# Patient Record
Sex: FEMALE | Race: WHITE | NOT HISPANIC OR LATINO | Employment: OTHER | URBAN - METROPOLITAN AREA
[De-identification: names, ages, dates, MRNs, and addresses within clinical notes are randomized per-mention and may not be internally consistent; named-entity substitution may affect disease eponyms.]

---

## 2017-02-15 ENCOUNTER — OFFICE VISIT (OUTPATIENT)
Dept: PEDIATRICS | Facility: CLINIC | Age: 72
End: 2017-02-15
Payer: MEDICARE

## 2017-02-15 VITALS
HEIGHT: 65 IN | HEART RATE: 52 BPM | BODY MASS INDEX: 40.15 KG/M2 | TEMPERATURE: 97.7 F | SYSTOLIC BLOOD PRESSURE: 120 MMHG | DIASTOLIC BLOOD PRESSURE: 80 MMHG | WEIGHT: 241 LBS

## 2017-02-15 DIAGNOSIS — I48.19 PERSISTENT ATRIAL FIBRILLATION (H): ICD-10-CM

## 2017-02-15 DIAGNOSIS — I10 BENIGN HYPERTENSION: ICD-10-CM

## 2017-02-15 DIAGNOSIS — E11.8 TYPE 2 DIABETES MELLITUS WITH COMPLICATION, WITHOUT LONG-TERM CURRENT USE OF INSULIN (H): ICD-10-CM

## 2017-02-15 DIAGNOSIS — E78.5 HYPERLIPIDEMIA LDL GOAL <100: Primary | ICD-10-CM

## 2017-02-15 LAB — HBA1C MFR BLD: 7.5 % (ref 4.3–6)

## 2017-02-15 PROCEDURE — 80061 LIPID PANEL: CPT | Performed by: INTERNAL MEDICINE

## 2017-02-15 PROCEDURE — 99207 C FOOT EXAM  NO CHARGE: CPT | Performed by: INTERNAL MEDICINE

## 2017-02-15 PROCEDURE — 99214 OFFICE O/P EST MOD 30 MIN: CPT | Performed by: INTERNAL MEDICINE

## 2017-02-15 PROCEDURE — 36415 COLL VENOUS BLD VENIPUNCTURE: CPT | Performed by: INTERNAL MEDICINE

## 2017-02-15 PROCEDURE — 80053 COMPREHEN METABOLIC PANEL: CPT | Performed by: INTERNAL MEDICINE

## 2017-02-15 PROCEDURE — 83036 HEMOGLOBIN GLYCOSYLATED A1C: CPT | Performed by: INTERNAL MEDICINE

## 2017-02-15 RX ORDER — METFORMIN HCL 500 MG
500 TABLET, EXTENDED RELEASE 24 HR ORAL 2 TIMES DAILY WITH MEALS
Qty: 180 TABLET | Refills: 0 | Status: SHIPPED | OUTPATIENT
Start: 2017-02-15 | End: 2018-01-10

## 2017-02-15 RX ORDER — PRAVASTATIN SODIUM 40 MG
40 TABLET ORAL DAILY
Qty: 90 TABLET | Refills: 1 | Status: SHIPPED | OUTPATIENT
Start: 2017-02-15 | End: 2017-06-27

## 2017-02-15 NOTE — PROGRESS NOTES
SUBJECTIVE:                                                      SUBJECTIVE:                                                    Hima Griffiths is a 71 year old female who presents to clinic today for the following health issues:        Diabetes Follow-up      Patient is checking blood sugars: 3x's times a week 120 in am    Diabetic concerns: None     Symptoms of hypoglycemia (low blood sugar): none     Paresthesias (numbness or burning in feet) or sores: No      Date of last diabetic eye exam: 1/16     Hyperlipidemia Follow-Up      Rate your low fat/cholesterol diet?: fair    Taking statin?  Yes, no muscle aches from statin    Other lipid medications/supplements?:  none     Hypertension Follow-up      Outpatient blood pressures are being checked at [facility].  Results are 120/80.    Low Salt Diet: low salt         Amount of exercise or physical activity: 1-2day/week for an average of less than 15 minutes    Problems taking medications regularly: No    Medication side effects: yes, diarrhea from metformin, only taking one pill  Diet: regular (no restrictions)    Problem list and histories reviewed & adjusted, as indicated.  Additional history: as documented    Patient Active Problem List   Diagnosis     Advanced directives, counseling/discussion     Persistent atrial fibrillation (H)     Benign paroxysmal positional vertigo     H/O ischemic left MCA stroke     Family history of atrial fibrillation     Type 2 diabetes mellitus with complication, without long-term current use of insulin (H)     Benign hypertension     Facial droop     BPPV (benign paroxysmal positional vertigo)     Colitis     Past Surgical History   Procedure Laterality Date     As loop recorder implant  11/9/15     Cataract iol, rt/lt  8/13/14       Social History   Substance Use Topics     Smoking status: Former Smoker     Start date: 10/3/1963     Quit date: 10/3/2006     Smokeless tobacco: Not on file     Alcohol use Yes     Family  "History   Problem Relation Age of Onset     Hypertension Mother      Arrhythmia Mother      Hypertension Father      Coronary Artery Disease Father            ROS:  Constitutional, HEENT, cardiovascular, pulmonary, GI, , musculoskeletal, neuro, skin, endocrine and psych systems are negative, except as otherwise noted.    OBJECTIVE:                                                    /80 (Cuff Size: Adult Large)  Pulse 52  Temp 97.7  F (36.5  C) (Oral)  Ht 5' 4.57\" (1.64 m)  Wt 241 lb (109.3 kg)  BMI 40.64 kg/m2  Body mass index is 40.64 kg/(m^2).  GENERAL: healthy, alert and no distress  NECK: no adenopathy, no asymmetry, masses, or scars and thyroid normal to palpation  RESP: lungs clear to auscultation - no rales, rhonchi or wheezes  CV: regular rate and rhythm, normal S1 S2, no S3 or S4, no murmur, click or rub, no peripheral edema and peripheral pulses strong  ABDOMEN: soft, nontender, no hepatosplenomegaly, no masses and bowel sounds normal  MS: no gross musculoskeletal defects noted, no edema  Diabetic foot exam: normal DP and PT pulses, no trophic changes or ulcerative lesions, normal sensory exam and normal monofilament exam    Diagnostic Test Results:  Results for orders placed or performed in visit on 02/15/17   Hemoglobin A1c   Result Value Ref Range    Hemoglobin A1C 7.5 (H) 4.3 - 6.0 %   Lipid panel reflex to direct LDL   Result Value Ref Range    Cholesterol 107 <200 mg/dL    Triglycerides 100 <150 mg/dL    HDL Cholesterol 51 >49 mg/dL    LDL Cholesterol Calculated 36 <100 mg/dL    Non HDL Cholesterol 56 <130 mg/dL   Comprehensive metabolic panel   Result Value Ref Range    Sodium 139 133 - 144 mmol/L    Potassium 4.8 3.4 - 5.3 mmol/L    Chloride 104 94 - 109 mmol/L    Carbon Dioxide 22 20 - 32 mmol/L    Anion Gap 13 3 - 14 mmol/L    Glucose 215 (H) 70 - 99 mg/dL    Urea Nitrogen 18 7 - 30 mg/dL    Creatinine 0.94 0.52 - 1.04 mg/dL    GFR Estimate 58 (L) >60 mL/min/1.7m2    GFR Estimate If Black " 70 >60 mL/min/1.7m2    Calcium 9.5 8.5 - 10.1 mg/dL    Bilirubin Total 0.6 0.2 - 1.3 mg/dL    Albumin 3.7 3.4 - 5.0 g/dL    Protein Total 8.2 6.8 - 8.8 g/dL    Alkaline Phosphatase 90 40 - 150 U/L    ALT 25 0 - 50 U/L    AST 17 0 - 45 U/L        ASSESSMENT/PLAN:                                                      1. Hyperlipidemia LDL goal <100  Will switch to pravastatin due to also being on diltiazem.   - pravastatin (PRAVACHOL) 40 MG tablet; Take 1 tablet (40 mg) by mouth daily  Dispense: 90 tablet; Refill: 1    2. Type 2 diabetes mellitus with complication, without long-term current use of insulin (H)  Will increase metformin. She will let me know if she has GI side effects  - FOOT EXAM  - Hemoglobin A1c  - Lipid panel reflex to direct LDL  - Comprehensive metabolic panel  - metFORMIN (GLUCOPHAGE-XR) 500 MG 24 hr tablet; Take 1 tablet (500 mg) by mouth 2 times daily (with meals)  Dispense: 180 tablet; Refill: 0    3. Persistent atrial fibrillation (H)  Stable with good rate control.    4. Benign hypertension  Repeat blood pressure at nurse visit.       Patient Instructions   Try increasing the metformin XR to twice daily with meals.    Stop lovastatin and start pravastatin 40 mg daily.  We should repeat fasting labs in 3 months. See me a few days after.    Come in for a nurse visit blood pressure/pulse check in 2 weeks. Check your pulse and call if >100 at rest.      Georgiana Belle MD  [facility]

## 2017-02-15 NOTE — NURSING NOTE
"Chief Complaint   Patient presents with     Recheck Medication       Initial There were no vitals taken for this visit. Estimated body mass index is 38.33 kg/(m^2) as calculated from the following:    Height as of 11/14/16: 5' 6\" (1.676 m).    Weight as of 12/12/16: 237 lb 8 oz (107.7 kg).  Medication Reconciliation: complete   Patience Henry LPN    "

## 2017-02-15 NOTE — MR AVS SNAPSHOT
"              After Visit Summary   2/15/2017    Hima Griffiths    MRN: [id number]           Patient Information     Date Of Birth          1945        Visit Information        Provider Department      2/15/2017 9:30 AM Georgiana Joy MD [facility]        Today's Diagnoses     Hyperlipidemia LDL goal <100    -  1    Type 2 diabetes mellitus with complication, without long-term current use of insulin (H)        Persistent atrial fibrillation (H)        Benign hypertension          Care Instructions    Try increasing the metformin XR to twice daily with meals.    Stop lovastatin and start pravastatin 40 mg daily.  We should repeat fasting labs in 3 months. See me a few days after.    Come in for a nurse visit blood pressure/pulse check in 2 weeks. Check your pulse and call if >100 at rest.        Follow-ups after your visit        Who to contact     If you have questions or need follow up information about today's clinic visit or your schedule please contact [facility] directly at [phone].  Normal or non-critical lab and imaging results will be communicated to you by [employer], letter or phone within 4 business days after the clinic has received the results. If you do not hear from us within 7 days, please contact the clinic through [email or web address] or phone. If you have a critical or abnormal lab result, we will notify you by phone as soon as possible.  Submit refill requests through [employer] or call your pharmacy and they will forward the refill request to us. Please allow 3 business days for your refill to be completed.          Additional Information About Your Visit        [employer] Information     [employer] lets you send messages to your doctor, view your test results, renew your prescriptions, schedule appointments and more. To sign up, go to [email or web address] . Click on \"Log in\" on the left side of the screen, which will take you to the Welcome page. Then click on \"Sign up Now\" " "on the right side of the page.     You will be asked to enter the access code listed below, as well as some personal information. Please follow the directions to create your username and password.     Your access code is: [email or web address]  Expires: 2017 10:36 AM     Your access code will  in 90 days. If you need help or a new code, please call your [place] clinic or [phone].        Care EveryWhere ID     This is your Care EveryWhere ID. This could be used by other organizations to access your [place] medical records  [id number]        Your Vitals Were     Pulse Temperature Height BMI (Body Mass Index)          52 97.7  F (36.5  C) (Oral) 5' 4.57\" (1.64 m) 40.64 kg/m2         Blood Pressure from Last 3 Encounters:   02/15/17 120/80   16 132/84   16 108/66    Weight from Last 3 Encounters:   02/15/17 241 lb (109.3 kg)   16 237 lb 8 oz (107.7 kg)   16 234 lb 8 oz (106.4 kg)              We Performed the Following     Comprehensive metabolic panel     FOOT EXAM     Hemoglobin A1c     Lipid panel reflex to direct LDL          Today's Medication Changes          These changes are accurate as of: 2/15/17 10:36 AM.  If you have any questions, ask your nurse or doctor.               Start taking these medicines.        Dose/Directions    pravastatin 40 MG tablet   Commonly known as:  PRAVACHOL   Used for:  Hyperlipidemia LDL goal <100   Started by:  Georgiana Joy MD        Dose:  40 mg   Take 1 tablet (40 mg) by mouth daily   Quantity:  90 tablet   Refills:  1         These medicines have changed or have updated prescriptions.        Dose/Directions    metFORMIN 500 MG 24 hr tablet   Commonly known as:  GLUCOPHAGE-XR   This may have changed:    - how much to take  - when to take this  - additional instructions   Used for:  Type 2 diabetes mellitus with complication, without long-term current use of insulin (H)        Take 1 tablet at dinner week 1, then week 2 take 2 tablets at " dinner, then week 3 take 3 tablets at dinner   Quantity:  90 tablet   Refills:  0         Stop taking these medicines if you haven't already. Please contact your care team if you have questions.     lovastatin 40 MG tablet   Commonly known as:  MEVACOR   Stopped by:  Georgiana Joy MD                Where to get your medicines      These medications were sent to [facility] - [place], [place] - [address]  [address], [facility] [address]     Phone:  [phone]     pravastatin 40 MG tablet                Primary Care Provider Office Phone # Fax #    Georgiana Joy [phone]       [facility] [address] [place] [address]        Thank you!     Thank you for choosing [facility]  for your care. Our goal is always to provide you with excellent care. Hearing back from our patients is one way we can continue to improve our services. Please take a few minutes to complete the written survey that you may receive in the mail after your visit with us. Thank you!             Your Updated Medication List - Protect others around you: Learn how to safely use, store and throw away your medicines at www.disposemymeds.org.          This list is accurate as of: 2/15/17 10:36 AM.  Always use your most recent med list.                   Brand Name Dispense Instructions for use    diltiazem 30 MG tablet    CARDIZEM    180 tablet    Take 1 tablet (30 mg) by mouth 2 times daily       glipiZIDE 10 MG 24 hr tablet    GLUCOTROL XL    180 tablet    Take 2 tablets (20 mg) by mouth daily       JANUVIA 100 MG tablet   Generic drug:  sitagliptin     90 tablet    Take 1 tablet (100 mg) by mouth daily       metFORMIN 500 MG 24 hr tablet    GLUCOPHAGE-XR    90 tablet    Take 1 tablet at dinner week 1, then week 2 take 2 tablets at dinner, then week 3 take 3 tablets at dinner       metoprolol 100 MG tablet    LOPRESSOR    180  tablet    Take 1 tablet (100 mg) by mouth 2 times daily       pravastatin 40 MG tablet    PRAVACHOL    90 tablet    Take 1 tablet (40 mg) by mouth daily       XARELTO 20 MG Tabs tablet   Generic drug:  rivaroxaban ANTICOAGULANT     90 tablet    Take 1 tablet (20 mg) by mouth daily (with dinner)

## 2017-02-15 NOTE — LETTER
[facility]  [address] [address]                  [phone]   February 20, 2017    Hima Griffiths  [address]   [facility] [address]      Dear Hima,    Here is a summary of your recent test results:    It was so good to see you at your visit.    Your kidney, electrolyte, and liver tests are essentially normal. Your cholesterol looks good.    Your A1C looks better.     We can meet after your repeat lab tests in 3 months. Please let me know if you have questions or concerns.    Your test results are enclosed.      Please contact me if you have any questions.           Thank you very much for choosing [facility]    Best regards,    Georgiana Joy MD        Results for orders placed or performed in visit on 02/15/17   Hemoglobin A1c   Result Value Ref Range    Hemoglobin A1C 7.5 (H) 4.3 - 6.0 %   Lipid panel reflex to direct LDL   Result Value Ref Range    Cholesterol 107 <200 mg/dL    Triglycerides 100 <150 mg/dL    HDL Cholesterol 51 >49 mg/dL    LDL Cholesterol Calculated 36 <100 mg/dL    Non HDL Cholesterol 56 <130 mg/dL   Comprehensive metabolic panel   Result Value Ref Range    Sodium 139 133 - 144 mmol/L    Potassium 4.8 3.4 - 5.3 mmol/L    Chloride 104 94 - 109 mmol/L    Carbon Dioxide 22 20 - 32 mmol/L    Anion Gap 13 3 - 14 mmol/L    Glucose 215 (H) 70 - 99 mg/dL    Urea Nitrogen 18 7 - 30 mg/dL    Creatinine 0.94 0.52 - 1.04 mg/dL    GFR Estimate 58 (L) >60 mL/min/1.7m2    GFR Estimate If Black 70 >60 mL/min/1.7m2    Calcium 9.5 8.5 - 10.1 mg/dL    Bilirubin Total 0.6 0.2 - 1.3 mg/dL    Albumin 3.7 3.4 - 5.0 g/dL    Protein Total 8.2 6.8 - 8.8 g/dL    Alkaline Phosphatase 90 40 - 150 U/L    ALT 25 0 - 50 U/L    AST 17 0 - 45 U/L

## 2017-02-15 NOTE — PATIENT INSTRUCTIONS
Try increasing the metformin XR to twice daily with meals.    Stop lovastatin and start pravastatin 40 mg daily.  We should repeat fasting labs in 3 months. See me a few days after.    Come in for a nurse visit blood pressure/pulse check in 2 weeks. Check your pulse and call if >100 at rest.

## 2017-02-16 LAB
ALBUMIN SERPL-MCNC: 3.7 G/DL (ref 3.4–5)
ALP SERPL-CCNC: 90 U/L (ref 40–150)
ALT SERPL W P-5'-P-CCNC: 25 U/L (ref 0–50)
ANION GAP SERPL CALCULATED.3IONS-SCNC: 13 MMOL/L (ref 3–14)
AST SERPL W P-5'-P-CCNC: 17 U/L (ref 0–45)
BILIRUB SERPL-MCNC: 0.6 MG/DL (ref 0.2–1.3)
BUN SERPL-MCNC: 18 MG/DL (ref 7–30)
CALCIUM SERPL-MCNC: 9.5 MG/DL (ref 8.5–10.1)
CHLORIDE SERPL-SCNC: 104 MMOL/L (ref 94–109)
CHOLEST SERPL-MCNC: 107 MG/DL
CO2 SERPL-SCNC: 22 MMOL/L (ref 20–32)
CREAT SERPL-MCNC: 0.94 MG/DL (ref 0.52–1.04)
GFR SERPL CREATININE-BSD FRML MDRD: 58 ML/MIN/1.7M2
GLUCOSE SERPL-MCNC: 215 MG/DL (ref 70–99)
HDLC SERPL-MCNC: 51 MG/DL
LDLC SERPL CALC-MCNC: 36 MG/DL
NONHDLC SERPL-MCNC: 56 MG/DL
POTASSIUM SERPL-SCNC: 4.8 MMOL/L (ref 3.4–5.3)
PROT SERPL-MCNC: 8.2 G/DL (ref 6.8–8.8)
SODIUM SERPL-SCNC: 139 MMOL/L (ref 133–144)
TRIGL SERPL-MCNC: 100 MG/DL

## 2017-02-22 ENCOUNTER — TELEPHONE (OUTPATIENT)
Dept: PEDIATRICS | Facility: CLINIC | Age: 72
End: 2017-02-22

## 2017-02-22 NOTE — TELEPHONE ENCOUNTER
"Pts daughter sent the message below in her own chart in a [employer] msg.    Do you want pt to have OV to do these things?  Please advise.        \"Also, my mom (Hima Griffiths) needs a form filled out for purposes of getting plates for her car in [place] for handicapped parking and also, she forgot to ask but wanted a letter dictated to her residence ([facility]) to indicate that due to her diabetes she wants to be able to purchase just 10 meals versus 20 because she feels many of the meals are too high in sodium, sometimes sugar and carbs. How should we go about getting either of those completed.\"  "

## 2017-02-22 NOTE — LETTER
[facility]  [address] [place] [address]  [phone]      March 9, 2017    RE:  Hima Lanierдмитрийdat                                                                                                                                                       [address]   [facility] [address]            To whom it may concern:    Hima BANDA Hasmukhbob is under my professional care. Because she has diabetes, she prefers and would do better with just 10 meals purchased at a time.        Sincerely,        Georgiana Joy MD

## 2017-02-23 NOTE — TELEPHONE ENCOUNTER
Dr. Joy can address this next week.    Lilo Gallo MD  Internal Medicine/Pediatrics  [facility]

## 2017-03-09 NOTE — TELEPHONE ENCOUNTER
Called and LM to return call to let us know if they want the form mailed or put downstairs for pick-up.  Patience Henry LPN

## 2017-04-06 DIAGNOSIS — I10 BENIGN HYPERTENSION: ICD-10-CM

## 2017-04-06 DIAGNOSIS — Z86.73 H/O ISCHEMIC LEFT MCA STROKE: ICD-10-CM

## 2017-04-06 DIAGNOSIS — I48.19 PERSISTENT ATRIAL FIBRILLATION (H): ICD-10-CM

## 2017-04-06 DIAGNOSIS — E11.8 TYPE 2 DIABETES MELLITUS WITH COMPLICATION, WITHOUT LONG-TERM CURRENT USE OF INSULIN (H): Primary | ICD-10-CM

## 2017-04-07 RX ORDER — RIVAROXABAN 20 MG/1
20 TABLET, FILM COATED ORAL
Qty: 90 TABLET | Refills: 0 | Status: SHIPPED | OUTPATIENT
Start: 2017-04-07 | End: 2017-06-27

## 2017-04-07 NOTE — TELEPHONE ENCOUNTER
Routing refill request to provider for review/approval because:  Due for CBC.     Dr. Joy - would you like the CBC updated?

## 2017-04-07 NOTE — TELEPHONE ENCOUNTER
Future labs ordered. She is due next month and can have done then. Then see me a week after.  Georgiana Joy M.D.

## 2017-05-10 DIAGNOSIS — E78.5 HYPERLIPIDEMIA LDL GOAL <100: ICD-10-CM

## 2017-05-10 NOTE — TELEPHONE ENCOUNTER
Glipizide 10mg tab         Last Written Prescription Date: 12/12/16  Last Fill Quantity: 180, # refills: 0  Last Office Visit with [facility], [facility] or [facility] prescribing provider:  02/15/17        BP Readings from Last 3 Encounters:   02/15/17 120/80   12/12/16 132/84   11/14/16 108/66     Lab Results   Component Value Date    MICROL 33 10/03/2016     Lab Results   Component Value Date    UMALCR 18.07 10/03/2016     Creatinine   Date Value Ref Range Status   02/15/2017 0.94 0.52 - 1.04 mg/dL Final   ]  GFR Estimate   Date Value Ref Range Status   02/15/2017 58 (L) >60 mL/min/1.7m2 Final     Comment:     Non  GFR Calc   10/03/2016 58 (L) >60 mL/min/1.7m2 Final     Comment:     Non  GFR Calc     GFR Estimate If Black   Date Value Ref Range Status   02/15/2017 70 >60 mL/min/1.7m2 Final     Comment:      GFR Calc   10/03/2016 71 >60 mL/min/1.7m2 Final     Comment:      GFR Calc     Lab Results   Component Value Date    CHOL 107 02/15/2017     Lab Results   Component Value Date    HDL 51 02/15/2017     Lab Results   Component Value Date    LDL 36 02/15/2017     Lab Results   Component Value Date    TRIG 100 02/15/2017     No results found for: CHOLHDLRATIO  Lab Results   Component Value Date    AST 17 02/15/2017     Lab Results   Component Value Date    ALT 25 02/15/2017     Lab Results   Component Value Date    A1C 7.5 02/15/2017    A1C 8.5 10/03/2016     Potassium   Date Value Ref Range Status   02/15/2017 4.8 3.4 - 5.3 mmol/L Final

## 2017-05-11 ENCOUNTER — TELEPHONE (OUTPATIENT)
Dept: PEDIATRICS | Facility: CLINIC | Age: 72
End: 2017-05-11

## 2017-05-11 RX ORDER — GLIPIZIDE 10 MG/1
20 TABLET, FILM COATED, EXTENDED RELEASE ORAL DAILY
Qty: 60 TABLET | Refills: 0 | Status: SHIPPED | OUTPATIENT
Start: 2017-05-11 | End: 2017-06-14

## 2017-05-11 NOTE — TELEPHONE ENCOUNTER
OK to hold the xarelto for A fib for 24 hours before surgery.    Harley Gutierrez MD  Internal Medicine and Pediatrics

## 2017-05-11 NOTE — TELEPHONE ENCOUNTER
Patient is completely out of medication and hasn't taken for a week.    Abena Disla,   [facility]

## 2017-05-11 NOTE — TELEPHONE ENCOUNTER
LM on VM to call back and asked to schedule a lab only appt.    Medication is being filled for 1 time refill only due to:  Future labs ordered for follow up labs..     Prescription approved per [facility] Refill Protocol.    Aida Ramirez, RN, BSN, PHN

## 2017-05-11 NOTE — TELEPHONE ENCOUNTER
Call from Dr. Liu.  Patient is scheduled to have 11 teeth pulled on 5/15, Monday.  She is on Xarelto and they are asking if there is possibility to hold this for a day, or if being on Xarelto would be more important and they would just have to deal with possibility of oozing more.  He can be reached at [phone].    Routing to covering provider-please let me know if this should be addressed by PCP.    Lea Oliva RN  Message handled by Nurse Triage.

## 2017-06-01 DIAGNOSIS — I10 BENIGN HYPERTENSION: ICD-10-CM

## 2017-06-01 DIAGNOSIS — I48.19 PERSISTENT ATRIAL FIBRILLATION (H): ICD-10-CM

## 2017-06-01 DIAGNOSIS — Z86.73 H/O ISCHEMIC LEFT MCA STROKE: ICD-10-CM

## 2017-06-01 DIAGNOSIS — E11.8 TYPE 2 DIABETES MELLITUS WITH COMPLICATION, WITHOUT LONG-TERM CURRENT USE OF INSULIN (H): ICD-10-CM

## 2017-06-01 LAB
ALBUMIN SERPL-MCNC: 3.5 G/DL (ref 3.4–5)
ALP SERPL-CCNC: 91 U/L (ref 40–150)
ALT SERPL W P-5'-P-CCNC: 30 U/L (ref 0–50)
ANION GAP SERPL CALCULATED.3IONS-SCNC: 8 MMOL/L (ref 3–14)
AST SERPL W P-5'-P-CCNC: 23 U/L (ref 0–45)
BILIRUB SERPL-MCNC: 0.6 MG/DL (ref 0.2–1.3)
BUN SERPL-MCNC: 18 MG/DL (ref 7–30)
CALCIUM SERPL-MCNC: 8.9 MG/DL (ref 8.5–10.1)
CHLORIDE SERPL-SCNC: 107 MMOL/L (ref 94–109)
CHOLEST SERPL-MCNC: 136 MG/DL
CO2 SERPL-SCNC: 24 MMOL/L (ref 20–32)
CREAT SERPL-MCNC: 1.03 MG/DL (ref 0.52–1.04)
ERYTHROCYTE [DISTWIDTH] IN BLOOD BY AUTOMATED COUNT: 11.5 % (ref 10–15)
GFR SERPL CREATININE-BSD FRML MDRD: 52 ML/MIN/1.7M2
GLUCOSE SERPL-MCNC: 185 MG/DL (ref 70–99)
HBA1C MFR BLD: 7.7 % (ref 4.3–6)
HCT VFR BLD AUTO: 42.8 % (ref 35–47)
HDLC SERPL-MCNC: 45 MG/DL
HGB BLD-MCNC: 14.6 G/DL (ref 11.7–15.7)
LDLC SERPL CALC-MCNC: 58 MG/DL
MCH RBC QN AUTO: 34.4 PG (ref 26.5–33)
MCHC RBC AUTO-ENTMCNC: 34.1 G/DL (ref 31.5–36.5)
MCV RBC AUTO: 101 FL (ref 78–100)
NONHDLC SERPL-MCNC: 91 MG/DL
PLATELET # BLD AUTO: 177 10E9/L (ref 150–450)
POTASSIUM SERPL-SCNC: 4.5 MMOL/L (ref 3.4–5.3)
PROT SERPL-MCNC: 7.9 G/DL (ref 6.8–8.8)
RBC # BLD AUTO: 4.24 10E12/L (ref 3.8–5.2)
SODIUM SERPL-SCNC: 139 MMOL/L (ref 133–144)
TRIGL SERPL-MCNC: 163 MG/DL
WBC # BLD AUTO: 9.1 10E9/L (ref 4–11)

## 2017-06-01 PROCEDURE — 80061 LIPID PANEL: CPT | Performed by: INTERNAL MEDICINE

## 2017-06-01 PROCEDURE — 83036 HEMOGLOBIN GLYCOSYLATED A1C: CPT | Performed by: INTERNAL MEDICINE

## 2017-06-01 PROCEDURE — 36415 COLL VENOUS BLD VENIPUNCTURE: CPT | Performed by: INTERNAL MEDICINE

## 2017-06-01 PROCEDURE — 80053 COMPREHEN METABOLIC PANEL: CPT | Performed by: INTERNAL MEDICINE

## 2017-06-01 PROCEDURE — 85027 COMPLETE CBC AUTOMATED: CPT | Performed by: INTERNAL MEDICINE

## 2017-06-12 DIAGNOSIS — E11.8 TYPE 2 DIABETES MELLITUS WITH COMPLICATION, WITHOUT LONG-TERM CURRENT USE OF INSULIN (H): Primary | ICD-10-CM

## 2017-06-13 NOTE — TELEPHONE ENCOUNTER
JANUVIA 100 MG tablet         Last Written Prescription Date: 12/12/2016  Last Fill Quantity: 90, # refills: 1  Last Office Visit with [facility], P or [facility] prescribing provider:  2/15/2017        BP Readings from Last 3 Encounters:   02/15/17 120/80   12/12/16 132/84   11/14/16 108/66     Lab Results   Component Value Date    MICROL 33 10/03/2016     Lab Results   Component Value Date    UMALCR 18.07 10/03/2016     Creatinine   Date Value Ref Range Status   06/01/2017 1.03 0.52 - 1.04 mg/dL Final   ]  GFR Estimate   Date Value Ref Range Status   06/01/2017 52 (L) >60 mL/min/1.7m2 Final     Comment:     Non  GFR Calc   02/15/2017 58 (L) >60 mL/min/1.7m2 Final     Comment:     Non  GFR Calc   10/03/2016 58 (L) >60 mL/min/1.7m2 Final     Comment:     Non  GFR Calc     GFR Estimate If Black   Date Value Ref Range Status   06/01/2017 63 >60 mL/min/1.7m2 Final     Comment:      GFR Calc   02/15/2017 70 >60 mL/min/1.7m2 Final     Comment:      GFR Calc   10/03/2016 71 >60 mL/min/1.7m2 Final     Comment:      GFR Calc     Lab Results   Component Value Date    CHOL 136 06/01/2017     Lab Results   Component Value Date    HDL 45 06/01/2017     Lab Results   Component Value Date    LDL 58 06/01/2017     Lab Results   Component Value Date    TRIG 163 06/01/2017     No results found for: CHOLHDLRATIO  Lab Results   Component Value Date    AST 23 06/01/2017     Lab Results   Component Value Date    ALT 30 06/01/2017     Lab Results   Component Value Date    A1C 7.7 06/01/2017    A1C 7.5 02/15/2017    A1C 8.5 10/03/2016     Potassium   Date Value Ref Range Status   06/01/2017 4.5 3.4 - 5.3 mmol/L Final

## 2017-06-13 NOTE — TELEPHONE ENCOUNTER
glipiZIDE (GLUCOTROL XL) 10 MG 24 hr tablet         Last Written Prescription Date: 5/11/2017  Last Fill Quantity: 60, # refills: 0  Last Office Visit with [facility], [facility] or [facility] prescribing provider:  2/15/2017        BP Readings from Last 3 Encounters:   02/15/17 120/80   12/12/16 132/84   11/14/16 108/66     Lab Results   Component Value Date    MICROL 33 10/03/2016     Lab Results   Component Value Date    UMALCR 18.07 10/03/2016     Creatinine   Date Value Ref Range Status   06/01/2017 1.03 0.52 - 1.04 mg/dL Final   ]  GFR Estimate   Date Value Ref Range Status   06/01/2017 52 (L) >60 mL/min/1.7m2 Final     Comment:     Non  GFR Calc   02/15/2017 58 (L) >60 mL/min/1.7m2 Final     Comment:     Non  GFR Calc   10/03/2016 58 (L) >60 mL/min/1.7m2 Final     Comment:     Non  GFR Calc     GFR Estimate If Black   Date Value Ref Range Status   06/01/2017 63 >60 mL/min/1.7m2 Final     Comment:      GFR Calc   02/15/2017 70 >60 mL/min/1.7m2 Final     Comment:      GFR Calc   10/03/2016 71 >60 mL/min/1.7m2 Final     Comment:      GFR Calc     Lab Results   Component Value Date    CHOL 136 06/01/2017     Lab Results   Component Value Date    HDL 45 06/01/2017     Lab Results   Component Value Date    LDL 58 06/01/2017     Lab Results   Component Value Date    TRIG 163 06/01/2017     No results found for: CHOLHDLRATIO  Lab Results   Component Value Date    AST 23 06/01/2017     Lab Results   Component Value Date    ALT 30 06/01/2017     Lab Results   Component Value Date    A1C 7.7 06/01/2017    A1C 7.5 02/15/2017    A1C 8.5 10/03/2016     Potassium   Date Value Ref Range Status   06/01/2017 4.5 3.4 - 5.3 mmol/L Final

## 2017-06-14 RX ORDER — GLIPIZIDE 10 MG/1
20 TABLET, FILM COATED, EXTENDED RELEASE ORAL DAILY
Qty: 60 TABLET | Refills: 0 | Status: SHIPPED | OUTPATIENT
Start: 2017-06-14 | End: 2017-06-27

## 2017-06-14 RX ORDER — SITAGLIPTIN 100 MG/1
100 TABLET, FILM COATED ORAL DAILY
Qty: 90 TABLET | Refills: 1 | OUTPATIENT
Start: 2017-06-14

## 2017-06-14 RX ORDER — SITAGLIPTIN 100 MG/1
100 TABLET, FILM COATED ORAL DAILY
Qty: 90 TABLET | Refills: 1 | Status: SHIPPED | OUTPATIENT
Start: 2017-06-14 | End: 2017-12-07

## 2017-06-14 RX ORDER — GLIPIZIDE 10 MG/1
20 TABLET, FILM COATED, EXTENDED RELEASE ORAL DAILY
Qty: 60 TABLET | Refills: 0 | OUTPATIENT
Start: 2017-06-14

## 2017-06-27 ENCOUNTER — OFFICE VISIT (OUTPATIENT)
Dept: PEDIATRICS | Facility: CLINIC | Age: 72
End: 2017-06-27
Payer: MEDICARE

## 2017-06-27 VITALS
SYSTOLIC BLOOD PRESSURE: 108 MMHG | TEMPERATURE: 97.4 F | DIASTOLIC BLOOD PRESSURE: 66 MMHG | BODY MASS INDEX: 39.92 KG/M2 | HEIGHT: 65 IN | WEIGHT: 239.6 LBS

## 2017-06-27 DIAGNOSIS — E78.5 HYPERLIPIDEMIA LDL GOAL <100: ICD-10-CM

## 2017-06-27 DIAGNOSIS — I48.19 PERSISTENT ATRIAL FIBRILLATION (H): ICD-10-CM

## 2017-06-27 DIAGNOSIS — Z86.73 H/O ISCHEMIC LEFT MCA STROKE: ICD-10-CM

## 2017-06-27 DIAGNOSIS — E11.8 TYPE 2 DIABETES MELLITUS WITH COMPLICATION, WITHOUT LONG-TERM CURRENT USE OF INSULIN (H): ICD-10-CM

## 2017-06-27 DIAGNOSIS — I10 BENIGN HYPERTENSION: ICD-10-CM

## 2017-06-27 PROCEDURE — 99214 OFFICE O/P EST MOD 30 MIN: CPT | Performed by: INTERNAL MEDICINE

## 2017-06-27 RX ORDER — PRAVASTATIN SODIUM 40 MG
40 TABLET ORAL DAILY
Qty: 90 TABLET | Refills: 1 | Status: SHIPPED | OUTPATIENT
Start: 2017-06-27 | End: 2018-01-10

## 2017-06-27 RX ORDER — METFORMIN HCL 500 MG
500 TABLET, EXTENDED RELEASE 24 HR ORAL 2 TIMES DAILY WITH MEALS
Qty: 180 TABLET | Refills: 1 | Status: CANCELLED | OUTPATIENT
Start: 2017-06-27

## 2017-06-27 RX ORDER — GLIPIZIDE 10 MG/1
20 TABLET, FILM COATED, EXTENDED RELEASE ORAL DAILY
Qty: 60 TABLET | Refills: 1 | Status: SHIPPED | OUTPATIENT
Start: 2017-06-27 | End: 2017-09-13

## 2017-06-27 RX ORDER — METOPROLOL TARTRATE 100 MG
100 TABLET ORAL 2 TIMES DAILY
Qty: 180 TABLET | Refills: 2 | Status: SHIPPED | OUTPATIENT
Start: 2017-06-27 | End: 2018-01-10

## 2017-06-27 RX ORDER — RIVAROXABAN 20 MG/1
20 TABLET, FILM COATED ORAL
Qty: 90 TABLET | Refills: 1 | Status: SHIPPED | OUTPATIENT
Start: 2017-06-27 | End: 2017-12-21

## 2017-06-27 NOTE — PROGRESS NOTES
SUBJECTIVE:                                                    Hima Griffiths is a 72 year old female who presents to clinic today for the following health issues:    Medication Followup     Taking Medication as prescribed: NO, metformin gives her diarrhea    Side Effects:  None    Medication Helping Symptoms:  yes     Diabetes Follow-up      Patient is checking blood sugars: not at all    Diabetic concerns: None     Symptoms of hypoglycemia (low blood sugar): none     Paresthesias (numbness or burning in feet) or sores: No     Date of last diabetic eye exam:     Hyperlipidemia Follow-Up      Rate your low fat/cholesterol diet?: good    Taking statin?  Yes, no muscle aches from statin    Other lipid medications/supplements?:  none    Hypertension Follow-up      Outpatient blood pressures are not being checked.    Low Salt Diet: no added salt    Cerebrovascular Follow-up      Patient history: ischemic cerebrovascular incident    Residual symptoms: None    Worsened or new symptoms since last visit: No    Daily aspirin use: No    Hypertension controlled: Yes      Problem list and histories reviewed & adjusted, as indicated.  Additional history: as documented    Patient Active Problem List   Diagnosis     Advanced directives, counseling/discussion     Persistent atrial fibrillation (H)     Benign paroxysmal positional vertigo     H/O ischemic left MCA stroke     Family history of atrial fibrillation     Type 2 diabetes mellitus with complication, without long-term current use of insulin (H)     Benign hypertension     Facial droop     Colitis     Past Surgical History:   Procedure Laterality Date     AS LOOP RECORDER IMPLANT  11/9/15     CATARACT IOL, RT/LT  8/13/14       Social History   Substance Use Topics     Smoking status: Former Smoker     Start date: 10/3/1963     Quit date: 10/3/2006     Smokeless tobacco: Not on file     Alcohol use Yes     Family History   Problem Relation Age of Onset     Hypertension  "Mother      Arrhythmia Mother      Hypertension Father      Coronary Artery Disease Father          Current Outpatient Prescriptions   Medication Sig Dispense Refill     XARELTO 20 MG TABS tablet Take 1 tablet (20 mg) by mouth daily (with dinner) 90 tablet 1     glipiZIDE (GLUCOTROL XL) 10 MG 24 hr tablet Take 2 tablets (20 mg) by mouth daily 60 tablet 1     pravastatin (PRAVACHOL) 40 MG tablet Take 1 tablet (40 mg) by mouth daily 90 tablet 1     metoprolol (LOPRESSOR) 100 MG tablet Take 1 tablet (100 mg) by mouth 2 times daily 180 tablet 2     ACE/ARB NOT PRESCRIBED, INTENTIONAL, Please choose reason not prescribed, below       JANUVIA 100 MG tablet Take 1 tablet (100 mg) by mouth daily 90 tablet 1     diltiazem (CARDIZEM) 30 MG tablet Take 1 tablet (30 mg) by mouth 2 times daily 180 tablet 3     metFORMIN (GLUCOPHAGE-XR) 500 MG 24 hr tablet Take 1 tablet (500 mg) by mouth 2 times daily (with meals) (Patient not taking: Reported on 6/27/2017) 180 tablet 0     BP Readings from Last 3 Encounters:   06/27/17 108/66   02/15/17 120/80   12/12/16 132/84    Wt Readings from Last 3 Encounters:   06/27/17 239 lb 9.6 oz (108.7 kg)   02/15/17 241 lb (109.3 kg)   12/12/16 237 lb 8 oz (107.7 kg)                    Reviewed and updated as needed this visit by clinical staff  Tobacco  Allergies  Meds  Med Hx  Surg Hx  Fam Hx  Soc Hx      Reviewed and updated as needed this visit by Provider         ROS:  Constitutional, HEENT, cardiovascular, pulmonary, GI, , musculoskeletal, neuro, skin, endocrine and psych systems are negative, except as otherwise noted.    OBJECTIVE:     /66 (Cuff Size: Adult Large)  Temp 97.4  F (36.3  C) (Oral)  Ht 5' 4.56\" (1.64 m)  Wt 239 lb 9.6 oz (108.7 kg)  BMI 40.42 kg/m2  Body mass index is 40.42 kg/(m^2).  GENERAL: healthy, alert and no distress  NECK: no adenopathy, no asymmetry, masses, or scars and thyroid normal to palpation  RESP: lungs clear to auscultation - no rales, rhonchi " or wheezes  CV: irreg irreg, normal S1 S2, no S3 or S4, no murmur, click or rub  ABDOMEN: soft, nontender, no hepatosplenomegaly, no masses and bowel sounds normal  MS: no gross musculoskeletal defects noted, no edema    Diagnostic Test Results:  Results for orders placed or performed in visit on 06/01/17   **A1C FUTURE anytime   Result Value Ref Range    Hemoglobin A1C 7.7 (H) 4.3 - 6.0 %   **Comprehensive metabolic panel FUTURE anytime   Result Value Ref Range    Sodium 139 133 - 144 mmol/L    Potassium 4.5 3.4 - 5.3 mmol/L    Chloride 107 94 - 109 mmol/L    Carbon Dioxide 24 20 - 32 mmol/L    Anion Gap 8 3 - 14 mmol/L    Glucose 185 (H) 70 - 99 mg/dL    Urea Nitrogen 18 7 - 30 mg/dL    Creatinine 1.03 0.52 - 1.04 mg/dL    GFR Estimate 52 (L) >60 mL/min/1.7m2    GFR Estimate If Black 63 >60 mL/min/1.7m2    Calcium 8.9 8.5 - 10.1 mg/dL    Bilirubin Total 0.6 0.2 - 1.3 mg/dL    Albumin 3.5 3.4 - 5.0 g/dL    Protein Total 7.9 6.8 - 8.8 g/dL    Alkaline Phosphatase 91 40 - 150 U/L    ALT 30 0 - 50 U/L    AST 23 0 - 45 U/L   **CBC with platelets FUTURE anytime   Result Value Ref Range    WBC 9.1 4.0 - 11.0 10e9/L    RBC Count 4.24 3.8 - 5.2 10e12/L    Hemoglobin 14.6 11.7 - 15.7 g/dL    Hematocrit 42.8 35.0 - 47.0 %     (H) 78 - 100 fl    MCH 34.4 (H) 26.5 - 33.0 pg    MCHC 34.1 31.5 - 36.5 g/dL    RDW 11.5 10.0 - 15.0 %    Platelet Count 177 150 - 450 10e9/L   **Lipid panel reflex to direct LDL FUTURE anytime   Result Value Ref Range    Cholesterol 136 <200 mg/dL    Triglycerides 163 (H) <150 mg/dL    HDL Cholesterol 45 (L) >49 mg/dL    LDL Cholesterol Calculated 58 <100 mg/dL    Non HDL Cholesterol 91 <130 mg/dL       ASSESSMENT/PLAN:     1. Persistent atrial fibrillation (H)  Stable. Good rate control. Doing well on xarelto  - XARELTO 20 MG TABS tablet; Take 1 tablet (20 mg) by mouth daily (with dinner)  Dispense: 90 tablet; Refill: 1  - metoprolol (LOPRESSOR) 100 MG tablet; Take 1 tablet (100 mg) by mouth 2  times daily  Dispense: 180 tablet; Refill: 2    2. Type 2 diabetes mellitus with complication, without long-term current use of insulin (H)  Doing reasonably well. Due for diabetes education. Discussed importance of walking.  - glipiZIDE (GLUCOTROL XL) 10 MG 24 hr tablet; Take 2 tablets (20 mg) by mouth daily  Dispense: 60 tablet; Refill: 1  - ACE/ARB NOT PRESCRIBED, INTENTIONAL,; Please choose reason not prescribed, below  - DIABETES EDUCATOR REFERRAL    3. Benign hypertension  stable  - XARELTO 20 MG TABS tablet; Take 1 tablet (20 mg) by mouth daily (with dinner)  Dispense: 90 tablet; Refill: 1    4. Hyperlipidemia LDL goal <100  Tolerating statin  - pravastatin (PRAVACHOL) 40 MG tablet; Take 1 tablet (40 mg) by mouth daily  Dispense: 90 tablet; Refill: 1    5. H/O ischemic left MCA stroke    - XARELTO 20 MG TABS tablet; Take 1 tablet (20 mg) by mouth daily (with dinner)  Dispense: 90 tablet; Refill: 1    Patient Instructions   Let's keep your medications the same.    Try to walk more every day.  If you feel unsteady with your walker, we can have you see a physical therapist.    Set up an appointment with diabetes education.    Try to skip dessert after dinner, especially if it doesn't look delicious.    I spent 25 minutes with this patient face-to-face, of which 50% or greater was spent in counseling and coordination of care with regards to diabetes, diet, walking, blood pressure and afib. Please see plan above.    Georgiana Belle MD  [facility]

## 2017-06-27 NOTE — NURSING NOTE
"Chief Complaint   Patient presents with     RECHECK     follow up on labs and meds       Initial /66 (Cuff Size: Adult Large)  Temp 97.4  F (36.3  C) (Oral)  Ht 5' 4.56\" (1.64 m)  Wt 239 lb 9.6 oz (108.7 kg)  BMI 40.42 kg/m2 Estimated body mass index is 40.42 kg/(m^2) as calculated from the following:    Height as of this encounter: 5' 4.56\" (1.64 m).    Weight as of this encounter: 239 lb 9.6 oz (108.7 kg).  Medication Reconciliation: complete   Patience Henry LPN    "

## 2017-06-27 NOTE — PATIENT INSTRUCTIONS
Let's keep your medications the same.    Try to walk more every day.  If you feel unsteady with your walker, we can have you see a physical therapist.    Set up an appointment with diabetes education.    Try to skip dessert after dinner, especially if it doesn't look delicious.

## 2017-06-27 NOTE — MR AVS SNAPSHOT
After Visit Summary   6/27/2017    Hima Griffiths    MRN: [id number]           Patient Information     Date Of Birth          1945        Visit Information        Provider Department      6/27/2017 9:50 AM Georgiana Joy MD [facility] [place]        Today's Diagnoses     H/O ischemic left MCA stroke        Persistent atrial fibrillation (H)        Type 2 diabetes mellitus with complication, without long-term current use of insulin (H)        Benign hypertension        Hyperlipidemia LDL goal <100        PAF (paroxysmal atrial fibrillation) (H)          Care Instructions    Let's keep your medications the same.    Try to walk more every day.  If you feel unsteady with your walker, we can have you see a physical therapist.    Set up an appointment with diabetes education.    Try to skip dessert after dinner, especially if it doesn't look delicious.          Follow-ups after your visit        Additional Services     DIABETES EDUCATOR REFERRAL       DIABETES SELF MANAGEMENT TRAINING (DSMT)      Your provider has referred you to Diabetes Education: [facility]: Diabetes Education - All [facility] [place] [phone]   [email or web address]    Type of training and number of hours: Previous Diagnosis: Follow-up DSMT - 2 hours.    Medicare covers: 10 hours of initial DSMT in 12 month period from the time of first visit, plus 2 hours of follow-up DSMT annually, and additional hours as requested for insulin training.    Diabetes Type: Type 2 - Diet Control             Diabetes Co-Morbidities: none               A1C Goal:  <8.0       A1C is: Lab Results       Component                Value               Date                       A1C                      7.7                 06/01/2017              If an urgent visit is needed or A1C is above 12, Care Team to call the Diabetes Education Team at [phone] or send an In Basket message to the Diabetes  Education Pool (P DIAB ED-PATIENT CARE).    Diabetes Education Topics: Comprehensive Knowledge Assessment and Instruction    Special Educational Needs Requiring Individual DSMT: None       MEDICAL NUTRITION THERAPY (MNT) for Diabetes    Medical Nutrition Therapy with a Registered Dietitian can be provided in coordination with Diabetes Self-Management Training to assist in achieving optimal diabetes management.    MNT Type and Hours: Do not initiate MNT at this time.                       Medicare will cover: 3 hours initial MNT in 12 month period after first visit, plus 2 hours of follow-up MNT annually    Please be aware that coverage of these services is subject to the terms and limitations of your health insurance plan.  Call member services at your health plan to determine Diabetes Self-Management Training benefits and ask which blood glucose monitor brands are covered by your plan.      Please bring the following with you to your appointment:    (1)  List of current medications   (2)  List of Blood Glucose Monitor brands that are covered by your insurance plan  (3)  Blood Glucose Monitor and log book  (4)   Food records for the 3 days prior to your visit    The Certified Diabetes Educator may make diabetes medication adjustments per the CDE Protocol and Collaborative Practice Agreement.                  Who to contact     If you have questions or need follow up information about today's clinic visit or your schedule please contact [facility] directly at [phone].  Normal or non-critical lab and imaging results will be communicated to you by MyChart, letter or phone within 4 business days after the clinic has received the results. If you do not hear from us within 7 days, please contact the clinic through [facility] or phone. If you have a critical or abnormal lab result, we will notify you by phone as soon as possible.  Submit refill requests through [employer] or call your pharmacy and they will  "forward the refill request to us. Please allow 3 business days for your refill to be completed.          Additional Information About Your Visit        [employer] of [employer] Information     [employer] lets you send messages to your doctor, view your test results, renew your prescriptions, schedule appointments and more. To sign up, go to [email or web address] . Click on \"Log in\" on the left side of the screen, which will take you to the Welcome page. Then click on \"Sign up Now\" on the right side of the page.     You will be asked to enter the access code listed below, as well as some personal information. Please follow the directions to create your username and password.     Your access code is: [email or web address]  Expires: 2017 10:57 AM     Your access code will  in 90 days. If you need help or a new code, please call your [place] clinic or [phone].        Care EveryWhere ID     This is your Care EveryWhere ID. This could be used by other organizations to access your [place] medical records  [id number]        Your Vitals Were     Temperature Height BMI (Body Mass Index)             97.4  F (36.3  C) (Oral) 5' 4.56\" (1.64 m) 40.42 kg/m2          Blood Pressure from Last 3 Encounters:   17 108/66   02/15/17 120/80   16 132/84    Weight from Last 3 Encounters:   17 239 lb 9.6 oz (108.7 kg)   02/15/17 241 lb (109.3 kg)   16 237 lb 8 oz (107.7 kg)              We Performed the Following     DIABETES EDUCATOR REFERRAL          Today's Medication Changes          These changes are accurate as of: 17 10:57 AM.  If you have any questions, ask your nurse or doctor.               Start taking these medicines.        Dose/Directions    ACE/ARB NOT PRESCRIBED (INTENTIONAL)   Used for:  Type 2 diabetes mellitus with complication, without long-term current use of insulin (H)   Started by:  Georgiana Joy MD        Please choose reason not prescribed, below   Refills:  0            Where to get " your medicines      These medications were sent to [facility] - [place], [place] - [address]  [address], [facility] [address]     Phone:  [phone]     glipiZIDE 10 MG 24 hr tablet    metoprolol 100 MG tablet    pravastatin 40 MG tablet    XARELTO 20 MG Tabs tablet         Some of these will need a paper prescription and others can be bought over the counter.  Ask your nurse if you have questions.     You don't need a prescription for these medications     ACE/ARB NOT PRESCRIBED (INTENTIONAL)                Primary Care Provider Office Phone # Fax #    Georgiana Joy [phone]       [facility] [address] [place] [address]        Equal Access to Services     TIFFANIE GUERRIER : Hadii aad ku hadasho Soomaali, waaxda luqadaha, qaybta kaalmada adeegyada, gigi adames . So [facility] [phone].    ATENCIÓN: Si habla español, tiene a ramesh disposición servicios gratuitos de asistencia lingüística. Llame al [phone].    We comply with applicable federal civil rights laws and [place] laws. We do not discriminate on the basis of race, color, national origin, age, disability sex, sexual orientation or gender identity.            Thank you!     Thank you for choosing [facility]  for your care. Our goal is always to provide you with excellent care. Hearing back from our patients is one way we can continue to improve our services. Please take a few minutes to complete the written survey that you may receive in the mail after your visit with us. Thank you!             Your Updated Medication List - Protect others around you: Learn how to safely use, store and throw away your medicines at www.disposemymeds.org.          This list is accurate as of: 6/27/17 10:57 AM.  Always use your most recent med list.                   Brand Name Dispense Instructions for use Diagnosis     ACE/ARB NOT PRESCRIBED (INTENTIONAL)      Please choose reason not prescribed, below    Type 2 diabetes mellitus with complication, without long-term current use of insulin (H)       diltiazem 30 MG tablet    CARDIZEM    180 tablet    Take 1 tablet (30 mg) by mouth 2 times daily    Persistent atrial fibrillation (H)       glipiZIDE 10 MG 24 hr tablet    GLUCOTROL XL    60 tablet    Take 2 tablets (20 mg) by mouth daily    Type 2 diabetes mellitus with complication, without long-term current use of insulin (H)       JANUVIA 100 MG tablet   Generic drug:  sitagliptin     90 tablet    Take 1 tablet (100 mg) by mouth daily    Type 2 diabetes mellitus with complication, without long-term current use of insulin (H)       metFORMIN 500 MG 24 hr tablet    GLUCOPHAGE-XR    180 tablet    Take 1 tablet (500 mg) by mouth 2 times daily (with meals)    Type 2 diabetes mellitus with complication, without long-term current use of insulin (H)       metoprolol 100 MG tablet    LOPRESSOR    180 tablet    Take 1 tablet (100 mg) by mouth 2 times daily    PAF (paroxysmal atrial fibrillation) (H)       pravastatin 40 MG tablet    PRAVACHOL    90 tablet    Take 1 tablet (40 mg) by mouth daily    Hyperlipidemia LDL goal <100       XARELTO 20 MG Tabs tablet   Generic drug:  rivaroxaban ANTICOAGULANT     90 tablet    Take 1 tablet (20 mg) by mouth daily (with dinner)    H/O ischemic left MCA stroke, Persistent atrial fibrillation (H), Type 2 diabetes mellitus with complication, without long-term current use of insulin (H), Benign hypertension

## 2017-07-20 ENCOUNTER — TRANSFERRED RECORDS (OUTPATIENT)
Dept: HEALTH INFORMATION MANAGEMENT | Facility: CLINIC | Age: 72
End: 2017-07-20

## 2017-09-13 DIAGNOSIS — E11.8 TYPE 2 DIABETES MELLITUS WITH COMPLICATION, WITHOUT LONG-TERM CURRENT USE OF INSULIN (H): ICD-10-CM

## 2017-09-13 DIAGNOSIS — I48.19 PERSISTENT ATRIAL FIBRILLATION (H): ICD-10-CM

## 2017-09-14 NOTE — TELEPHONE ENCOUNTER
glipiZIDE (GLUCOTROL XL) 10 MG 24 hr tablet         Last Written Prescription Date: 6/27/2017  Last Fill Quantity: 60, # refills: 1  Last Office Visit with [facility], [facility] or [facility] prescribing provider:  6/27/2017        BP Readings from Last 3 Encounters:   06/27/17 108/66   02/15/17 120/80   12/12/16 132/84     Lab Results   Component Value Date    MICROL 33 10/03/2016     Lab Results   Component Value Date    UMALCR 18.07 10/03/2016     Creatinine   Date Value Ref Range Status   06/01/2017 1.03 0.52 - 1.04 mg/dL Final   ]  GFR Estimate   Date Value Ref Range Status   06/01/2017 52 (L) >60 mL/min/1.7m2 Final     Comment:     Non  GFR Calc   02/15/2017 58 (L) >60 mL/min/1.7m2 Final     Comment:     Non  GFR Calc   10/03/2016 58 (L) >60 mL/min/1.7m2 Final     Comment:     Non  GFR Calc     GFR Estimate If Black   Date Value Ref Range Status   06/01/2017 63 >60 mL/min/1.7m2 Final     Comment:      GFR Calc   02/15/2017 70 >60 mL/min/1.7m2 Final     Comment:      GFR Calc   10/03/2016 71 >60 mL/min/1.7m2 Final     Comment:      GFR Calc     Lab Results   Component Value Date    CHOL 136 06/01/2017     Lab Results   Component Value Date    HDL 45 06/01/2017     Lab Results   Component Value Date    LDL 58 06/01/2017     Lab Results   Component Value Date    TRIG 163 06/01/2017     No results found for: CHOLHDLRATIO  Lab Results   Component Value Date    AST 23 06/01/2017     Lab Results   Component Value Date    ALT 30 06/01/2017     Lab Results   Component Value Date    A1C 7.7 06/01/2017    A1C 7.5 02/15/2017    A1C 8.5 10/03/2016     Potassium   Date Value Ref Range Status   06/01/2017 4.5 3.4 - 5.3 mmol/L Final

## 2017-09-15 RX ORDER — GLIPIZIDE 10 MG/1
20 TABLET, FILM COATED, EXTENDED RELEASE ORAL DAILY
Qty: 180 TABLET | Refills: 0 | Status: SHIPPED | OUTPATIENT
Start: 2017-09-15 | End: 2017-12-21

## 2017-09-15 NOTE — TELEPHONE ENCOUNTER
Routing refill request to provider for review/approval because:  Labs out of range:  gfr low, will forward to Dr. Gutierrez as Dr. Joy is out of the office

## 2017-12-07 DIAGNOSIS — E11.8 TYPE 2 DIABETES MELLITUS WITH COMPLICATION, WITHOUT LONG-TERM CURRENT USE OF INSULIN (H): ICD-10-CM

## 2017-12-11 RX ORDER — SITAGLIPTIN 100 MG/1
100 TABLET, FILM COATED ORAL DAILY
Qty: 30 TABLET | Refills: 0 | Status: SHIPPED | OUTPATIENT
Start: 2017-12-11 | End: 2018-01-10

## 2017-12-11 NOTE — TELEPHONE ENCOUNTER
Requested Prescriptions   Pending Prescriptions Disp Refills     LISSETUVIA 100 MG tablet 90 tablet 1     Sig: Take 1 tablet (100 mg) by mouth daily    DPP4 Inhibitors Protocol Failed    12/7/2017  4:39 PM       Failed - Microalbumin on file in past 12 months    Recent Labs   Lab Test  10/03/16   1303   MICROL  33   UMALCR  18.07            Failed - HgbA1C in past 3 or 6 months    Recent Labs   Lab Test  06/01/17   0816   A1C  7.7*            Passed - Blood pressure less than 140/90 in past 6 months    BP Readings from Last 3 Encounters:   06/27/17 108/66   02/15/17 120/80   12/12/16 132/84                Passed - LDL on file in past 12 months    Recent Labs   Lab Test  06/01/17   0816   LDL  58            Passed - Patient is age 18 or older       Passed - No active pregnancy on record       Passed - Normal serum creatinine in past 12 months    Recent Labs   Lab Test  06/01/17   0816   CR  1.03            Passed - No positive pregnancy test in past 12 months       Passed - Recent visit with authorizing provider's specialty in past 6 months    IV to PO - Antibiotics     None                    Routing refill request to provider for review/approval because:  Labs not current:  Due for an microalbumin and A1C and also an appt    Will forward to the station, pt due for d/m f/u.  Please call to help her schedule.  Thanks!

## 2017-12-21 DIAGNOSIS — I48.19 PERSISTENT ATRIAL FIBRILLATION (H): ICD-10-CM

## 2017-12-21 DIAGNOSIS — Z86.73 H/O ISCHEMIC LEFT MCA STROKE: ICD-10-CM

## 2017-12-21 DIAGNOSIS — E11.8 TYPE 2 DIABETES MELLITUS WITH COMPLICATION, WITHOUT LONG-TERM CURRENT USE OF INSULIN (H): ICD-10-CM

## 2017-12-21 DIAGNOSIS — I10 BENIGN HYPERTENSION: ICD-10-CM

## 2017-12-22 NOTE — TELEPHONE ENCOUNTER
Requested Prescriptions   Pending Prescriptions Disp Refills     XARELTO 20 MG TABS tablet  Last Written Prescription Date:  6/27/2017  Last Fill Quantity: 90 tablet,  # refills: 1   Last Office Visit with [facility], [facility] or [facility] prescribing provider:  6/27/2017   Future Office Visit:    Next 5 appointments (look out 90 days)     Ryan 10, 2018  8:30 AM CST   Office Visit with Georgiana Joy MD   [facility]    [address] [place] [address]  [address] [place] [id number] [phone]                90 tablet 1     Sig: Take 1 tablet (20 mg) by mouth daily (with dinner)    Platelet Inhibitors Passed    12/21/2017  8:13 PM       Passed - Normal ALT on file in past 12 months    Recent Labs   Lab Test  06/01/17   0816   ALT  30          Passed - Normal HGB on file in past 12 months    Recent Labs   Lab Test  06/01/17   0816   HGB  14.6          Passed - Normal AST on file in past 12 months    Recent Labs   Lab Test  06/01/17   0816   AST  23          Passed - Normal Platelets on file in past 12 months    Recent Labs   Lab Test  06/01/17   0816   PLT  177          Passed - Recent or future visit with authorizing provider's specialty    Patient had office visit in the last year or has a visit in the next 30 days with authorizing provider.  See chart review.        Passed - Patient is age 18 or older       Passed - No active pregnancy on record       Passed - Normal serum creatinine on file in past 12 months    Recent Labs   Lab Test  06/01/17   0816   CR  1.03          Passed - No positive pregnancy test in past 12 months              glipiZIDE (GLUCOTROL XL) 10 MG 24 hr tablet  Last Written Prescription Date:  9/15/2017  Last Fill Quantity: 180 tablet,  # refills: 0   Last Office Visit with [facility] or  [facility] prescribing provider:  6/27/2017   Future Office Visit:    Next 5 appointments (look out 90 days)     Ryan 10, 2018  8:30 AM CST   Office Visit with Georgiana Joy MD    [facility]    [address]  [address]  [place] [address] [phone]                180 tablet 0     Sig: Take 2 tablets (20 mg) by mouth daily    Sulfonylurea Agents Failed    12/21/2017  8:13 PM       Failed - Patient has had a Microalbumin in the past 12 mos.    Recent Labs   Lab Test  10/03/16   1303   MICROL  33   UMALCR  18.07          Failed - Patient has documented A1c within the specified period of time.    Recent Labs   Lab Test  06/01/17   0816   A1C  7.7*          Passed - Patient's BP is less than 140/90    BP Readings from Last 3 Encounters:   06/27/17 108/66   02/15/17 120/80   12/12/16 132/84          Passed - Patient has documented LDL within the past 12 mos.    Recent Labs   Lab Test  06/01/17   0816   LDL  58          Passed - Patient is age 18 or older       Passed - No active pregnancy on record       Passed - Patient has a recent creatinine (normal) within the past 12 mos.    Recent Labs   Lab Test  06/01/17   0816   CR  1.03          Passed - Patient has not had a positive pregnancy test within the past 12 mos.       Passed - Patient has had an appointment with authorizing provider within the past 6 mos. or  within next 30 days    Patient had office visit in the last 6 months or has a visit in the next 30 days with authorizing provider.  See chart review.

## 2017-12-27 RX ORDER — GLIPIZIDE 10 MG/1
20 TABLET, FILM COATED, EXTENDED RELEASE ORAL DAILY
Qty: 180 TABLET | Refills: 1 | Status: SHIPPED | OUTPATIENT
Start: 2017-12-27 | End: 2018-01-10

## 2018-01-10 ENCOUNTER — OFFICE VISIT (OUTPATIENT)
Dept: PEDIATRICS | Facility: CLINIC | Age: 73
End: 2018-01-10
Payer: MEDICARE

## 2018-01-10 VITALS
WEIGHT: 252.8 LBS | SYSTOLIC BLOOD PRESSURE: 100 MMHG | HEIGHT: 65 IN | TEMPERATURE: 97.7 F | DIASTOLIC BLOOD PRESSURE: 68 MMHG | HEART RATE: 52 BPM | BODY MASS INDEX: 42.12 KG/M2

## 2018-01-10 DIAGNOSIS — Z23 NEED FOR PROPHYLACTIC VACCINATION AND INOCULATION AGAINST INFLUENZA: ICD-10-CM

## 2018-01-10 DIAGNOSIS — I10 BENIGN HYPERTENSION: ICD-10-CM

## 2018-01-10 DIAGNOSIS — Z86.73 H/O ISCHEMIC LEFT MCA STROKE: ICD-10-CM

## 2018-01-10 DIAGNOSIS — Z78.0 ASYMPTOMATIC POSTMENOPAUSAL STATUS: ICD-10-CM

## 2018-01-10 DIAGNOSIS — R26.81 GAIT INSTABILITY: ICD-10-CM

## 2018-01-10 DIAGNOSIS — E11.8 TYPE 2 DIABETES MELLITUS WITH COMPLICATION, WITHOUT LONG-TERM CURRENT USE OF INSULIN (H): Primary | ICD-10-CM

## 2018-01-10 DIAGNOSIS — Z23 NEED FOR PROPHYLACTIC VACCINATION WITH TETANUS-DIPHTHERIA (TD): ICD-10-CM

## 2018-01-10 DIAGNOSIS — I48.19 PERSISTENT ATRIAL FIBRILLATION (H): ICD-10-CM

## 2018-01-10 DIAGNOSIS — Z23 NEED FOR PROPHYLACTIC VACCINATION AGAINST STREPTOCOCCUS PNEUMONIAE (PNEUMOCOCCUS): ICD-10-CM

## 2018-01-10 DIAGNOSIS — E78.5 HYPERLIPIDEMIA LDL GOAL <100: ICD-10-CM

## 2018-01-10 LAB — HBA1C MFR BLD: 7.8 % (ref 4.3–6)

## 2018-01-10 PROCEDURE — G0008 ADMIN INFLUENZA VIRUS VAC: HCPCS | Performed by: INTERNAL MEDICINE

## 2018-01-10 PROCEDURE — 80053 COMPREHEN METABOLIC PANEL: CPT | Performed by: INTERNAL MEDICINE

## 2018-01-10 PROCEDURE — 80061 LIPID PANEL: CPT | Performed by: INTERNAL MEDICINE

## 2018-01-10 PROCEDURE — 99214 OFFICE O/P EST MOD 30 MIN: CPT | Mod: 25 | Performed by: INTERNAL MEDICINE

## 2018-01-10 PROCEDURE — 90662 IIV NO PRSV INCREASED AG IM: CPT | Performed by: INTERNAL MEDICINE

## 2018-01-10 PROCEDURE — 83036 HEMOGLOBIN GLYCOSYLATED A1C: CPT | Performed by: INTERNAL MEDICINE

## 2018-01-10 PROCEDURE — 36415 COLL VENOUS BLD VENIPUNCTURE: CPT | Performed by: INTERNAL MEDICINE

## 2018-01-10 PROCEDURE — 99207 C FOOT EXAM  NO CHARGE: CPT | Performed by: INTERNAL MEDICINE

## 2018-01-10 RX ORDER — DILTIAZEM HYDROCHLORIDE 30 MG/1
30 TABLET, FILM COATED ORAL 2 TIMES DAILY
Qty: 180 TABLET | Refills: 3 | Status: SHIPPED | OUTPATIENT
Start: 2018-01-10 | End: 2019-01-02

## 2018-01-10 RX ORDER — PRAVASTATIN SODIUM 40 MG
40 TABLET ORAL DAILY
Qty: 90 TABLET | Refills: 3 | Status: SHIPPED | OUTPATIENT
Start: 2018-01-10 | End: 2019-01-02

## 2018-01-10 RX ORDER — METOPROLOL TARTRATE 100 MG
100 TABLET ORAL 2 TIMES DAILY
Qty: 180 TABLET | Refills: 3 | Status: SHIPPED | OUTPATIENT
Start: 2018-01-10 | End: 2019-01-02

## 2018-01-10 RX ORDER — SITAGLIPTIN 100 MG/1
100 TABLET, FILM COATED ORAL DAILY
Qty: 90 TABLET | Refills: 3 | Status: SHIPPED | OUTPATIENT
Start: 2018-01-10 | End: 2019-01-02

## 2018-01-10 RX ORDER — GLIPIZIDE 10 MG/1
20 TABLET, FILM COATED, EXTENDED RELEASE ORAL DAILY
Qty: 180 TABLET | Refills: 3 | Status: SHIPPED | OUTPATIENT
Start: 2018-01-10 | End: 2019-01-02

## 2018-01-10 NOTE — MR AVS SNAPSHOT
After Visit Summary   1/10/2018    Hima Griffiths    MRN: [id number]           Patient Information     Date Of Birth          1945        Visit Information        Provider Department      1/10/2018 8:30 AM Georgiana Joy MD [facility] [place]        Today's Diagnoses     Need for prophylactic vaccination and inoculation against influenza    -  1    Asymptomatic postmenopausal status        Need for prophylactic vaccination against Streptococcus pneumoniae (pneumococcus)        Need for prophylactic vaccination with tetanus-diphtheria (TD)        Persistent atrial fibrillation (H)        Hyperlipidemia LDL goal <100        Type 2 diabetes mellitus with complication, without long-term current use of insulin (H)        H/O ischemic left MCA stroke        Benign hypertension        Gait instability          Care Instructions    Let's keep your medications the same.    I've ordered a homecare visit to look at safety and balance.    Let's meet in 3 months. We can do a nonfasting A1C before you come upstairs for the visit.          Follow-ups after your visit        Additional Services     HOME CARE NURSING REFERRAL       **Order classes of: [facility], [facility] and [facility] will route to the Home Care and Hospice Referral Pool.  Home Care or Hospice will then contact the patient to schedule their appointment.**    If you do not hear from Home Care and Hospice, or you would like to call to schedule, please call the referring place of service that your provider has listed below.  ______________________________________________________________________    Your provider has referred you to: [facility]: [facility] [phone]   [email or web address]    Extended Emergency Contact Information  Primary Emergency Contact: Maida Whitney   [facility]  Home Phone: [phone]  Relation: Relative  Secondary Emergency Contact: Dillon  Santos   [facility]  Home Phone: [phone]  Relation: Son    Patient Anticipated Discharge Date: at home   RN, PT, HHA to begin 24 - 48 hours after discharge.  PLEASE EVALUATE AND TREAT (Evaluation timeline is 24 - 48 hrs. Please call if there is need for a variance to this timeline).    REASON FOR REFERRAL: Fall Risk Assessment: Assessment to be scheduled and completed routine, Member indicated is worried about falls, can stumble over walker, Member indicated use of walker and cane and Member indicates difficulty with chair did not indicate      ADDITIONAL SERVICES NEEDED: none    OTHER PERTINENT INFORMATION: Patient was last seen by provider on January 10, 2018   for diabetes, s/p TIA.    Current Outpatient Prescriptions:  rivaroxaban ANTICOAGULANT (XARELTO) 20 MG TABS tablet, Take 1 tablet (20 mg) by mouth daily (with dinner), Disp: 90 tablet, Rfl: 1  glipiZIDE (GLUCOTROL XL) 10 MG 24 hr tablet, Take 2 tablets (20 mg) by mouth daily, Disp: 180 tablet, Rfl: 1  JANUVIA 100 MG tablet, Take 1 tablet (100 mg) by mouth daily, Disp: 30 tablet, Rfl: 0  pravastatin (PRAVACHOL) 40 MG tablet, Take 1 tablet (40 mg) by mouth daily, Disp: 90 tablet, Rfl: 1  metoprolol (LOPRESSOR) 100 MG tablet, Take 1 tablet (100 mg) by mouth 2 times daily, Disp: 180 tablet, Rfl: 2  diltiazem (CARDIZEM) 30 MG tablet, Take 1 tablet (30 mg) by mouth 2 times daily, Disp: 180 tablet, Rfl: 3  ACE/ARB NOT PRESCRIBED, INTENTIONAL,, Please choose reason not prescribed, below, Disp: , Rfl:       Patient Active Problem List:     Advanced directives, counseling/discussion     Persistent atrial fibrillation (H)     Benign paroxysmal positional vertigo     H/O ischemic left MCA stroke     Family history of atrial fibrillation     Type 2 diabetes mellitus with complication, without long-term current use of insulin (H)     Benign hypertension     Facial droop     Colitis      Documentation of Face to Face and Certification for Home Health Services    I  certify that patient, Hima Griffiths is under my care and that I, or a Nurse Practitioner or Physician's Assistant working with me, had a face-to-face encounter that meets the physician face-to-face encounter requirements with this patient on: 1/10/2018.    This encounter with the patient was in whole, or in part, for the following medical condition, which is the primary reason for Home Health Care: gait instability.    I certify that, based on my findings, the following services are medically necessary Home Health Services: Physical Therapy    My clinical findings support the need for the above services because: Physical Therapy Services are needed to assess and treat the following functional impairments: falls risk assessment, gait instability.    Further, I certify that my clinical findings support that this patient is homebound (i.e. absences from home require considerable and taxing effort and are for medical reasons or [employer] services or infrequently or of short duration when for other reasons) because: Requires assistance of another person or specialized equipment to access medical services because patient: Is unable to exit home safely on own due to: need for assistance of another.    Based on the above findings, I certify that this patient is confined to the home and needs intermittent skilled nursing care, physical therapy and/or speech therapy.  The patient is under my care, and I have initiated the establishment of the plan of care.  This patient will be followed by a physician who will periodically review the plan of care.    Physician/Provider to provide follow up care: Georgiana Joy certified Physician at time of discharge: Georgiana Joy M.D.      Please be aware that coverage of these services is subject to the terms and limitations of your health insurance plan.  Call member services at your health plan with any benefit or coverage questions.                 "  Future tests that were ordered for you today     Open Future Orders        Priority Expected Expires Ordered    DEXA HIP/PELVIS/SPINE - Future Routine  1/10/2019 1/10/2018            Who to contact     If you have questions or need follow up information about today's clinic visit or your schedule please contact [facility] directly at [phone].  Normal or non-critical lab and imaging results will be communicated to you by MyChart, letter or phone within 4 business days after the clinic has received the results. If you do not hear from us within 7 days, please contact the clinic through [employer] or phone. If you have a critical or abnormal lab result, we will notify you by phone as soon as possible.  Submit refill requests through [employer] or call your pharmacy and they will forward the refill request to us. Please allow 3 business days for your refill to be completed.          Additional Information About Your Visit        [employer] Information     [employer] lets you send messages to your doctor, view your test results, renew your prescriptions, schedule appointments and more. To sign up, go to [email or web address] . Click on \"Log in\" on the left side of the screen, which will take you to the Welcome page. Then click on \"Sign up Now\" on the right side of the page.     You will be asked to enter the access code listed below, as well as some personal information. Please follow the directions to create your username and password.     Your access code is: [email or web address]  Expires: 4/10/2018  9:41 AM     Your access code will  in 90 days. If you need help or a new code, please call your [place] clinic or [phone].        Care EveryWhere ID     This is your Care EveryWhere ID. This could be used by other organizations to access your [place] medical records  [id number]        Your Vitals Were     Pulse Temperature Height BMI (Body Mass Index)          52 97.7  F (36.5  C) (Oral) 5' 4.56\" (1.64 m) " 42.64 kg/m2         Blood Pressure from Last 3 Encounters:   01/10/18 100/68   06/27/17 108/66   02/15/17 120/80    Weight from Last 3 Encounters:   01/10/18 252 lb 12.8 oz (114.7 kg)   06/27/17 239 lb 9.6 oz (108.7 kg)   02/15/17 241 lb (109.3 kg)              We Performed the Following     Albumin Random Urine Quantitative with Creat Ratio     Comprehensive metabolic panel     FLU VACCINE, INCREASED ANTIGEN, PRESV FREE, AGE 65+ [99182]     FOOT EXAM     HEMOGLOBIN A1C     HOME CARE NURSING REFERRAL     Lipid panel reflex to direct LDL Fasting     Pneumococcal vaccine 13 valent PCV13 IM (Prevnar) [33046]     Vaccine Administration, Initial [80853]          Where to get your medicines      These medications were sent to "[facility] - [facility] [address]  [address]  [address], [facility] [address]     Phone:  [phone]     diltiazem 30 MG tablet    glipiZIDE 10 MG 24 hr tablet    JANUVIA 100 MG tablet    metoprolol 100 MG tablet    pravastatin 40 MG tablet    rivaroxaban ANTICOAGULANT 20 MG Tabs tablet          Primary Care Provider Office Phone # Fax #    Georgiana Joy [phone] [address] [place] [address]        Equal Access to Services     [facility] AH: Hadii aad ku hadasho Soomaali, waaxda luqadaha, qaybta kaalmada adeegyada, gigi gar. So [facility] [phone].    ATENCIÓN: Si habla español, tiene a ramesh disposición servicios gratuitos de asistencia lingüística. Jose Raul al [phone].    We comply with applicable federal civil rights laws and [place] laws. We do not discriminate on the basis of race, color, national origin, age, disability, sex, sexual orientation, or gender identity.            Thank you!     Thank you for choosing [facility]  for your care. Our goal is always to provide you with excellent care. Hearing back from our patients is one way  we can continue to improve our services. Please take a few minutes to complete the written survey that you may receive in the mail after your visit with us. Thank you!             Your Updated Medication List - Protect others around you: Learn how to safely use, store and throw away your medicines at www.disposemymeds.org.          This list is accurate as of: 1/10/18  9:41 AM.  Always use your most recent med list.                   Brand Name Dispense Instructions for use Diagnosis    ACE/ARB/ARNI NOT PRESCRIBED (INTENTIONAL)      Please choose reason not prescribed, below    Type 2 diabetes mellitus with complication, without long-term current use of insulin (H)       diltiazem 30 MG tablet    CARDIZEM    180 tablet    Take 1 tablet (30 mg) by mouth 2 times daily    Persistent atrial fibrillation (H)       glipiZIDE 10 MG 24 hr tablet    GLUCOTROL XL    180 tablet    Take 2 tablets (20 mg) by mouth daily    Type 2 diabetes mellitus with complication, without long-term current use of insulin (H)       JANUVIA 100 MG tablet   Generic drug:  sitagliptin     90 tablet    Take 1 tablet (100 mg) by mouth daily    Type 2 diabetes mellitus with complication, without long-term current use of insulin (H)       metoprolol 100 MG tablet    LOPRESSOR    180 tablet    Take 1 tablet (100 mg) by mouth 2 times daily    Persistent atrial fibrillation (H)       pravastatin 40 MG tablet    PRAVACHOL    90 tablet    Take 1 tablet (40 mg) by mouth daily    Hyperlipidemia LDL goal <100       rivaroxaban ANTICOAGULANT 20 MG Tabs tablet    XARELTO    90 tablet    Take 1 tablet (20 mg) by mouth daily (with dinner)    H/O ischemic left MCA stroke, Persistent atrial fibrillation (H), Type 2 diabetes mellitus with complication, without long-term current use of insulin (H), Benign hypertension

## 2018-01-10 NOTE — PROGRESS NOTES
SUBJECTIVE:   Hima Griffiths is a 72 year old female who presents to clinic today for the following health issues:      Diabetes Follow-up      Patient is checking blood sugars: once a week and this am 113    Diabetic concerns: None     Symptoms of hypoglycemia (low blood sugar): none     Paresthesias (numbness or burning in feet) or sores: No     Date of last diabetic eye exam: 10/17    Hyperlipidemia Follow-Up      Rate your low fat/cholesterol diet?: fair    Taking statin?  Yes, no muscle aches from statin    Other lipid medications/supplements?:  none    Hypertension Follow-up      Outpatient blood pressures are not being checked.    Low Salt Diet: low salt    No dizziness or lightheadedness.     BP Readings from Last 2 Encounters:   06/27/17 108/66   02/15/17 120/80     Hemoglobin A1C (%)   Date Value   06/01/2017 7.7 (H)   02/15/2017 7.5 (H)     LDL Cholesterol Calculated (mg/dL)   Date Value   06/01/2017 58   02/15/2017 36         Amount of exercise or physical activity: walk the hallways 1 x per day    Problems taking medications regularly: No    Medication side effects: none    Diet: portion control    No falls at home, but does occasionally stumble. Uses a cane or walker most of the time. Walking hallways once a day, two more times to go to meals, etc.       Problem list and histories reviewed & adjusted, as indicated.  Additional history: as documented    Patient Active Problem List   Diagnosis     Advanced directives, counseling/discussion     Persistent atrial fibrillation (H)     Benign paroxysmal positional vertigo     H/O ischemic left MCA stroke     Family history of atrial fibrillation     Type 2 diabetes mellitus with complication, without long-term current use of insulin (H)     Benign hypertension     Facial droop     Colitis     Past Surgical History:   Procedure Laterality Date     AS LOOP RECORDER IMPLANT  11/9/15     CATARACT IOL, RT/LT  8/13/14       Social History   Substance Use  Topics     Smoking status: Former Smoker     Start date: 10/3/1963     Quit date: 10/3/2006     Smokeless tobacco: Never Used     Alcohol use Yes     Family History   Problem Relation Age of Onset     Hypertension Mother      Arrhythmia Mother      Hypertension Father      Coronary Artery Disease Father          Current Outpatient Prescriptions   Medication Sig Dispense Refill     diltiazem (CARDIZEM) 30 MG tablet Take 1 tablet (30 mg) by mouth 2 times daily 180 tablet 3     metoprolol (LOPRESSOR) 100 MG tablet Take 1 tablet (100 mg) by mouth 2 times daily 180 tablet 3     pravastatin (PRAVACHOL) 40 MG tablet Take 1 tablet (40 mg) by mouth daily 90 tablet 3     JANUVIA 100 MG tablet Take 1 tablet (100 mg) by mouth daily 90 tablet 3     glipiZIDE (GLUCOTROL XL) 10 MG 24 hr tablet Take 2 tablets (20 mg) by mouth daily 180 tablet 3     rivaroxaban ANTICOAGULANT (XARELTO) 20 MG TABS tablet Take 1 tablet (20 mg) by mouth daily (with dinner) 90 tablet 3     [DISCONTINUED] rivaroxaban ANTICOAGULANT (XARELTO) 20 MG TABS tablet Take 1 tablet (20 mg) by mouth daily (with dinner) 90 tablet 1     [DISCONTINUED] glipiZIDE (GLUCOTROL XL) 10 MG 24 hr tablet Take 2 tablets (20 mg) by mouth daily 180 tablet 1     [DISCONTINUED] JANUVIA 100 MG tablet Take 1 tablet (100 mg) by mouth daily 30 tablet 0     ACE/ARB NOT PRESCRIBED, INTENTIONAL, Please choose reason not prescribed, below       [DISCONTINUED] pravastatin (PRAVACHOL) 40 MG tablet Take 1 tablet (40 mg) by mouth daily 90 tablet 1     [DISCONTINUED] metoprolol (LOPRESSOR) 100 MG tablet Take 1 tablet (100 mg) by mouth 2 times daily 180 tablet 2     [DISCONTINUED] diltiazem (CARDIZEM) 30 MG tablet Take 1 tablet (30 mg) by mouth 2 times daily 180 tablet 3     Allergies   Allergen Reactions     Sudafed [Pseudoephedrine]      Legs and arms get weak     Recent Labs   Lab Test  01/10/18   0946  06/01/17   0816  02/15/17   1039  10/03/16   1303   A1C  7.8*  7.7*  7.5*  8.5*   LDL   --   " 58  36  46   HDL   --   45*  51  39*   TRIG   --   163*  100  135   ALT   --   30  25  20   CR   --   1.03  0.94  0.94   GFRESTIMATED   --   52*  58*  58*   GFRESTBLACK   --   63  70  71   POTASSIUM   --   4.5  4.8  4.5   TSH   --    --    --   0.74      BP Readings from Last 3 Encounters:   01/10/18 100/68   06/27/17 108/66   02/15/17 120/80    Wt Readings from Last 3 Encounters:   01/10/18 252 lb 12.8 oz (114.7 kg)   06/27/17 239 lb 9.6 oz (108.7 kg)   02/15/17 241 lb (109.3 kg)                          Reviewed and updated as needed this visit by clinical staff     Reviewed and updated as needed this visit by Provider         ROS:  Constitutional, HEENT, cardiovascular, pulmonary, GI, , musculoskeletal, neuro, skin, endocrine and psych systems are negative, except as otherwise noted.      OBJECTIVE:   /68 (Cuff Size: Adult Large)  Pulse 52  Temp 97.7  F (36.5  C) (Oral)  Ht 5' 4.56\" (1.64 m)  Wt 252 lb 12.8 oz (114.7 kg)  BMI 42.64 kg/m2  Body mass index is 42.64 kg/(m^2).  GENERAL: healthy, alert and no distress  NECK: no adenopathy, no asymmetry, masses, or scars and thyroid normal to palpation  RESP: lungs clear to auscultation - no rales, rhonchi or wheezes  CV: irreg irreg rhythm, normal S1 S2, no S3 or S4, no murmur, click or rub, no peripheral edema and peripheral pulses strong  ABDOMEN: soft, nontender, no hepatosplenomegaly, no masses and bowel sounds normal  MS: no gross musculoskeletal defects noted, no edema  Diabetic foot exam: normal DP and PT pulses, no trophic changes or ulcerative lesions, normal sensory exam and normal monofilament exam    Diagnostic Test Results:  Results for orders placed or performed in visit on 01/10/18 (from the past 24 hour(s))   HEMOGLOBIN A1C   Result Value Ref Range    Hemoglobin A1C 7.8 (H) 4.3 - 6.0 %       ASSESSMENT/PLAN:     1. Type 2 diabetes mellitus with complication, without long-term current use of insulin (H)  Stable. A1C today similar. " Discussed goal of 8 vs 7 for A1C. Given age and comorbidities, I do worry about hypoglycemia and think a goal of <8 is reasonable.   - HEMOGLOBIN A1C  - FOOT EXAM  - JANUVIA 100 MG tablet; Take 1 tablet (100 mg) by mouth daily  Dispense: 90 tablet; Refill: 3  - glipiZIDE (GLUCOTROL XL) 10 MG 24 hr tablet; Take 2 tablets (20 mg) by mouth daily  Dispense: 180 tablet; Refill: 3  - rivaroxaban ANTICOAGULANT (XARELTO) 20 MG TABS tablet; Take 1 tablet (20 mg) by mouth daily (with dinner)  Dispense: 90 tablet; Refill: 3  - Lipid panel reflex to direct LDL Fasting  - Comprehensive metabolic panel    2. Persistent atrial fibrillation (H)  Good rate control. Asymptomatic.  - diltiazem (CARDIZEM) 30 MG tablet; Take 1 tablet (30 mg) by mouth 2 times daily  Dispense: 180 tablet; Refill: 3  - metoprolol (LOPRESSOR) 100 MG tablet; Take 1 tablet (100 mg) by mouth 2 times daily  Dispense: 180 tablet; Refill: 3  - rivaroxaban ANTICOAGULANT (XARELTO) 20 MG TABS tablet; Take 1 tablet (20 mg) by mouth daily (with dinner)  Dispense: 90 tablet; Refill: 3    3. Gait instability  Home care eval to see if she needs different equipment.  - HOME CARE NURSING REFERRAL    4. Hyperlipidemia LDL goal <100    - pravastatin (PRAVACHOL) 40 MG tablet; Take 1 tablet (40 mg) by mouth daily  Dispense: 90 tablet; Refill: 3    5. H/O ischemic left MCA stroke    - rivaroxaban ANTICOAGULANT (XARELTO) 20 MG TABS tablet; Take 1 tablet (20 mg) by mouth daily (with dinner)  Dispense: 90 tablet; Refill: 3    6. Benign hypertension    - rivaroxaban ANTICOAGULANT (XARELTO) 20 MG TABS tablet; Take 1 tablet (20 mg) by mouth daily (with dinner)  Dispense: 90 tablet; Refill: 3    7. Need for prophylactic vaccination and inoculation against influenza    - FLU VACCINE, INCREASED ANTIGEN, PRESV FREE, AGE 65+ [92324]  - Vaccine Administration, Initial [05999]    8. Asymptomatic postmenopausal status    - DEXA HIP/PELVIS/SPINE - Future; Future    9. Need for prophylactic  vaccination against Streptococcus pneumoniae (pneumococcus)    - Pneumococcal vaccine 13 valent PCV13 IM (Prevnar) [39907]    10. Need for prophylactic vaccination with tetanus-diphtheria (TD)        Patient Instructions   Let's keep your medications the same.    I've ordered a homecare visit to look at safety and balance.    Let's meet in 3 months. We can do a nonfasting A1C before you come upstairs for the visit.      Georgiana Belle MD  [facility]  Injectable Influenza Immunization Documentation    1.  Is the person to be vaccinated sick today?   No    2. Does the person to be vaccinated have an allergy to a component   of the vaccine?   No  Egg Allergy Algorithm Link    3. Has the person to be vaccinated ever had a serious reaction   to influenza vaccine in the past?   No    4. Has the person to be vaccinated ever had Guillain-Barré syndrome?   No    Form completed by Patience Henry LPN

## 2018-01-10 NOTE — LETTER
[facility]  [address] [place] [id number]                  [phone]   January 26, 2018    Hima Griffiths  [address]   [facility] [address]      Hima,     It was so nice to see you and your daughter at your recent appointment.     Your blood tests look about the same.     I recommend you continue your medications as you are.     I look forward to seeing both of you at your next visit in April.     Best Regards,   Georgiana Joy M.D.         Results for orders placed or performed in visit on 01/10/18   HEMOGLOBIN A1C   Result Value Ref Range    Hemoglobin A1C 7.8 (H) 4.3 - 6.0 %   Lipid panel reflex to direct LDL Fasting   Result Value Ref Range    Cholesterol 141 <200 mg/dL    Triglycerides 166 (H) <150 mg/dL    HDL Cholesterol 53 >49 mg/dL    LDL Cholesterol Calculated 55 <100 mg/dL    Non HDL Cholesterol 88 <130 mg/dL   Comprehensive metabolic panel   Result Value Ref Range    Sodium 137 133 - 144 mmol/L    Potassium 4.7 3.4 - 5.3 mmol/L    Chloride 104 94 - 109 mmol/L    Carbon Dioxide 20 20 - 32 mmol/L    Anion Gap 13 3 - 14 mmol/L    Glucose 210 (H) 70 - 99 mg/dL    Urea Nitrogen 17 7 - 30 mg/dL    Creatinine 1.00 0.52 - 1.04 mg/dL    GFR Estimate 54 (L) >60 mL/min/1.7m2    GFR Estimate If Black 66 >60 mL/min/1.7m2    Calcium 9.0 8.5 - 10.1 mg/dL    Bilirubin Total 0.6 0.2 - 1.3 mg/dL    Albumin 3.7 3.4 - 5.0 g/dL    Protein Total 7.8 6.8 - 8.8 g/dL    Alkaline Phosphatase 80 40 - 150 U/L    ALT 26 0 - 50 U/L    AST 24 0 - 45 U/L

## 2018-01-10 NOTE — PATIENT INSTRUCTIONS
Let's keep your medications the same.    I've ordered a homecare visit to look at safety and balance.    Let's meet in 3 months. We can do a nonfasting A1C before you come upstairs for the visit.

## 2018-01-12 LAB
ALBUMIN SERPL-MCNC: 3.7 G/DL (ref 3.4–5)
ALP SERPL-CCNC: 80 U/L (ref 40–150)
ALT SERPL W P-5'-P-CCNC: 26 U/L (ref 0–50)
ANION GAP SERPL CALCULATED.3IONS-SCNC: 13 MMOL/L (ref 3–14)
AST SERPL W P-5'-P-CCNC: 24 U/L (ref 0–45)
BILIRUB SERPL-MCNC: 0.6 MG/DL (ref 0.2–1.3)
BUN SERPL-MCNC: 17 MG/DL (ref 7–30)
CALCIUM SERPL-MCNC: 9 MG/DL (ref 8.5–10.1)
CHLORIDE SERPL-SCNC: 104 MMOL/L (ref 94–109)
CHOLEST SERPL-MCNC: 141 MG/DL
CO2 SERPL-SCNC: 20 MMOL/L (ref 20–32)
CREAT SERPL-MCNC: 1 MG/DL (ref 0.52–1.04)
GFR SERPL CREATININE-BSD FRML MDRD: 54 ML/MIN/1.7M2
GLUCOSE SERPL-MCNC: 210 MG/DL (ref 70–99)
HDLC SERPL-MCNC: 53 MG/DL
LDLC SERPL CALC-MCNC: 55 MG/DL
NONHDLC SERPL-MCNC: 88 MG/DL
POTASSIUM SERPL-SCNC: 4.7 MMOL/L (ref 3.4–5.3)
PROT SERPL-MCNC: 7.8 G/DL (ref 6.8–8.8)
SODIUM SERPL-SCNC: 137 MMOL/L (ref 133–144)
TRIGL SERPL-MCNC: 166 MG/DL

## 2018-01-15 PROBLEM — E78.5 HYPERLIPIDEMIA LDL GOAL <100: Status: ACTIVE | Noted: 2018-01-15

## 2018-01-16 ENCOUNTER — DOCUMENTATION ONLY (OUTPATIENT)
Dept: CARE COORDINATION | Facility: CLINIC | Age: 73
End: 2018-01-16

## 2018-01-16 NOTE — PROGRESS NOTES
[facility] now requests orders and shares plan of care/discharge summaries for some patients through [employer].  Please REPLY TO THIS MESSAGE in order to give authorization for orders when needed.  This is considered a verbal order, you will still receive a faxed copy of orders for signature.  Thank you for your assistance in improving collaboration for our patients.    PT saw Ms. Griffiths for a home care start of care visit on 1/12/18.  Ms. Griffiths stated she is doing well, is not needing any home care or PT, and is not homebound, driving frequently even in the winter.  Pt was therefore not admitted to home care.

## 2018-07-19 ENCOUNTER — OFFICE VISIT (OUTPATIENT)
Dept: URGENT CARE | Facility: URGENT CARE | Age: 73
End: 2018-07-19
Payer: MEDICARE

## 2018-07-19 VITALS
TEMPERATURE: 97.1 F | SYSTOLIC BLOOD PRESSURE: 116 MMHG | DIASTOLIC BLOOD PRESSURE: 90 MMHG | HEART RATE: 79 BPM | OXYGEN SATURATION: 98 %

## 2018-07-19 DIAGNOSIS — R42 DIZZINESS: Primary | ICD-10-CM

## 2018-07-19 LAB
ALBUMIN SERPL-MCNC: 3.6 G/DL (ref 3.4–5)
ALP SERPL-CCNC: 73 U/L (ref 40–150)
ALT SERPL W P-5'-P-CCNC: 23 U/L (ref 0–50)
ANION GAP SERPL CALCULATED.3IONS-SCNC: 9 MMOL/L (ref 3–14)
AST SERPL W P-5'-P-CCNC: 24 U/L (ref 0–45)
BASOPHILS # BLD AUTO: 0 10E9/L (ref 0–0.2)
BASOPHILS NFR BLD AUTO: 0.2 %
BILIRUB SERPL-MCNC: 0.8 MG/DL (ref 0.2–1.3)
BUN SERPL-MCNC: 11 MG/DL (ref 7–30)
CALCIUM SERPL-MCNC: 9.2 MG/DL (ref 8.5–10.1)
CHLORIDE SERPL-SCNC: 101 MMOL/L (ref 94–109)
CO2 SERPL-SCNC: 29 MMOL/L (ref 20–32)
CREAT SERPL-MCNC: 1 MG/DL (ref 0.52–1.04)
DIFFERENTIAL METHOD BLD: ABNORMAL
EOSINOPHIL # BLD AUTO: 0.1 10E9/L (ref 0–0.7)
EOSINOPHIL NFR BLD AUTO: 0.5 %
ERYTHROCYTE [DISTWIDTH] IN BLOOD BY AUTOMATED COUNT: 11.7 % (ref 10–15)
GFR SERPL CREATININE-BSD FRML MDRD: 54 ML/MIN/1.7M2
GLUCOSE SERPL-MCNC: 266 MG/DL (ref 70–99)
HCT VFR BLD AUTO: 41.8 % (ref 35–47)
HGB BLD-MCNC: 14.7 G/DL (ref 11.7–15.7)
LYMPHOCYTES # BLD AUTO: 1.1 10E9/L (ref 0.8–5.3)
LYMPHOCYTES NFR BLD AUTO: 12.3 %
MCH RBC QN AUTO: 34.8 PG (ref 26.5–33)
MCHC RBC AUTO-ENTMCNC: 35.2 G/DL (ref 31.5–36.5)
MCV RBC AUTO: 99 FL (ref 78–100)
MONOCYTES # BLD AUTO: 0.5 10E9/L (ref 0–1.3)
MONOCYTES NFR BLD AUTO: 5.2 %
NEUTROPHILS # BLD AUTO: 7.6 10E9/L (ref 1.6–8.3)
NEUTROPHILS NFR BLD AUTO: 81.8 %
PLATELET # BLD AUTO: 155 10E9/L (ref 150–450)
POTASSIUM SERPL-SCNC: 4.7 MMOL/L (ref 3.4–5.3)
PROT SERPL-MCNC: 7.6 G/DL (ref 6.8–8.8)
RBC # BLD AUTO: 4.23 10E12/L (ref 3.8–5.2)
SODIUM SERPL-SCNC: 139 MMOL/L (ref 133–144)
WBC # BLD AUTO: 9.3 10E9/L (ref 4–11)

## 2018-07-19 PROCEDURE — 93000 ELECTROCARDIOGRAM COMPLETE: CPT | Performed by: FAMILY MEDICINE

## 2018-07-19 PROCEDURE — 80053 COMPREHEN METABOLIC PANEL: CPT | Performed by: FAMILY MEDICINE

## 2018-07-19 PROCEDURE — 99214 OFFICE O/P EST MOD 30 MIN: CPT | Performed by: FAMILY MEDICINE

## 2018-07-19 PROCEDURE — 85025 COMPLETE CBC W/AUTO DIFF WBC: CPT | Performed by: FAMILY MEDICINE

## 2018-07-19 PROCEDURE — 36415 COLL VENOUS BLD VENIPUNCTURE: CPT | Performed by: FAMILY MEDICINE

## 2018-07-19 RX ORDER — MECLIZINE HYDROCHLORIDE 25 MG/1
25 TABLET ORAL EVERY 6 HOURS PRN
Qty: 30 TABLET | Refills: 0 | Status: SHIPPED | OUTPATIENT
Start: 2018-07-19 | End: 2019-01-29

## 2018-07-19 NOTE — PATIENT INSTRUCTIONS
Okay to take meclizine to help with dizziness.  Follow up with ENT specialist for further evaluation.      Inner Ear Problems: Causes of Dizziness (Vertigo)       Benign positional vertigo (BPV)  This is the most common cause of vertigo. BPV is also called benign positional paroxysmal vertigo (BPPV). It happens when crystals in the ear canals shift into the wrong place. Vertigo usually occurs when you move your head in a certain way. This can happen when turning in bed, bending, or looking up. Because BPV comes on quickly, you should think about if you are safe to drive or do other tasks that need your full attention.  BPV:    Causes vertigo that last for seconds. Vertigo can occur several times a day, depending on body position.    Doesn t cause hearing loss    Often goes away on its own. But it but may go away sooner with treatment.  Infection or inflammation  Sometimes the semicircular canals swell and send incorrect balance signals. This problem may be caused by a viral infection. Depending on the cause, your hearing can be affected (labyrinthitis). Or your hearing can remain normal (neuronitis).  Infection or inflammation:    Causes vertigo that lasts for hours or days. The first episode is usually the worst.    Can cause hearing loss    Often goes away on its own. But it may go away sooner with treatment.  You may need vestibular rehabilitation if you have balance problems that don't go away.  Meniere s disease  This condition is uncommon. It happens when there is too much fluid in the ear canals. This causes increased pressure and swelling. It affects balance and hearing signals.  Meniere s disease may:    Cause vertigo that last for hours    Cause hearing problems that come and go. The problems are usually in one ear and get worse over time.    Cause buzzing or ringing in the ears (tinnitus)    Cause a feeling of fullness or pressure in the ear    Cause any of these symptoms: vertigo, hearing loss, tinnitus,  or ear fullness to last a lifetime  Date Last Reviewed: 11/1/2016 2000-2017 [employer]. [address] [place] [address], [place] [address]. All rights reserved. This information is not intended as a substitute for professional medical care. Always follow your healthcare professional's instructions.

## 2018-07-19 NOTE — PROGRESS NOTES
"SUBJECTIVE:   Hima Griffiths is a 73 year old female presenting with a chief complaint of dizziness, nausea and vomiting.  Felt \"off balance\".  Threw up once.  Denies room spinning.  Onset of symptoms was early this morning.  Course of illness is same.    Severity moderate  Current and Associated symptoms: dizzy, nausea  Treatment measures tried include Fluids and Rest.  Predisposing factors include DM, HTN, a.fib.    Patient has similar problems before but this was several years ago.  Patient states that had struggle with this, medication did not work well for her, had to go to physical therapy.    Denies any recent URI symptoms, no fever.  Denies any chest pain, palpitations.    Past Medical History:   Diagnosis Date     Benign hypertension      Benign paroxysmal positional vertigo      Diabetes mellitus (H)      Family history of atrial fibrillation     Mother     Persistent atrial fibrillation (H) 11/2015     Stroke (H) 10/2015     Current Outpatient Prescriptions   Medication Sig Dispense Refill     ACE/ARB NOT PRESCRIBED, INTENTIONAL, Please choose reason not prescribed, below       diltiazem (CARDIZEM) 30 MG tablet Take 1 tablet (30 mg) by mouth 2 times daily 180 tablet 3     glipiZIDE (GLUCOTROL XL) 10 MG 24 hr tablet Take 2 tablets (20 mg) by mouth daily 180 tablet 3     JANUVIA 100 MG tablet Take 1 tablet (100 mg) by mouth daily 90 tablet 3     metoprolol (LOPRESSOR) 100 MG tablet Take 1 tablet (100 mg) by mouth 2 times daily 180 tablet 3     pravastatin (PRAVACHOL) 40 MG tablet Take 1 tablet (40 mg) by mouth daily 90 tablet 3     rivaroxaban ANTICOAGULANT (XARELTO) 20 MG TABS tablet Take 1 tablet (20 mg) by mouth daily (with dinner) 90 tablet 3     Social History   Substance Use Topics     Smoking status: Former Smoker     Start date: 10/3/1963     Quit date: 10/3/2006     Smokeless tobacco: Never Used     Alcohol use Yes       ROS:  Review of systems negative except as stated above.    OBJECTIVE:  BP " 116/90 (BP Location: Right arm, Patient Position: Chair, Cuff Size: Adult Large)  Pulse 79  Temp 97.1  F (36.2  C) (Tympanic)  SpO2 98%  GENERAL APPEARANCE: healthy, alert and no distress  EYES: EOMI,  PERRL, conjunctiva clear  HENT: ear canals and TM's normal.  Nose and mouth without ulcers, erythema or lesions  NECK: supple, nontender, no lymphadenopathy  RESP: lungs clear to auscultation - no rales, rhonchi or wheezes  CV: irregular rate, rhythm, no murmur noted  Extremities: no peripheral edema or tenderness, peripheral pulses normal  SKIN: no suspicious lesions or rashes  PSYCH: mentation appears normal and affect normal/bright    EKG - a.fib, rate 89, no ST changes c/w ischemia    Results for orders placed or performed in visit on 07/19/18   CBC with platelets and differential   Result Value Ref Range    WBC 9.3 4.0 - 11.0 10e9/L    RBC Count 4.23 3.8 - 5.2 10e12/L    Hemoglobin 14.7 11.7 - 15.7 g/dL    Hematocrit 41.8 35.0 - 47.0 %    MCV 99 78 - 100 fl    MCH 34.8 (H) 26.5 - 33.0 pg    MCHC 35.2 31.5 - 36.5 g/dL    RDW 11.7 10.0 - 15.0 %    Platelet Count 155 150 - 450 10e9/L    Diff Method Automated Method     % Neutrophils 81.8 %    % Lymphocytes 12.3 %    % Monocytes 5.2 %    % Eosinophils 0.5 %    % Basophils 0.2 %    Absolute Neutrophil 7.6 1.6 - 8.3 10e9/L    Absolute Lymphocytes 1.1 0.8 - 5.3 10e9/L    Absolute Monocytes 0.5 0.0 - 1.3 10e9/L    Absolute Eosinophils 0.1 0.0 - 0.7 10e9/L    Absolute Basophils 0.0 0.0 - 0.2 10e9/L   Comprehensive metabolic panel   Result Value Ref Range    Sodium 139 133 - 144 mmol/L    Potassium 4.7 3.4 - 5.3 mmol/L    Chloride 101 94 - 109 mmol/L    Carbon Dioxide 29 20 - 32 mmol/L    Anion Gap 9 3 - 14 mmol/L    Glucose 266 (H) 70 - 99 mg/dL    Urea Nitrogen 11 7 - 30 mg/dL    Creatinine 1.00 0.52 - 1.04 mg/dL    GFR Estimate 54 (L) >60 mL/min/1.7m2    GFR Estimate If Black 65 >60 mL/min/1.7m2    Calcium 9.2 8.5 - 10.1 mg/dL    Bilirubin Total 0.8 0.2 - 1.3 mg/dL     Albumin 3.6 3.4 - 5.0 g/dL    Protein Total 7.6 6.8 - 8.8 g/dL    Alkaline Phosphatase 73 40 - 150 U/L    ALT 23 0 - 50 U/L    AST 24 0 - 45 U/L         ASSESSMENT/PLAN:  (R42) Dizziness  (primary encounter diagnosis)  Comment: inner ear  Plan: EKG 12-lead complete w/read - Clinics, CBC with        platelets and differential, Comprehensive         metabolic panel, meclizine (ANTIVERT) 25 MG         tablet, OTOLARYNGOLOGY REFERRAL            Reassurance given, reviewed etiology for dizziness, discussed labs and within normal limits.  EKG with a.fib which is c/w diagnosis.  Trial of meclizine given for probable inner ear causing dizziness.  Referral to ENT for further evaluation and treatment.    Follow up with ENT for further work up    Gabriel Kaiser MD  July 19, 2018 11:11 AM

## 2018-07-19 NOTE — MR AVS SNAPSHOT
After Visit Summary   7/19/2018    Hima Griffiths    MRN: [id number]           Patient Information     Date Of Birth          1945        Visit Information        Provider Department      7/19/2018 9:25 AM Gabriel Kaiser MD [facility]        Today's Diagnoses     Dizziness    -  1      Care Instructions    Okay to take meclizine to help with dizziness.  Follow up with ENT specialist for further evaluation.      Inner Ear Problems: Causes of Dizziness (Vertigo)       Benign positional vertigo (BPV)  This is the most common cause of vertigo. BPV is also called benign positional paroxysmal vertigo (BPPV). It happens when crystals in the ear canals shift into the wrong place. Vertigo usually occurs when you move your head in a certain way. This can happen when turning in bed, bending, or looking up. Because BPV comes on quickly, you should think about if you are safe to drive or do other tasks that need your full attention.  BPV:    Causes vertigo that last for seconds. Vertigo can occur several times a day, depending on body position.    Doesn t cause hearing loss    Often goes away on its own. But it but may go away sooner with treatment.  Infection or inflammation  Sometimes the semicircular canals swell and send incorrect balance signals. This problem may be caused by a viral infection. Depending on the cause, your hearing can be affected (labyrinthitis). Or your hearing can remain normal (neuronitis).  Infection or inflammation:    Causes vertigo that lasts for hours or days. The first episode is usually the worst.    Can cause hearing loss    Often goes away on its own. But it may go away sooner with treatment.  You may need vestibular rehabilitation if you have balance problems that don't go away.  Meniere s disease  This condition is uncommon. It happens when there is too much fluid in the ear canals. This causes increased pressure and swelling. It affects balance and hearing  signals.  Meniere s disease may:    Cause vertigo that last for hours    Cause hearing problems that come and go. The problems are usually in one ear and get worse over time.    Cause buzzing or ringing in the ears (tinnitus)    Cause a feeling of fullness or pressure in the ear    Cause any of these symptoms: vertigo, hearing loss, tinnitus, or ear fullness to last a lifetime  Date Last Reviewed: 11/1/2016 2000-2017 [employer]. [address] [place] [address]. All rights reserved. This information is not intended as a substitute for professional medical care. Always follow your healthcare professional's instructions.                Follow-ups after your visit        Additional Services     OTOLARYNGOLOGY REFERRAL       Your provider has referred you to: [facility]: [facility] [phone]   [email or web address]  [facility]: [facility] [place] [phone]   [email or web address]    Please be aware that coverage of these services is subject to the terms and limitations of your health insurance plan.  Call member services at your health plan with any benefit or coverage questions.      Please bring the following with you to your appointment:    (1) Any X-Rays, CTs or MRIs which have been performed.  Contact the facility where they were done to arrange for  prior to your scheduled appointment.   (2) List of current medications  (3) This referral request   (4) Any documents/labs given to you for this referral                  Who to contact     If you have questions or need follow up information about today's clinic visit or your schedule please contact [facility] directly at [phone].  Normal or non-critical lab and imaging results will be communicated to you by MyChart, letter or phone within 4 business days after the clinic has received the results. If you do not  "hear from us within 7 days, please contact the clinic through [employer] or phone. If you have a critical or abnormal lab result, we will notify you by phone as soon as possible.  Submit refill requests through [employer] or call your pharmacy and they will forward the refill request to us. Please allow 3 business days for your refill to be completed.          Additional Information About Your Visit        [employer] Information     [employer] lets you send messages to your doctor, view your test results, renew your prescriptions, schedule appointments and more. To sign up, go to [email or web address] . Click on \"Log in\" on the left side of the screen, which will take you to the Welcome page. Then click on \"Sign up Now\" on the right side of the page.     You will be asked to enter the access code listed below, as well as some personal information. Please follow the directions to create your username and password.     Your access code is: [email or web address]  Expires: 10/17/2018 11:08 AM     Your access code will  in 90 days. If you need help or a new code, please call your [place] clinic or [phone].        Care EveryWhere ID     This is your Care EveryWhere ID. This could be used by other organizations to access your [facility] medical records  [id number]        Your Vitals Were     Pulse Temperature Pulse Oximetry             79 97.1  F (36.2  C) (Tympanic) 98%          Blood Pressure from Last 3 Encounters:   18 116/90   01/10/18 100/68   17 108/66    Weight from Last 3 Encounters:   01/10/18 252 lb 12.8 oz (114.7 kg)   17 239 lb 9.6 oz (108.7 kg)   02/15/17 241 lb (109.3 kg)              We Performed the Following     CBC with platelets and differential     Comprehensive metabolic panel     EKG 12-lead complete w/read - Clinics     OTOLARYNGOLOGY REFERRAL          Today's Medication Changes          These changes are accurate as of 18 11:08 AM.  If you have any questions, ask your nurse or " doctor.               Start taking these medicines.        Dose/Directions    meclizine 25 MG tablet   Commonly known as:  ANTIVERT   Used for:  Dizziness   Started by:  Gabriel Kaiser MD        Dose:  25 mg   Take 1 tablet (25 mg) by mouth every 6 hours as needed for dizziness   Quantity:  30 tablet   Refills:  0            Where to get your medicines      These medications were sent to [id number] - [place], [place] - [address]  [address], [facility] [address]     Phone:  [phone]     meclizine 25 MG tablet                Primary Care Provider Office Phone # Fax #    Georgiana Joy [phone] [address] [place] [address]        Equal Access to Services     [facility]: Hadii wojciech ponce hadasho Soomaali, waaxda luqadaha, qaybta kaalmada adeegyada, gigi adames . So [facility] [phone].    ATENCIÓN: Si habla español, tiene a ramesh disposición servicios gratuitos de asistencia lingüística. Llame al [phone].    We comply with applicable federal civil rights laws and [place] laws. We do not discriminate on the basis of race, color, national origin, age, disability, sex, sexual orientation, or gender identity.            Thank you!     Thank you for [facility]  [facility] your care. Our goal is always to provide you with excellent care. Hearing back from our patients is one way we can continue to improve our services. Please take a few minutes to complete the written survey that you may receive in the mail after your visit with us. Thank you!             Your Updated Medication List - Protect others around you: Learn how to safely use, store and throw away your medicines at www.disposemymeds.org.          This list is accurate as of 7/19/18 11:08 AM.  Always use your most recent med list.                   Brand Name Dispense Instructions for use Diagnosis     ACE/ARB/ARNI NOT PRESCRIBED (INTENTIONAL)      Please choose reason not prescribed, below    Type 2 diabetes mellitus with complication, without long-term current use of insulin (H)       diltiazem 30 MG tablet    CARDIZEM    180 tablet    Take 1 tablet (30 mg) by mouth 2 times daily    Persistent atrial fibrillation (H)       glipiZIDE 10 MG 24 hr tablet    GLUCOTROL XL    180 tablet    Take 2 tablets (20 mg) by mouth daily    Type 2 diabetes mellitus with complication, without long-term current use of insulin (H)       JANUVIA 100 MG tablet   Generic drug:  sitagliptin     90 tablet    Take 1 tablet (100 mg) by mouth daily    Type 2 diabetes mellitus with complication, without long-term current use of insulin (H)       meclizine 25 MG tablet    ANTIVERT    30 tablet    Take 1 tablet (25 mg) by mouth every 6 hours as needed for dizziness    Dizziness       metoprolol tartrate 100 MG tablet    LOPRESSOR    180 tablet    Take 1 tablet (100 mg) by mouth 2 times daily    Persistent atrial fibrillation (H)       pravastatin 40 MG tablet    PRAVACHOL    90 tablet    Take 1 tablet (40 mg) by mouth daily    Hyperlipidemia LDL goal <100       rivaroxaban ANTICOAGULANT 20 MG Tabs tablet    XARELTO    90 tablet    Take 1 tablet (20 mg) by mouth daily (with dinner)    H/O ischemic left MCA stroke, Persistent atrial fibrillation (H), Type 2 diabetes mellitus with complication, without long-term current use of insulin (H), Benign hypertension

## 2018-08-09 DIAGNOSIS — Z86.73 H/O ISCHEMIC LEFT MCA STROKE: ICD-10-CM

## 2018-08-09 DIAGNOSIS — E11.8 TYPE 2 DIABETES MELLITUS WITH COMPLICATION, WITHOUT LONG-TERM CURRENT USE OF INSULIN (H): ICD-10-CM

## 2018-08-09 DIAGNOSIS — I48.19 PERSISTENT ATRIAL FIBRILLATION (H): ICD-10-CM

## 2018-08-09 DIAGNOSIS — I10 BENIGN HYPERTENSION: ICD-10-CM

## 2018-08-09 NOTE — TELEPHONE ENCOUNTER
"Requested Prescriptions   Pending Prescriptions Disp Refills     rivaroxaban ANTICOAGULANT (XARELTO) 20 MG TABS tablet  Last Written Prescription Date:  01/10/2018  Last Fill Quantity: 90 tablet,  # refills: 3   Last office visit: 1/10/2018 with prescribing provider:      Georgiana Joy MD        Future Office Visit:     90 tablet 3     Sig: Take 1 tablet (20 mg) by mouth daily (with dinner)    Platelet Inhibitors Passed    8/9/2018  9:43 AM       Passed - Normal ALT on file in past 12 months    Recent Labs   Lab Test  07/19/18   1017   ALT  23            Passed - Normal HGB on file in past 12 months    Recent Labs   Lab Test  07/19/18   1017   HGB  14.7              Passed - Normal AST on file in past 12 months    Recent Labs   Lab Test  07/19/18   1017   AST  24            Passed - Normal Platelets on file in past 12 months    Recent Labs   Lab Test  07/19/18   1017   PLT  155              Passed - Recent (12 mo) or future (30 days) visit within the authorizing provider's specialty    Patient had office visit in the last 12 months or has a visit in the next 30 days with authorizing provider or within the authorizing provider's specialty.  See \"Patient Info\" tab in inbasket, or \"Choose Columns\" in Meds & Orders section of the refill encounter.           Passed - Patient is age 18 or older       Passed - No active pregnancy on record       Passed - Normal serum creatinine on file in past 12 months    Recent Labs   Lab Test  07/19/18   1017   CR  1.00            Passed - No positive pregnancy test in past 12 months          "

## 2019-01-02 ENCOUNTER — OFFICE VISIT (OUTPATIENT)
Dept: PEDIATRICS | Facility: CLINIC | Age: 74
End: 2019-01-02
Payer: COMMERCIAL

## 2019-01-02 VITALS
HEIGHT: 64 IN | HEART RATE: 70 BPM | OXYGEN SATURATION: 96 % | SYSTOLIC BLOOD PRESSURE: 134 MMHG | WEIGHT: 260.4 LBS | TEMPERATURE: 97.5 F | DIASTOLIC BLOOD PRESSURE: 84 MMHG | BODY MASS INDEX: 44.46 KG/M2

## 2019-01-02 DIAGNOSIS — M25.561 CHRONIC PAIN OF RIGHT KNEE: ICD-10-CM

## 2019-01-02 DIAGNOSIS — E78.5 HYPERLIPIDEMIA LDL GOAL <100: ICD-10-CM

## 2019-01-02 DIAGNOSIS — G89.29 CHRONIC PAIN OF RIGHT KNEE: ICD-10-CM

## 2019-01-02 DIAGNOSIS — Z86.73 H/O ISCHEMIC LEFT MCA STROKE: ICD-10-CM

## 2019-01-02 DIAGNOSIS — R26.81 GAIT INSTABILITY: ICD-10-CM

## 2019-01-02 DIAGNOSIS — R19.7 DIARRHEA, UNSPECIFIED TYPE: ICD-10-CM

## 2019-01-02 DIAGNOSIS — E66.01 MORBID OBESITY (H): ICD-10-CM

## 2019-01-02 DIAGNOSIS — I10 BENIGN HYPERTENSION: ICD-10-CM

## 2019-01-02 DIAGNOSIS — E11.8 TYPE 2 DIABETES MELLITUS WITH COMPLICATION, WITHOUT LONG-TERM CURRENT USE OF INSULIN (H): Primary | ICD-10-CM

## 2019-01-02 DIAGNOSIS — I48.19 PERSISTENT ATRIAL FIBRILLATION (H): ICD-10-CM

## 2019-01-02 LAB — HBA1C MFR BLD: 8.9 % (ref 0–5.6)

## 2019-01-02 PROCEDURE — 82043 UR ALBUMIN QUANTITATIVE: CPT | Performed by: INTERNAL MEDICINE

## 2019-01-02 PROCEDURE — 80061 LIPID PANEL: CPT | Performed by: INTERNAL MEDICINE

## 2019-01-02 PROCEDURE — 36415 COLL VENOUS BLD VENIPUNCTURE: CPT | Performed by: INTERNAL MEDICINE

## 2019-01-02 PROCEDURE — 83036 HEMOGLOBIN GLYCOSYLATED A1C: CPT | Performed by: INTERNAL MEDICINE

## 2019-01-02 PROCEDURE — 80053 COMPREHEN METABOLIC PANEL: CPT | Performed by: INTERNAL MEDICINE

## 2019-01-02 PROCEDURE — 99215 OFFICE O/P EST HI 40 MIN: CPT | Performed by: INTERNAL MEDICINE

## 2019-01-02 RX ORDER — GLIPIZIDE 10 MG/1
20 TABLET, FILM COATED, EXTENDED RELEASE ORAL DAILY
Qty: 180 TABLET | Refills: 3 | Status: SHIPPED | OUTPATIENT
Start: 2019-01-02 | End: 2020-01-10

## 2019-01-02 RX ORDER — SITAGLIPTIN 100 MG/1
100 TABLET, FILM COATED ORAL DAILY
Qty: 90 TABLET | Refills: 3 | Status: SHIPPED | OUTPATIENT
Start: 2019-01-02 | End: 2019-12-20

## 2019-01-02 RX ORDER — PRAVASTATIN SODIUM 40 MG
40 TABLET ORAL DAILY
Qty: 90 TABLET | Refills: 3 | Status: SHIPPED | OUTPATIENT
Start: 2019-01-02 | End: 2019-12-18

## 2019-01-02 RX ORDER — METOPROLOL TARTRATE 100 MG
100 TABLET ORAL 2 TIMES DAILY
Qty: 180 TABLET | Refills: 3 | Status: SHIPPED | OUTPATIENT
Start: 2019-01-02 | End: 2019-12-18

## 2019-01-02 RX ORDER — DILTIAZEM HYDROCHLORIDE 30 MG/1
30 TABLET, FILM COATED ORAL 2 TIMES DAILY
Qty: 180 TABLET | Refills: 3 | Status: SHIPPED | OUTPATIENT
Start: 2019-01-02 | End: 2019-12-18

## 2019-01-02 ASSESSMENT — MIFFLIN-ST. JEOR: SCORE: 1657.68

## 2019-01-02 NOTE — LETTER
[facility]  [address] [facility] [address]                  [phone]   January 21, 2019    Hima Griffiths  [address]   [facility] [address]      Hima,     Your diabetes numbers are up, and there is some extra protein in your urine.     We should meet to discuss these results. Please set up appointment at your convenience.     Best Regards,   Georgiana Joy M.D.         Results for orders placed or performed in visit on 01/02/19   Lipid panel reflex to direct LDL Fasting   Result Value Ref Range    Cholesterol 159 <200 mg/dL    Triglycerides 202 (H) <150 mg/dL    HDL Cholesterol 45 (L) >49 mg/dL    LDL Cholesterol Calculated 74 <100 mg/dL    Non HDL Cholesterol 114 <130 mg/dL   Comprehensive metabolic panel   Result Value Ref Range    Sodium 135 133 - 144 mmol/L    Potassium 4.4 3.4 - 5.3 mmol/L    Chloride 103 94 - 109 mmol/L    Carbon Dioxide 23 20 - 32 mmol/L    Anion Gap 9 3 - 14 mmol/L    Glucose 213 (H) 70 - 99 mg/dL    Urea Nitrogen 15 7 - 30 mg/dL    Creatinine 1.00 0.52 - 1.04 mg/dL    GFR Estimate 55 (L) >60 mL/min/[1.73_m2]    GFR Estimate If Black 63 >60 mL/min/[1.73_m2]    Calcium 9.3 8.5 - 10.1 mg/dL    Bilirubin Total 0.7 0.2 - 1.3 mg/dL    Albumin 3.6 3.4 - 5.0 g/dL    Protein Total 8.2 6.8 - 8.8 g/dL    Alkaline Phosphatase 78 40 - 150 U/L    ALT 28 0 - 50 U/L    AST 30 0 - 45 U/L   Hemoglobin A1c   Result Value Ref Range    Hemoglobin A1C 8.9 (H) 0 - 5.6 %   Albumin Random Urine Quantitative with Creat Ratio   Result Value Ref Range    Creatinine Urine 242 mg/dL    Albumin Urine mg/L 233 mg/L    Albumin Urine mg/g Cr 96.28 (H) 0 - 25 mg/g Cr

## 2019-01-02 NOTE — PROGRESS NOTES
SUBJECTIVE:   Hima Griffiths is a 73 year old female who presents to clinic today with her daughter for the following health issues:      Diabetes Follow-up      Patient is checking blood sugars: 1 times a week in am 104    Diabetic concerns: None     Symptoms of hypoglycemia (low blood sugar): none     Paresthesias (numbness or burning in feet) or sores: No     Date of last diabetic eye exam: 10/17 will do soon    Diabetes Management Resources    Hyperlipidemia Follow-Up      Rate your low fat/cholesterol diet?: not monitoring fat    Taking statin?  Yes, no muscle aches from statin    Other lipid medications/supplements?:  none    Hypertension Follow-up      Outpatient blood pressures are not being checked    Low Salt Diet: no added salt    BP Readings from Last 2 Encounters:   07/19/18 116/90   01/10/18 100/68     Hemoglobin A1C (%)   Date Value   01/10/2018 7.8 (H)   06/01/2017 7.7 (H)     LDL Cholesterol Calculated (mg/dL)   Date Value   01/10/2018 55   06/01/2017 58       Amount of exercise or physical activity: None    Problems taking medications regularly: No    Medication side effects: none    Diet: regular (no restrictions)    No falls. Her kids are telling her they think she should start using the walker more. She feels more stable with this. Uses can or walker when going somewhere inside her building. Doesn't use anything in her apartment. Her daughter thinks she gets less tired when using a walker than when she uses a cane.    Also reports she is having knee pain, especially on R. Had a meniscus tear on that knee in past and was surgically repaired. Knee hurts sometimes, not all the time, especially if she is walking more, hurts to get in and out of the car.     Wonders if it is OK to take miralax every day. She is not taking anything regularly. Has about 1 day a week where she has more frequent stools. Then ends up staying home because of the frequent stools.  This has been going on for 6 months.  "No blood or black in stools.     Problem list and histories reviewed & adjusted, as indicated.  Additional history: as documented    Patient Active Problem List   Diagnosis     Advanced directives, counseling/discussion     Persistent atrial fibrillation (H)     Benign paroxysmal positional vertigo     H/O ischemic left MCA stroke     Family history of atrial fibrillation     Type 2 diabetes mellitus with complication, without long-term current use of insulin (H)     Benign hypertension     Facial droop     Colitis     Hyperlipidemia LDL goal <100     Morbid obesity (H)     Past Surgical History:   Procedure Laterality Date     AS LOOP RECORDER IMPLANT  11/9/15     CATARACT IOL, RT/LT  14       Social History     Tobacco Use     Smoking status: Former Smoker     Start date: 10/3/1963     Last attempt to quit: 10/3/2006     Years since quittin.2     Smokeless tobacco: Never Used   Substance Use Topics     Alcohol use: Yes     Family History   Problem Relation Age of Onset     Hypertension Mother      Arrhythmia Mother      Hypertension Father      Coronary Artery Disease Father            Reviewed and updated as needed this visit by clinical staff       Reviewed and updated as needed this visit by Provider         ROS:  Constitutional, HEENT, cardiovascular, pulmonary, gi and gu systems are negative, except as otherwise noted.    OBJECTIVE:     /84 (Cuff Size: Adult Large)   Pulse 70   Temp 97.5  F (36.4  C) (Oral)   Ht 1.62 m (5' 3.78\")   Wt 118.1 kg (260 lb 6.4 oz)   SpO2 96%   BMI 45.01 kg/m    Body mass index is 45.01 kg/m .  GENERAL: healthy, alert and no distress  NECK: no adenopathy, no asymmetry, masses, or scars and thyroid normal to palpation  RESP: lungs clear to auscultation - no rales, rhonchi or wheezes  CV: irregularly irregular rhythm, normal S1 S2, no S3 or S4, no murmur, click or rub, peripheral pulses strong and no peripheral edema  MS: no gross musculoskeletal defects noted, " no edema  Diabetic foot exam: normal DP and PT pulses, no trophic changes or ulcerative lesions and normal monofilament exam    Diagnostic Test Results:  Results for orders placed or performed in visit on 01/02/19 (from the past 24 hour(s))   Hemoglobin A1c   Result Value Ref Range    Hemoglobin A1C 8.9 (H) 0 - 5.6 %       ASSESSMENT/PLAN:     1. Type 2 diabetes mellitus with complication, without long-term current use of insulin (H)  A1C returned after visit completed. Will need follow up to discuss regimen change. She is concerned about cost of januvia. Discussed importance of weight management, which she is not working on.  - glipiZIDE (GLUCOTROL XL) 10 MG 24 hr tablet; Take 2 tablets (20 mg) by mouth daily  Dispense: 180 tablet; Refill: 3  - JANUVIA 100 MG tablet; Take 1 tablet (100 mg) by mouth daily  Dispense: 90 tablet; Refill: 3  - Comprehensive metabolic panel  - Hemoglobin A1c  - Albumin Random Urine Quantitative with Creat Ratio    2. Chronic pain of right knee  Suspect DJD. Defer xrays to orthopedics. Until then, can try topical nsaids.   - ORTHO  REFERRAL  - diclofenac (VOLTAREN) 1 % topical gel; Place 4 g onto the skin 4 times daily  Dispense: 800 g; Refill: 1    3. Morbid obesity (H)  Discussed importance of increasing her activity and cutting down on sweets.    4. Diarrhea  Discussed options for evaluation and treatment. She has no history concerning for infection, no recent abx. Symptoms mild and intermittent. Consider other colitis. I have recommended colonoscopy for evaluation. She is not interested in this time. Did have normal colonoscopy by her report 10 years ago. For now has agreed to do trial on metamucil, but if not better, she has agreed to consider colonoscopy.    4. H/O ischemic left MCA stroke  On xarelto, doing well  - rivaroxaban ANTICOAGULANT (XARELTO) 20 MG TABS tablet; Take 1 tablet (20 mg) by mouth daily (with dinner)  Dispense: 90 tablet; Refill: 3    5. Benign  hypertension  Good control  - rivaroxaban ANTICOAGULANT (XARELTO) 20 MG TABS tablet; Take 1 tablet (20 mg) by mouth daily (with dinner)  Dispense: 90 tablet; Refill: 3    6. Hyperlipidemia LDL goal <100  Tolerating statin  - pravastatin (PRAVACHOL) 40 MG tablet; Take 1 tablet (40 mg) by mouth daily  Dispense: 90 tablet; Refill: 3  - Lipid panel reflex to direct LDL Fasting    7. Persistent atrial fibrillation (H)  Good rate control.  - diltiazem (CARDIZEM) 30 MG tablet; Take 1 tablet (30 mg) by mouth 2 times daily  Dispense: 180 tablet; Refill: 3  - metoprolol tartrate (LOPRESSOR) 100 MG tablet; Take 1 tablet (100 mg) by mouth 2 times daily  Dispense: 180 tablet; Refill: 3  - rivaroxaban ANTICOAGULANT (XARELTO) 20 MG TABS tablet; Take 1 tablet (20 mg) by mouth daily (with dinner)  Dispense: 90 tablet; Refill: 3    8. Gait instability  Declines OT/PT evaluation, but feels more comfortable using walker with seat on it. Also feels she doesn't get as tired when she uses this. If any falls or worsening, she will let me know.     Patient Instructions   Consider adding glucosamine sulfate for the knee pain.    OK to add tylenol 2 tab twice daily.  You can also try diclofenac gel topically to knee 4 times daily.    Add metamucil every day. If you still have days with more stools after 3 months, we should do another colonoscopy.     Shingles vaccine.    I spent 45 minutes with this patient and her daughter face-to-face, of which 50% or greater was spent in counseling and coordination of care with regards to diabetes, weight, knee pain, diarrhea, a fib, anticoagulation, gait instability. Please see plan above.      Georgiana Joy MD  [facility]

## 2019-01-02 NOTE — PATIENT INSTRUCTIONS
Consider adding glucosamine sulfate for the knee pain.    OK to add tylenol 2 tab twice daily.  You can also try diclofenac gel topically to knee 4 times daily.    Add metamucil every day. If you still have days with more stools after 3 months, we should do another colonoscopy.     Shingles vaccine.

## 2019-01-03 LAB
ALBUMIN SERPL-MCNC: 3.6 G/DL (ref 3.4–5)
ALP SERPL-CCNC: 78 U/L (ref 40–150)
ALT SERPL W P-5'-P-CCNC: 28 U/L (ref 0–50)
ANION GAP SERPL CALCULATED.3IONS-SCNC: 9 MMOL/L (ref 3–14)
AST SERPL W P-5'-P-CCNC: 30 U/L (ref 0–45)
BILIRUB SERPL-MCNC: 0.7 MG/DL (ref 0.2–1.3)
BUN SERPL-MCNC: 15 MG/DL (ref 7–30)
CALCIUM SERPL-MCNC: 9.3 MG/DL (ref 8.5–10.1)
CHLORIDE SERPL-SCNC: 103 MMOL/L (ref 94–109)
CHOLEST SERPL-MCNC: 159 MG/DL
CO2 SERPL-SCNC: 23 MMOL/L (ref 20–32)
CREAT SERPL-MCNC: 1 MG/DL (ref 0.52–1.04)
CREAT UR-MCNC: 242 MG/DL
GFR SERPL CREATININE-BSD FRML MDRD: 55 ML/MIN/{1.73_M2}
GLUCOSE SERPL-MCNC: 213 MG/DL (ref 70–99)
HDLC SERPL-MCNC: 45 MG/DL
LDLC SERPL CALC-MCNC: 74 MG/DL
MICROALBUMIN UR-MCNC: 233 MG/L
MICROALBUMIN/CREAT UR: 96.28 MG/G CR (ref 0–25)
NONHDLC SERPL-MCNC: 114 MG/DL
POTASSIUM SERPL-SCNC: 4.4 MMOL/L (ref 3.4–5.3)
PROT SERPL-MCNC: 8.2 G/DL (ref 6.8–8.8)
SODIUM SERPL-SCNC: 135 MMOL/L (ref 133–144)
TRIGL SERPL-MCNC: 202 MG/DL

## 2019-01-09 ENCOUNTER — TRANSFERRED RECORDS (OUTPATIENT)
Dept: HEALTH INFORMATION MANAGEMENT | Facility: CLINIC | Age: 74
End: 2019-01-09

## 2019-01-18 ENCOUNTER — TRANSFERRED RECORDS (OUTPATIENT)
Dept: HEALTH INFORMATION MANAGEMENT | Facility: CLINIC | Age: 74
End: 2019-01-18

## 2019-01-29 ENCOUNTER — OFFICE VISIT (OUTPATIENT)
Dept: PEDIATRICS | Facility: CLINIC | Age: 74
End: 2019-01-29
Payer: COMMERCIAL

## 2019-01-29 VITALS
WEIGHT: 256.1 LBS | TEMPERATURE: 97.5 F | DIASTOLIC BLOOD PRESSURE: 88 MMHG | HEIGHT: 64 IN | HEART RATE: 95 BPM | BODY MASS INDEX: 43.72 KG/M2 | SYSTOLIC BLOOD PRESSURE: 134 MMHG | OXYGEN SATURATION: 97 %

## 2019-01-29 DIAGNOSIS — E11.8 TYPE 2 DIABETES MELLITUS WITH COMPLICATION, WITHOUT LONG-TERM CURRENT USE OF INSULIN (H): Primary | ICD-10-CM

## 2019-01-29 PROCEDURE — 99214 OFFICE O/P EST MOD 30 MIN: CPT | Performed by: INTERNAL MEDICINE

## 2019-01-29 ASSESSMENT — MIFFLIN-ST. JEOR: SCORE: 1638.17

## 2019-01-29 NOTE — PATIENT INSTRUCTIONS
trulicity 0.75 mg once weekly. 2 ml is a 1 month supply. This would be an option if A1C not improving.    For now, let's keep your medication the same.  Work on healthy diet and walking 3 times a day.

## 2019-01-29 NOTE — PROGRESS NOTES
SUBJECTIVE:   Hima Griffiths is a 73 year old female who presents to clinic today for the following health issues:      elevated A1C, not doing blood sugars, one touch meter not working.    Didn't do well with diet over holidays. Has been trying to walk more, and is using her walker when she goes places outside her apartment.    Here with daughter today. Reports she is concerned about the cost of Januvia, and isn't sure it is helping much. Has been on glipizide for many many years and has done well with this. Does not think she is having low blood sugars. No episodes of shakiness, dizziness.    Going to [place] in a few days!    Problem list and histories reviewed & adjusted, as indicated.  Additional history: as documented    Patient Active Problem List   Diagnosis     Advanced directives, counseling/discussion     Persistent atrial fibrillation (H)     Benign paroxysmal positional vertigo     H/O ischemic left MCA stroke     Family history of atrial fibrillation     Type 2 diabetes mellitus with complication, without long-term current use of insulin (H)     Benign hypertension     Facial droop     Colitis     Hyperlipidemia LDL goal <100     Morbid obesity (H)     Past Surgical History:   Procedure Laterality Date     AS LOOP RECORDER IMPLANT  11/9/15     CATARACT IOL, RT/LT  14       Social History     Tobacco Use     Smoking status: Former Smoker     Start date: 10/3/1963     Last attempt to quit: 10/3/2006     Years since quittin.3     Smokeless tobacco: Never Used   Substance Use Topics     Alcohol use: Yes     Family History   Problem Relation Age of Onset     Hypertension Mother      Arrhythmia Mother      Hypertension Father      Coronary Artery Disease Father          Current Outpatient Medications   Medication Sig Dispense Refill     diltiazem (CARDIZEM) 30 MG tablet Take 1 tablet (30 mg) by mouth 2 times daily 180 tablet 3     glipiZIDE (GLUCOTROL XL) 10 MG 24 hr tablet Take 2 tablets (20  "mg) by mouth daily 180 tablet 3     JANUVIA 100 MG tablet Take 1 tablet (100 mg) by mouth daily 90 tablet 3     metoprolol tartrate (LOPRESSOR) 100 MG tablet Take 1 tablet (100 mg) by mouth 2 times daily 180 tablet 3     order for DME Equipment being ordered: glucose meter  Per insurance coverage. 1 Device 0     order for DME Equipment being ordered: test strips.  Patient tests twice per day.  Per insurance coverage 200 Device 11     order for DME Equipment being ordered: lancets. Patient tests twice per day.  Per insurance coverage. 200 Device 11     pravastatin (PRAVACHOL) 40 MG tablet Take 1 tablet (40 mg) by mouth daily 90 tablet 3     rivaroxaban ANTICOAGULANT (XARELTO) 20 MG TABS tablet Take 1 tablet (20 mg) by mouth daily (with dinner) 90 tablet 3     ACE/ARB NOT PRESCRIBED, INTENTIONAL, Please choose reason not prescribed, below       diclofenac (VOLTAREN) 1 % topical gel Place 4 g onto the skin 4 times daily (Patient not taking: Reported on 1/29/2019) 800 g 1       Reviewed and updated as needed this visit by clinical staff  Tobacco  Allergies  Meds  Med Hx  Surg Hx  Fam Hx  Soc Hx      Reviewed and updated as needed this visit by Provider         ROS:  Constitutional, HEENT, cardiovascular, pulmonary, gi and gu systems are negative, except as otherwise noted.    OBJECTIVE:     /88 (Cuff Size: Adult Large)   Pulse 95   Temp 97.5  F (36.4  C) (Oral)   Ht 1.62 m (5' 3.78\")   Wt 116.2 kg (256 lb 1.6 oz)   SpO2 97%   BMI 44.26 kg/m    Body mass index is 44.26 kg/m .  GENERAL: healthy, alert and no distress    Diagnostic Test Results:  Results for orders placed or performed in visit on 01/02/19   Lipid panel reflex to direct LDL Fasting   Result Value Ref Range    Cholesterol 159 <200 mg/dL    Triglycerides 202 (H) <150 mg/dL    HDL Cholesterol 45 (L) >49 mg/dL    LDL Cholesterol Calculated 74 <100 mg/dL    Non HDL Cholesterol 114 <130 mg/dL   Comprehensive metabolic panel   Result Value Ref " Range    Sodium 135 133 - 144 mmol/L    Potassium 4.4 3.4 - 5.3 mmol/L    Chloride 103 94 - 109 mmol/L    Carbon Dioxide 23 20 - 32 mmol/L    Anion Gap 9 3 - 14 mmol/L    Glucose 213 (H) 70 - 99 mg/dL    Urea Nitrogen 15 7 - 30 mg/dL    Creatinine 1.00 0.52 - 1.04 mg/dL    GFR Estimate 55 (L) >60 mL/min/[1.73_m2]    GFR Estimate If Black 63 >60 mL/min/[1.73_m2]    Calcium 9.3 8.5 - 10.1 mg/dL    Bilirubin Total 0.7 0.2 - 1.3 mg/dL    Albumin 3.6 3.4 - 5.0 g/dL    Protein Total 8.2 6.8 - 8.8 g/dL    Alkaline Phosphatase 78 40 - 150 U/L    ALT 28 0 - 50 U/L    AST 30 0 - 45 U/L   Hemoglobin A1c   Result Value Ref Range    Hemoglobin A1C 8.9 (H) 0 - 5.6 %   Albumin Random Urine Quantitative with Creat Ratio   Result Value Ref Range    Creatinine Urine 242 mg/dL    Albumin Urine mg/L 233 mg/L    Albumin Urine mg/g Cr 96.28 (H) 0 - 25 mg/g Cr       ASSESSMENT/PLAN:     1. Type 2 diabetes mellitus with complication, without long-term current use of insulin (H)  Discussed options. Most important is for her to get a meter that works. She will start checking her sugars twice daily.   Discussed my concern over her high dose sulfonylurea and risk of hypoglycemia leading to dizziness or falls. Discussed option to continue current regimen knowing risk, and work on increasing exercise. Discussed switching sulfonylurea to lantus, discussed switch sulfonylurea to glp-1. She is not interested in making medication change, since she just got 90 days of januvia, but will come see me in 3 months and we can address again. Information on trulicity given to daughter.   - order for DME; Equipment being ordered: glucose meter  Per insurance coverage.  Dispense: 1 Device; Refill: 0  - order for DME; Equipment being ordered: test strips.  Patient tests twice per day.  Per insurance coverage  Dispense: 200 Device; Refill: 11  - order for DME; Equipment being ordered: lancets. Patient tests twice per day.  Per insurance coverage.  Dispense: 200  Device; Refill: 11    See Patient Instructions    I spent 30 minutes with this patient face-to-face, of which 50% or greater was spent in counseling and coordination of care with regards to diabetes treatment options. Please see plan above.      Georgiana Joy MD  [facility]

## 2019-02-13 ENCOUNTER — OFFICE VISIT (OUTPATIENT)
Dept: PEDIATRICS | Facility: CLINIC | Age: 74
End: 2019-02-13
Payer: COMMERCIAL

## 2019-02-13 VITALS
DIASTOLIC BLOOD PRESSURE: 82 MMHG | WEIGHT: 256 LBS | HEIGHT: 64 IN | BODY MASS INDEX: 43.71 KG/M2 | TEMPERATURE: 100 F | OXYGEN SATURATION: 96 % | SYSTOLIC BLOOD PRESSURE: 116 MMHG | HEART RATE: 78 BPM

## 2019-02-13 DIAGNOSIS — R06.2 WHEEZING: ICD-10-CM

## 2019-02-13 DIAGNOSIS — J06.9 VIRAL URI WITH COUGH: Primary | ICD-10-CM

## 2019-02-13 PROCEDURE — 99213 OFFICE O/P EST LOW 20 MIN: CPT | Mod: GE | Performed by: INTERNAL MEDICINE

## 2019-02-13 RX ORDER — FLUTICASONE PROPIONATE 50 MCG
2 SPRAY, SUSPENSION (ML) NASAL DAILY
Qty: 1 BOTTLE | Refills: 0 | Status: SHIPPED | OUTPATIENT
Start: 2019-02-13 | End: 2019-12-18

## 2019-02-13 RX ORDER — ALBUTEROL SULFATE 90 UG/1
2 AEROSOL, METERED RESPIRATORY (INHALATION) EVERY 4 HOURS PRN
Qty: 1 INHALER | Refills: 1 | Status: SHIPPED | OUTPATIENT
Start: 2019-02-13 | End: 2020-07-14

## 2019-02-13 ASSESSMENT — MIFFLIN-ST. JEOR: SCORE: 1637.72

## 2019-02-13 NOTE — PROGRESS NOTES
SUBJECTIVE:   Hima Griffiths is a 73 year old female who presents to clinic today for the following health issues:    RESPIRATORY SYMPTOMS      Duration: a week     Description  Cough started last Wednesday in [place] - started dry, now weakly productive, wheezing, cough & symptoms worse at night fatigue/malaise, hoarse voice and nausea with an episode of vomiting which was clear secretions. No diarrhea. No fevers.     Severity: moderate    Accompanying signs and symptoms: no appetite     History (predisposing factors):  15 year smoking history, quit 12 years ago. Has required an inhaler previous during a cold    Precipitating or alleviating factors: None    Therapies tried and outcome:  Nyquil and Dayquil, Robitussin     Problem list and histories reviewed & adjusted, as indicated.  Additional history: as documented    Patient Active Problem List   Diagnosis     Advanced directives, counseling/discussion     Persistent atrial fibrillation (H)     Benign paroxysmal positional vertigo     H/O ischemic left MCA stroke     Family history of atrial fibrillation     Type 2 diabetes mellitus with complication, without long-term current use of insulin (H)     Benign hypertension     Facial droop     Colitis     Hyperlipidemia LDL goal <100     Morbid obesity (H)     Past Surgical History:   Procedure Laterality Date     AS LOOP RECORDER IMPLANT  11/9/15     CATARACT IOL, RT/LT  14       Social History     Tobacco Use     Smoking status: Former Smoker     Start date: 10/3/1963     Last attempt to quit: 10/3/2006     Years since quittin.3     Smokeless tobacco: Never Used   Substance Use Topics     Alcohol use: Yes     Family History   Problem Relation Age of Onset     Hypertension Mother      Arrhythmia Mother      Hypertension Father      Coronary Artery Disease Father          Reviewed and updated as needed this visit by clinical staff       Reviewed and updated as needed this visit by Provider      "    ROS:  Constitutional, HEENT, cardiovascular, pulmonary, gi and gu systems are negative, except as otherwise noted.    OBJECTIVE:     /82   Pulse 78   Temp 100  F (37.8  C) (Tympanic)   Ht 1.62 m (5' 3.78\")   Wt 116.1 kg (256 lb)   SpO2 96%   BMI 44.25 kg/m    Body mass index is 44.25 kg/m .  GENERAL: Tired appearing, breathing comfortably, in NAD  EYES: Eyes grossly normal to inspection, PERRL and conjunctivae and sclerae normal  HENT: ear canals and TM's normal, bilateral erythematous turbinates & with thin secretions and mouth without ulcers or lesions  NECK: no adenopathy, no asymmetry, masses  RESP: Good aeration bilaterally with scattered wheezing. No crackles or dullness  CV: regular rate and rhythm, normal S1 S2, no murmurs, , no peripheral edema and peripheral pulses strong  ABDOMEN: soft, nontender, non-distended  MS: no gross musculoskeletal defects noted, no edema  SKIN: no suspicious lesions or rashes    ASSESSMENT/PLAN:   Hima is a 73 year old former smoker who presents with a week of progressive upper respiratory infection symptoms. She does not endorse fevers & lung exam without signs of pneumonia. Discussed typical etiology of viral respiratory infections & time course. Offered supportive cares & symptom management. Patient with wheezing on exam & history of viral induced wheezing. Will likely benefit from B2 agonist. Discussed warning signs for return evaluation.     1. Viral URI with cough  - Supportive cares: Rest, hydration time  - fluticasone (FLONASE) 50 MCG/ACT nasal spray; Spray 2 sprays into both nostrils daily  Dispense: 1 Bottle; Refill: 0  - Can consider mucinex to help thin secretions    2. Wheezing  - albuterol (PROAIR HFA/PROVENTIL HFA/VENTOLIN HFA) 108 (90 Base) MCG/ACT inhaler; Inhale 2 puffs into the lungs every 4 hours as needed for shortness of breath / dyspnea or wheezing  Dispense: 1 Inhaler; Refill: 1    Follow up as needed if symptoms worsen/do not " improve    Juan Leonardo MD  [facility]    I have discussed the patient with the resident and agree with the jointly developed plan as documented above    Zabrina Maldonado MD  Internal Medicine - Pediatrics

## 2019-02-13 NOTE — PATIENT INSTRUCTIONS
Cough/Cold  - Flonase, two puffs each nostril twice daily for 2 weeks, then daily as needed  - Consider Mucinex (guiafenesin) every 12 hours to help thin mucus  - Albuterol 2 puffs every 4 hours for the next 3-4 days, then as needed    - Watch for fevers, decreased intake, decreased urination, or significant chest pain/shortness of breath

## 2019-02-19 ENCOUNTER — TELEPHONE (OUTPATIENT)
Dept: PEDIATRICS | Facility: CLINIC | Age: 74
End: 2019-02-19

## 2019-02-19 NOTE — TELEPHONE ENCOUNTER
Reason for call:  Other   Patient called regarding (reason for call): call back  Additional comments: Call daughter (Debbi) back regarding symptoms of her mother. She has had a virus for the last 10 days and her eyes are very red. Her sugar is 345 also. Her daughter is concerned and wants to know if her mother should be seen.    :  Other phone number:  [phone]  Best Time:  ASAP    Can we leave a detailed message on this number?  YES

## 2019-02-19 NOTE — TELEPHONE ENCOUNTER
PCP-please review-would you like to see patient tomorrow ( ok to fit on, or should patient see antoher provider?  Daughter will call me with an update tomorrow morning/or I will call her.    Call to patient and spoke with patient and daughter who is with her. No fever, cough is getting better.  Patient was seen on 2/13 for URI.  URI is resolving, she is still tired.  The sugar was checked NON-Fasting.    No other symptoms, no fever, urinating as usual, no foul smell to the urine, denies confusion, acting herself.  The only symptoms is fatigue.  Taking all medications as per medication list, on Januvia and glipizide for DM 2, still using Flonase and albuterol inhaler for URI.  Has not been eating much as she was sick.    Daughter will monitor-will be seen if worsening, or with new symptoms. Will check sugar fasting, then tid 2 hours after meals.   Lea Oliva RN  Message handled by Nurse Triage.

## 2019-02-20 ENCOUNTER — APPOINTMENT (OUTPATIENT)
Dept: CT IMAGING | Facility: CLINIC | Age: 74
End: 2019-02-20
Attending: EMERGENCY MEDICINE
Payer: COMMERCIAL

## 2019-02-20 ENCOUNTER — APPOINTMENT (OUTPATIENT)
Dept: GENERAL RADIOLOGY | Facility: CLINIC | Age: 74
End: 2019-02-20
Attending: EMERGENCY MEDICINE
Payer: COMMERCIAL

## 2019-02-20 ENCOUNTER — HOSPITAL ENCOUNTER (EMERGENCY)
Facility: CLINIC | Age: 74
Discharge: HOME | End: 2019-02-20
Attending: EMERGENCY MEDICINE | Admitting: EMERGENCY MEDICINE
Payer: COMMERCIAL

## 2019-02-20 VITALS
DIASTOLIC BLOOD PRESSURE: 77 MMHG | SYSTOLIC BLOOD PRESSURE: 115 MMHG | BODY MASS INDEX: 43.32 KG/M2 | HEART RATE: 113 BPM | OXYGEN SATURATION: 95 % | HEIGHT: 65 IN | WEIGHT: 260 LBS | TEMPERATURE: 97.6 F | RESPIRATION RATE: 18 BRPM
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
## 2019-02-20 DIAGNOSIS — S02.2XXA CLOSED FRACTURE OF NASAL BONE, INITIAL ENCOUNTER: ICD-10-CM

## 2019-02-20 PROCEDURE — 71046 X-RAY EXAM CHEST 2 VIEWS: CPT

## 2019-02-20 PROCEDURE — 70450 CT HEAD/BRAIN W/O DYE: CPT

## 2019-02-20 PROCEDURE — 21310 ZZHC CLOSED TX NASAL BONE FRACTURE W/O MANIPULATION: CPT

## 2019-02-20 PROCEDURE — 90471 IMMUNIZATION ADMIN: CPT

## 2019-02-20 PROCEDURE — 90715 TDAP VACCINE 7 YRS/> IM: CPT | Performed by: EMERGENCY MEDICINE

## 2019-02-20 PROCEDURE — 25000128 H RX IP 250 OP 636: Performed by: EMERGENCY MEDICINE

## 2019-02-20 PROCEDURE — 99285 EMERGENCY DEPT VISIT HI MDM: CPT | Mod: 25

## 2019-02-20 PROCEDURE — 70486 CT MAXILLOFACIAL W/O DYE: CPT

## 2019-02-20 RX ADMIN — CLOSTRIDIUM TETANI TOXOID ANTIGEN (FORMALDEHYDE INACTIVATED), CORYNEBACTERIUM DIPHTHERIAE TOXOID ANTIGEN (FORMALDEHYDE INACTIVATED), BORDETELLA PERTUSSIS TOXOID ANTIGEN (GLUTARALDEHYDE INACTIVATED), BORDETELLA PERTUSSIS FILAMENTOUS HEMAGGLUTININ ANTIGEN (FORMALDEHYDE INACTIVATED), BORDETELLA PERTUSSIS PERTACTIN ANTIGEN, AND BORDETELLA PERTUSSIS FIMBRIAE 2/3 ANTIGEN 0.5 ML: 5; 2; 2.5; 5; 3; 5 INJECTION, SUSPENSION INTRAMUSCULAR at 22:04

## 2019-02-20 ASSESSMENT — MIFFLIN-ST. JEOR: SCORE: 1675.23

## 2019-02-20 NOTE — ED AVS SNAPSHOT
[facility] Emergency Department  [address]  [facility] [address]  Phone:  [phone]  Fax:  [phone]                                    Hima Griffiths   MRN: [id number]    Department:  [facility] Emergency Department   Date of Visit:  2/20/2019           After Visit Summary Signature Page    I have received my discharge instructions, and my questions have been answered. I have discussed any challenges I see with this plan with the nurse or doctor.    ..........................................................................................................................................  Patient/Patient Representative Signature      ..........................................................................................................................................  Patient Representative Print Name and Relationship to Patient    ..................................................               ................................................  Date                                   Time    ..........................................................................................................................................  Reviewed by Signature/Title    ...................................................              ..............................................  Date                                               Time          22EPIC Rev 08/18

## 2019-02-20 NOTE — TELEPHONE ENCOUNTER
Spoke with Debbi, pt's daughter, with pt present.  Pt is improving.  Debbi states that she is feeling better.  Her blood sugars today were 222, 270, and 250, much improved from yesterday.  Eyes are less pink and improving.  Cough is also improving.  Denies fever.  Pt continuing to take her medications as scheduled and appetite increasing.  Denies signs or symptoms of dehydration.  Debbi and pt will continue to monitor blood sugars closely and call with any concerns.     informed.    Isis Davila RN

## 2019-02-21 NOTE — DISCHARGE INSTRUCTIONS
Please follow with your PCP in 2-3 days.  Return to the ED if notice increasing weakness, headache, confusion, not acting like your self, vomiting more than 2 times, or other acute changes.    Discharge Instructions  Head Injury    You have been seen today for a head injury. Your evaluation included a history and physical examination. You may have had a CT (CAT) scan performed, though most head injuries do not require a scan. Based on this evaluation, your provider today does not feel that your head injury is serious.    Generally, every Emergency Department visit should have a follow-up clinic visit with either a primary or a specialty clinic/provider. Please follow-up as instructed by your emergency provider today.  Return to the Emergency Department if:  You are confused or you are not acting right.  Your headache gets worse or you start to have a really bad headache even with your recommended treatment plan.  You vomit (throw up) more than once.  You have a seizure.  You have trouble walking.  You have weakness or paralysis (cannot move) in an arm or a leg.  You have blood or fluid coming from your ears or nose.  You have new symptoms or anything that worries you.    Sleeping:  It is okay for you to sleep, but someone should wake you up if instructed by your provider, and someone should check on you at your usual time to wake up.     Activity:  Do not drive for at least 24 hours.  Do not drive if you have dizzy spells or trouble concentrating, or remembering things.  Do not return to any contact sports until cleared by your regular provider.     MORE INFORMATION:    Concussion:  A concussion is a minor head injury that may cause temporary problems with the way the brain works. Although concussions are important, they are generally not an emergency or a reason that a person needs to be hospitalized. Some concussion symptoms include confusion, amnesia (forgetful), nausea (sick to your stomach) and vomiting  (throwing up), dizziness, fatigue, memory or concentration problems, irritability and sleep problems. For most people, concussions are mild and temporary but some will have more severe and persistent symptoms that require on-going care and treatment.  CT Scans: Your evaluation today may have included a CT scan (CAT scan) to look for things like bleeding or a skull fracture (broken bone).  CT scans involve radiation and too many CT scans can cause serious health problems like cancer, especially in children.  Because of this, your provider may not have ordered a CT scan today if they think you are at low risk for a serious or life threatening problem.    If you were given a prescription for medicine here today, be sure to read all of the information (including the package insert) that comes with your prescription.  This will include important information about the medicine, its side effects, and any warnings that you need to know about.  The pharmacist who fills the prescription can provide more information and answer questions you may have about the medicine.  If you have questions or concerns that the pharmacist cannot address, please call or return to the Emergency Department.     Remember that you can always come back to the Emergency Department if you are not able to see your regular provider in the amount of time listed above, if you get any new symptoms, or if there is anything that worries you.

## 2019-02-21 NOTE — ED PROVIDER NOTES
"  History     Chief Complaint:  Fall    HPI   Hima Griffiths is a 73 year old female with a history of stroke, atrial fibrillation, and diabetes who presents with her two daughters to the Emergency Department today for evaluation of fall. Patient reports she dropped something on the floor in her room after dinner. She leaned over too far when she bent down to pick it up, lost her balance, and fell forward onto her face. Patient did not lose consciousness. Patient has had a cold for the last two weeks which has improved the last two days. No chest pain or shortness of breath. No palpitations or chest pain. No numbness or tingling. No abdominal pain. No nausea or vomiting. No lightheadedness or dizziness. No fever. No headache or vision changes. No neck pain. Patient does not know when her last tetanus shot was.    Allergies:  Alkylamines: weakness  Sudafed: arms and legs weak    Medications:    Albuterol inhaler  Cardizem  Glipizide  Januvia  Lopressor  Pravachol  Xarelto      Past Medical History:    Obesity  Hyperlipidemia   Facial droop  Colitis  Vertigo  Diabetes  Hypertension  Atrial fibrillation  Ischemic left MCA stroke    Past Surgical History:    As loop recorder implant  Bilateral cataract     Family History:    Hypertension  Arrhythmia  CAD    Social History:  Smoking status: Former smoker quit date 10/3/2006  Alcohol use: Yes  Marital Status:   [5]     Review of Systems   All other systems reviewed and are negative.    Physical Exam     Patient Vitals for the past 24 hrs:   BP Temp Temp src Pulse Heart Rate Resp SpO2 Height Weight   02/20/19 2200 115/77 -- -- -- 98 -- -- -- --   02/20/19 2100 (!) 123/93 -- -- 113 -- -- 95 % -- --   02/20/19 2059 123/85 -- -- -- -- -- -- -- --   02/20/19 2054 -- 97.6  F (36.4  C) Oral -- 89 18 98 % 1.651 m (5' 5\") 117.9 kg (260 lb)       Physical Exam  General: Resting on the bed.  Head: No obvious trauma to head except ecchymosis along the bilateral sides of the " nasal bridge   Ears, Nose, Throat:  External ears normal.  Nose normal.  No pharyngeal erythema, swelling or exudate.  Midline uvula.  no septal hematomas  Eyes:  Conjunctivae clear.  Pupils are equal, round, and reactive.   Neck: Normal range of motion.  Neck supple.  non tender c spine.    CV: Regular rate and rhythm.  No murmurs.      Respiratory: Effort normal and breath sounds normal.  Subtle wheezing without crackles.   Gastrointestinal: Soft.  No distension. There is no tenderness.  There is no rigidity, no rebound and no guarding.   Musculoskeletal: Normal range of motion.  Non tender extremities to palpations.    Neuro: Alert. Moving all extremities appropriately.  Normal speech.  GCS 15.  CN II-XII grossly intact.  Gross muscle strength intact of the proximal and distal bilateral upper and lower extremities.  Sensation intact to light touch in all 4 extremities.    Skin: Skin is warm and dry.  No rash noted.  Abrasion to the nasal bridge.      Emergency Department Course     Imaging:  Radiographic findings were communicated with the patient and family who voiced understanding of the findings.  XR Chest 2 Views  IMPRESSION: Mild linear atelectasis or fibrosis in the left midlung.  The right lung appears clear. No pleural effusion. Aortic tortuosity  is noted. As read by radiology.  Head CT w/o contrast  IMPRESSION:   1. No intracranial hemorrhage or skull fractures are identified.  2. There is diffuse parenchymal volume loss.  White matter changes are  present in the cerebral hemispheres that are consistent with small  vessel ischemic disease in this age patient. As read by radiology.  CT Facial Bones without Contrast  IMPRESSION:  1. Nondisplaced fracture of the tip of the nasal bones.  2. Mucosal thickening in the maxillary and ethmoid sinuses. Air-fluid  level in the left maxillary sinus. As read by radiology.    Interventions:  2204: Tdap 0.5 mL IM    Emergency Department Course:  Past medical records,  nursing notes, and vitals reviewed.  2106: I performed an exam of the patient and obtained history, as documented above.     The patient was sent for a chest x-ray, head CT, and facial bones CT while in the emergency department, findings above.    2211: I rechecked the patient. Explained findings to the patient and her daughters.    Findings and plan explained to the Patient and daughter. Patient discharged home with instructions regarding supportive care, medications, and reasons to return. The importance of close follow-up was reviewed.     Impression & Plan      Medical Decision Making:  Hima Griffiths is a 73-year-old female on Xarelto for A. Fib who presents after a fall.  Vital signs largely unremarkable.  Broad differential was pursued including but not limited to intracranial hemorrhage, concussion, contusion, facial fracture, septal hematoma, cervical fracture dislocation, pneumonia, pneumothorax, effusion, reactive lung disease, influenza, etc.  Overall patient is well-appearing and nontoxic.  Patient presents after a mechanical fall.  She reports that she lost her balance after she was seated and fell onto her face.  Head CT shows no evidence of acute intracranial hemorrhage, fracture.  There is no evidence of acute neurologic finding on examination.  CT face shows a nondisplaced fracture of the tip of the nasal bone.  There is otherwise no acute facial fractures noted.  Patient has no septal hematoma on examination.  No suggestion of open fracture at this time.  Able to clinically clear patient C-spine, no tenderness to midline palpation, no indication for CT imaging of the neck.  After discussion with patient she has had a cold recently and had some windedness earlier but is currently chest pain, shortness of breath, overall symptom-free.  Offered work up including labs, CXR, etc.  Family and patient would like a CXR.  Chest x-ray shows fibrosis versus atelectasis in the left midlung but no evidence of  pneumonia, effusions or pneumothorax.  She has some slight wheezing on exam but otherwise nothing notable.  She denies any chest pain or shortness of breath.  No palpitations.  This was a mechanical fall per her after she lost her balance.  She recalls the entire event.  Do not feel that further workup is required for this fall.  Head to toe exam there reveals is no other injuries.  Patient was ambulated without difficulty.  She was discharged home.  Advise close follow-up with primary care doctor.  Discussed possibility of delayed bleeding with patient and family and they are made aware.  She will be checked on regularly.  Strict return precautions were discussed and she was discharged in stable improved condition.    Diagnosis:    ICD-10-CM   1. Fall, initial encounter W19.XXXA   2. Closed fracture of nasal bone, initial encounter S02.2XXA     Disposition:  discharged to home    Melissa Katie  2/20/2019   [facility] EMERGENCY DEPARTMENT  Scribe Disclosure:  I, Melissa Katie, am serving as a scribe at 9:06 PM on 2/20/2019 to document services personally performed by Zehra Blas MD based on my observations and the provider's statements to me.        Zehra Blas MD  02/21/19 1200

## 2019-02-21 NOTE — ED NOTES
A/O. VSS. Pt verbalized understanding of d/c instructions. Pt ambulated around room at baseline. MD Blas updated

## 2019-02-21 NOTE — ED TRIAGE NOTES
Pt has had cold symptoms 2 weeks, pt seeing PCP.  Pt felt better today and went to dinner at facility.  After dinner pt up at her apartment.  Pt sitting in her chair when she dropped blood sugar testing supplies.  Pt leaned forward to pick it up and fell out of her chair and landed on her carpet face first. Denies LOC, recalls all event.  ABC in tact. A/OX4, .

## 2019-02-21 NOTE — ED NOTES
Bed: ED24  Expected date: 2/20/19  Expected time: 8:36 PM  Means of arrival: Ambulance  Comments:  Dana Wright

## 2019-03-11 ENCOUNTER — OFFICE VISIT (OUTPATIENT)
Dept: PEDIATRICS | Facility: CLINIC | Age: 74
End: 2019-03-11
Payer: COMMERCIAL

## 2019-03-11 VITALS
SYSTOLIC BLOOD PRESSURE: 122 MMHG | DIASTOLIC BLOOD PRESSURE: 84 MMHG | TEMPERATURE: 97.5 F | WEIGHT: 244.6 LBS | OXYGEN SATURATION: 99 % | HEART RATE: 84 BPM | HEIGHT: 65 IN | BODY MASS INDEX: 40.75 KG/M2

## 2019-03-11 DIAGNOSIS — B37.2 YEAST DERMATITIS: ICD-10-CM

## 2019-03-11 DIAGNOSIS — S02.2XXD CLOSED FRACTURE OF NASAL BONE WITH ROUTINE HEALING, SUBSEQUENT ENCOUNTER: Primary | ICD-10-CM

## 2019-03-11 PROCEDURE — 99214 OFFICE O/P EST MOD 30 MIN: CPT | Performed by: INTERNAL MEDICINE

## 2019-03-11 RX ORDER — CLOTRIMAZOLE 1 %
CREAM (GRAM) TOPICAL 2 TIMES DAILY
Qty: 113 G | Refills: 1 | Status: SHIPPED | OUTPATIENT
Start: 2019-03-11 | End: 2021-02-09

## 2019-03-11 ASSESSMENT — MIFFLIN-ST. JEOR: SCORE: 1600.38

## 2019-03-11 NOTE — PATIENT INSTRUCTIONS
If you can  trulicity, bring it to your appointment with diabetes education. They will show you how to use this injection once weekly.    OK to keep your other medications the same. If you start having blood sugars <100, stop januvia.  If blood sugars < 80, call clinic and let us know, take a hard candy or other.    Use clotrimazole cream twice daily on rash under breasts.

## 2019-03-11 NOTE — PROGRESS NOTES
SUBJECTIVE:   Hima Griffiths is a 74 year old female who presents to clinic today for the following health issues:      Diabetes Follow-up    Patient is checking blood sugars: twice daily.    Blood sugar testing frequency justification: Patient modifying lifestyle changes (diet, exercise) with blood sugars  Results are as follows:         am - 180              postprandial after lunch- 280    Diabetic concerns: blood sugar frequently over 200     Symptoms of hypoglycemia (low blood sugar): none     Paresthesias (numbness or burning in feet) or sores: No     Date of last diabetic eye exam: 1/18/19    BP Readings from Last 2 Encounters:   02/20/19 115/77   02/13/19 116/82     Hemoglobin A1C (%)   Date Value   01/02/2019 8.9 (H)   01/10/2018 7.8 (H)     LDL Cholesterol Calculated (mg/dL)   Date Value   01/02/2019 74   01/10/2018 55       Diabetes Management Resources    Amount of exercise or physical activity: None    Problems taking medications regularly: No    Medication side effects: none    Diet: diabetic      ED/UC Followup:    Facility:  [facility]  Date of visit: 2/20/19  Reason for visit: Patient reports she dropped something on the floor in her room after dinner. She leaned over too far when she bent down to pick it up, lost her balance, and fell forward onto her face. Patient did not lose consciousness.fracture nose  Current Status: unable to breathe through nose           Problem list and histories reviewed & adjusted, as indicated.  Additional history: as documented    Patient Active Problem List   Diagnosis     Advanced directives, counseling/discussion     Persistent atrial fibrillation (H)     Benign paroxysmal positional vertigo     H/O ischemic left MCA stroke     Family history of atrial fibrillation     Type 2 diabetes mellitus with complication, without long-term current use of insulin (H)     Benign hypertension     Facial droop     Colitis     Hyperlipidemia LDL goal <100     Morbid obesity (H)      "Past Surgical History:   Procedure Laterality Date     AS LOOP RECORDER IMPLANT  11/9/15     CATARACT IOL, RT/LT  14       Social History     Tobacco Use     Smoking status: Former Smoker     Start date: 10/3/1963     Last attempt to quit: 10/3/2006     Years since quittin.5     Smokeless tobacco: Never Used   Substance Use Topics     Alcohol use: Yes     Family History   Problem Relation Age of Onset     Hypertension Mother      Arrhythmia Mother      Hypertension Father      Coronary Artery Disease Father            Reviewed and updated as needed this visit by clinical staff       Reviewed and updated as needed this visit by Provider         ROS:   ROS: 10 point ROS neg other than the symptoms noted above in the HPI.      OBJECTIVE:     /84 (Cuff Size: Adult Regular)   Pulse 84   Temp 97.5  F (36.4  C) (Tympanic)   Ht 1.651 m (5' 5\")   Wt 110.9 kg (244 lb 9.6 oz)   SpO2 99%   BMI 40.70 kg/m    Body mass index is 40.7 kg/m .  GENERAL: healthy, alert and no distress  EYES: Eyes grossly normal to inspection, PERRL and conjunctivae and sclerae normal  HENT: ear canals and TM's normal, nose and mouth without ulcers or lesions. Mild ecchymoses bilateral upper cheeks.  NECK: no adenopathy, no asymmetry, masses, or scars and thyroid normal to palpation  RESP: lungs clear to auscultation - no rales, rhonchi or wheezes  CV: regular rate and rhythm, normal S1 S2, no S3 or S4, no murmur, click or rub  SKIN: erythematous patches under both breasts consistent with yeast.     Diagnostic Test Results:  Results for orders placed or performed during the hospital encounter of 19   Head CT w/o contrast    Narrative    CT SCAN OF THE HEAD WITHOUT CONTRAST   2019 9:44 PM     HISTORY: Head trauma, minor, pt on anticoagulation    TECHNIQUE:  Axial images of the head and coronal reformations without  IV contrast material. Radiation dose for this scan was reduced using  automated exposure control, " adjustment of the mA and/or kV according  to patient size, or iterative reconstruction technique.    COMPARISON: None.    FINDINGS:  There is diffuse parenchymal volume loss.  White matter  changes are present in the cerebral hemispheres that are consistent  with small vessel ischemic disease in this age patient. There is no  evidence of intracranial hemorrhage, mass, acute infarct or anomaly.  The visualized portions of the sinuses and mastoids appear normal. No  bleed or fractures are identified.      Impression    IMPRESSION:   1. No intracranial hemorrhage or skull fractures are identified.  2. There is diffuse parenchymal volume loss.  White matter changes are  present in the cerebral hemispheres that are consistent with small  vessel ischemic disease in this age patient.      LETY KURTZ MD   XR Chest 2 Views    Narrative    CHEST TWO VIEWS  2/20/2019 9:49 PM     HISTORY: Cough      Impression    IMPRESSION: Mild linear atelectasis or fibrosis in the left midlung.  The right lung appears clear. No pleural effusion. Aortic tortuosity  is noted.    JORDAN DOUGLAS MD   CT Facial Bones without Contrast    Narrative    CT SCAN OF THE PARANASAL SINUSES AND FACE  2/20/2019 9:44 PM     HISTORY: Nasal fx, suspected    TECHNIQUE: Radiation dose for this scan was reduced using automated  exposure control, adjustment of the mA and/or kV according to patient  size, or iterative reconstruction technique.  Noncontrast axial scans  and coronal and sagittal reformations.    COMPARISON: None.    FINDINGS: The mandible is normal. The zygomatic arches are negative.  The bony walls of the frontal sinuses, ethmoid sinuses, maxillary  sinuses are intact. There is mucosal thickening in ethmoid sinuses in  the maxillary sinuses. Bony walls of the orbits are intact. There is a  nondisplaced fracture through the tip of the nasal bones.      Impression    IMPRESSION:  1. Nondisplaced fracture of the tip of the nasal bones.  2. Mucosal  thickening in the maxillary and ethmoid sinuses. Air-fluid  level in the left maxillary sinus.    LETY KURTZ MD       ASSESSMENT/PLAN:     1. Closed fracture of nasal bone with routine healing, subsequent encounter  Symptoms continue to improve. Given facial fracture, I have recommended she follow up with ENT.  - OTOLARYNGOLOGY REFERRAL    2. Uncontrolled type 2 diabetes mellitus with complication, without long-term current use of insulin (H)  Discussed options for control. Just filled her januvia, which bothers her how expensive this is. Discussed option to try GLP1, which she would like to do. Discussed that this may be more expensive than the januvia. Will see how it goes for her at pharmacy.  - dulaglutide (TRULICITY) 0.75 MG/0.5ML pen; Inject 0.75 mg Subcutaneous every 7 days  Dispense: 6 mL; Refill: 3  - ASPIRIN NOT PRESCRIBED (INTENTIONAL); Please choose reason not prescribed, below    3. Yeast dermatitis  Discussed elevated sugars may be contributing.  - clotrimazole (LOTRIMIN) 1 % external cream; Apply topically 2 times daily  Dispense: 113 g; Refill: 1    Patient Instructions   If you can  trulicity, bring it to your appointment with diabetes education. They will show you how to use this injection once weekly.    OK to keep your other medications the same. If you start having blood sugars <100, stop januvia.  If blood sugars < 80, call clinic and let us know, take a hard candy or other.    Use clotrimazole cream twice daily on rash under breasts.      Georgiana Joy MD  [facility]

## 2019-03-22 ENCOUNTER — ALLIED HEALTH/NURSE VISIT (OUTPATIENT)
Dept: EDUCATION SERVICES | Facility: CLINIC | Age: 74
End: 2019-03-22
Payer: COMMERCIAL

## 2019-03-22 DIAGNOSIS — E11.8 TYPE 2 DIABETES MELLITUS WITH COMPLICATION, WITHOUT LONG-TERM CURRENT USE OF INSULIN (H): Primary | ICD-10-CM

## 2019-03-22 PROCEDURE — G0108 DIAB MANAGE TRN  PER INDIV: HCPCS | Performed by: DIETITIAN, REGISTERED

## 2019-03-22 NOTE — LETTER
"    3/22/2019         RE: Hima Griffiths  [address]  [facility] [address]        Dear Colleague,    Thank you for referring your patient, Hima Griffiths, to the [facility]. Please see a copy of my visit note below.    Diabetes Self-Management Education & Support    Diabetes Education Self Management & Training    SUBJECTIVE/OBJECTIVE:  Diabetes education in the past 24mo: (P) No  Diabetes type: (P) Type 2  Disease course: (P) Getting harder to manage  Cultural Influences/Ethnic Background:  American    Diabetes Symptoms & Complications  Blurred vision: (P) No  Fatigue: (P) No  Foot ulcerations: (P) No  Polydipsia: (P) No  Polyphagia: (P) No  Polyuria: (P) No  Visual change: (P) No  Weakness: (P) No  Weight loss: (P) Yes  Slow healing wounds: (P) No  Weight trend: (P) Fluctuating minimally  Autonomic neuropathy: (P) No  CVA: (P) No  Heart disease: (P) No  Nephropathy: (P) No  Peripheral Vascular Disease: (P) No  Retinopathy: (P) No  Sexual dysfunction: (P) No    Patient Problem List and Family Medical History reviewed for relevant medical history, current medical status, and diabetes risk factors.    Vitals:  There were no vitals taken for this visit.  Estimated body mass index is 40.7 kg/m  as calculated from the following:    Height as of 3/11/19: 1.651 m (5' 5\").    Weight as of 3/11/19: 110.9 kg (244 lb 9.6 oz).   Last 3 BP:   BP Readings from Last 3 Encounters:   03/11/19 122/84   02/20/19 115/77   02/13/19 116/82       History   Smoking Status     Former Smoker     Start date: 10/3/1963     Quit date: 10/3/2006   Smokeless Tobacco     Never Used       Labs:  Lab Results   Component Value Date    A1C 8.9 01/02/2019     Lab Results   Component Value Date     01/02/2019     Lab Results   Component Value Date    LDL 74 01/02/2019     HDL Cholesterol   Date Value Ref Range Status   01/02/2019 45 (L) >49 mg/dL Final   ]  GFR Estimate   Date Value Ref " Range Status   01/02/2019 55 (L) >60 mL/min/[1.73_m2] Final     Comment:     Non  GFR Calc  Starting 12/18/2018, serum creatinine based estimated GFR (eGFR) will be   calculated using the Chronic Kidney Disease Epidemiology Collaboration   (CKD-EPI) equation.       GFR Estimate If Black   Date Value Ref Range Status   01/02/2019 63 >60 mL/min/[1.73_m2] Final     Comment:      GFR Calc  Starting 12/18/2018, serum creatinine based estimated GFR (eGFR) will be   calculated using the Chronic Kidney Disease Epidemiology Collaboration   (CKD-EPI) equation.       Lab Results   Component Value Date    CR 1.00 01/02/2019     No results found for: MICROALBUMIN    Healthy Eating  Cultural/Sikhism diet restrictions?: (P) No  Meal planning: (P) Avoiding sweets, Smaller portions  Meals include: (P) Breakfast, Dinner, Snacks  Beverages: (P) Water, Coffee, Diet soda    Being Active  Barrier to exercise: (P) Physical limitation    Monitoring  Blood Glucose Meter: (P) Unknown  Home Glucose (Sugar) Monitoring: (P) 1-2 times per day  Blood glucose trend: (P) Decreasing steadily  Low Glucose Range (mg/dL): (P) 110-130  High Glucose Range (mg/dL): (P) >200  Overall Range (mg/dL): (P) 140-180    No meter or BG log brought to appt. Pt reports blood sugars are coming down- was 129 mg/dl this morning, 140 mg/dl yesterday am.    Taking Medications  Diabetes Medication(s)     Dipeptidyl Peptidase-4 (DPP-4) Inhibitors       JANUVIA 100 MG tablet    Take 1 tablet (100 mg) by mouth daily    Sulfonylureas       glipiZIDE (GLUCOTROL XL) 10 MG 24 hr tablet    Take 2 tablets (20 mg) by mouth daily    Incretin Mimetic Agents (GLP-1 Receptor Agonists)       dulaglutide (TRULICITY) 0.75 MG/0.5ML pen    Inject 0.75 mg Subcutaneous every 7 days - not taking yet, instruction provided today        Current Treatments: (P) Oral Agent (dual therapy)    Problem Solving  Hypoglycemia Frequency: (P) Never  Hypoglycemia Treatment:  (P) Juice, Candy  Patient carries a carbohydrate source: (P) No  Medical alert: (P) Yes  Severe weather/disaster plan for diabetes management?: (P) No  DKA prevention plan?: (P) No  Sick day plan for diabetes management?: (P) No    Reducing Risks  CAD Risks: (P) Family history, Hypertension, Obesity  Has dilated eye exam at least once a year?: (P) Yes  Sees dentist every 6 months?: (P) No  Sees podiatrist (foot doctor)?: (P) No    Healthy Coping  Informal Support system:: (P) Family  Difficulty affording diabetes management supplies?: (P) No  Patient Activation Measure Survey Score:  No flowsheet data found.    ASSESSMENT:  Needs instruction on Trulicity use- however with recent BG improvement- pt and daughter question whether it will be safe to add another medication. Cost is also a concern, and noted Januvia and Trulicity both incretin based treatments.     Patient's most recent   Lab Results   Component Value Date    A1C 8.9 01/02/2019    is not meeting goal of <8.0    INTERVENTION:   Diabetes knowledge and skills assessment:     Patient is knowledgeable in diabetes management concepts related to: Healthy Eating, Monitoring and Healthy Coping    Patient needs further education on the following diabetes management concepts: Taking Medication and Problem Solving    Based on learning assessment above, most appropriate setting for further diabetes education would be: Individual setting.    Education provided today on:  AADE Self-Care Behaviors:  Taking Medication: action of prescribed medication, drawing up, administering and storing injectable diabetes medications, proper site selection and rotation for injections, side effects of prescribed medications and when to take medications  Problem Solving: high blood glucose - causes, signs/symptoms, treatment and prevention, low blood glucose - causes, signs/symptoms, treatment and prevention and when to call health care provider    GLP-1 administration technique taught  today. Patient verbalized understanding and was able to perform an accurate return demonstration of administration technique. Side effects were discussed, if patient has any abdominal pain, with or without nausea and/or vomiting, stop medication, call provider, clinic or go to the emergency room.    Opportunities for ongoing education and support in diabetes-self management were discussed.    Pt verbalized understanding of concepts discussed and recommendations provided today.       Education Materials Provided:  No new materials provided today    PLAN:  See Patient Instructions for co-developed, patient-stated behavior change goals.  Routing to Dr. Joy for review- pt would like phone call regarding start of Trulicity, would like direction on whether she can use up her Januvia before starting Trulicity since blood sugars improving significantly, and both incretin based medications.   AVS printed and provided to patient today. See Follow-Up section for recommended follow-up.    Valarie Mckeon RD, CDE  Diabetes     Time Spent: 30 minutes  Encounter Type: Individual    Any diabetes medication dose changes were made via the CDE Protocol and Collaborative Practice Agreement with the patient's primary care provider. A copy of this encounter was shared with the provider.

## 2019-03-22 NOTE — PROGRESS NOTES
"Diabetes Self-Management Education & Support    Diabetes Education Self Management & Training    SUBJECTIVE/OBJECTIVE:  Diabetes education in the past 24mo: (P) No  Diabetes type: (P) Type 2  Disease course: (P) Getting harder to manage  Cultural Influences/Ethnic Background:  American    Diabetes Symptoms & Complications  Blurred vision: (P) No  Fatigue: (P) No  Foot ulcerations: (P) No  Polydipsia: (P) No  Polyphagia: (P) No  Polyuria: (P) No  Visual change: (P) No  Weakness: (P) No  Weight loss: (P) Yes  Slow healing wounds: (P) No  Weight trend: (P) Fluctuating minimally  Autonomic neuropathy: (P) No  CVA: (P) No  Heart disease: (P) No  Nephropathy: (P) No  Peripheral Vascular Disease: (P) No  Retinopathy: (P) No  Sexual dysfunction: (P) No    Patient Problem List and Family Medical History reviewed for relevant medical history, current medical status, and diabetes risk factors.    Vitals:  There were no vitals taken for this visit.  Estimated body mass index is 40.7 kg/m  as calculated from the following:    Height as of 3/11/19: 1.651 m (5' 5\").    Weight as of 3/11/19: 110.9 kg (244 lb 9.6 oz).   Last 3 BP:   BP Readings from Last 3 Encounters:   03/11/19 122/84   02/20/19 115/77   02/13/19 116/82       History   Smoking Status     Former Smoker     Start date: 10/3/1963     Quit date: 10/3/2006   Smokeless Tobacco     Never Used       Labs:  Lab Results   Component Value Date    A1C 8.9 01/02/2019     Lab Results   Component Value Date     01/02/2019     Lab Results   Component Value Date    LDL 74 01/02/2019     HDL Cholesterol   Date Value Ref Range Status   01/02/2019 45 (L) >49 mg/dL Final   ]  GFR Estimate   Date Value Ref Range Status   01/02/2019 55 (L) >60 mL/min/[1.73_m2] Final     Comment:     Non  GFR Calc  Starting 12/18/2018, serum creatinine based estimated GFR (eGFR) will be   calculated using the Chronic Kidney Disease Epidemiology Collaboration   (CKD-EPI) equation.   "     GFR Estimate If Black   Date Value Ref Range Status   01/02/2019 63 >60 mL/min/[1.73_m2] Final     Comment:      GFR Calc  Starting 12/18/2018, serum creatinine based estimated GFR (eGFR) will be   calculated using the Chronic Kidney Disease Epidemiology Collaboration   (CKD-EPI) equation.       Lab Results   Component Value Date    CR 1.00 01/02/2019     No results found for: MICROALBUMIN    Healthy Eating  Cultural/Spiritism diet restrictions?: (P) No  Meal planning: (P) Avoiding sweets, Smaller portions  Meals include: (P) Breakfast, Dinner, Snacks  Beverages: (P) Water, Coffee, Diet soda    Being Active  Barrier to exercise: (P) Physical limitation    Monitoring  Blood Glucose Meter: (P) Unknown  Home Glucose (Sugar) Monitoring: (P) 1-2 times per day  Blood glucose trend: (P) Decreasing steadily  Low Glucose Range (mg/dL): (P) 110-130  High Glucose Range (mg/dL): (P) >200  Overall Range (mg/dL): (P) 140-180    No meter or BG log brought to appt. Pt reports blood sugars are coming down- was 129 mg/dl this morning, 140 mg/dl yesterday am.    Taking Medications  Diabetes Medication(s)     Dipeptidyl Peptidase-4 (DPP-4) Inhibitors       JANUVIA 100 MG tablet    Take 1 tablet (100 mg) by mouth daily    Sulfonylureas       glipiZIDE (GLUCOTROL XL) 10 MG 24 hr tablet    Take 2 tablets (20 mg) by mouth daily    Incretin Mimetic Agents (GLP-1 Receptor Agonists)       dulaglutide (TRULICITY) 0.75 MG/0.5ML pen    Inject 0.75 mg Subcutaneous every 7 days - not taking yet, instruction provided today        Current Treatments: (P) Oral Agent (dual therapy)    Problem Solving  Hypoglycemia Frequency: (P) Never  Hypoglycemia Treatment: (P) Juice, Candy  Patient carries a carbohydrate source: (P) No  Medical alert: (P) Yes  Severe weather/disaster plan for diabetes management?: (P) No  DKA prevention plan?: (P) No  Sick day plan for diabetes management?: (P) No    Reducing Risks  CAD Risks: (P) Family  history, Hypertension, Obesity  Has dilated eye exam at least once a year?: (P) Yes  Sees dentist every 6 months?: (P) No  Sees podiatrist (foot doctor)?: (P) No    Healthy Coping  Informal Support system:: (P) Family  Difficulty affording diabetes management supplies?: (P) No  Patient Activation Measure Survey Score:  No flowsheet data found.    ASSESSMENT:  Needs instruction on Trulicity use- however with recent BG improvement- pt and daughter question whether it will be safe to add another medication. Cost is also a concern, and noted Januvia and Trulicity both incretin based treatments.     Patient's most recent   Lab Results   Component Value Date    A1C 8.9 01/02/2019    is not meeting goal of <8.0    INTERVENTION:   Diabetes knowledge and skills assessment:     Patient is knowledgeable in diabetes management concepts related to: Healthy Eating, Monitoring and Healthy Coping    Patient needs further education on the following diabetes management concepts: Taking Medication and Problem Solving    Based on learning assessment above, most appropriate setting for further diabetes education would be: Individual setting.    Education provided today on:  AADE Self-Care Behaviors:  Taking Medication: action of prescribed medication, drawing up, administering and storing injectable diabetes medications, proper site selection and rotation for injections, side effects of prescribed medications and when to take medications  Problem Solving: high blood glucose - causes, signs/symptoms, treatment and prevention, low blood glucose - causes, signs/symptoms, treatment and prevention and when to call health care provider    GLP-1 administration technique taught today. Patient verbalized understanding and was able to perform an accurate return demonstration of administration technique. Side effects were discussed, if patient has any abdominal pain, with or without nausea and/or vomiting, stop medication, call provider, clinic or  go to the emergency room.    Opportunities for ongoing education and support in diabetes-self management were discussed.    Pt verbalized understanding of concepts discussed and recommendations provided today.       Education Materials Provided:  No new materials provided today    PLAN:  See Patient Instructions for co-developed, patient-stated behavior change goals.  Routing to Dr. Joy for review- pt would like phone call regarding start of Trulicity, would like direction on whether she can use up her Januvia before starting Trulicity since blood sugars improving significantly, and both incretin based medications.   AVS printed and provided to patient today. See Follow-Up section for recommended follow-up.    Valarie Mckeon RD, CDE  Diabetes     Time Spent: 30 minutes  Encounter Type: Individual    Any diabetes medication dose changes were made via the CDE Protocol and Collaborative Practice Agreement with the patient's primary care provider. A copy of this encounter was shared with the provider.

## 2019-03-22 NOTE — PATIENT INSTRUCTIONS
Care Plan:    Since blood sugars are coming down into target- recommend use up your Januvia for now. Will send a message to Dr. Joy to see if she would want you to take both the Januvia and the Trulicity together.     When you start Trulicity:  Take injection dose on the same day each week.    Keep unused Trulicity pens in the refrigerator.     Call your doctor if you experience abdominal pain that is severe and will not go away while using Trulicity.    Call/e-mail educator  if questions or concerns.     ROGELIO Molina RD, SREEKANTH  Diabetes     Follow up:  Call ([phone]), e-mail ([email or web address]), or send [employer] message with questions, concerns or if follow-up is needed.    Bring blood glucose meter and logbook with you to all doctor and follow-up appointments.     ROGELIO Molina RD, SREEKANTH  Diabetes     [facility] for the [facility]:    For Your Diabetes or Nutrition Education Appointments Call:  [phone]   For Diabetes or Nutrition Related Questions Call or Email:   [phone]  [email or web address]  Fax: [phone]   If you need a medication refill please contact your pharmacy. Please allow 3 business days for your refills to be completed.

## 2019-03-22 NOTE — Clinical Note
Hi Dr. Joy-Pt instructed on Trulicity- however pt wondering if she could use up Januvia before starting Trulicity since blood sugars in target past few days. Also wondering if ok to take both incretins at the same time. Daughter is requesting a phone call or [email or web address] message. Thanks!Valarie Mckeon RD, CDEDiabetes

## 2019-03-27 ENCOUNTER — TELEPHONE (OUTPATIENT)
Dept: PEDIATRICS | Facility: CLINIC | Age: 74
End: 2019-03-27

## 2019-03-27 NOTE — TELEPHONE ENCOUNTER
Please call Hima's daughter. Reviewed diab ed note. If sugars are improving, could stay on januvia for now. Before she refills this, would like to check in with her in clinic, or she could check in with diab ed, to decide if it is better to switch.  Georgiana Joy M.D.

## 2019-04-01 NOTE — TELEPHONE ENCOUNTER
Spoke with pt's daughter, Velvet.  She states that the pt's blood sugars are improving.  They will continue the Januvia for now.  If anything changes, they will call back.    Isis Davila RN

## 2019-04-01 NOTE — TELEPHONE ENCOUNTER
Patient daughter called back I don't see a consent to communicate on file, I did tell her that the nurse would call her back

## 2019-05-08 ENCOUNTER — TELEPHONE (OUTPATIENT)
Dept: PEDIATRICS | Facility: CLINIC | Age: 74
End: 2019-05-08

## 2019-05-08 DIAGNOSIS — E11.8 TYPE 2 DIABETES MELLITUS WITH COMPLICATION, WITHOUT LONG-TERM CURRENT USE OF INSULIN (H): Primary | ICD-10-CM

## 2019-05-08 NOTE — TELEPHONE ENCOUNTER
"I received a  message in patient daughter's chart, copy\"    Second question-   My mom lauri mahmood wants to get her A1c checked so should I just make a lab appointment and then depending on that result wants to see you to discuss getting off jenuvia and switching to the shot. Let me know how to proceed -     Patient is due for A1C and office visit with Dr. Joy/OR appointment with a diabetic educator.  See 3/27 telephone enc-this plan was created by Dr. Joy. No consent to communicate with daughter Ernesto on file.    Call to patient-advised and patient in agreement.  Signed A1C and TSH per .  Please add any other labs you would like checked.    Lea Oliva RN  Message handled by Nurse Triage.    "

## 2019-05-14 NOTE — TELEPHONE ENCOUNTER
Has already open orders for A1C and thyroid. Can have this done, then follow up with me depending on results. Will need to watch blood sugars twice daily when switching to trulicity because of the glipizide. Then may be able to wean glipizide.   Georgiana Joy M.D.

## 2019-05-15 NOTE — TELEPHONE ENCOUNTER
Has lab appt on Friday, is still taking the januvia 100 mg bid, has not started the trulicity, wanted to finish out the januvia.  Patience Henry LPN

## 2019-05-17 DIAGNOSIS — E11.8 TYPE 2 DIABETES MELLITUS WITH COMPLICATION, WITHOUT LONG-TERM CURRENT USE OF INSULIN (H): ICD-10-CM

## 2019-05-17 LAB
HBA1C MFR BLD: 7.6 % (ref 0–5.6)
TSH SERPL DL<=0.005 MIU/L-ACNC: 1.49 MU/L (ref 0.4–4)

## 2019-05-17 PROCEDURE — 36415 COLL VENOUS BLD VENIPUNCTURE: CPT | Performed by: INTERNAL MEDICINE

## 2019-05-17 PROCEDURE — 84443 ASSAY THYROID STIM HORMONE: CPT | Performed by: INTERNAL MEDICINE

## 2019-05-17 PROCEDURE — 83036 HEMOGLOBIN GLYCOSYLATED A1C: CPT | Performed by: INTERNAL MEDICINE

## 2019-08-02 ENCOUNTER — OFFICE VISIT (OUTPATIENT)
Dept: FAMILY MEDICINE | Facility: CLINIC | Age: 74
End: 2019-08-02
Payer: COMMERCIAL

## 2019-08-02 VITALS
TEMPERATURE: 97.6 F | HEIGHT: 65 IN | OXYGEN SATURATION: 97 % | SYSTOLIC BLOOD PRESSURE: 119 MMHG | RESPIRATION RATE: 16 BRPM | BODY MASS INDEX: 38.65 KG/M2 | DIASTOLIC BLOOD PRESSURE: 82 MMHG | HEART RATE: 65 BPM | WEIGHT: 232 LBS

## 2019-08-02 DIAGNOSIS — M17.11 OSTEOARTHRITIS OF RIGHT KNEE, UNSPECIFIED OSTEOARTHRITIS TYPE: Primary | ICD-10-CM

## 2019-08-02 PROCEDURE — 20610 DRAIN/INJ JOINT/BURSA W/O US: CPT | Performed by: FAMILY MEDICINE

## 2019-08-02 PROCEDURE — 99213 OFFICE O/P EST LOW 20 MIN: CPT | Mod: 25 | Performed by: FAMILY MEDICINE

## 2019-08-02 RX ORDER — BUPIVACAINE HYDROCHLORIDE 5 MG/ML
4 INJECTION, SOLUTION PERINEURAL ONCE
Status: DISCONTINUED | OUTPATIENT
Start: 2019-08-02 | End: 2019-12-18

## 2019-08-02 RX ORDER — DEXAMETHASONE SODIUM PHOSPHATE 4 MG/ML
4 INJECTION, SOLUTION INTRA-ARTICULAR; INTRALESIONAL; INTRAMUSCULAR; INTRAVENOUS; SOFT TISSUE ONCE
Status: DISCONTINUED | OUTPATIENT
Start: 2019-08-02 | End: 2019-12-18

## 2019-08-02 ASSESSMENT — MIFFLIN-ST. JEOR: SCORE: 1543.23

## 2019-08-02 NOTE — PROGRESS NOTES
"Subjective     Hima Griffiths is a 74 year old female who presents to clinic today for the following health issues:    History of Present Illness        She eats 0-1 servings of fruits and vegetables daily.She consumes 1 sweetened beverage(s) daily.  She is taking medications regularly.     Concern - rt knee pain, injection   Onset:    Description:   Rt knee    Intensity:     Progression of Symptoms:      Accompanying Signs & Symptoms:      Previous history of similar problem:       Precipitating factors:   Worsened by:     Alleviating factors:  Improved by:     Therapies Tried and outcome:     Osteoarthritis of right knee, unspecified osteoarthritis type  (primary encounter diagnosis)- Patient complains of Right knee pain.\"bone on bone\" described She had a cortisone injection from [facility] in 2019, it was very helpful. She wants a cortisone injection today.   No specific trauma.        Past Medical History:   Diagnosis Date     Benign hypertension      Benign paroxysmal positional vertigo      Diabetes mellitus (H)      Family history of atrial fibrillation     Mother     Osteoarthritis of right knee, unspecified osteoarthritis type 2019     Persistent atrial fibrillation (H) 2015     Stroke (H) 10/2015       Past Surgical History:   Procedure Laterality Date     AS LOOP RECORDER IMPLANT  11/9/15     CATARACT IOL, RT/LT  12       Family History   Problem Relation Age of Onset     Hypertension Mother      Arrhythmia Mother      Hypertension Father      Coronary Artery Disease Father        Social History     Tobacco Use     Smoking status: Former Smoker     Start date: 10/3/1963     Last attempt to quit: 10/3/2006     Years since quittin.8     Smokeless tobacco: Never Used   Substance Use Topics     Alcohol use: Yes         Reviewed and updated as needed this visit by Provider       Review of Systems   No falls.    This document serves as a record of the services and " "decisions personally performed and made by Garrett Lauren MD. It was created on his behalf by Crispin Dick, a trained medical scribe. The creation of this document is based on the provider's statements to the medical scribe.  Crispin Dick August 2, 2019 9:22 AM  '      Objective    /82 (BP Location: Right arm, Patient Position: Chair, Cuff Size: Adult Large)   Pulse 65   Temp 97.6  F (36.4  C) (Oral)   Resp 16   Ht 1.651 m (5' 5\")   Wt 105.2 kg (232 lb)   SpO2 97%   Breastfeeding? No   BMI 38.61 kg/m    Body mass index is 38.61 kg/m .     Physical Exam   R knee w/o palpable effusion. Wheeled  Walker      Informed consent discussed, signed. After the verification of the absence of allergies, with betadine prep, in sterile fashion  MARCAINE without epinepherine and  4 mg Dexamethasone  was instilled into the  R knee   with  0 blood loss with almost complete resolution of discomfort.            Assessment & Plan   Problem List Items Addressed This Visit        Musculoskeletal and Integumentary    Osteoarthritis of right knee, unspecified osteoarthritis type - Primary     Cortisone injection given.  Xrays notes requested from [facility]         Relevant Medications    dexamethasone (DECADRON) injection 4 mg (Start on 8/2/2019 12:15 PM)    bupivacaine (MARCAINE) 0.5% injection MDV (Start on 8/2/2019 12:15 PM)    Other Relevant Orders    Large Joint/Bursa injection and/or drainage (Shoulder, Knee) (Completed)             BMI:   Estimated body mass index is 38.61 kg/m  as calculated from the following:    Height as of this encounter: 1.651 m (5' 5\").    Weight as of this encounter: 105.2 kg (232 lb).       No follow-ups on file.    The information in this document, created by the medical scribe for me, accurately reflects the services I personally performed and the decisions made by me. I have reviewed and approved this document for accuracy prior to leaving the patient care area.  August 2, 2019 9:22 AM    Garrett SALMERON" MD Xin  [facility]

## 2019-12-18 ENCOUNTER — OFFICE VISIT (OUTPATIENT)
Dept: PEDIATRICS | Facility: CLINIC | Age: 74
End: 2019-12-18
Payer: COMMERCIAL

## 2019-12-18 VITALS
HEIGHT: 65 IN | DIASTOLIC BLOOD PRESSURE: 68 MMHG | OXYGEN SATURATION: 99 % | TEMPERATURE: 97.6 F | HEART RATE: 59 BPM | SYSTOLIC BLOOD PRESSURE: 108 MMHG | BODY MASS INDEX: 39.82 KG/M2 | WEIGHT: 239 LBS

## 2019-12-18 DIAGNOSIS — E11.8 TYPE 2 DIABETES MELLITUS WITH COMPLICATION, WITHOUT LONG-TERM CURRENT USE OF INSULIN (H): Primary | ICD-10-CM

## 2019-12-18 DIAGNOSIS — Z86.73 H/O ISCHEMIC LEFT MCA STROKE: ICD-10-CM

## 2019-12-18 DIAGNOSIS — M17.11 OSTEOARTHRITIS OF RIGHT KNEE, UNSPECIFIED OSTEOARTHRITIS TYPE: ICD-10-CM

## 2019-12-18 DIAGNOSIS — I10 BENIGN HYPERTENSION: ICD-10-CM

## 2019-12-18 DIAGNOSIS — R29.810 FACIAL DROOP: ICD-10-CM

## 2019-12-18 DIAGNOSIS — I48.19 PERSISTENT ATRIAL FIBRILLATION (H): ICD-10-CM

## 2019-12-18 DIAGNOSIS — E78.5 HYPERLIPIDEMIA LDL GOAL <100: ICD-10-CM

## 2019-12-18 DIAGNOSIS — Z78.0 ASYMPTOMATIC POSTMENOPAUSAL STATUS: ICD-10-CM

## 2019-12-18 DIAGNOSIS — N18.30 CKD (CHRONIC KIDNEY DISEASE) STAGE 3, GFR 30-59 ML/MIN (H): ICD-10-CM

## 2019-12-18 DIAGNOSIS — Z87.891 PERSONAL HISTORY OF TOBACCO USE: ICD-10-CM

## 2019-12-18 LAB
ERYTHROCYTE [DISTWIDTH] IN BLOOD BY AUTOMATED COUNT: 11.6 % (ref 10–15)
HBA1C MFR BLD: 7.6 % (ref 0–5.6)
HCT VFR BLD AUTO: 44.3 % (ref 35–47)
HGB BLD-MCNC: 15.2 G/DL (ref 11.7–15.7)
MCH RBC QN AUTO: 34.2 PG (ref 26.5–33)
MCHC RBC AUTO-ENTMCNC: 34.3 G/DL (ref 31.5–36.5)
MCV RBC AUTO: 100 FL (ref 78–100)
PLATELET # BLD AUTO: 149 10E9/L (ref 150–450)
RBC # BLD AUTO: 4.44 10E12/L (ref 3.8–5.2)
WBC # BLD AUTO: 8.6 10E9/L (ref 4–11)

## 2019-12-18 PROCEDURE — 36415 COLL VENOUS BLD VENIPUNCTURE: CPT | Performed by: INTERNAL MEDICINE

## 2019-12-18 PROCEDURE — 99214 OFFICE O/P EST MOD 30 MIN: CPT | Performed by: INTERNAL MEDICINE

## 2019-12-18 PROCEDURE — 83036 HEMOGLOBIN GLYCOSYLATED A1C: CPT | Performed by: INTERNAL MEDICINE

## 2019-12-18 PROCEDURE — 85027 COMPLETE CBC AUTOMATED: CPT | Performed by: INTERNAL MEDICINE

## 2019-12-18 PROCEDURE — 99207 C FOOT EXAM  NO CHARGE: CPT | Mod: 25 | Performed by: INTERNAL MEDICINE

## 2019-12-18 PROCEDURE — G0296 VISIT TO DETERM LDCT ELIG: HCPCS | Performed by: INTERNAL MEDICINE

## 2019-12-18 PROCEDURE — 80061 LIPID PANEL: CPT | Performed by: INTERNAL MEDICINE

## 2019-12-18 PROCEDURE — 82043 UR ALBUMIN QUANTITATIVE: CPT | Performed by: INTERNAL MEDICINE

## 2019-12-18 PROCEDURE — 80048 BASIC METABOLIC PNL TOTAL CA: CPT | Performed by: INTERNAL MEDICINE

## 2019-12-18 RX ORDER — DILTIAZEM HYDROCHLORIDE 30 MG/1
30 TABLET, FILM COATED ORAL 2 TIMES DAILY
Qty: 180 TABLET | Refills: 3 | Status: SHIPPED | OUTPATIENT
Start: 2019-12-18 | End: 2021-01-20

## 2019-12-18 RX ORDER — PRAVASTATIN SODIUM 40 MG
40 TABLET ORAL DAILY
Qty: 90 TABLET | Refills: 3 | Status: SHIPPED | OUTPATIENT
Start: 2019-12-18 | End: 2021-01-20

## 2019-12-18 RX ORDER — METOPROLOL TARTRATE 100 MG
100 TABLET ORAL 2 TIMES DAILY
Qty: 180 TABLET | Refills: 3 | Status: SHIPPED | OUTPATIENT
Start: 2019-12-18 | End: 2021-01-20

## 2019-12-18 ASSESSMENT — MIFFLIN-ST. JEOR: SCORE: 1574.98

## 2019-12-18 NOTE — PROGRESS NOTES
"Subjective     Hima Griffiths is a 74 year old female who presents to clinic today for the following health issues:    HPI     \"Bone on bone\" DJD R knee. Tries to walk her ha twice a day. Knee pain slowing her down and really bothers her.    Diabetes Follow-up    How often are you checking your blood sugar? A few times a week  What time of day are you checking your blood sugars (select all that apply)?  Before meals  Have you had any blood sugars above 200?  No  Have you had any blood sugars below 70?  No    What symptoms do you notice when your blood sugar is low?  None    What concerns do you have today about your diabetes?  Other: januvia expensive at $125 per month     Do you have any of these symptoms? (Select all that apply)  No numbness or tingling in feet.  No redness, sores or blisters on feet.  No complaints of excessive thirst.  No reports of blurry vision.  No significant changes to weight.     Have you had a diabetic eye exam in the last 12 months? Yes- Date of last eye exam: 1/18/19    Diabetes Management Resources    Hyperlipidemia Follow-Up      Are you regularly taking any medication or supplement to lower your cholesterol?   Yes- pravastatin    Are you having muscle aches or other side effects that you think could be caused by your cholesterol lowering medication?  No    Hypertension Follow-up      Do you check your blood pressure regularly outside of the clinic? No     Are you following a low salt diet? No but does not add salt    Are your blood pressures ever more than 140 on the top number (systolic) OR more   than 90 on the bottom number (diastolic), for example 140/90? Not taking    BP Readings from Last 2 Encounters:   12/18/19 108/68   08/02/19 119/82     Hemoglobin A1C (%)   Date Value   12/18/2019 7.6 (H)   05/17/2019 7.6 (H)     LDL Cholesterol Calculated (mg/dL)   Date Value   12/18/2019 76   01/02/2019 74         How many servings of fruits and vegetables do you eat daily?  " "2-3    On average, how many sweetened beverages do you drink each day (Examples: soda, juice, sweet tea, etc.  Do NOT count diet or artificially sweetened beverages)?   0    How many days per week do you miss taking your medication? 0      Patient Active Problem List   Diagnosis     Advanced directives, counseling/discussion     Persistent atrial fibrillation     Benign paroxysmal positional vertigo     H/O ischemic left MCA stroke     Type 2 diabetes mellitus with complication, without long-term current use of insulin (H)     Benign hypertension     Facial droop     Colitis     Hyperlipidemia LDL goal <100     Morbid obesity (H)     Osteoarthritis of right knee, unspecified osteoarthritis type     CKD (chronic kidney disease) stage 3, GFR 30-59 ml/min (H)     Past Surgical History:   Procedure Laterality Date     AS LOOP RECORDER IMPLANT  11/9/15     CATARACT IOL, RT/LT  14       Social History     Tobacco Use     Smoking status: Former Smoker     Start date: 10/3/1963     Last attempt to quit: 10/3/2006     Years since quittin.2     Smokeless tobacco: Never Used     Tobacco comment: 30 years on and off less than a pack a day   Substance Use Topics     Alcohol use: Yes     Family History   Problem Relation Age of Onset     Hypertension Mother      Arrhythmia Mother      Hypertension Father      Coronary Artery Disease Father              Reviewed and updated as needed this visit by Provider         Review of Systems   ROS COMP: Constitutional, HEENT, cardiovascular, pulmonary, gi and gu systems are negative, except as otherwise noted.      Objective    /68 (Cuff Size: Adult Large)   Pulse 59   Temp 97.6  F (36.4  C) (Tympanic)   Ht 1.651 m (5' 5\")   Wt 108.4 kg (239 lb)   SpO2 99%   BMI 39.77 kg/m    Body mass index is 39.77 kg/m .  Physical Exam   GENERAL: healthy, alert and no distress  NECK: no adenopathy, no asymmetry, masses, or scars and thyroid normal to palpation  RESP: lungs clear to " auscultation - no rales, rhonchi or wheezes  CV: irreg irreg rate and rhythm, normal S1 S2, no S3 or S4, no murmur, click or rub  ABDOMEN: soft, nontender, no hepatosplenomegaly, no masses and bowel sounds normal  MS: no gross musculoskeletal defects noted, no edema  Diabetic foot exam: normal DP and PT pulses, no trophic changes or ulcerative lesions and normal sensory exam    Diagnostic Test Results:  Labs reviewed in Epic  Results for orders placed or performed in visit on 12/18/19   HEMOGLOBIN A1C     Status: Abnormal   Result Value Ref Range    Hemoglobin A1C 7.6 (H) 0 - 5.6 %   BASIC METABOLIC PANEL     Status: Abnormal   Result Value Ref Range    Sodium 135 133 - 144 mmol/L    Potassium 5.3 3.4 - 5.3 mmol/L    Chloride 104 94 - 109 mmol/L    Carbon Dioxide 24 20 - 32 mmol/L    Anion Gap 7 3 - 14 mmol/L    Glucose 215 (H) 70 - 99 mg/dL    Urea Nitrogen 21 7 - 30 mg/dL    Creatinine 0.95 0.52 - 1.04 mg/dL    GFR Estimate 58 (L) >60 mL/min/[1.73_m2]    GFR Estimate If Black 67 >60 mL/min/[1.73_m2]    Calcium 9.1 8.5 - 10.1 mg/dL   Lipid panel reflex to direct LDL Fasting     Status: Abnormal   Result Value Ref Range    Cholesterol 156 <200 mg/dL    Triglycerides 162 (H) <150 mg/dL    HDL Cholesterol 48 (L) >49 mg/dL    LDL Cholesterol Calculated 76 <100 mg/dL    Non HDL Cholesterol 108 <130 mg/dL   Albumin Random Urine Quantitative with Creat Ratio     Status: Abnormal   Result Value Ref Range    Creatinine Urine 150 mg/dL    Albumin Urine mg/L 64 mg/L    Albumin Urine mg/g Cr 42.87 (H) 0 - 25 mg/g Cr   CBC with platelets     Status: Abnormal   Result Value Ref Range    WBC 8.6 4.0 - 11.0 10e9/L    RBC Count 4.44 3.8 - 5.2 10e12/L    Hemoglobin 15.2 11.7 - 15.7 g/dL    Hematocrit 44.3 35.0 - 47.0 %     78 - 100 fl    MCH 34.2 (H) 26.5 - 33.0 pg    MCHC 34.3 31.5 - 36.5 g/dL    RDW 11.6 10.0 - 15.0 %    Platelet Count 149 (L) 150 - 450 10e9/L           Assessment & Plan     1. Type 2 diabetes mellitus with  complication, without long-term current use of insulin (H)  Did not want to keep taking GLP1. She remains concerned about cost of Januvia, but she prefers to stay on this. Discussed goal A1C reasonable at <8 given age and comorbidities.  - HEMOGLOBIN A1C  - BASIC METABOLIC PANEL  - Lipid panel reflex to direct LDL Fasting  - Albumin Random Urine Quantitative with Creat Ratio  - CBC with platelets  - rivaroxaban ANTICOAGULANT (XARELTO ANTICOAGULANT) 20 MG TABS tablet; Take 1 tablet (20 mg) by mouth daily (with dinner)  Dispense: 90 tablet; Refill: 3    2. Persistent atrial fibrillation  Maintains good rate control.   - diltiazem (CARDIZEM) 30 MG tablet; Take 1 tablet (30 mg) by mouth 2 times daily  Dispense: 180 tablet; Refill: 3  - metoprolol tartrate (LOPRESSOR) 100 MG tablet; Take 1 tablet (100 mg) by mouth 2 times daily  Dispense: 180 tablet; Refill: 3  - rivaroxaban ANTICOAGULANT (XARELTO ANTICOAGULANT) 20 MG TABS tablet; Take 1 tablet (20 mg) by mouth daily (with dinner)  Dispense: 90 tablet; Refill: 3    3. Hyperlipidemia LDL goal <100  tolerating  - pravastatin (PRAVACHOL) 40 MG tablet; Take 1 tablet (40 mg) by mouth daily  Dispense: 90 tablet; Refill: 3    4. H/O ischemic left MCA stroke  No bleeding or complications.  - rivaroxaban ANTICOAGULANT (XARELTO ANTICOAGULANT) 20 MG TABS tablet; Take 1 tablet (20 mg) by mouth daily (with dinner)  Dispense: 90 tablet; Refill: 3    5. CKD (chronic kidney disease) stage 3, GFR 30-59 ml/min (H)  Stable.    6. Osteoarthritis of right knee, unspecified osteoarthritis type  Very limiting to her mobility and exercise. Recommend she follow up with ortho to discuss options.  - ORTHO  REFERRAL    7. Benign hypertension  Good control    8. Asymptomatic postmenopausal status    - DEXA HIP/PELVIS/SPINE - Future; Future    9. Personal history of tobacco use    - Prof Fee: Shared Decision Making Visit for Lung Cancer Screening     BMI:   Estimated body mass index is 39.77  "kg/m  as calculated from the following:    Height as of this encounter: 1.651 m (5' 5\").    Weight as of this encounter: 108.4 kg (239 lb).   Weight management plan: Discussed healthy diet and exercise guidelines        Patient Instructions   Leave medications the same.    Good job with the walking twice a day.    Have your daughter's call for an appointment with Dr. Sheldon Macedo.    Get your shingles vaccine doses.    See me back in 6 months.         No follow-ups on file.    Georgiana Joy MD  [facility]      "

## 2019-12-18 NOTE — PATIENT INSTRUCTIONS
Leave medications the same.    Good job with the walking twice a day.    Have your daughter's call for an appointment with Dr. Sheldon Macedo.    Get your shingles vaccine doses.    See me back in 6 months.

## 2019-12-18 NOTE — PROGRESS NOTES
Lung Cancer Screening Shared Decision Making Visit     Hima Griffiths is not eligible for lung cancer screening on the basis of the information provided in my signed lung cancer screening order. Hima's smoking history is below the threshold and so it is not recommended.    Patient is not currently a smoker and so we did not discuss that the only way to prevent lung cancer is to not smoke. Smoking cessation assistance was not offered.    ShouldIScreen

## 2019-12-19 DIAGNOSIS — E11.8 TYPE 2 DIABETES MELLITUS WITH COMPLICATION, WITHOUT LONG-TERM CURRENT USE OF INSULIN (H): ICD-10-CM

## 2019-12-19 LAB
ANION GAP SERPL CALCULATED.3IONS-SCNC: 7 MMOL/L (ref 3–14)
BUN SERPL-MCNC: 21 MG/DL (ref 7–30)
CALCIUM SERPL-MCNC: 9.1 MG/DL (ref 8.5–10.1)
CHLORIDE SERPL-SCNC: 104 MMOL/L (ref 94–109)
CHOLEST SERPL-MCNC: 156 MG/DL
CO2 SERPL-SCNC: 24 MMOL/L (ref 20–32)
CREAT SERPL-MCNC: 0.95 MG/DL (ref 0.52–1.04)
CREAT UR-MCNC: 150 MG/DL
GFR SERPL CREATININE-BSD FRML MDRD: 58 ML/MIN/{1.73_M2}
GLUCOSE SERPL-MCNC: 215 MG/DL (ref 70–99)
HDLC SERPL-MCNC: 48 MG/DL
LDLC SERPL CALC-MCNC: 76 MG/DL
MICROALBUMIN UR-MCNC: 64 MG/L
MICROALBUMIN/CREAT UR: 42.87 MG/G CR (ref 0–25)
NONHDLC SERPL-MCNC: 108 MG/DL
POTASSIUM SERPL-SCNC: 5.3 MMOL/L (ref 3.4–5.3)
SODIUM SERPL-SCNC: 135 MMOL/L (ref 133–144)
TRIGL SERPL-MCNC: 162 MG/DL

## 2019-12-19 NOTE — TELEPHONE ENCOUNTER
We received a refill request from patient's pharmacy[facility] [address] [facility] [place] for refill of Januvia 100 mg tabs. 90-day refill was requested.     LEVON GAGNON MA on 12/19/2019 at 4:07 PM

## 2019-12-20 RX ORDER — SITAGLIPTIN 100 MG/1
100 TABLET, FILM COATED ORAL DAILY
Qty: 90 TABLET | Refills: 3 | Status: SHIPPED | OUTPATIENT
Start: 2019-12-20 | End: 2020-12-08

## 2019-12-20 NOTE — TELEPHONE ENCOUNTER
"Requested Prescriptions   Pending Prescriptions Disp Refills     LISSETUVIA 100 MG tablet 90 tablet 3     Sig: Take 1 tablet (100 mg) by mouth daily       DPP4 Inhibitors Protocol Passed - 12/19/2019  4:08 PM        Passed - Blood pressure less than 140/90 in past 6 months     BP Readings from Last 3 Encounters:   12/18/19 108/68   08/02/19 119/82   03/11/19 122/84                 Passed - LDL on file in past 12 months     Recent Labs   Lab Test 12/18/19  1146   LDL 76             Passed - Microalbumin on file in past 12 months     Recent Labs   Lab Test 12/18/19  1147   MICROL 64   UMALCR 42.87*             Passed - HgbA1C in past 3 or 6 months     If HgbA1C is 8 or greater, it needs to be on file within the past 3 months.  If less than 8, must be on file within the past 6 months.     Recent Labs   Lab Test 12/18/19  1146   A1C 7.6*             Passed - Medication is active on med list        Passed - Patient is age 18 or older        Passed - No active pregnancy on record        Passed - Normal serum creatinine in past 12 months     Recent Labs   Lab Test 12/18/19  1146   CR 0.95             Passed - No positive pregnancy test in past 12 months        Passed - Recent (6 mo) or future (30 days) visit within the authorizing provider's specialty     Patient had office visit in the last 6 months or has a visit in the next 30 days with authorizing provider.  See \"Patient Info\" tab in inbasket, or \"Choose Columns\" in Meds & Orders section of the refill encounter.              Last office visit 12-18-19    Routing refill request to provider for review/approval because:  Dictation from office visit not complete.    Please advise, thanks.        "

## 2020-01-09 DIAGNOSIS — E11.8 TYPE 2 DIABETES MELLITUS WITH COMPLICATION, WITHOUT LONG-TERM CURRENT USE OF INSULIN (H): ICD-10-CM

## 2020-01-10 RX ORDER — GLIPIZIDE 10 MG/1
20 TABLET, FILM COATED, EXTENDED RELEASE ORAL DAILY
Qty: 180 TABLET | Refills: 1 | Status: SHIPPED | OUTPATIENT
Start: 2020-01-10 | End: 2020-07-08

## 2020-01-10 NOTE — TELEPHONE ENCOUNTER
Prescription approved per [facility] Refill Protocol.  Jonah Green, RN, BSN

## 2020-02-05 ENCOUNTER — TRANSFERRED RECORDS (OUTPATIENT)
Dept: HEALTH INFORMATION MANAGEMENT | Facility: CLINIC | Age: 75
End: 2020-02-05

## 2020-02-05 LAB — RETINOPATHY: NEGATIVE

## 2020-02-20 DIAGNOSIS — I48.19 PERSISTENT ATRIAL FIBRILLATION (H): ICD-10-CM

## 2020-02-20 DIAGNOSIS — J06.9 VIRAL URI WITH COUGH: ICD-10-CM

## 2020-02-20 DIAGNOSIS — Z86.73 H/O ISCHEMIC LEFT MCA STROKE: ICD-10-CM

## 2020-02-20 DIAGNOSIS — I10 BENIGN HYPERTENSION: ICD-10-CM

## 2020-02-20 DIAGNOSIS — E11.8 TYPE 2 DIABETES MELLITUS WITH COMPLICATION, WITHOUT LONG-TERM CURRENT USE OF INSULIN (H): ICD-10-CM

## 2020-02-20 RX ORDER — METOPROLOL TARTRATE 100 MG
TABLET ORAL
Qty: 180 TABLET | Refills: 3 | OUTPATIENT
Start: 2020-02-20

## 2020-02-20 RX ORDER — RIVAROXABAN 20 MG/1
TABLET, FILM COATED ORAL
Qty: 90 TABLET | Refills: 3 | OUTPATIENT
Start: 2020-02-20

## 2020-02-20 NOTE — TELEPHONE ENCOUNTER
"Routing refill request to provider for review/approval because:  Drug not active on patient's medication list    Requested Prescriptions   Pending Prescriptions Disp Refills     FLUTICASONE 50 MCG/ACT NA nasal spray [Pharmacy Med Name: FLUTICASONE 50MCG NASAL SP (120) RX] 16 g      Sig: SHAKE LIQUID AND USE 2 SPRAYS IN EACH NOSTRIL DAILY   Last Written Prescription Date:  NA  Last Fill Quantity: NA,  # refills: NA   Last office visit: 12/18/2019 with prescribing provider:     Future Office Visit:        Inhaled Steroids Protocol Failed - 2/20/2020  7:37 AM        Failed - Medication is active on med list        Passed - Patient is age 12 or older        Passed - Recent (12 mo) or future (30 days) visit within the authorizing provider's specialty     Patient has had an office visit with the authorizing provider or a provider within the authorizing providers department within the previous 12 mos or has a future within next 30 days. See \"Patient Info\" tab in inbasket, or \"Choose Columns\" in Meds & Orders section of the refill encounter.            Beckie Agudelo RN    "

## 2020-02-25 RX ORDER — FLUTICASONE PROPIONATE 50 MCG
SPRAY, SUSPENSION (ML) NASAL
Qty: 16 G | Refills: 11 | Status: SHIPPED | OUTPATIENT
Start: 2020-02-25 | End: 2020-02-26

## 2020-07-07 DIAGNOSIS — E11.8 TYPE 2 DIABETES MELLITUS WITH COMPLICATION, WITHOUT LONG-TERM CURRENT USE OF INSULIN (H): ICD-10-CM

## 2020-07-07 NOTE — TELEPHONE ENCOUNTER
Routing refill request to provider for review/approval because:  Labs not current:  A1C  Patient needs to be seen because:  Patient due for visit    Care team please assist patient in scheduling appt.  PCP please advise on refill.    Patrice ROSAS RN, BSN

## 2020-07-08 DIAGNOSIS — E11.8 TYPE 2 DIABETES MELLITUS WITH COMPLICATION, WITHOUT LONG-TERM CURRENT USE OF INSULIN (H): Primary | ICD-10-CM

## 2020-07-08 RX ORDER — GLIPIZIDE 10 MG/1
TABLET, FILM COATED, EXTENDED RELEASE ORAL
Qty: 180 TABLET | Refills: 1 | Status: SHIPPED | OUTPATIENT
Start: 2020-07-08 | End: 2021-01-20

## 2020-07-10 DIAGNOSIS — E11.8 TYPE 2 DIABETES MELLITUS WITH COMPLICATION, WITHOUT LONG-TERM CURRENT USE OF INSULIN (H): ICD-10-CM

## 2020-07-10 LAB — HBA1C MFR BLD: 8.5 % (ref 0–5.6)

## 2020-07-10 PROCEDURE — 36415 COLL VENOUS BLD VENIPUNCTURE: CPT | Performed by: INTERNAL MEDICINE

## 2020-07-10 PROCEDURE — 80053 COMPREHEN METABOLIC PANEL: CPT | Performed by: INTERNAL MEDICINE

## 2020-07-10 PROCEDURE — 83036 HEMOGLOBIN GLYCOSYLATED A1C: CPT | Performed by: INTERNAL MEDICINE

## 2020-07-11 LAB
ALBUMIN SERPL-MCNC: 3.3 G/DL (ref 3.4–5)
ALP SERPL-CCNC: 89 U/L (ref 40–150)
ALT SERPL W P-5'-P-CCNC: 25 U/L (ref 0–50)
ANION GAP SERPL CALCULATED.3IONS-SCNC: 7 MMOL/L (ref 3–14)
AST SERPL W P-5'-P-CCNC: 20 U/L (ref 0–45)
BILIRUB SERPL-MCNC: 0.5 MG/DL (ref 0.2–1.3)
BUN SERPL-MCNC: 10 MG/DL (ref 7–30)
CALCIUM SERPL-MCNC: 8.8 MG/DL (ref 8.5–10.1)
CHLORIDE SERPL-SCNC: 104 MMOL/L (ref 94–109)
CO2 SERPL-SCNC: 25 MMOL/L (ref 20–32)
CREAT SERPL-MCNC: 0.78 MG/DL (ref 0.52–1.04)
GFR SERPL CREATININE-BSD FRML MDRD: 73 ML/MIN/{1.73_M2}
GLUCOSE SERPL-MCNC: 181 MG/DL (ref 70–99)
POTASSIUM SERPL-SCNC: 4.3 MMOL/L (ref 3.4–5.3)
PROT SERPL-MCNC: 7.8 G/DL (ref 6.8–8.8)
SODIUM SERPL-SCNC: 136 MMOL/L (ref 133–144)

## 2020-07-14 ENCOUNTER — VIRTUAL VISIT (OUTPATIENT)
Dept: PEDIATRICS | Facility: CLINIC | Age: 75
End: 2020-07-14
Payer: COMMERCIAL

## 2020-07-14 DIAGNOSIS — I10 BENIGN HYPERTENSION: ICD-10-CM

## 2020-07-14 DIAGNOSIS — N18.30 CKD (CHRONIC KIDNEY DISEASE) STAGE 3, GFR 30-59 ML/MIN (H): ICD-10-CM

## 2020-07-14 DIAGNOSIS — I48.19 PERSISTENT ATRIAL FIBRILLATION (H): ICD-10-CM

## 2020-07-14 DIAGNOSIS — E11.8 TYPE 2 DIABETES MELLITUS WITH COMPLICATION, WITHOUT LONG-TERM CURRENT USE OF INSULIN (H): Primary | ICD-10-CM

## 2020-07-14 DIAGNOSIS — E66.01 MORBID OBESITY (H): ICD-10-CM

## 2020-07-14 PROCEDURE — 99214 OFFICE O/P EST MOD 30 MIN: CPT | Mod: 95 | Performed by: INTERNAL MEDICINE

## 2020-07-14 NOTE — PATIENT INSTRUCTIONS
Set up a phone or video appointment with [facility], [facility] pharmacy. I think we should start another medication for your diabetes. Options are adding another oral medication, doing a once a week injection, or adding once daily insulin. My preference would be the once weekly trulicity injection.     I've sent an order for a new glucometer if your daughter can't get yours to work.    Consider getting a home blood pressure monitor so you can keep an eye on your blood pressure during the pandemic.

## 2020-07-14 NOTE — PROGRESS NOTES
"Hima Griffiths is a 75 year old female who is being evaluated via a billable telephone visit.      The patient has been notified of following:     \"This telephone visit will be conducted via a call between you and your physician/provider. We have found that certain health care needs can be provided without the need for a physical exam.  This service lets us provide the care you need with a short phone conversation.  If a prescription is necessary we can send it directly to your pharmacy.  If lab work is needed we can place an order for that and you can then stop by our lab to have the test done at a later time.    Telephone visits are billed at different rates depending on your insurance coverage. During this emergency period, for some insurers they may be billed the same as an in-person visit.  Please reach out to your insurance provider with any questions.    If during the course of the call the physician/provider feels a telephone visit is not appropriate, you will not be charged for this service.\"    Patient has given verbal consent for Telephone visit?  Yes    What phone number would you like to be contacted at? [phone]    How would you like to obtain your AVS? Mail a copy    Subjective     Hima Griffiths is a 75 year old female who presents via phone visit today for the following health issues:    HPI  Diabetes Follow-up      How often are you checking your blood sugar? Not at all lately, meter not working, will check with daughter for help    What concerns do you have today about your diabetes? None     Do you have any of these symptoms? (Select all that apply)  No numbness or tingling in feet.  No redness, sores or blisters on feet.  No complaints of excessive thirst.  No reports of blurry vision.  No significant changes to weight.      Hyperlipidemia Follow-Up      Are you regularly taking any medication or supplement to lower your cholesterol?   Yes- pravastatin    Are you having muscle aches or " other side effects that you think could be caused by your cholesterol lowering medication?  No    Hypertension Follow-up      Do you check your blood pressure regularly outside of the clinic? No     Are you following a low salt diet? No , no added salts    Are your blood pressures ever more than 140 on the top number (systolic) OR more   than 90 on the bottom number (diastolic), for example 140/90? Not taking    BP Readings from Last 2 Encounters:   12/18/19 108/68   08/02/19 119/82     Hemoglobin A1C (%)   Date Value   07/10/2020 8.5 (H)   12/18/2019 7.6 (H)     LDL Cholesterol Calculated (mg/dL)   Date Value   12/18/2019 76   01/02/2019 74         How many servings of fruits and vegetables do you eat daily?  2-3    On average, how many sweetened beverages do you drink each day (Examples: soda, juice, sweet tea, etc.  Do NOT count diet or artificially sweetened beverages)?   0    How many days per week do you exercise enough to make your heart beat faster? 7 walks the halls twice daily    How many minutes a day do you exercise enough to make your heart beat faster? 20 - 29    How many days per week do you miss taking your medication? 0        Patient Active Problem List   Diagnosis     Advanced directives, counseling/discussion     Persistent atrial fibrillation (H)     Benign paroxysmal positional vertigo     H/O ischemic left MCA stroke     Type 2 diabetes mellitus with complication, without long-term current use of insulin (H)     Benign hypertension     Facial droop     Hyperlipidemia LDL goal <100     Morbid obesity (H)     Osteoarthritis of right knee, unspecified osteoarthritis type     CKD (chronic kidney disease) stage 3, GFR 30-59 ml/min (H)     Past Surgical History:   Procedure Laterality Date     AS LOOP RECORDER IMPLANT  11/9/15     CATARACT IOL, RT/LT  8/13/14       Social History     Tobacco Use     Smoking status: Former Smoker     Start date: 10/3/1963     Last attempt to quit: 10/3/2006     Years  since quittin.7     Smokeless tobacco: Never Used     Tobacco comment: 30 years on and off less than a pack a day   Substance Use Topics     Alcohol use: Yes     Family History   Problem Relation Age of Onset     Hypertension Mother      Arrhythmia Mother      Hypertension Father      Coronary Artery Disease Father          Current Outpatient Medications   Medication Sig Dispense Refill     blood glucose (NO BRAND SPECIFIED) lancets standard Use to test blood sugar 2 times daily or as directed. 100 each 11     blood glucose monitoring (NO BRAND SPECIFIED) meter device kit Use to test blood sugar 2 times daily or as directed. 1 kit 1     diltiazem (CARDIZEM) 30 MG tablet Take 1 tablet (30 mg) by mouth 2 times daily 180 tablet 3     glipiZIDE (GLUCOTROL XL) 10 MG 24 hr tablet TAKE 2 TABLETS(20 MG) BY MOUTH DAILY 180 tablet 1     JANUVIA 100 MG tablet Take 1 tablet (100 mg) by mouth daily 90 tablet 3     metoprolol tartrate (LOPRESSOR) 100 MG tablet Take 1 tablet (100 mg) by mouth 2 times daily 180 tablet 3     pravastatin (PRAVACHOL) 40 MG tablet Take 1 tablet (40 mg) by mouth daily 90 tablet 3     rivaroxaban ANTICOAGULANT (XARELTO ANTICOAGULANT) 20 MG TABS tablet Take 1 tablet (20 mg) by mouth daily (with dinner) 90 tablet 3     ACE/ARB NOT PRESCRIBED, INTENTIONAL, Please choose reason not prescribed, below       ASPIRIN NOT PRESCRIBED (INTENTIONAL) Please choose reason not prescribed, below       clotrimazole (LOTRIMIN) 1 % external cream Apply topically 2 times daily 113 g 1       Reviewed and updated as needed this visit by Provider         Review of Systems   Constitutional, HEENT, cardiovascular, pulmonary, gi and gu systems are negative, except as otherwise noted.       Objective   Reported vitals:  There were no vitals taken for this visit.   healthy, alert and no distress  PSYCH: Alert and oriented times 3; coherent speech, normal   rate and volume, able to articulate logical thoughts, able   to  abstract reason, no tangential thoughts, no hallucinations   or delusions  Her affect is normal  RESP: No cough, no audible wheezing, able to talk in full sentences  Remainder of exam unable to be completed due to telephone visits    Diagnostic Test Results:  Labs reviewed in Epic        Assessment/Plan:  1. Type 2 diabetes mellitus with complication, without long-term current use of insulin (H)  Inadequate control. She is willing to add another medication. Discussed options with her. Currently on Januvia and MAGAÑA.  Discussed option to do Jardiance/Invokana, or GLP1. Could also start insulin glargine once daily, but would be more injections and cost comparison is important for her.   - REVIEW OF HEALTH MAINTENANCE PROTOCOL ORDERS  - blood glucose monitoring (NO BRAND SPECIFIED) meter device kit; Use to test blood sugar 2 times daily or as directed.  Dispense: 1 kit; Refill: 1  - MED THERAPY MANAGE REFERRAL  - blood glucose (NO BRAND SPECIFIED) lancets standard; Use to test blood sugar 2 times daily or as directed.  Dispense: 100 each; Refill: 11    2. Benign hypertension  Doesn't have home bp machine and not getting nurse bp checks due to covid. Discussed getting home b p monitor.    3. Persistent atrial fibrillation (H)  Continues on xarelto    4. CKD (chronic kidney disease) stage 3, GFR 30-59 ml/min (H)  Stable.    5. Morbid obesity (H)  Eating more in lockdown. Discussed having meal service not bring desserts.       No follow-ups on file.      Phone call duration:  22 minutes    Georgiana Joy MD

## 2020-07-20 DIAGNOSIS — E11.8 TYPE 2 DIABETES MELLITUS WITH COMPLICATION, WITHOUT LONG-TERM CURRENT USE OF INSULIN (H): Primary | ICD-10-CM

## 2020-07-21 NOTE — TELEPHONE ENCOUNTER
Routing refill request to provider for review/approval because:  Drug not active on patient's medication list  Wolf Smith RN, BSN

## 2020-07-21 NOTE — TELEPHONE ENCOUNTER
Pharmacy requesting:    ACCU-CHEK GUIDE TEST STRIPS 100S  SIG: TEST TWICE DAILY    Not on med list

## 2020-08-04 ENCOUNTER — VIRTUAL VISIT (OUTPATIENT)
Dept: PHARMACY | Facility: CLINIC | Age: 75
End: 2020-08-04
Attending: INTERNAL MEDICINE

## 2020-08-04 DIAGNOSIS — E78.5 HYPERLIPIDEMIA LDL GOAL <100: ICD-10-CM

## 2020-08-04 DIAGNOSIS — R21 RASH: ICD-10-CM

## 2020-08-04 DIAGNOSIS — Z13.820 SCREENING FOR OSTEOPOROSIS: ICD-10-CM

## 2020-08-04 DIAGNOSIS — E11.8 TYPE 2 DIABETES MELLITUS WITH COMPLICATION, WITHOUT LONG-TERM CURRENT USE OF INSULIN (H): Primary | ICD-10-CM

## 2020-08-04 DIAGNOSIS — Z71.85 VACCINE COUNSELING: ICD-10-CM

## 2020-08-04 DIAGNOSIS — I48.19 PERSISTENT ATRIAL FIBRILLATION (H): ICD-10-CM

## 2020-08-04 DIAGNOSIS — I10 BENIGN HYPERTENSION: ICD-10-CM

## 2020-08-04 PROCEDURE — 99607 MTMS BY PHARM ADDL 15 MIN: CPT | Mod: TEL | Performed by: PHARMACIST

## 2020-08-04 PROCEDURE — 99606 MTMS BY PHARM EST 15 MIN: CPT | Mod: TEL | Performed by: PHARMACIST

## 2020-08-04 RX ORDER — FLASH GLUCOSE SENSOR
1 KIT MISCELLANEOUS
Qty: 2 EACH | Refills: 11 | Status: SHIPPED | OUTPATIENT
Start: 2020-08-04 | End: 2020-08-31

## 2020-08-04 RX ORDER — DULAGLUTIDE 0.75 MG/.5ML
0.75 INJECTION, SOLUTION SUBCUTANEOUS
Qty: 2 ML | Refills: 2 | Status: SHIPPED | OUTPATIENT
Start: 2020-08-04 | End: 2020-10-12

## 2020-08-04 RX ORDER — FLASH GLUCOSE SCANNING READER
1 EACH MISCELLANEOUS ONCE
Qty: 1 DEVICE | Refills: 0 | Status: SHIPPED | OUTPATIENT
Start: 2020-08-04 | End: 2020-08-04

## 2020-08-04 NOTE — LETTER
August 7, 2020      [address]ima Griffiths  [id number]   [place] [address]        Dear Hima,     Please review the instructions on your after visit summary.      Sincerely,        Sandy Sesay, PharmD  Medication Therapy [facility] Pharmacist

## 2020-08-04 NOTE — Clinical Note
Iqra Schofield pt is in [facility] with insurance will discuss next steps at our follow-up call. Continued on Januvia for now. Thank you.

## 2020-08-04 NOTE — PROGRESS NOTES
MTM ENCOUNTER  SUBJECTIVE/OBJECTIVE:                           Hima Griffiths is a 75 year old female called for an initial visit. She was referred to me from PCP. Patient was accompanied by daughter/Velvet over the phone.     Patient consented to a telehealth visit: yes  Telemedicine Visit Details  Type of service:  Telephone visit  Start Time: 3:40PM  End Time: 4:15PM  Originating Location (pt. Location): Home  Distant Location (provider location):  [facility]  Mode of Communication:  Telephone    Chief Complaint: DM injectable options    Allergies/ADRs: Reviewed in EHR  Tobacco:  reports that she quit smoking about 13 years ago. She started smoking about 56 years ago. She has never used smokeless tobacco.  Alcohol: Less than 1 beverage / month  Caffeine: 1 cups/day of coffee in the morning. Diet pop on occasion.  Activity: Independent living.  PMH: Reviewed in EHR    Medication Adherence/Access: no issues reported, takes medication BID and denies any issues.     Diabetes:  Pt currently taking Januvia 100 mg daily, glipizide XL 10 mg twice daily. Pt is not experiencing side effects.   Per chart: hx failed metformin due to GI side effects.  SMBG: never.   Needs to  prescription. She is wondering about CGM.   Frequency of hypoglycemia? never. Symptoms of low blood sugar - none.    Symptoms of high blood sugar? none  Eye exam: up to date  Foot exam: up to date  Aspirin: Not taking due to Xarelto  Diet: did not review in detail today  Exercise: did not review in detail today  ACEi/ARB: No. Hx of hypotension with therapy noted.   Lab Results   Component Value Date    UMALCR 42.87 (H) 12/18/2019      Lab Results   Component Value Date    A1C 8.5 07/10/2020    A1C 7.6 12/18/2019    A1C 7.6 05/17/2019    A1C 8.9 01/02/2019    A1C 7.8 01/10/2018         Afib/Hypertension: Current medications include metoprolol  mg BID and diltiazem 30 mg BID. Also on Xarelto 20 mg daily but not taking with  food. Patient does not self-monitor BP.  Patient reports no current medication side effects. PCP had suggest getting a BP monitor pt has not picked up.  BP Readings from Last 3 Encounters:   12/18/19 108/68   08/02/19 119/82   03/11/19 122/84     Hyperlipidemia: Current therapy includes pravastatin 40 mg once daily.  Pt reports no significant myalgias or other side effects.  Recent Labs   Lab Test 12/18/19  1146 01/02/19  1502   CHOL 156 159   HDL 48* 45*   LDL 76 74   TRIG 162* 202*     Rash: she has an intermittent rash under her breast that she will use clotrimazole cream. This is effective. No concerns currently.    Vaccines:   Most Recent Immunizations   Administered Date(s) Administered     HepB-Adult 04/04/2000     Influenza (High Dose) 3 valent vaccine 10/25/2019     Influenza Vaccine IM > 6 months Valent IIV4 10/22/2015     MMR 11/29/2011     Pneumo Conj 13-V (2010&after) 10/22/2015     Pneumococcal 23 valent 11/02/2004     TDAP Vaccine (Adacel) 02/20/2019     Td (Adult), Adsorbed 12/04/2003     Zoster vaccine, live 03/04/2013     Osteoporosis Prevention: Current therapy includes: none. DEXA ordered no appt made yet.      Today's Vitals: There were no vitals taken for this visit.    Last Comprehensive Metabolic Panel:  Sodium   Date Value Ref Range Status   07/10/2020 136 133 - 144 mmol/L Final     Potassium   Date Value Ref Range Status   07/10/2020 4.3 3.4 - 5.3 mmol/L Final     Chloride   Date Value Ref Range Status   07/10/2020 104 94 - 109 mmol/L Final     Carbon Dioxide   Date Value Ref Range Status   07/10/2020 25 20 - 32 mmol/L Final     Anion Gap   Date Value Ref Range Status   07/10/2020 7 3 - 14 mmol/L Final     Glucose   Date Value Ref Range Status   07/10/2020 181 (H) 70 - 99 mg/dL Final     Urea Nitrogen   Date Value Ref Range Status   07/10/2020 10 7 - 30 mg/dL Final     Creatinine   Date Value Ref Range Status   07/10/2020 0.78 0.52 - 1.04 mg/dL Final     GFR Estimate   Date Value Ref Range  Status   07/10/2020 73 >60 mL/min/[1.73_m2] Final     Comment:     Non  GFR Calc  Starting 12/18/2018, serum creatinine based estimated GFR (eGFR) will be   calculated using the Chronic Kidney Disease Epidemiology Collaboration   (CKD-EPI) equation.       Calcium   Date Value Ref Range Status   07/10/2020 8.8 8.5 - 10.1 mg/dL Final     Bilirubin Total   Date Value Ref Range Status   07/10/2020 0.5 0.2 - 1.3 mg/dL Final     Alkaline Phosphatase   Date Value Ref Range Status   07/10/2020 89 40 - 150 U/L Final     ALT   Date Value Ref Range Status   07/10/2020 25 0 - 50 U/L Final     AST   Date Value Ref Range Status   07/10/2020 20 0 - 45 U/L Final         ASSESSMENT:                              Medication Adherence: fair, no issues identified    Type 2 Diabetes: Needs Improvement. Patient is not meeting A1c goal of < 8%. Pt may be a candidate for GLP-1 RA instead of DPP4 inhibitor therapy though cost may be a barrier (GLP-1 high cost along with DPP4 but greater a1c lowering activity and protective towards CKD). If cost is a barrier, may need to consider Actos though would need to be cautious with hypoglycemia.   May consider CGM per pt preference.     Afib/HTN: needs improvement. Pt is not taking Xarelto with food which may reduce efficacy.    Hyperlipidemia: Stable. Pt is on moderate intensity statin which is indicated based on 2019 ACC/AHA guidelines for lipid management.      Rash: stable.    Vaccine: needs improvement. Candidate for Shingrix.    Osteoporosis Prevention: Needs Improvement. Should have DEXA screening.        PLAN:                              1. Take Xarelto 20 mg with dinner.  2. Please go to the pharmacy to get the Shingrix vaccine, which helps prevent the risk of getting shingles and the nerve pain if you have shingles. After the first shot, you will need the second shot to complete the series 2-6 months later. - Pt has not received the Shingrix vaccine yet per [facility] on 9/24  3.  Trulicity Rx sent to pharmacy - per pharmacy $206 for 30 DS (Januvia was 90DS $295)   Formulary 2020: [email or web address]  GLP1- tier 3, DPP4 - tier 3 --> pt is likely in coverage gap now. Discussed with patient. We will review at our next call prior to running out of Januvia of next steps.  4. Pt to  glucose meter and BP meter.   5. Pt to start checking blood sugars fasting in the morning and 2 hours after largest meal of the day.  6. CGM Rx sent, MTM follow-up on coverage.    I spent 35 minutes with this patient today. All changes were made via collaborative practice agreement with Georgiana Joy. A copy of the visit note was provided to the patient's primary care provider.    Will follow up in 8/31 prior to running out of Januvia.    The patient was mailed a summary of these recommendations.     Sandy Sesay, PharmD  Medication Therapy Management Pharmacist  Pager#: [phone]

## 2020-08-07 NOTE — PATIENT INSTRUCTIONS
Recommendations from today's MTM visit:                                                      1. Take Xarelto 20 mg with dinner.    2. Please go to the pharmacy to get the Shingrix vaccine, which helps prevent the risk of getting shingles and the nerve pain if you have shingles. After the first shot, you will need the second shot to complete the series 2-6 months later.     3. Trulicity Rx sent to pharmacy. You are in the coverage gap now. We will review at our next call prior to running out of Ryanuvia of next options.    4.  glucose meter and blood pressure meter.     5. start checking blood sugars fasting in the morning and 2 hours after largest meal of the day.    6. MTM/Sandy will check with pharmacy about a continuous glucose monitor called FreeStyle Nallely.    It was great to speak with you today.  I value your experience and would be very thankful for your time with providing feedback on our clinic survey. You may receive a survey via email or text message in the next few days.     Next MTM visit: 8/31 phone call    To schedule another MTM appointment, please call the clinic directly or you may call the MTM scheduling line at [phone] or toll-free at [phone].     My Clinical Pharmacist's contact information:                                                      It was a pleasure talking with you today!  Please feel free to contact me with any questions or concerns you have.      Sandy Sesay, PharmD  Medication Therapy Management Pharmacist  Pager#: [phone]

## 2020-08-31 ENCOUNTER — ALLIED HEALTH/NURSE VISIT (OUTPATIENT)
Dept: PHARMACY | Facility: CLINIC | Age: 75
End: 2020-08-31

## 2020-08-31 ENCOUNTER — TELEPHONE (OUTPATIENT)
Dept: PHARMACY | Facility: CLINIC | Age: 75
End: 2020-08-31

## 2020-08-31 DIAGNOSIS — E11.8 TYPE 2 DIABETES MELLITUS WITH COMPLICATION, WITHOUT LONG-TERM CURRENT USE OF INSULIN (H): Primary | ICD-10-CM

## 2020-08-31 PROCEDURE — 99607 MTMS BY PHARM ADDL 15 MIN: CPT | Performed by: PHARMACIST

## 2020-08-31 PROCEDURE — 99606 MTMS BY PHARM EST 15 MIN: CPT | Performed by: PHARMACIST

## 2020-08-31 NOTE — PROGRESS NOTES
"MTM ENCOUNTER  SUBJECTIVE/OBJECTIVE:                           Hima Griffiths is a 75 year old female called for a follow-up visit. She was referred to me from PCP.  Today's visit is a follow-up MTM visit from 8/31/2020.     Patient consented to a telehealth visit: yes  Telemedicine Visit Details  Type of service:  Telephone visit  Start Time: 2:35 PM  End Time: 2:55 PM  Originating Location (pt. Location): Home  Distant Location (provider location):  [facility]  Mode of Communication:  Telephone    Chief Complaint: She saw a program advertised online called \"[employer]\" and has applied for their savings program. Wondering about my thoughts and if PCP has received paperwork for the application.    Allergies/ADRs: Reviewed in EHR  Tobacco:  reports that she quit smoking about 13 years ago. She started smoking about 56 years ago. She has never used smokeless tobacco.  Alcohol: rare occasion  Caffeine: 1 cups/day of coffee in the morning. Diet pop on occasion.  Activity: Independent living.  PMH: Reviewed in EHR    Medication Adherence/Access: takes medication BID and denies any issues.     Diabetes:  Pt currently taking Januvia 100 mg daily, glipizide XL 10 mg twice daily. Pt is not experiencing side effects. Last OV we discussed she is in the doughnut hole with insurance and any GLP-1 and DPP4 inhibitor are going to be high cost. She wants to purse GLP-1 such a Trulicity if affordable.   Per chart: hx failed metformin due to GI side effects.  SMBG: picked up new meter and starting checking. She reports they have been high. -214. FreeStyle Nallely was not covered.   Frequency of hypoglycemia? never. Symptoms of low blood sugar - none.    Symptoms of high blood sugar? none  Eye exam: up to date  Foot exam: up to date  Aspirin: Not taking due to Xarelto  Diet: did not review in detail today  Exercise: did not review in detail today  ACEi/ARB: No. Hx of hypotension with therapy noted.   Lab " Results   Component Value Date    UMALCR 42.87 (H) 12/18/2019      Lab Results   Component Value Date    A1C 8.5 07/10/2020    A1C 7.6 12/18/2019    A1C 7.6 05/17/2019    A1C 8.9 01/02/2019    A1C 7.8 01/10/2018       Today's Vitals: There were no vitals taken for this visit.      ASSESSMENT:                            Medication Adherence: fair, no issues identified    Type 2 Diabetes: Needs Improvement. Patient is not meeting A1c goal of < 8%. Pt may be a candidate for GLP-1 RA instead of DPP4 inhibitor therapy though cost may be a barrier (GLP-1 high cost along with DPP4 but greater a1c lowering activity and protective towards CKD). If cost is a barrier, may need to consider Actos though would need to be cautious with hypoglycemia.       PLAN:                              1. Discussed with pt and daughter --> unfamiliar with [employer]. MTM is concerned if pt qualifies for  savings program, then she would be able to receive assistance from [employer] Prescription Assistance program free of charge. The other program she applied for would be charging her for an enrollment fee + monthly prescription fee - Xarelto, Januvia, Trulicity.     2. I discussed with pt, if starting Trulicity this will replace Januvia. She verbalized understanding.   - stop Januvia  - start Trulicity as previously discussed. Trulicity 0.75mg weekly.     3. Pt to consider calling Contact the [place] Prescription Assistance Program/Fund (Reyna Bennett and Maria A Berg) at [phone].    I spent 20 minutes with this patient today. All changes were made via collaborative practice agreement with Georgiana Joy. A copy of the visit note was provided to the patient's primary care provider.    Will follow up in 4 weeks after starting Trulicity with either myself or PCP.    The patient was mailed a summary of these recommendations.     Sandy Sesay, PharmD  Medication Therapy Management Pharmacist  Pager#: [phone]               no concerns

## 2020-08-31 NOTE — TELEPHONE ENCOUNTER
Pt and daughter are wondering if PCP received a form regarding application for a prescription savings program call [employer]. Pt reports it was faxed to [phone] 1 week ago with attention to PCP. IF no fax found, please call pt and pt will refax to clinic.    fyi to PCP:  MICHAEL discussed with pt and daughter, I am not familiar with the program and would suggest they reach out [facility] Prescription Assistance program first. However, pt had already started the process with [employer] and paid a one-time enrollment fee and wants to try this program first. See MTROBERTO CARLOS enc from today for further details.     Sandy Sesay, Kalpana  Medication Therapy Management Pharmacist  Pager#: [phone]

## 2020-09-01 NOTE — TELEPHONE ENCOUNTER
Patient informed that form has been placed in Georgiana Belle MD in basket.       Neva Simpson CMA

## 2020-09-03 NOTE — PATIENT INSTRUCTIONS
Recommendations from today's MTM visit:                                                      1. I am unfamiliar with [employer]. My concern is if you qualify for  savings program, then you would be able to receive assistance from [facility] Prescription Assistance program free of charge. Consider calling Contact the [place] Prescription Assistance Program/[facility] (Reyna Bennett and Maria Afaviola Berg) at [phone].    2. If starting Trulicity this will replace Januvia. She verbalized understanding.   - stop Januvia  - start Trulicity as previously discussed. Trulicity 0.75mg weekly.     3. If you are unable to start Trulicity we should meet again to discuss alternatives.     It was great to speak with you today.  I value your experience and would be very thankful for your time with providing feedback on our clinic survey. You may receive a survey via email or text message in the next few days.     Next MTM visit: After 4 weeks of Trulicity follow-up with either myself or Dr. Joy    To schedule another MTM appointment, please call the clinic directly or you may call the MTM scheduling line at [phone] or toll-free at [phone].     My Clinical Pharmacist's contact information:                                                      It was a pleasure talking with you today!  Please feel free to contact me with any questions or concerns you have.      Sandy Sesay, Kalpana  Medication Therapy Management Pharmacist  Pager#: [phone]

## 2020-09-10 ENCOUNTER — TELEPHONE (OUTPATIENT)
Dept: PEDIATRICS | Facility: CLINIC | Age: 75
End: 2020-09-10

## 2020-09-10 NOTE — TELEPHONE ENCOUNTER
FYI~Sept 10, 2020 I spoke with Hima, she is in need of financial assistance for medication.    We reviewed the Prescription Assistance Program for manfacturer brand name assistance programs, gross income, insurance and Rx list.  Hima is over income for the Pharmacy Assistance Fund.     assistance applications will be completed for: Trulicity 0.75mg & Xarelto. When approved Hima will receive this medication at no cost for the remainder of 2020.    Hima is cancelling service with [employer].    Reyna Bennett  Prescription   Pharmacy Assistance  [phone]

## 2020-10-09 ENCOUNTER — MYC MEDICAL ADVICE (OUTPATIENT)
Dept: PEDIATRICS | Facility: CLINIC | Age: 75
End: 2020-10-09

## 2020-10-12 DIAGNOSIS — E11.8 TYPE 2 DIABETES MELLITUS WITH COMPLICATION, WITHOUT LONG-TERM CURRENT USE OF INSULIN (H): ICD-10-CM

## 2020-10-12 NOTE — TELEPHONE ENCOUNTER
Called and lm with daughter (Velvet) regarding scheduling appt in 2 months, or after being on trulicity for one month or so.  MTM will also reach out to patient.  Patience Henry LPN

## 2020-10-12 NOTE — TELEPHONE ENCOUNTER
Actually would be better to wait until after at least 1 month on Trulicity. See if we can find something 2 months from now or so. If it's a little further than that, that's OK as long as she checks in with [facility] about her sugars so we can see if we should increase the trulicity dose.   Will Forward to MICHAEL RODRIGUEZ.  Georgiana Joy M.D.

## 2020-10-12 NOTE — TELEPHONE ENCOUNTER
I am in process of applying to the Trulicity assistance program through [employer].  [employer] requires a hand signed, brand name, hard copy script be submitted with their application.    Please hand sign a BRAND name, hard copy script for:      TRULICITY 0.75mg    Please send the HARD COPY script to me via interoffice mail FPS Reyna Zavala or via [place] mail  at:      [facility]   Reyna Bennett   [address] [place]  [address]    Thanks so much for your help!    Reyna Bennett  Prescription   Pharmacy Assistance  [phone]

## 2020-10-12 NOTE — TELEPHONE ENCOUNTER
DO NOT FAX! I must have the HARD COPY, hand signed.    Reyna Bennett  Prescription   Pharmacy Assistance  [phone]

## 2020-10-13 RX ORDER — DULAGLUTIDE 0.75 MG/.5ML
0.75 INJECTION, SOLUTION SUBCUTANEOUS
Qty: 8 ML | Refills: 1 | Status: SHIPPED | OUTPATIENT
Start: 2020-10-13 | End: 2021-01-20

## 2020-10-13 NOTE — TELEPHONE ENCOUNTER
Would Baylee or another provider print and sign rx. I may not be back in office until next week.  Georgiana Jyo M.D.

## 2020-10-21 ENCOUNTER — MYC MEDICAL ADVICE (OUTPATIENT)
Dept: PEDIATRICS | Facility: CLINIC | Age: 75
End: 2020-10-21

## 2020-10-28 ENCOUNTER — TELEPHONE (OUTPATIENT)
Dept: PEDIATRICS | Facility: CLINIC | Age: 75
End: 2020-10-28

## 2020-10-28 NOTE — TELEPHONE ENCOUNTER
Hima has been approved to the [facility] assistance program for Trulicity through December 2020.  She will receive this medication at no cost through the enrollment period.    12 Trulicity 0.75mg pens of will be delivered to Hima's HOME Nov 3, 2020. She has been informed of this approval and delivery date.    Thanks so much for your help!    Reyna Bennett  Prescription   Pharmacy Assistance  [phone]

## 2020-12-03 ENCOUNTER — TRANSFERRED RECORDS (OUTPATIENT)
Dept: HEALTH INFORMATION MANAGEMENT | Facility: CLINIC | Age: 75
End: 2020-12-03

## 2020-12-08 ENCOUNTER — ALLIED HEALTH/NURSE VISIT (OUTPATIENT)
Dept: PHARMACY | Facility: CLINIC | Age: 75
End: 2020-12-08

## 2020-12-08 ENCOUNTER — MYC MEDICAL ADVICE (OUTPATIENT)
Dept: PEDIATRICS | Facility: CLINIC | Age: 75
End: 2020-12-08

## 2020-12-08 DIAGNOSIS — E78.5 HYPERLIPIDEMIA LDL GOAL <100: ICD-10-CM

## 2020-12-08 DIAGNOSIS — I10 BENIGN HYPERTENSION: ICD-10-CM

## 2020-12-08 DIAGNOSIS — Z71.85 VACCINE COUNSELING: ICD-10-CM

## 2020-12-08 DIAGNOSIS — E11.8 TYPE 2 DIABETES MELLITUS WITH COMPLICATION, WITHOUT LONG-TERM CURRENT USE OF INSULIN (H): Primary | ICD-10-CM

## 2020-12-08 DIAGNOSIS — I48.19 PERSISTENT ATRIAL FIBRILLATION (H): ICD-10-CM

## 2020-12-08 DIAGNOSIS — R68.89 FLU-LIKE SYMPTOMS: ICD-10-CM

## 2020-12-08 PROCEDURE — 99607 MTMS BY PHARM ADDL 15 MIN: CPT | Mod: TEL | Performed by: PHARMACIST

## 2020-12-08 PROCEDURE — 99606 MTMS BY PHARM EST 15 MIN: CPT | Mod: TEL | Performed by: PHARMACIST

## 2020-12-08 NOTE — TELEPHONE ENCOUNTER
Spoke with daughter, Velvet (could not find C2C on file)    Advised daughter to fill out C2C when in next.    Daughter mentioned that since sending in the [facility] message, she had scheduled a COVID test for patient with [facility] tomorrow (12/9).    Daughter states pt was exposed to someone with COVID sometime last week and developed symptoms (see MTM visit 12/8/20 as well as [facility] encounters from 12/8/20) on Saturday. Patient will be tested tomorrow outside of .    Daughter will call back if further questions.    Celeste VELASQUEZ RN

## 2020-12-08 NOTE — PROGRESS NOTES
MTM ENCOUNTER  SUBJECTIVE/OBJECTIVE:                           Hima Griffiths is a 75 year old female called for a follow-up visit. She was referred to me from Dr. Joy. Patient was accompanied by daughter, Velvet. Today's visit is a follow-up MTM visit from 8/31/2020.     Reason for visit: Has flu-like symptoms.    Allergies/ADRs: Reviewed in chart  Tobacco: She reports that she quit smoking about 14 years ago. She started smoking about 57 years ago. She has never used smokeless tobacco.  Alcohol: rare occasion  Caffeine: 1 cups/day of coffee in the morning. Diet pop on occasion.  Activity: Independent living.  PMH: Reviewed in EHR    Medication Adherence/Access: takes medication BID and denies any issues.     Flu-like symptoms: Patient reports she had the flu-like symptoms over the weekend which included nausea and vomiting. Her daughter had noticed increase raspy voice since.  Now patient reports she has diarrhea. Patient resides in assisted living.  No SOB, CP, palpitation, muscle aches or fever. Primarily GI symptoms reports. Also noticed a reduction in appetite.     Diabetes:  Pt currently taking Trulicity 0.75 mg weekly on Saturday's (has 4 doses so far), glipizide XL 20 mg qAM. Stopped Januvia 100 mg daily.   Pt is experiencing the above GI symptoms. She notices no issue when she first started the medication but after the 3rd dose started noticing a rise in her sugars and then the GI symptoms began.   Per chart: hx failed metformin due to GI side effects.   SMBG: once daily. She reports after the first & second week of Trulicity blood sugar were 180-200's, then the Third week  It started to up to 180-400's.  She reports before Trulcity blood sugar was around 100-170 with highest at 200 only. I did note her A1c in July that would correlate blood sugars around 220's.  SMBG: picked up new meter and starting checking. She reports they have been high. -214. FreeStyle Nallely was not covered.    Frequency of hypoglycemia? never. Symptoms of low blood sugar - none.    Symptoms of high blood sugar? none  Eye exam: up to date  Foot exam: up to date  Aspirin: Not taking due to Xarelto  Diet: denies any change in diet. Denies drinking any regular soda pop or orange juice. She is primarily drinking ICE drink that has no sugar.  Exercise: did not review in detail today  ACEi/ARB: No. Hx of hypotension with therapy noted.   Lab Results   Component Value Date    UMALCR 42.87 (H) 12/18/2019      Lab Results   Component Value Date    A1C 8.5 07/10/2020    A1C 7.6 12/18/2019    A1C 7.6 05/17/2019    A1C 8.9 01/02/2019    A1C 7.8 01/10/2018     Afib/Hypertension: Current medications include metoprolol  mg BID and diltiazem 30 mg BID. Also on Xarelto 20 mg daily near dinner. Patient reports no current medication side effects.   BP Readings from Last 3 Encounters:   12/18/19 108/68   08/02/19 119/82   03/11/19 122/84     Hyperlipidemia: Current therapy includes pravastatin 40 mg once daily.  Pt reports no significant myalgias or other side effects.  Recent Labs   Lab Test 12/18/19  1146 01/02/19  1502   CHOL 156 159   HDL 48* 45*   LDL 76 74   TRIG 162* 202*     Vaccines: she did receive the flu shot from her housing in October this year.  Most Recent Immunizations   Administered Date(s) Administered     HepB-Adult 04/04/2000     Influenza (High Dose) 3 valent vaccine 10/25/2019     Influenza Vaccine IM > 6 months Valent IIV4 10/22/2015     MMR 11/29/2011     Pneumo Conj 13-V (2010&after) 10/22/2015     Pneumococcal 23 valent 11/02/2004     TDAP Vaccine (Adacel) 02/20/2019     Td (Adult), Adsorbed 12/04/2003     Zoster vaccine, live 03/04/2013       Today's Vitals: There were no vitals taken for this visit.      ASSESSMENT:                              Medication Adherence: No issues identified    Flu-like symptoms: Possible stomach flu given symptoms, though suggest she be seen or outreach to PCP if not improved.  We also discussed concern for DKA and needs to remain well hydrated. From the timing of Trulicity start, does not seem to correlate as a side effect in particular with the N/V improving within a few days when patient had a dose on Saturday. Patient may benefit from COVID-19 testing as well.    Type 2 Diabetes: Needs Improvement. Patient is not meeting A1c goal of < 8%. SMBG not improved though concern possible viral infection resulting in elevated blood sugar and symptoms. See above regarding Trulicity. Suggest 1 more month of higher Trulicity dose and if no concern for side effects, may need to consider stepping up to 1.5 mg weekly dose.    Afib/HTN: stable.    Hyperlipidemia: Stable. Pt is on moderate intensity statin which is indicated based on 2019 ACC/AHA guidelines for lipid management.      Vaccine: unchanged. Candidate for Shingrix (discussed before).      PLAN:                            1. Reviewed with patient and daughter, I have low suspicion for recent symptoms to only from Trulicity. The reduction in appetite perhaps may be a side effect. The timing and duration of symptoms does not match up to the Trulicity.  2. Future if blood sugar remain elevated over fasting goal  then can consider step up in Trulicity to 1.5 mg weekly dosing with [facility] prescription assistance program help. Or, could await A1c recheck to decide if preferred.   3. Also counseled to increase monitoring given concern for lows and highs as patient's appetite is low and on glipizide.   4. Patient already received flu shot in Oct but not in [facility].     Next appt with PCP 1/21/21    I spent 26 minutes with this patient today. All changes were made via collaborative practice agreement with Benita. A copy of the visit note was provided to the patient's primary care provider.    Will follow up in a few week via [employer] if FBG elevated.    The patient was sent via [employer] a summary of these recommendations.     Sandy Sesay,  PharmD  Medication Therapy Management Pharmacist  Pager#: [phone]    Patient consented to a telehealth visit: yes  Telemedicine Visit Details  Type of service:  Telephone visit  Start Time: 10:02 AM  End Time: 10:28AM  Originating Location (patient location): Home  Distant Location (provider location):  [facility]  Mode of Communication:  Telephone

## 2020-12-08 NOTE — Clinical Note
Hi, please see my note for details. Patient reported new flu-like symptoms that were only GI symptoms. At first I was worried it may be trulicity but from further review of the timeline of when trulicity started and the onset/duration of her symptoms it does not seem to correlate to Trulicity. In the midst, of completing my note, patient has sent a mychart and I huddled with PAL as well regarding COVID-19 testing. Thanks!

## 2020-12-08 NOTE — PATIENT INSTRUCTIONS
Recommendations from today's MTM visit:                                                      1. If you have sugars again in the 300 & 400's + Nausea/vomiting you should be seen right away. This puts you are risk for something called diabetic ketoacidosis.     2. Continue to drink plenty of water and fluids to prevent dehydration.    3. You want to consider COVID-19 testing, there are testing sites on the [place] Department of health website. Let us know if you have more questions.    4. Given the timeline of Trulicity and the duration of your symptoms, I have low suspicion for this to be Trulicity causing these symptoms. Continue on this current dose at this time.     It was great to speak with you today.  I value your experience and would be very thankful for your time with providing feedback on our clinic survey. You may receive a survey via email or text message in the next few days.     Next MTM visit: Ryan with Dr. Joy or reach out to [facility] via [employer] if ongoing elevated BG.    To schedule another MTM appointment, please call the clinic directly or you may call the [place] scheduling line at [phone] or toll-free at [phone].     My Clinical Pharmacist's contact information:                                                      It was a pleasure talking with you today!  Please feel free to contact me with any questions or concerns you have.      Sandy Sesay, Kalpana  Medication Therapy Management Pharmacist  Pager#: [phone]

## 2020-12-27 ENCOUNTER — HEALTH MAINTENANCE LETTER (OUTPATIENT)
Age: 75
End: 2020-12-27

## 2021-01-01 ENCOUNTER — TRANSFERRED RECORDS (OUTPATIENT)
Dept: HEALTH INFORMATION MANAGEMENT | Facility: CLINIC | Age: 76
End: 2021-01-01
Payer: COMMERCIAL

## 2021-01-01 DIAGNOSIS — E11.8 TYPE 2 DIABETES MELLITUS WITH COMPLICATION, WITHOUT LONG-TERM CURRENT USE OF INSULIN (H): ICD-10-CM

## 2021-01-01 RX ORDER — GLIPIZIDE 10 MG/1
TABLET, FILM COATED, EXTENDED RELEASE ORAL
Qty: 180 TABLET | Refills: 0 | Status: SHIPPED | OUTPATIENT
Start: 2021-01-01 | End: 2022-01-01

## 2021-01-15 ENCOUNTER — HEALTH MAINTENANCE LETTER (OUTPATIENT)
Age: 76
End: 2021-01-15

## 2021-01-20 ENCOUNTER — OFFICE VISIT (OUTPATIENT)
Dept: PEDIATRICS | Facility: CLINIC | Age: 76
End: 2021-01-20
Payer: COMMERCIAL

## 2021-01-20 VITALS
BODY MASS INDEX: 41.83 KG/M2 | RESPIRATION RATE: 20 BRPM | OXYGEN SATURATION: 97 % | DIASTOLIC BLOOD PRESSURE: 74 MMHG | HEIGHT: 64 IN | HEART RATE: 113 BPM | TEMPERATURE: 96.7 F | SYSTOLIC BLOOD PRESSURE: 130 MMHG | WEIGHT: 245 LBS

## 2021-01-20 DIAGNOSIS — E66.01 MORBID OBESITY (H): ICD-10-CM

## 2021-01-20 DIAGNOSIS — I48.19 PERSISTENT ATRIAL FIBRILLATION (H): ICD-10-CM

## 2021-01-20 DIAGNOSIS — Z86.73 H/O ISCHEMIC LEFT MCA STROKE: ICD-10-CM

## 2021-01-20 DIAGNOSIS — Z11.59 NEED FOR HEPATITIS C SCREENING TEST: ICD-10-CM

## 2021-01-20 DIAGNOSIS — E78.5 HYPERLIPIDEMIA LDL GOAL <100: ICD-10-CM

## 2021-01-20 DIAGNOSIS — E11.8 TYPE 2 DIABETES MELLITUS WITH COMPLICATION, WITHOUT LONG-TERM CURRENT USE OF INSULIN (H): Primary | ICD-10-CM

## 2021-01-20 DIAGNOSIS — D75.89 MACROCYTOSIS: ICD-10-CM

## 2021-01-20 DIAGNOSIS — Z78.0 ASYMPTOMATIC POSTMENOPAUSAL STATUS: ICD-10-CM

## 2021-01-20 DIAGNOSIS — I10 BENIGN HYPERTENSION: ICD-10-CM

## 2021-01-20 LAB
ERYTHROCYTE [DISTWIDTH] IN BLOOD BY AUTOMATED COUNT: 12.3 % (ref 10–15)
HBA1C MFR BLD: 8.5 % (ref 0–5.6)
HCT VFR BLD AUTO: 44.2 % (ref 35–47)
HGB BLD-MCNC: 15.2 G/DL (ref 11.7–15.7)
MCH RBC QN AUTO: 35.2 PG (ref 26.5–33)
MCHC RBC AUTO-ENTMCNC: 34.4 G/DL (ref 31.5–36.5)
MCV RBC AUTO: 102 FL (ref 78–100)
PLATELET # BLD AUTO: 204 10E9/L (ref 150–450)
RBC # BLD AUTO: 4.32 10E12/L (ref 3.8–5.2)
WBC # BLD AUTO: 9.2 10E9/L (ref 4–11)

## 2021-01-20 PROCEDURE — 80061 LIPID PANEL: CPT | Performed by: INTERNAL MEDICINE

## 2021-01-20 PROCEDURE — 85027 COMPLETE CBC AUTOMATED: CPT | Performed by: INTERNAL MEDICINE

## 2021-01-20 PROCEDURE — 86803 HEPATITIS C AB TEST: CPT | Performed by: INTERNAL MEDICINE

## 2021-01-20 PROCEDURE — 80053 COMPREHEN METABOLIC PANEL: CPT | Performed by: INTERNAL MEDICINE

## 2021-01-20 PROCEDURE — 99214 OFFICE O/P EST MOD 30 MIN: CPT | Performed by: INTERNAL MEDICINE

## 2021-01-20 PROCEDURE — 82607 VITAMIN B-12: CPT | Performed by: INTERNAL MEDICINE

## 2021-01-20 PROCEDURE — 99207 PR FOOT EXAM NO CHARGE: CPT | Mod: 25 | Performed by: INTERNAL MEDICINE

## 2021-01-20 PROCEDURE — 83036 HEMOGLOBIN GLYCOSYLATED A1C: CPT | Performed by: INTERNAL MEDICINE

## 2021-01-20 PROCEDURE — 82043 UR ALBUMIN QUANTITATIVE: CPT | Performed by: INTERNAL MEDICINE

## 2021-01-20 PROCEDURE — 36415 COLL VENOUS BLD VENIPUNCTURE: CPT | Performed by: INTERNAL MEDICINE

## 2021-01-20 RX ORDER — METOPROLOL TARTRATE 100 MG
100 TABLET ORAL 2 TIMES DAILY
Qty: 180 TABLET | Refills: 3 | Status: SHIPPED | OUTPATIENT
Start: 2021-01-20 | End: 2022-01-01

## 2021-01-20 RX ORDER — DILTIAZEM HYDROCHLORIDE 30 MG/1
30 TABLET, FILM COATED ORAL 2 TIMES DAILY
Qty: 180 TABLET | Refills: 3 | Status: SHIPPED | OUTPATIENT
Start: 2021-01-20 | End: 2022-01-01

## 2021-01-20 RX ORDER — PRAVASTATIN SODIUM 40 MG
40 TABLET ORAL DAILY
Qty: 90 TABLET | Refills: 3 | Status: SHIPPED | OUTPATIENT
Start: 2021-01-20 | End: 2022-01-01

## 2021-01-20 RX ORDER — GLIPIZIDE 10 MG/1
TABLET, FILM COATED, EXTENDED RELEASE ORAL
Qty: 180 TABLET | Refills: 1 | Status: SHIPPED | OUTPATIENT
Start: 2021-01-20 | End: 2021-08-13

## 2021-01-20 ASSESSMENT — MIFFLIN-ST. JEOR: SCORE: 1577.82

## 2021-01-20 NOTE — PROGRESS NOTES
Assessment & Plan     Type 2 diabetes mellitus with complication, without long-term current use of insulin (H) with Obesity  Inadequate control at last A1C. Plan increase trulicity. This is going well for her and should help with weight management for her.  - Lipid panel reflex to direct LDL Fasting  - Albumin Random Urine Quantitative with Creat Ratio  - CBC with Platelets    - HEMOGLOBIN A1C  - FOOT EXAM  - Comprehensive metabolic panel (BMP + Alb, Alk Phos, ALT, AST, Total. Bili, TP)  - glipiZIDE (GLUCOTROL XL) 10 MG 24 hr tablet; TAKE 2 TABLETS(20 MG) BY MOUTH DAILY  - dulaglutide (TRULICITY) 1.5 MG/0.5ML pen; Inject 1.5 mg Subcutaneous every 7 days  - rivaroxaban ANTICOAGULANT (XARELTO ANTICOAGULANT) 20 MG TABS tablet; Take 1 tablet (20 mg) by mouth daily (with dinner)    Persistent atrial fibrillation (H)  Pulse during exam 80's. Initially likely elevated as she was coming in to office. Continue anticoagulation and rate control medications.  - diltiazem (CARDIZEM) 30 MG tablet; Take 1 tablet (30 mg) by mouth 2 times daily  - metoprolol tartrate (LOPRESSOR) 100 MG tablet; Take 1 tablet (100 mg) by mouth 2 times daily  - rivaroxaban ANTICOAGULANT (XARELTO ANTICOAGULANT) 20 MG TABS tablet; Take 1 tablet (20 mg) by mouth daily (with dinner)    Hyperlipidemia LDL goal <100  On statin.  - pravastatin (PRAVACHOL) 40 MG tablet; Take 1 tablet (40 mg) by mouth daily    H/O ischemic left MCA stroke    - rivaroxaban ANTICOAGULANT (XARELTO ANTICOAGULANT) 20 MG TABS tablet; Take 1 tablet (20 mg) by mouth daily (with dinner)    Benign hypertension  Reasonable control  - rivaroxaban ANTICOAGULANT (XARELTO ANTICOAGULANT) 20 MG TABS tablet; Take 1 tablet (20 mg) by mouth daily (with dinner)    Macrocytosis  Unclear why. Check b12  - Vitamin B12    Asymptomatic postmenopausal status    - DEXA HIP/PELVIS/SPINE - Future; Future    Need for hepatitis C screening test    - Hepatitis C Screen Reflex to HCV RNA Quant and  "Genotype    Assessment requiring an independent historian(s) - family - daughter     BMI:   Estimated body mass index is 42.34 kg/m  as calculated from the following:    Height as of this encounter: 1.62 m (5' 3.78\").    Weight as of this encounter: 111.1 kg (245 lb).   Weight management plan: Discussed healthy diet and exercise guidelines    See Patient Instructions    No follow-ups on file.    Georgiana Joy MD  [facility] NIRMALA Rabago is a 75 year old who presents to clinic today for the following health issues  accompanied by her daughter:    HPI       Diabetes Follow-up    How often are you checking your blood sugar? One time daily  What time of day are you checking your blood sugars (select all that apply)?  in the am  Have you had any blood sugars above 200?  Yes in December but not in January  Have you had any blood sugars below 70?  No    What symptoms do you notice when your blood sugar is low?  None    What concerns do you have today about your diabetes? None     Do you have any of these symptoms? (Select all that apply)  No numbness or tingling in feet.  No redness, sores or blisters on feet.  No complaints of excessive thirst.  No reports of blurry vision.  No significant changes to weight.    Have you had a diabetic eye exam in the last 12 months? Yes- Date of last eye exam: 2/5/20,  Location: [facility]                Hyperlipidemia Follow-Up      Are you regularly taking any medication or supplement to lower your cholesterol?   Yes- pravastatin    Are you having muscle aches or other side effects that you think could be caused by your cholesterol lowering medication?  No    Hypertension Follow-up      Do you check your blood pressure regularly outside of the clinic? No     Are you following a low salt diet? No no added salty    Are your blood pressures ever more than 140 on the top number (systolic) OR more   than 90 on the bottom number (diastolic), " "for example 140/90? Not taking    BP Readings from Last 2 Encounters:   01/20/21 (!) 140/86   12/18/19 108/68     Hemoglobin A1C (%)   Date Value   07/10/2020 8.5 (H)   12/18/2019 7.6 (H)     LDL Cholesterol Calculated (mg/dL)   Date Value   12/18/2019 76   01/02/2019 74         How many servings of fruits and vegetables do you eat daily?  2-3    On average, how many sweetened beverages do you drink each day (Examples: soda, juice, sweet tea, etc.  Do NOT count diet or artificially sweetened beverages)?   0    How many days per week do you exercise enough to make your heart beat faster? 0    How many minutes a day do you exercise enough to make your heart beat faster? 0    How many days per week do you miss taking your medication? 0      Review of Systems   Constitutional, HEENT, cardiovascular, pulmonary, gi and gu systems are negative, except as otherwise noted.      Objective    BP (!) 140/86 (BP Location: Right arm, Patient Position: Sitting, Cuff Size: Adult Large)   Pulse 113   Temp 96.7  F (35.9  C) (Tympanic)   Resp 20   Ht 1.62 m (5' 3.78\")   Wt 111.1 kg (245 lb)   SpO2 97%   BMI 42.34 kg/m    Body mass index is 42.34 kg/m .  Physical Exam   GENERAL: healthy, alert and no distress  EYES: Eyes grossly normal to inspection, PERRL and conjunctivae and sclerae normal  NECK: no adenopathy, no asymmetry, masses, or scars and thyroid normal to palpation  RESP: lungs clear to auscultation - no rales, rhonchi or wheezes  BREAST: normal without masses, tenderness or nipple discharge and no palpable axillary masses or adenopathy  CV: regular rate and rhythm, normal S1 S2, no S3 or S4, no murmur, click or rub  ABDOMEN: soft, nontender, no hepatosplenomegaly, no masses and bowel sounds normal  MS: no gross musculoskeletal defects noted, no edema  SKIN: no suspicious lesions or rashes  PSYCH: mentation appears normal, affect normal/bright  Diabetic foot exam: normal DP and PT pulses, no trophic changes or " ulcerative lesions and normal sensory exam    Orders Only on 07/10/2020   Component Date Value Ref Range Status     Hemoglobin A1C 07/10/2020 8.5* 0 - 5.6 % Final    Comment: Normal <5.7% Prediabetes 5.7-6.4%  Diabetes 6.5% or higher - adopted from ADA   consensus guidelines.       Sodium 07/10/2020 136  133 - 144 mmol/L Final     Potassium 07/10/2020 4.3  3.4 - 5.3 mmol/L Final     Chloride 07/10/2020 104  94 - 109 mmol/L Final     Carbon Dioxide 07/10/2020 25  20 - 32 mmol/L Final     Anion Gap 07/10/2020 7  3 - 14 mmol/L Final     Glucose 07/10/2020 181* 70 - 99 mg/dL Final     Urea Nitrogen 07/10/2020 10  7 - 30 mg/dL Final     Creatinine 07/10/2020 0.78  0.52 - 1.04 mg/dL Final     GFR Estimate 07/10/2020 73  >60 mL/min/[1.73_m2] Final    Comment: Non  GFR Calc  Starting 12/18/2018, serum creatinine based estimated GFR (eGFR) will be   calculated using the Chronic Kidney Disease Epidemiology Collaboration   (CKD-EPI) equation.       GFR Estimate If Black 07/10/2020 85  >60 mL/min/[1.73_m2] Final    Comment:  GFR Calc  Starting 12/18/2018, serum creatinine based estimated GFR (eGFR) will be   calculated using the Chronic Kidney Disease Epidemiology Collaboration   (CKD-EPI) equation.       Calcium 07/10/2020 8.8  8.5 - 10.1 mg/dL Final     Bilirubin Total 07/10/2020 0.5  0.2 - 1.3 mg/dL Final     Albumin 07/10/2020 3.3* 3.4 - 5.0 g/dL Final     Protein Total 07/10/2020 7.8  6.8 - 8.8 g/dL Final     Alkaline Phosphatase 07/10/2020 89  40 - 150 U/L Final     ALT 07/10/2020 25  0 - 50 U/L Final     AST 07/10/2020 20  0 - 45 U/L Final       I spent a total of 37 minutes face-to-face with Hima Griffiths during today's office visit.  Over 50% of this time was spent counseling the patient and/or coordinating care regarding diabetes, a fib, hypertension, cholesterol, obesity.  See note for details.

## 2021-01-20 NOTE — PATIENT INSTRUCTIONS
Increase trulicity to 1.5 mg weekly.  Let us know if you start to have blood sugars less than 100. Then we would decrease your glipizide.    I've refilled everything else.    Bone density test when you are ready.

## 2021-01-21 LAB
ALBUMIN SERPL-MCNC: 3.5 G/DL (ref 3.4–5)
ALP SERPL-CCNC: 94 U/L (ref 40–150)
ALT SERPL W P-5'-P-CCNC: 31 U/L (ref 0–50)
ANION GAP SERPL CALCULATED.3IONS-SCNC: 8 MMOL/L (ref 3–14)
AST SERPL W P-5'-P-CCNC: 23 U/L (ref 0–45)
BILIRUB SERPL-MCNC: 0.7 MG/DL (ref 0.2–1.3)
BUN SERPL-MCNC: 16 MG/DL (ref 7–30)
CALCIUM SERPL-MCNC: 9.2 MG/DL (ref 8.5–10.1)
CHLORIDE SERPL-SCNC: 106 MMOL/L (ref 94–109)
CHOLEST SERPL-MCNC: 140 MG/DL
CO2 SERPL-SCNC: 25 MMOL/L (ref 20–32)
CREAT SERPL-MCNC: 0.96 MG/DL (ref 0.52–1.04)
GFR SERPL CREATININE-BSD FRML MDRD: 56 ML/MIN/{1.73_M2}
GLUCOSE SERPL-MCNC: 181 MG/DL (ref 70–99)
HCV AB SERPL QL IA: NONREACTIVE
HDLC SERPL-MCNC: 54 MG/DL
LDLC SERPL CALC-MCNC: 59 MG/DL
NONHDLC SERPL-MCNC: 86 MG/DL
POTASSIUM SERPL-SCNC: 4.6 MMOL/L (ref 3.4–5.3)
PROT SERPL-MCNC: 7.9 G/DL (ref 6.8–8.8)
SODIUM SERPL-SCNC: 139 MMOL/L (ref 133–144)
TRIGL SERPL-MCNC: 133 MG/DL

## 2021-01-22 ENCOUNTER — TELEPHONE (OUTPATIENT)
Dept: PEDIATRICS | Facility: CLINIC | Age: 76
End: 2021-01-22

## 2021-01-22 DIAGNOSIS — D75.89 MACROCYTOSIS: Primary | ICD-10-CM

## 2021-01-22 LAB
CREAT UR-MCNC: 232 MG/DL
MICROALBUMIN UR-MCNC: 179 MG/L
MICROALBUMIN/CREAT UR: 77.16 MG/G CR (ref 0–25)

## 2021-01-22 NOTE — TELEPHONE ENCOUNTER
----- Message from Georgiana Joy MD sent at 1/22/2021 11:15 AM CST -----  pls check with lab to see if we can add a b12 level to labs from 2 days ago. MCV elevated. Dx macrocytosis.  If not, would like this checked when in to see pharmD.  Georgiana

## 2021-01-22 NOTE — TELEPHONE ENCOUNTER
Called and spoke with lab. They are able to add on Vit b12 to current sample from 1/20/21 OV.    Order placed in Epic, lab notified.    Celeste VELASQUEZ RN

## 2021-01-25 LAB — VIT B12 SERPL-MCNC: 476 PG/ML (ref 193–986)

## 2021-02-04 ENCOUNTER — TELEPHONE (OUTPATIENT)
Dept: PEDIATRICS | Facility: CLINIC | Age: 76
End: 2021-02-04

## 2021-02-04 NOTE — TELEPHONE ENCOUNTER
FYI~ Feb 4, 2021 I spoke with Hima, she is in need of financial assistance for medication.    We reviewed the Prescription Assistance Program for manfacturer assistance programs, gross income, insurance and Rx list. Hima is over income for the Pharmacy Assistance Fund.    I will complete the [employer] assistance application for Trulicity 1.5mg  I will review her 2020 1040 for the Xarelto program. I suspect she is over income.     When approved, Hima will receive this medication at no cost for the remainder of 2021.    Reyna Bennett  Prescription   Pharmacy Assistance  [phone]

## 2021-02-08 NOTE — PROGRESS NOTES
Medication Therapy Management (MTM) Encounter    ASSESSMENT:                            Medication Adherence/Access: No issues identified      Type 2 Diabetes: Patient is not meeting A1c goal of < 8%. Recent GLP increase, too soon to observed steady state change. Encourage patient to continue and work on diet. Increase protein intake and avoid high sugars foods in excessive such as syrup.  Microalbumin elevated, may need to consider an ACEI/ARB in future. Though will work on improving DM first.     Afib/HTN: stable. Blood pressure at goal < 140/90.    Hyperlipidemia: Pt is on moderate intensity statin which is indicated based on 2019 ACC/AHA guidelines for lipid management.        PLAN:                            1. Labs in April, patient to set up lab appt.     2. Discussed dietary changes today. Encouraged patient to increase her protein intake and reduce foods high in carbohydrate intake.    3. Reviewed labs.    Follow-up: mid-April lab, then follow-up with MTM or PCP either option.    SUBJECTIVE/OBJECTIVE:                          Hima Griffiths is a 75 year old female called for a follow-up visit. She was referred to me from Dr. Joy. Patient was accompanied by daughter. Today's visit is a follow-up MTM visit from 12/8/20. First of 2021.     Reason for visit: DM follow-up. Blood sugar still but she tells me she feels good no symptoms.    Allergies/ADRs: Reviewed in chart  Tobacco: She reports that she quit smoking about 14 years ago. She started smoking about 57 years ago. She has never used smokeless tobacco.  Alcohol: rare occasion  Caffeine: 1 cups/day of black coffee in the morning usually. Diet pop on occasion.  Activity: Independent living.  PMH: Reviewed in EHR    Medication Adherence/Access: takes medication BID and denies any issues.     Diabetes:  Pt currently taking Trulicity 1.5 mg weekly on Monday's (2 doses so far), glipizide XL 20 mg qAM.   Pt is not experiencing any side effects  Per  chart: hx failed metformin due to GI side effects.   SMBG: once daily. BG are still in 190-200's.   SMBG: picked up new meter and starting checking. She reports they have been high. -214. FreeStyle Nallely was not covered.   Frequency of hypoglycemia? never. Symptoms of low blood sugar - none.    Symptoms of high blood sugar? none  Eye exam: up to date  Foot exam: up to date  Aspirin: Not taking due to Xarelto  Diet: denies any change in diet. She feels she doesn't eat much.   Breakfast --> cereal (Cheerios) or waffle (Syrup) or toast (butter)  Lunch --> leftover from Supper  Dinner --> varies roast beef, hot dishes often  Snacks --> popcorn, cheese stephy/crackers  Exercise: did not review in detail today  ACEi/ARB: No. Hx of hypotension with therapy noted.   Lab Results   Component Value Date    UMALCR 77.16 (H) 01/20/2021      Lab Results   Component Value Date    A1C 8.5 01/20/2021    A1C 8.5 07/10/2020    A1C 7.6 12/18/2019    A1C 7.6 05/17/2019    A1C 8.9 01/02/2019       Afib/Hypertension: Current medications include metoprolol  mg BID and diltiazem 30 mg BID. Also on Xarelto 20 mg daily near dinner. Patient reports no current medication side effects.  Denies any palpitation or flutter.   BP Readings from Last 3 Encounters:   01/20/21 130/74   12/18/19 108/68   08/02/19 119/82       Hyperlipidemia: Current therapy includes pravastatin 40 mg once daily night.  Pt reports no significant myalgias or other side effects.  Recent Labs   Lab Test 01/20/21  1545 12/18/19  1146   CHOL 140 156   HDL 54 48*   LDL 59 76   TRIG 133 162*       Today's Vitals: There were no vitals taken for this visit.  ----------------        I spent 27 minutes with this patient today. All changes were made via collaborative practice agreement with Georgiana Joy. A copy of the visit note was provided to the patient's primary care provider.    The patient was sent via [employer] a summary of these recommendations.     Sandy Sesay  PharmD  Medication Therapy Management Pharmacist  Pager#: [phone]    Telemedicine Visit Details  Type of service:  Telephone visit  Start Time: 8:30 AM  End Time: 8:57 AM  Originating Location (patient location): Home  Distant Location (provider location):  [facility] NIRMALA RODRIGUEZ      Medication Therapy Recommendations  No medication therapy recommendations to display

## 2021-02-09 ENCOUNTER — ALLIED HEALTH/NURSE VISIT (OUTPATIENT)
Dept: PHARMACY | Facility: CLINIC | Age: 76
End: 2021-02-09

## 2021-02-09 DIAGNOSIS — I10 BENIGN HYPERTENSION: ICD-10-CM

## 2021-02-09 DIAGNOSIS — E11.8 TYPE 2 DIABETES MELLITUS WITH COMPLICATION, WITHOUT LONG-TERM CURRENT USE OF INSULIN (H): Primary | ICD-10-CM

## 2021-02-09 DIAGNOSIS — E78.5 HYPERLIPIDEMIA LDL GOAL <100: ICD-10-CM

## 2021-02-09 DIAGNOSIS — I48.19 PERSISTENT ATRIAL FIBRILLATION (H): ICD-10-CM

## 2021-02-09 PROCEDURE — 99607 MTMS BY PHARM ADDL 15 MIN: CPT | Mod: TEL | Performed by: PHARMACIST

## 2021-02-09 PROCEDURE — 99605 MTMS BY PHARM NP 15 MIN: CPT | Mod: TEL | Performed by: PHARMACIST

## 2021-02-09 RX ORDER — CLOTRIMAZOLE 1 %
CREAM (GRAM) TOPICAL 2 TIMES DAILY PRN
COMMUNITY
End: 2022-01-01

## 2021-02-09 NOTE — Clinical Note
Fyi - Doing well on higher Trulicity. Unfortunately minimal benefit observed from medication so far (though only started a week ago).

## 2021-02-09 NOTE — PATIENT INSTRUCTIONS
Recommendations from today's MTM visit:                                                      1. COVID-19 information/link, see below!    2. Try to increase your protein intake such as added a lean protein/meat to breakfast. Increasing protein and reducing high carbohydrate foods such as syrup, potatoes, foods with gravy or sauce will help your sugars. If you have crackers, have some low fat cheese and meat and cut back the number of crackers for a snack instead.    3. Continue Trulicity higher dose, too soon right now to see expected change. We should recheck the A1c around 4/20/21, from there we will determine if additional change is needed. Let us know if you have issues with coverage of the Trulicity while they work on applying for the program.     It was great to speak with you today.  I value your experience and would be very thankful for your time with providing feedback on our clinic survey. You may receive a survey via email or text message in the next few days.     Next MTM visit: mid-April Labs, then 1-2 days later schedule a follow-up appointment either with me or PCP to review.     To schedule another MT appointment, please call the clinic directly or you may call the MT scheduling line at [phone] or toll-free at [phone].     My Clinical Pharmacist's contact information:                                                      It was a pleasure talking with you today!  Please feel free to contact me with any questions or concerns you have.     Sandy Sesay, PharmD  Medication Therapy Management Pharmacist  Pager#: [phone]      --    We are working hard to begin vaccinating more people against COVID-19. Currently, we are only vaccinating individuals age 75 and older and Phase 1a workers - healthcare workers who are unable to do their job remotely. Vaccine availability is very limited.    If you are 75 or older, or a healthcare worker who is unable to do your job remotely, please log in to  Wikia using this link to see if we have an open appointment and schedule an appointment.  If there are no appointments left, you will be unable to schedule and need to check back later.  If you are a healthcare worker, you will be asked to provide proof of employment at your appointment. If you cannot, you will be turned away.    Vaccine appointments are being added as they become available. Please check your Wikia account frequently for availability. If you have technical difficulty using Wikia, call [phone] for assistance.    You can learn more about the [facility]'s phased approach to administering the vaccine, with details on each phase, [email or web address].      Aa vaccine supply increases and we are able to open appointments to more groups, we will share that information on our website [email or web address]. Check this website to stay up to date on COVID-19 vaccination information.

## 2021-02-17 ENCOUNTER — IMMUNIZATION (OUTPATIENT)
Dept: NURSING | Facility: CLINIC | Age: 76
End: 2021-02-17
Payer: COMMERCIAL

## 2021-02-17 PROCEDURE — 0001A PR COVID VAC PFIZER DIL RECON 30 MCG/0.3 ML IM: CPT

## 2021-02-17 PROCEDURE — 91300 PR COVID VAC PFIZER DIL RECON 30 MCG/0.3 ML IM: CPT

## 2021-03-01 ENCOUNTER — TRANSFERRED RECORDS (OUTPATIENT)
Dept: MULTI SPECIALTY CLINIC | Facility: CLINIC | Age: 76
End: 2021-03-01
Payer: COMMERCIAL

## 2021-03-01 LAB — RETINOPATHY: NORMAL

## 2021-03-10 ENCOUNTER — IMMUNIZATION (OUTPATIENT)
Dept: NURSING | Facility: CLINIC | Age: 76
End: 2021-03-10
Attending: INTERNAL MEDICINE
Payer: COMMERCIAL

## 2021-03-10 PROCEDURE — 91300 PR COVID VAC PFIZER DIL RECON 30 MCG/0.3 ML IM: CPT

## 2021-03-10 PROCEDURE — 0002A PR COVID VAC PFIZER DIL RECON 30 MCG/0.3 ML IM: CPT

## 2021-03-15 DIAGNOSIS — E11.8 TYPE 2 DIABETES MELLITUS WITH COMPLICATION, WITHOUT LONG-TERM CURRENT USE OF INSULIN (H): ICD-10-CM

## 2021-03-15 NOTE — TELEPHONE ENCOUNTER
I am in process of applying to the Trulicity assistance program.  [facility] requires a hand signed, brand name, hard copy script be submitted with their application.    Please hand sign a BRAND name, hard copy script for:       TRULICITY    Please send the HARD COPY script to me     via interoffice mail  FPS Reyna Zavala     or via US mail  at:   [facility]  Reyna Bennett  [address] [place]  [address]    Thanks so much for your help!    Reyna Bennett  Prescription   Pharmacy Assistance  [phone]

## 2021-03-15 NOTE — TELEPHONE ENCOUNTER
Routing refill request to provider for review/approval because:  New medication or adjusted dose please review dosing needs.     Mary Anderson RN on 3/15/2021 at 10:04 AM

## 2021-03-23 ENCOUNTER — TELEPHONE (OUTPATIENT)
Dept: PEDIATRICS | Facility: CLINIC | Age: 76
End: 2021-03-23

## 2021-03-23 NOTE — TELEPHONE ENCOUNTER
Hima has been approved to the "[employer] [facility] assistance program for Trulicity through December 2021.  She will receive this medication at no cost through the enrollment period.    A 120 day supply of TRULICITY 1.5mg will be delivered to Hima's home.  [facility] will contact Hima to schedule delivery. She has been informed of this approval & delivery.    Hima will contact my office for refills as we must work directly with the .  I will note EPIC as each refill is requested.    Thanks so much for your help!    Reyna Bennett  Prescription   Pharmacy Assistance  [phone]

## 2021-07-29 ENCOUNTER — TELEPHONE (OUTPATIENT)
Dept: PEDIATRICS | Facility: CLINIC | Age: 76
End: 2021-07-29

## 2021-07-29 ENCOUNTER — TRANSFERRED RECORDS (OUTPATIENT)
Dept: HEALTH INFORMATION MANAGEMENT | Facility: CLINIC | Age: 76
End: 2021-07-29

## 2021-07-29 NOTE — TELEPHONE ENCOUNTER
FYI~ A refill has been made to the  assistance program for Trulicity 1.5mg    A 90 day supply of Trulicity 1.5mg will be delivered to Hima's Home within 7-10 business days.    Thank you,    Maria A Berg  Prescription Assistance

## 2021-08-08 ENCOUNTER — HEALTH MAINTENANCE LETTER (OUTPATIENT)
Age: 76
End: 2021-08-08

## 2021-08-10 DIAGNOSIS — E11.8 TYPE 2 DIABETES MELLITUS WITH COMPLICATION, WITHOUT LONG-TERM CURRENT USE OF INSULIN (H): ICD-10-CM

## 2021-08-13 RX ORDER — GLIPIZIDE 10 MG/1
TABLET, FILM COATED, EXTENDED RELEASE ORAL
Qty: 180 TABLET | Refills: 0 | Status: SHIPPED | OUTPATIENT
Start: 2021-08-13 | End: 2021-01-01

## 2021-08-13 NOTE — TELEPHONE ENCOUNTER
3 month lincoln refill approved.     Further refills will be addressed at scheduled visit on 9/1/21    Mary Anderson RN on 8/13/2021 at 11:38 AM

## 2021-09-01 ENCOUNTER — LAB (OUTPATIENT)
Dept: LAB | Facility: CLINIC | Age: 76
End: 2021-09-01

## 2021-09-01 ENCOUNTER — OFFICE VISIT (OUTPATIENT)
Dept: PEDIATRICS | Facility: CLINIC | Age: 76
End: 2021-09-01
Payer: COMMERCIAL

## 2021-09-01 VITALS
RESPIRATION RATE: 20 BRPM | BODY MASS INDEX: 40.87 KG/M2 | DIASTOLIC BLOOD PRESSURE: 80 MMHG | OXYGEN SATURATION: 98 % | WEIGHT: 239.4 LBS | SYSTOLIC BLOOD PRESSURE: 144 MMHG | TEMPERATURE: 98.5 F | HEIGHT: 64 IN | HEART RATE: 52 BPM

## 2021-09-01 DIAGNOSIS — E66.01 MORBID OBESITY (H): ICD-10-CM

## 2021-09-01 DIAGNOSIS — E11.8 TYPE 2 DIABETES MELLITUS WITH COMPLICATION, WITHOUT LONG-TERM CURRENT USE OF INSULIN (H): Primary | ICD-10-CM

## 2021-09-01 DIAGNOSIS — I10 BENIGN HYPERTENSION: ICD-10-CM

## 2021-09-01 DIAGNOSIS — N18.31 STAGE 3A CHRONIC KIDNEY DISEASE (H): ICD-10-CM

## 2021-09-01 DIAGNOSIS — E78.5 HYPERLIPIDEMIA LDL GOAL <100: ICD-10-CM

## 2021-09-01 DIAGNOSIS — E11.8 TYPE 2 DIABETES MELLITUS WITH COMPLICATION, WITHOUT LONG-TERM CURRENT USE OF INSULIN (H): ICD-10-CM

## 2021-09-01 LAB — HBA1C MFR BLD: 7.7 % (ref 0–5.6)

## 2021-09-01 PROCEDURE — 99214 OFFICE O/P EST MOD 30 MIN: CPT | Performed by: INTERNAL MEDICINE

## 2021-09-01 PROCEDURE — 83036 HEMOGLOBIN GLYCOSYLATED A1C: CPT

## 2021-09-01 PROCEDURE — 36415 COLL VENOUS BLD VENIPUNCTURE: CPT

## 2021-09-01 RX ORDER — LISINOPRIL 2.5 MG/1
2.5 TABLET ORAL DAILY
Qty: 90 TABLET | Refills: 1 | Status: SHIPPED | OUTPATIENT
Start: 2021-09-01 | End: 2022-01-01

## 2021-09-01 ASSESSMENT — MIFFLIN-ST. JEOR: SCORE: 1547.42

## 2021-09-01 NOTE — PATIENT INSTRUCTIONS
Check with Reyna Juarez about Jardiance. I'll reach out to her as well. Would prefer jardiance and be able to stop glipizide.    I do recommend the shingles shot, you can get this at your pharmacy.     Add lisinopril 2.5 mg once daily to protect kidneys.   nonfasting lab in 1 month.  Lab appointment.   Nurse visit blood pressure at that time.

## 2021-09-01 NOTE — PROGRESS NOTES
"    Assessment & Plan     Stage 3a chronic kidney disease  bp mildly elevated today. Will re-start very low dose ACEI. Had been on lisinopril in past at 20 mg, but stopped due to low bp after strokes about 5 years ago.  - lisinopril (ZESTRIL) 2.5 MG tablet; Take 1 tablet (2.5 mg) by mouth daily    Type 2 diabetes mellitus with complication, without long-term current use of insulin (H)  Reviewed A1C. Close to goal. Discussed switching to SGLT2 and stopping riskier sulfonylurea. Recommended she check in with Med assistance program.     Morbid obesity (H)  Getting more activity now than she has in the past. stsable.    Hyperlipidemia LDL goal <100  Tolerating statin    Benign hypertension  Mildly elevated today.      31 minutes spent on the date of the encounter doing chart review, history and exam, documentation and further activities per the note       BMI:   Estimated body mass index is 41.38 kg/m  as calculated from the following:    Height as of this encounter: 1.62 m (5' 3.78\").    Weight as of this encounter: 108.6 kg (239 lb 6.4 oz).   Weight management plan: Patient referred to endocrine and/or weight management specialty    See Patient Instructions    Return in about 3 months (around 12/1/2021).    Georgiana Joy MD  [facility] NIRMALA Rabago is a 76 year old who presents for the following health issues    HPI     Diabetes Follow-up    How often are you checking your blood sugar? A few times a week 125-130  What time of day are you checking your blood sugars (select all that apply)?  am  Have you had any blood sugars above 200?  No  Have you had any blood sugars below 70?  No    What symptoms do you notice when your blood sugar is low?  None    What concerns do you have today about your diabetes? None     Do you have any of these symptoms? (Select all that apply)  No numbness or tingling in feet.  No redness, sores or blisters on feet.  No complaints of excessive thirst.  No " "reports of blurry vision.  No significant changes to weight.    Have you had a diabetic eye exam in the last 12 months? No      Hyperlipidemia Follow-Up      Are you regularly taking any medication or supplement to lower your cholesterol?   Yes- pravastatin    Are you having muscle aches or other side effects that you think could be caused by your cholesterol lowering medication?  No    Hypertension Follow-up      Do you check your blood pressure regularly outside of the clinic? Yes but not lately    Are you following a low salt diet? No    Are your blood pressures ever more than 140 on the top number (systolic) OR more   than 90 on the bottom number (diastolic), for example 140/90? Yes a few times    BP Readings from Last 2 Encounters:   09/01/21 (!) 144/80   01/20/21 130/74     Hemoglobin A1C (%)   Date Value   09/01/2021 7.7 (H)   01/20/2021 8.5 (H)   07/10/2020 8.5 (H)     LDL Cholesterol Calculated (mg/dL)   Date Value   01/20/2021 59   12/18/2019 76     Blood pressures at home 130/70's      How many servings of fruits and vegetables do you eat daily?  2-3    On average, how many sweetened beverages do you drink each day (Examples: soda, juice, sweet tea, etc.  Do NOT count diet or artificially sweetened beverages)?   0    How many days per week do you exercise enough to make your heart beat faster? 7    How many minutes a day do you exercise enough to make your heart beat faster? 10 - 19    How many days per week do you miss taking your medication? 0      Review of Systems   Constitutional, HEENT, cardiovascular, pulmonary, gi and gu systems are negative, except as otherwise noted.      Objective    BP (!) 144/80 (BP Location: Right arm, Patient Position: Sitting, Cuff Size: Adult Large)   Pulse 52   Temp 98.5  F (36.9  C) (Tympanic)   Resp 20   Ht 1.62 m (5' 3.78\")   Wt 108.6 kg (239 lb 6.4 oz)   SpO2 98%   BMI 41.38 kg/m    Body mass index is 41.38 kg/m .  Physical Exam   GENERAL: healthy, alert and " no distress  NECK: no adenopathy, no asymmetry, masses, or scars and thyroid normal to palpation  RESP: lungs clear to auscultation - no rales, rhonchi or wheezes  CV: regular rate and rhythm, normal S1 S2, no S3 or S4, no murmur, click or rub    Results for orders placed or performed in visit on 09/01/21   Hemoglobin A1c     Status: Abnormal   Result Value Ref Range    Hemoglobin A1C 7.7 (H) 0.0 - 5.6 %

## 2021-10-04 ENCOUNTER — HEALTH MAINTENANCE LETTER (OUTPATIENT)
Age: 76
End: 2021-10-04

## 2021-11-15 NOTE — TELEPHONE ENCOUNTER
Prescription approved per [facility] Refill Protocol.  CATHERINE refill. Further refills at upcoming appointment.  Supriya Rosenthal RN

## 2022-01-01 ENCOUNTER — TELEPHONE (OUTPATIENT)
Dept: PEDIATRICS | Facility: CLINIC | Age: 77
End: 2022-01-01
Payer: COMMERCIAL

## 2022-01-01 ENCOUNTER — MYC MEDICAL ADVICE (OUTPATIENT)
Dept: PEDIATRICS | Facility: CLINIC | Age: 77
End: 2022-01-01

## 2022-01-01 ENCOUNTER — TRANSITIONAL CARE UNIT VISIT (OUTPATIENT)
Dept: GERIATRICS | Facility: CLINIC | Age: 77
End: 2022-01-01
Payer: COMMERCIAL

## 2022-01-01 ENCOUNTER — APPOINTMENT (OUTPATIENT)
Dept: CT IMAGING | Facility: CLINIC | Age: 77
DRG: 064 | End: 2022-01-01
Attending: INTERNAL MEDICINE
Payer: MEDICARE

## 2022-01-01 ENCOUNTER — APPOINTMENT (OUTPATIENT)
Dept: PHYSICAL THERAPY | Facility: CLINIC | Age: 77
DRG: 064 | End: 2022-01-01
Attending: INTERNAL MEDICINE
Payer: MEDICARE

## 2022-01-01 ENCOUNTER — TELEPHONE (OUTPATIENT)
Dept: PEDIATRICS | Facility: CLINIC | Age: 77
End: 2022-01-01

## 2022-01-01 ENCOUNTER — LAB REQUISITION (OUTPATIENT)
Dept: LAB | Facility: CLINIC | Age: 77
End: 2022-01-01

## 2022-01-01 ENCOUNTER — APPOINTMENT (OUTPATIENT)
Dept: SPEECH THERAPY | Facility: CLINIC | Age: 77
DRG: 064 | End: 2022-01-01
Attending: INTERNAL MEDICINE
Payer: MEDICARE

## 2022-01-01 ENCOUNTER — APPOINTMENT (OUTPATIENT)
Dept: MRI IMAGING | Facility: CLINIC | Age: 77
DRG: 064 | End: 2022-01-01
Attending: INTERNAL MEDICINE
Payer: MEDICARE

## 2022-01-01 ENCOUNTER — APPOINTMENT (OUTPATIENT)
Dept: OCCUPATIONAL THERAPY | Facility: CLINIC | Age: 77
DRG: 064 | End: 2022-01-01
Attending: INTERNAL MEDICINE
Payer: MEDICARE

## 2022-01-01 ENCOUNTER — APPOINTMENT (OUTPATIENT)
Dept: CT IMAGING | Facility: CLINIC | Age: 77
DRG: 689 | End: 2022-01-01
Attending: EMERGENCY MEDICINE
Payer: COMMERCIAL

## 2022-01-01 ENCOUNTER — DISCHARGE SUMMARY NURSING HOME (OUTPATIENT)
Dept: GERIATRICS | Facility: CLINIC | Age: 77
End: 2022-01-01
Payer: COMMERCIAL

## 2022-01-01 ENCOUNTER — HOSPITAL ENCOUNTER (EMERGENCY)
Facility: CLINIC | Age: 77
Discharge: OTHER INSTITUTION | End: 2022-10-09
Attending: EMERGENCY MEDICINE | Admitting: EMERGENCY MEDICINE
Payer: COMMERCIAL

## 2022-01-01 ENCOUNTER — APPOINTMENT (OUTPATIENT)
Dept: PHYSICAL THERAPY | Facility: CLINIC | Age: 77
DRG: 689 | End: 2022-01-01
Payer: COMMERCIAL

## 2022-01-01 ENCOUNTER — PATIENT OUTREACH (OUTPATIENT)
Dept: CARE COORDINATION | Facility: CLINIC | Age: 77
End: 2022-01-01

## 2022-01-01 ENCOUNTER — TRANSFERRED RECORDS (OUTPATIENT)
Dept: HEALTH INFORMATION MANAGEMENT | Facility: CLINIC | Age: 77
End: 2022-01-01
Payer: COMMERCIAL

## 2022-01-01 ENCOUNTER — APPOINTMENT (OUTPATIENT)
Dept: OCCUPATIONAL THERAPY | Facility: CLINIC | Age: 77
DRG: 064 | End: 2022-01-01
Attending: HOSPITALIST
Payer: MEDICARE

## 2022-01-01 ENCOUNTER — NURSE TRIAGE (OUTPATIENT)
Dept: PEDIATRICS | Facility: CLINIC | Age: 77
End: 2022-01-01
Payer: COMMERCIAL

## 2022-01-01 ENCOUNTER — HOSPITAL ENCOUNTER (INPATIENT)
Facility: CLINIC | Age: 77
LOS: 8 days | Discharge: NURSING, REHAB OR LONG-TERM CARE | DRG: 689 | End: 2022-06-24
Attending: EMERGENCY MEDICINE | Admitting: INTERNAL MEDICINE
Payer: COMMERCIAL

## 2022-01-01 ENCOUNTER — TELEPHONE (OUTPATIENT)
Dept: GERIATRICS | Facility: CLINIC | Age: 77
End: 2022-01-01

## 2022-01-01 ENCOUNTER — APPOINTMENT (OUTPATIENT)
Dept: SPEECH THERAPY | Facility: CLINIC | Age: 77
DRG: 064 | End: 2022-01-01
Attending: HOSPITALIST
Payer: MEDICARE

## 2022-01-01 ENCOUNTER — HEALTH MAINTENANCE LETTER (OUTPATIENT)
Age: 77
End: 2022-01-01

## 2022-01-01 ENCOUNTER — HOSPITAL ENCOUNTER (OUTPATIENT)
Dept: ULTRASOUND IMAGING | Facility: CLINIC | Age: 77
Discharge: HOME | End: 2022-08-18
Attending: INTERNAL MEDICINE | Admitting: INTERNAL MEDICINE
Payer: COMMERCIAL

## 2022-01-01 ENCOUNTER — APPOINTMENT (OUTPATIENT)
Dept: MRI IMAGING | Facility: CLINIC | Age: 77
End: 2022-01-01
Attending: EMERGENCY MEDICINE
Payer: COMMERCIAL

## 2022-01-01 ENCOUNTER — APPOINTMENT (OUTPATIENT)
Dept: PHYSICAL THERAPY | Facility: CLINIC | Age: 77
DRG: 689 | End: 2022-01-01
Attending: INTERNAL MEDICINE
Payer: COMMERCIAL

## 2022-01-01 ENCOUNTER — APPOINTMENT (OUTPATIENT)
Dept: CT IMAGING | Facility: CLINIC | Age: 77
DRG: 064 | End: 2022-01-01
Attending: NURSE PRACTITIONER
Payer: MEDICARE

## 2022-01-01 ENCOUNTER — APPOINTMENT (OUTPATIENT)
Dept: SPEECH THERAPY | Facility: CLINIC | Age: 77
DRG: 689 | End: 2022-01-01
Payer: COMMERCIAL

## 2022-01-01 ENCOUNTER — MEDICAL CORRESPONDENCE (OUTPATIENT)
Dept: HEALTH INFORMATION MANAGEMENT | Facility: CLINIC | Age: 77
End: 2022-01-01

## 2022-01-01 ENCOUNTER — MEDICAL CORRESPONDENCE (OUTPATIENT)
Dept: PEDIATRICS | Facility: CLINIC | Age: 77
End: 2022-01-01

## 2022-01-01 ENCOUNTER — HOSPITAL ENCOUNTER (INPATIENT)
Facility: CLINIC | Age: 77
LOS: 2 days | End: 2022-10-22
Attending: INTERNAL MEDICINE | Admitting: HOSPITALIST
Payer: OTHER MISCELLANEOUS

## 2022-01-01 ENCOUNTER — APPOINTMENT (OUTPATIENT)
Dept: CT IMAGING | Facility: CLINIC | Age: 77
DRG: 064 | End: 2022-01-01
Attending: HOSPITALIST
Payer: MEDICARE

## 2022-01-01 ENCOUNTER — OFFICE VISIT (OUTPATIENT)
Dept: PEDIATRICS | Facility: CLINIC | Age: 77
End: 2022-01-01
Payer: COMMERCIAL

## 2022-01-01 ENCOUNTER — HOSPITAL ENCOUNTER (INPATIENT)
Facility: CLINIC | Age: 77
LOS: 11 days | Discharge: HOSPICE | DRG: 064 | End: 2022-10-20
Attending: INTERNAL MEDICINE | Admitting: HOSPITALIST
Payer: MEDICARE

## 2022-01-01 ENCOUNTER — APPOINTMENT (OUTPATIENT)
Dept: GENERAL RADIOLOGY | Facility: CLINIC | Age: 77
DRG: 689 | End: 2022-01-01
Attending: INTERNAL MEDICINE
Payer: COMMERCIAL

## 2022-01-01 ENCOUNTER — TELEPHONE (OUTPATIENT)
Dept: GASTROENTEROLOGY | Facility: CLINIC | Age: 77
End: 2022-01-01

## 2022-01-01 ENCOUNTER — APPOINTMENT (OUTPATIENT)
Dept: CARDIOLOGY | Facility: CLINIC | Age: 77
DRG: 064 | End: 2022-01-01
Attending: HOSPITALIST
Payer: MEDICARE

## 2022-01-01 ENCOUNTER — HOSPITAL ENCOUNTER (OUTPATIENT)
Facility: CLINIC | Age: 77
End: 2022-01-01
Attending: INTERNAL MEDICINE | Admitting: INTERNAL MEDICINE
Payer: COMMERCIAL

## 2022-01-01 ENCOUNTER — TRANSFERRED RECORDS (OUTPATIENT)
Dept: HEALTH INFORMATION MANAGEMENT | Facility: CLINIC | Age: 77
End: 2022-01-01

## 2022-01-01 ENCOUNTER — APPOINTMENT (OUTPATIENT)
Dept: PHYSICAL THERAPY | Facility: CLINIC | Age: 77
DRG: 064 | End: 2022-01-01
Attending: HOSPITALIST
Payer: MEDICARE

## 2022-01-01 ENCOUNTER — APPOINTMENT (OUTPATIENT)
Dept: GENERAL RADIOLOGY | Facility: CLINIC | Age: 77
DRG: 064 | End: 2022-01-01
Attending: INTERNAL MEDICINE
Payer: MEDICARE

## 2022-01-01 ENCOUNTER — APPOINTMENT (OUTPATIENT)
Dept: SPEECH THERAPY | Facility: CLINIC | Age: 77
DRG: 689 | End: 2022-01-01
Attending: INTERNAL MEDICINE
Payer: COMMERCIAL

## 2022-01-01 VITALS
OXYGEN SATURATION: 98 % | DIASTOLIC BLOOD PRESSURE: 75 MMHG | TEMPERATURE: 97.8 F | HEART RATE: 72 BPM | HEIGHT: 64 IN | BODY MASS INDEX: 38.24 KG/M2 | RESPIRATION RATE: 18 BRPM | SYSTOLIC BLOOD PRESSURE: 127 MMHG | WEIGHT: 224 LBS

## 2022-01-01 VITALS
OXYGEN SATURATION: 97 % | SYSTOLIC BLOOD PRESSURE: 113 MMHG | RESPIRATION RATE: 18 BRPM | TEMPERATURE: 98.1 F | HEART RATE: 85 BPM | WEIGHT: 224 LBS | DIASTOLIC BLOOD PRESSURE: 74 MMHG | BODY MASS INDEX: 38.24 KG/M2 | HEIGHT: 64 IN

## 2022-01-01 VITALS
HEIGHT: 66 IN | RESPIRATION RATE: 14 BRPM | TEMPERATURE: 96.8 F | BODY MASS INDEX: 32.31 KG/M2 | DIASTOLIC BLOOD PRESSURE: 56 MMHG | HEART RATE: 120 BPM | OXYGEN SATURATION: 97 % | SYSTOLIC BLOOD PRESSURE: 95 MMHG | WEIGHT: 201.06 LBS

## 2022-01-01 VITALS
BODY MASS INDEX: 38.91 KG/M2 | RESPIRATION RATE: 16 BRPM | SYSTOLIC BLOOD PRESSURE: 178 MMHG | WEIGHT: 227.9 LBS | TEMPERATURE: 97.5 F | HEIGHT: 64 IN | DIASTOLIC BLOOD PRESSURE: 88 MMHG | HEART RATE: 96 BPM | OXYGEN SATURATION: 97 %

## 2022-01-01 VITALS
RESPIRATION RATE: 18 BRPM | WEIGHT: 224 LBS | OXYGEN SATURATION: 98 % | SYSTOLIC BLOOD PRESSURE: 128 MMHG | DIASTOLIC BLOOD PRESSURE: 76 MMHG | HEART RATE: 85 BPM | BODY MASS INDEX: 38.45 KG/M2 | TEMPERATURE: 97.5 F

## 2022-01-01 VITALS
WEIGHT: 228.6 LBS | OXYGEN SATURATION: 97 % | SYSTOLIC BLOOD PRESSURE: 111 MMHG | TEMPERATURE: 97.4 F | BODY MASS INDEX: 39.03 KG/M2 | HEART RATE: 80 BPM | HEIGHT: 64 IN | DIASTOLIC BLOOD PRESSURE: 75 MMHG

## 2022-01-01 VITALS
HEART RATE: 74 BPM | OXYGEN SATURATION: 91 % | SYSTOLIC BLOOD PRESSURE: 141 MMHG | TEMPERATURE: 96.3 F | DIASTOLIC BLOOD PRESSURE: 119 MMHG

## 2022-01-01 VITALS
SYSTOLIC BLOOD PRESSURE: 152 MMHG | WEIGHT: 235.9 LBS | TEMPERATURE: 97.6 F | BODY MASS INDEX: 40.77 KG/M2 | HEART RATE: 64 BPM | DIASTOLIC BLOOD PRESSURE: 80 MMHG | OXYGEN SATURATION: 97 %

## 2022-01-01 VITALS
RESPIRATION RATE: 18 BRPM | HEART RATE: 75 BPM | OXYGEN SATURATION: 96 % | BODY MASS INDEX: 39.4 KG/M2 | DIASTOLIC BLOOD PRESSURE: 74 MMHG | HEIGHT: 64 IN | WEIGHT: 230.8 LBS | SYSTOLIC BLOOD PRESSURE: 134 MMHG | TEMPERATURE: 98.4 F

## 2022-01-01 VITALS — DIASTOLIC BLOOD PRESSURE: 83 MMHG | SYSTOLIC BLOOD PRESSURE: 117 MMHG

## 2022-01-01 VITALS
HEART RATE: 92 BPM | SYSTOLIC BLOOD PRESSURE: 112 MMHG | RESPIRATION RATE: 20 BRPM | WEIGHT: 223.2 LBS | OXYGEN SATURATION: 99 % | HEIGHT: 64 IN | DIASTOLIC BLOOD PRESSURE: 78 MMHG | TEMPERATURE: 97.4 F | BODY MASS INDEX: 38.1 KG/M2

## 2022-01-01 DIAGNOSIS — Z86.73 H/O ISCHEMIC LEFT MCA STROKE: ICD-10-CM

## 2022-01-01 DIAGNOSIS — I48.20 CHRONIC ATRIAL FIBRILLATION (H): ICD-10-CM

## 2022-01-01 DIAGNOSIS — R53.81 PHYSICAL DECONDITIONING: ICD-10-CM

## 2022-01-01 DIAGNOSIS — N18.31 STAGE 3A CHRONIC KIDNEY DISEASE (H): ICD-10-CM

## 2022-01-01 DIAGNOSIS — N39.0 URINARY TRACT INFECTION IN ELDERLY PATIENT: ICD-10-CM

## 2022-01-01 DIAGNOSIS — I10 BENIGN HYPERTENSION: ICD-10-CM

## 2022-01-01 DIAGNOSIS — E11.65 POORLY CONTROLLED TYPE 2 DIABETES MELLITUS (H): ICD-10-CM

## 2022-01-01 DIAGNOSIS — N39.0 URINARY TRACT INFECTION, SITE NOT SPECIFIED: ICD-10-CM

## 2022-01-01 DIAGNOSIS — I89.0 LYMPHEDEMA: ICD-10-CM

## 2022-01-01 DIAGNOSIS — I48.19 PERSISTENT ATRIAL FIBRILLATION (H): ICD-10-CM

## 2022-01-01 DIAGNOSIS — Z78.0 ASYMPTOMATIC MENOPAUSAL STATE: ICD-10-CM

## 2022-01-01 DIAGNOSIS — Z00.00 ENCOUNTER FOR MEDICARE ANNUAL WELLNESS EXAM: Primary | ICD-10-CM

## 2022-01-01 DIAGNOSIS — E78.5 HYPERLIPIDEMIA LDL GOAL <100: ICD-10-CM

## 2022-01-01 DIAGNOSIS — E78.5 DYSLIPIDEMIA: ICD-10-CM

## 2022-01-01 DIAGNOSIS — S09.90XA CLOSED HEAD INJURY, INITIAL ENCOUNTER: ICD-10-CM

## 2022-01-01 DIAGNOSIS — R53.1 WEAKNESS: ICD-10-CM

## 2022-01-01 DIAGNOSIS — R60.0 BILATERAL LOWER EXTREMITY EDEMA: ICD-10-CM

## 2022-01-01 DIAGNOSIS — M17.11 OSTEOARTHRITIS OF RIGHT KNEE, UNSPECIFIED OSTEOARTHRITIS TYPE: ICD-10-CM

## 2022-01-01 DIAGNOSIS — E11.8 TYPE 2 DIABETES MELLITUS WITH COMPLICATION, WITHOUT LONG-TERM CURRENT USE OF INSULIN (H): ICD-10-CM

## 2022-01-01 DIAGNOSIS — R60.0 LOWER EXTREMITY EDEMA: ICD-10-CM

## 2022-01-01 DIAGNOSIS — D75.89 MACROCYTOSIS WITHOUT ANEMIA: ICD-10-CM

## 2022-01-01 DIAGNOSIS — N39.0 URINARY TRACT INFECTION IN ELDERLY PATIENT: Primary | ICD-10-CM

## 2022-01-01 DIAGNOSIS — Z79.4 TYPE 2 DIABETES MELLITUS WITHOUT COMPLICATION, WITH LONG-TERM CURRENT USE OF INSULIN (H): ICD-10-CM

## 2022-01-01 DIAGNOSIS — L30.9 DERMATITIS: ICD-10-CM

## 2022-01-01 DIAGNOSIS — I63.9 CEREBELLAR STROKE, ACUTE (H): ICD-10-CM
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
## 2022-01-01 DIAGNOSIS — I63.111 CEREBROVASCULAR ACCIDENT (CVA) DUE TO EMBOLISM OF RIGHT VERTEBRAL ARTERY (H): Primary | ICD-10-CM

## 2022-01-01 DIAGNOSIS — R60.0 LOWER EXTREMITY EDEMA: Primary | ICD-10-CM

## 2022-01-01 DIAGNOSIS — Z86.73 H/O ISCHEMIC LEFT MCA STROKE: Primary | ICD-10-CM

## 2022-01-01 DIAGNOSIS — U07.1 COVID-19: ICD-10-CM

## 2022-01-01 DIAGNOSIS — B37.2 YEAST DERMATITIS: ICD-10-CM

## 2022-01-01 DIAGNOSIS — B96.20 E. COLI UTI: Primary | ICD-10-CM

## 2022-01-01 DIAGNOSIS — E11.9 TYPE 2 DIABETES MELLITUS WITHOUT COMPLICATION, WITH LONG-TERM CURRENT USE OF INSULIN (H): ICD-10-CM

## 2022-01-01 DIAGNOSIS — Z79.01 ANTICOAGULATED: ICD-10-CM

## 2022-01-01 DIAGNOSIS — G93.41 METABOLIC ENCEPHALOPATHY: ICD-10-CM

## 2022-01-01 DIAGNOSIS — I10 ESSENTIAL HYPERTENSION: ICD-10-CM

## 2022-01-01 DIAGNOSIS — E11.8 TYPE 2 DIABETES MELLITUS WITH COMPLICATION, WITHOUT LONG-TERM CURRENT USE OF INSULIN (H): Primary | ICD-10-CM

## 2022-01-01 DIAGNOSIS — R73.9 HYPERGLYCEMIA: ICD-10-CM

## 2022-01-01 DIAGNOSIS — Z00.01 ENCOUNTER FOR GENERAL ADULT MEDICAL EXAMINATION WITH ABNORMAL FINDINGS: ICD-10-CM

## 2022-01-01 DIAGNOSIS — M17.11 OSTEOARTHRITIS OF RIGHT KNEE, UNSPECIFIED OSTEOARTHRITIS TYPE: Primary | ICD-10-CM

## 2022-01-01 DIAGNOSIS — H81.10 BENIGN PAROXYSMAL POSITIONAL VERTIGO, UNSPECIFIED LATERALITY: ICD-10-CM

## 2022-01-01 DIAGNOSIS — L89.321 PRESSURE INJURY OF LEFT BUTTOCK, STAGE 1: ICD-10-CM

## 2022-01-01 DIAGNOSIS — E66.01 MORBID OBESITY (H): Primary | ICD-10-CM

## 2022-01-01 DIAGNOSIS — R60.0 LOCALIZED EDEMA: ICD-10-CM

## 2022-01-01 DIAGNOSIS — H91.93 BILATERAL HEARING LOSS, UNSPECIFIED HEARING LOSS TYPE: ICD-10-CM

## 2022-01-01 DIAGNOSIS — E86.0 DEHYDRATION: ICD-10-CM

## 2022-01-01 DIAGNOSIS — Z09 HOSPITAL DISCHARGE FOLLOW-UP: Primary | ICD-10-CM

## 2022-01-01 DIAGNOSIS — R52 PAIN: ICD-10-CM

## 2022-01-01 DIAGNOSIS — Z23 HIGH PRIORITY FOR COVID-19 VACCINATION: ICD-10-CM

## 2022-01-01 DIAGNOSIS — N39.0 E. COLI UTI: Primary | ICD-10-CM

## 2022-01-01 DIAGNOSIS — Z86.73 HISTORY OF CVA (CEREBROVASCULAR ACCIDENT): ICD-10-CM

## 2022-01-01 DIAGNOSIS — E66.01 MORBID OBESITY (H): ICD-10-CM

## 2022-01-01 DIAGNOSIS — R13.10 DYSPHAGIA, UNSPECIFIED TYPE: ICD-10-CM

## 2022-01-01 DIAGNOSIS — K52.9 ACUTE GASTROENTERITIS: ICD-10-CM

## 2022-01-01 DIAGNOSIS — L30.9 DERMATITIS: Primary | ICD-10-CM

## 2022-01-01 DIAGNOSIS — G47.00 INSOMNIA, UNSPECIFIED TYPE: ICD-10-CM

## 2022-01-01 DIAGNOSIS — Z11.1 ENCOUNTER FOR SCREENING FOR RESPIRATORY TUBERCULOSIS: ICD-10-CM

## 2022-01-01 DIAGNOSIS — I48.21 PERMANENT ATRIAL FIBRILLATION (H): ICD-10-CM

## 2022-01-01 LAB
ALBUMIN SERPL-MCNC: 3 G/DL (ref 3.4–5)
ALBUMIN SERPL-MCNC: 3 G/DL (ref 3.4–5)
ALBUMIN SERPL-MCNC: 3.1 G/DL (ref 3.4–5)
ALBUMIN SERPL-MCNC: 3.2 G/DL (ref 3.4–5)
ALBUMIN UR-MCNC: 10 MG/DL
ALBUMIN UR-MCNC: NEGATIVE MG/DL
ALP SERPL-CCNC: 112 U/L (ref 40–150)
ALP SERPL-CCNC: 83 U/L (ref 40–150)
ALP SERPL-CCNC: 87 U/L (ref 40–150)
ALP SERPL-CCNC: 88 U/L (ref 40–150)
ALT SERPL W P-5'-P-CCNC: 19 U/L (ref 0–50)
ALT SERPL W P-5'-P-CCNC: 36 U/L (ref 0–50)
ALT SERPL W P-5'-P-CCNC: 40 U/L (ref 0–50)
ALT SERPL W P-5'-P-CCNC: 43 U/L (ref 0–50)
AMMONIA PLAS-SCNC: <10 UMOL/L (ref 10–50)
ANION GAP SERPL CALCULATED.3IONS-SCNC: 10 MMOL/L (ref 3–14)
ANION GAP SERPL CALCULATED.3IONS-SCNC: 11 MMOL/L (ref 3–14)
ANION GAP SERPL CALCULATED.3IONS-SCNC: 12 MMOL/L (ref 3–14)
ANION GAP SERPL CALCULATED.3IONS-SCNC: 14 MMOL/L (ref 3–14)
ANION GAP SERPL CALCULATED.3IONS-SCNC: 14 MMOL/L (ref 7–15)
ANION GAP SERPL CALCULATED.3IONS-SCNC: 4 MMOL/L (ref 3–14)
ANION GAP SERPL CALCULATED.3IONS-SCNC: 5 MMOL/L (ref 3–14)
ANION GAP SERPL CALCULATED.3IONS-SCNC: 6 MMOL/L (ref 3–14)
ANION GAP SERPL CALCULATED.3IONS-SCNC: 7 MMOL/L (ref 3–14)
ANION GAP SERPL CALCULATED.3IONS-SCNC: 7 MMOL/L (ref 3–14)
ANION GAP SERPL CALCULATED.3IONS-SCNC: 8 MMOL/L (ref 3–14)
APPEARANCE UR: ABNORMAL
APPEARANCE UR: ABNORMAL
AST SERPL W P-5'-P-CCNC: 22 U/L (ref 0–45)
AST SERPL W P-5'-P-CCNC: 35 U/L (ref 0–45)
AST SERPL W P-5'-P-CCNC: 42 U/L (ref 0–45)
AST SERPL W P-5'-P-CCNC: 44 U/L (ref 0–45)
ATRIAL RATE - MUSE: 159 BPM
ATRIAL RATE - MUSE: 38 BPM
ATRIAL RATE - MUSE: 70 BPM
ATRIAL RATE - MUSE: 92 BPM
BACTERIA #/AREA URNS HPF: ABNORMAL /HPF
BACTERIA BLD CULT: NO GROWTH
BACTERIA BLD CULT: NO GROWTH
BACTERIA UR CULT: ABNORMAL
BASE EXCESS BLDA CALC-SCNC: 4.9 MMOL/L (ref -9–1.8)
BASE EXCESS BLDV CALC-SCNC: 0 MMOL/L (ref -7.7–1.9)
BASOPHILS # BLD AUTO: 0 10E3/UL (ref 0–0.2)
BASOPHILS NFR BLD AUTO: 0 %
BILIRUB SERPL-MCNC: 0.6 MG/DL (ref 0.2–1.3)
BILIRUB SERPL-MCNC: 0.7 MG/DL (ref 0.2–1.3)
BILIRUB SERPL-MCNC: 0.7 MG/DL (ref 0.2–1.3)
BILIRUB SERPL-MCNC: 1 MG/DL (ref 0.2–1.3)
BILIRUB UR QL STRIP: NEGATIVE
BILIRUB UR QL STRIP: NEGATIVE
BUN SERPL-MCNC: 11.7 MG/DL (ref 8–23)
BUN SERPL-MCNC: 12 MG/DL (ref 7–30)
BUN SERPL-MCNC: 12 MG/DL (ref 7–30)
BUN SERPL-MCNC: 15 MG/DL (ref 7–30)
BUN SERPL-MCNC: 16 MG/DL (ref 7–30)
BUN SERPL-MCNC: 17 MG/DL (ref 7–30)
BUN SERPL-MCNC: 19 MG/DL (ref 7–30)
BUN SERPL-MCNC: 21 MG/DL (ref 7–30)
BUN SERPL-MCNC: 21 MG/DL (ref 7–30)
BUN SERPL-MCNC: 24 MG/DL (ref 7–30)
BUN SERPL-MCNC: 25 MG/DL (ref 7–30)
BUN SERPL-MCNC: 26 MG/DL (ref 7–30)
BUN SERPL-MCNC: 34 MG/DL (ref 7–30)
BUN SERPL-MCNC: 39 MG/DL (ref 7–30)
BUN SERPL-MCNC: 41 MG/DL (ref 7–30)
BUN SERPL-MCNC: 44 MG/DL (ref 7–30)
BUN SERPL-MCNC: 7 MG/DL (ref 7–30)
BUN SERPL-MCNC: 9 MG/DL (ref 7–30)
CA-I BLD-MCNC: 4.2 MG/DL (ref 4.4–5.2)
CALCIUM SERPL-MCNC: 10.2 MG/DL (ref 8.5–10.1)
CALCIUM SERPL-MCNC: 10.4 MG/DL (ref 8.5–10.1)
CALCIUM SERPL-MCNC: 8.2 MG/DL (ref 8.5–10.1)
CALCIUM SERPL-MCNC: 8.3 MG/DL (ref 8.5–10.1)
CALCIUM SERPL-MCNC: 8.5 MG/DL (ref 8.5–10.1)
CALCIUM SERPL-MCNC: 8.7 MG/DL (ref 8.5–10.1)
CALCIUM SERPL-MCNC: 8.9 MG/DL (ref 8.5–10.1)
CALCIUM SERPL-MCNC: 8.9 MG/DL (ref 8.5–10.1)
CALCIUM SERPL-MCNC: 9.3 MG/DL (ref 8.8–10.2)
CALCIUM SERPL-MCNC: 9.4 MG/DL (ref 8.5–10.1)
CALCIUM SERPL-MCNC: 9.4 MG/DL (ref 8.5–10.1)
CALCIUM SERPL-MCNC: 9.5 MG/DL (ref 8.5–10.1)
CALCIUM SERPL-MCNC: 9.5 MG/DL (ref 8.5–10.1)
CALCIUM SERPL-MCNC: 9.6 MG/DL (ref 8.5–10.1)
CALCIUM SERPL-MCNC: 9.7 MG/DL (ref 8.5–10.1)
CALCIUM SERPL-MCNC: 9.9 MG/DL (ref 8.5–10.1)
CHLORIDE BLD-SCNC: 100 MMOL/L (ref 94–109)
CHLORIDE BLD-SCNC: 102 MMOL/L (ref 94–109)
CHLORIDE BLD-SCNC: 103 MMOL/L (ref 94–109)
CHLORIDE BLD-SCNC: 103 MMOL/L (ref 94–109)
CHLORIDE BLD-SCNC: 104 MMOL/L (ref 94–109)
CHLORIDE BLD-SCNC: 105 MMOL/L (ref 94–109)
CHLORIDE BLD-SCNC: 106 MMOL/L (ref 94–109)
CHLORIDE BLD-SCNC: 107 MMOL/L (ref 94–109)
CHLORIDE BLD-SCNC: 109 MMOL/L (ref 94–109)
CHLORIDE BLD-SCNC: 109 MMOL/L (ref 94–109)
CHLORIDE BLD-SCNC: 110 MMOL/L (ref 94–109)
CHLORIDE BLD-SCNC: 113 MMOL/L (ref 94–109)
CHLORIDE BLD-SCNC: 115 MMOL/L (ref 94–109)
CHLORIDE SERPL-SCNC: 102 MMOL/L (ref 98–107)
CHOLEST SERPL-MCNC: 129 MG/DL
CHOLEST SERPL-MCNC: 132 MG/DL
CK SERPL-CCNC: 70 U/L (ref 30–225)
CO2 SERPL-SCNC: 19 MMOL/L (ref 20–32)
CO2 SERPL-SCNC: 19 MMOL/L (ref 20–32)
CO2 SERPL-SCNC: 21 MMOL/L (ref 20–32)
CO2 SERPL-SCNC: 22 MMOL/L (ref 20–32)
CO2 SERPL-SCNC: 23 MMOL/L (ref 20–32)
CO2 SERPL-SCNC: 24 MMOL/L (ref 20–32)
CO2 SERPL-SCNC: 25 MMOL/L (ref 20–32)
CO2 SERPL-SCNC: 26 MMOL/L (ref 20–32)
CO2 SERPL-SCNC: 27 MMOL/L (ref 20–32)
CO2 SERPL-SCNC: 28 MMOL/L (ref 20–32)
CO2 SERPL-SCNC: 28 MMOL/L (ref 20–32)
CO2 SERPL-SCNC: 29 MMOL/L (ref 20–32)
CO2 SERPL-SCNC: 30 MMOL/L (ref 20–32)
CO2 SERPL-SCNC: 31 MMOL/L (ref 20–32)
CO2 SERPL-SCNC: 31 MMOL/L (ref 20–32)
COLOR UR AUTO: ABNORMAL
COLOR UR AUTO: YELLOW
CREAT SERPL-MCNC: 0.59 MG/DL (ref 0.52–1.04)
CREAT SERPL-MCNC: 0.6 MG/DL (ref 0.52–1.04)
CREAT SERPL-MCNC: 0.7 MG/DL (ref 0.52–1.04)
CREAT SERPL-MCNC: 0.7 MG/DL (ref 0.52–1.04)
CREAT SERPL-MCNC: 0.72 MG/DL (ref 0.52–1.04)
CREAT SERPL-MCNC: 0.73 MG/DL (ref 0.52–1.04)
CREAT SERPL-MCNC: 0.74 MG/DL (ref 0.51–0.95)
CREAT SERPL-MCNC: 0.75 MG/DL (ref 0.52–1.04)
CREAT SERPL-MCNC: 0.75 MG/DL (ref 0.52–1.04)
CREAT SERPL-MCNC: 0.8 MG/DL (ref 0.52–1.04)
CREAT SERPL-MCNC: 0.8 MG/DL (ref 0.52–1.04)
CREAT SERPL-MCNC: 0.85 MG/DL (ref 0.52–1.04)
CREAT SERPL-MCNC: 0.91 MG/DL (ref 0.52–1.04)
CREAT SERPL-MCNC: 1.03 MG/DL (ref 0.52–1.04)
CREAT SERPL-MCNC: 1.04 MG/DL (ref 0.52–1.04)
CREAT SERPL-MCNC: 1.06 MG/DL (ref 0.52–1.04)
DEPRECATED HCO3 PLAS-SCNC: 22 MMOL/L (ref 22–29)
DIASTOLIC BLOOD PRESSURE - MUSE: NORMAL MMHG
EOSINOPHIL # BLD AUTO: 0 10E3/UL (ref 0–0.7)
EOSINOPHIL # BLD AUTO: 0 10E3/UL (ref 0–0.7)
EOSINOPHIL # BLD AUTO: 0.1 10E3/UL (ref 0–0.7)
EOSINOPHIL # BLD AUTO: 0.1 10E3/UL (ref 0–0.7)
EOSINOPHIL NFR BLD AUTO: 0 %
EOSINOPHIL NFR BLD AUTO: 0 %
EOSINOPHIL NFR BLD AUTO: 1 %
EOSINOPHIL NFR BLD AUTO: 1 %
ERYTHROCYTE [DISTWIDTH] IN BLOOD BY AUTOMATED COUNT: 11.5 % (ref 10–15)
ERYTHROCYTE [DISTWIDTH] IN BLOOD BY AUTOMATED COUNT: 11.5 % (ref 10–15)
ERYTHROCYTE [DISTWIDTH] IN BLOOD BY AUTOMATED COUNT: 11.6 % (ref 10–15)
ERYTHROCYTE [DISTWIDTH] IN BLOOD BY AUTOMATED COUNT: 11.7 % (ref 10–15)
ERYTHROCYTE [DISTWIDTH] IN BLOOD BY AUTOMATED COUNT: 11.8 % (ref 10–15)
ERYTHROCYTE [DISTWIDTH] IN BLOOD BY AUTOMATED COUNT: 11.8 % (ref 10–15)
ERYTHROCYTE [DISTWIDTH] IN BLOOD BY AUTOMATED COUNT: 11.9 % (ref 10–15)
ERYTHROCYTE [DISTWIDTH] IN BLOOD BY AUTOMATED COUNT: 11.9 % (ref 10–15)
ERYTHROCYTE [DISTWIDTH] IN BLOOD BY AUTOMATED COUNT: 12 % (ref 10–15)
ERYTHROCYTE [DISTWIDTH] IN BLOOD BY AUTOMATED COUNT: 12 % (ref 10–15)
ERYTHROCYTE [DISTWIDTH] IN BLOOD BY AUTOMATED COUNT: 12.1 % (ref 10–15)
ERYTHROCYTE [DISTWIDTH] IN BLOOD BY AUTOMATED COUNT: 12.2 % (ref 10–15)
ERYTHROCYTE [DISTWIDTH] IN BLOOD BY AUTOMATED COUNT: 12.2 % (ref 10–15)
ERYTHROCYTE [DISTWIDTH] IN BLOOD BY AUTOMATED COUNT: 12.3 % (ref 10–15)
ERYTHROCYTE [DISTWIDTH] IN BLOOD BY AUTOMATED COUNT: 12.4 % (ref 10–15)
FASTING STATUS PATIENT QL REPORTED: YES
FLUAV RNA SPEC QL NAA+PROBE: NEGATIVE
FLUBV RNA RESP QL NAA+PROBE: NEGATIVE
GAMMA INTERFERON BACKGROUND BLD IA-ACNC: 0.02 IU/ML
GFR SERPL CREATININE-BSD FRML MDRD: 53 ML/MIN/1.73M2
GFR SERPL CREATININE-BSD FRML MDRD: 54 ML/MIN/1.73M2
GFR SERPL CREATININE-BSD FRML MDRD: 55 ML/MIN/1.73M2
GFR SERPL CREATININE-BSD FRML MDRD: 64 ML/MIN/1.73M2
GFR SERPL CREATININE-BSD FRML MDRD: 69 ML/MIN/1.73M2
GFR SERPL CREATININE-BSD FRML MDRD: 75 ML/MIN/1.73M2
GFR SERPL CREATININE-BSD FRML MDRD: 75 ML/MIN/1.73M2
GFR SERPL CREATININE-BSD FRML MDRD: 81 ML/MIN/1.73M2
GFR SERPL CREATININE-BSD FRML MDRD: 81 ML/MIN/1.73M2
GFR SERPL CREATININE-BSD FRML MDRD: 82 ML/MIN/1.73M2
GFR SERPL CREATININE-BSD FRML MDRD: 83 ML/MIN/1.73M2
GFR SERPL CREATININE-BSD FRML MDRD: 85 ML/MIN/1.73M2
GFR SERPL CREATININE-BSD FRML MDRD: 87 ML/MIN/1.73M2
GFR SERPL CREATININE-BSD FRML MDRD: 87 ML/MIN/1.73M2
GFR SERPL CREATININE-BSD FRML MDRD: >90 ML/MIN/1.73M2
GFR SERPL CREATININE-BSD FRML MDRD: >90 ML/MIN/1.73M2
GLUCOSE BLD-MCNC: 130 MG/DL (ref 70–99)
GLUCOSE BLD-MCNC: 135 MG/DL (ref 70–99)
GLUCOSE BLD-MCNC: 149 MG/DL (ref 70–99)
GLUCOSE BLD-MCNC: 162 MG/DL (ref 70–99)
GLUCOSE BLD-MCNC: 171 MG/DL (ref 70–99)
GLUCOSE BLD-MCNC: 178 MG/DL (ref 70–99)
GLUCOSE BLD-MCNC: 182 MG/DL (ref 70–99)
GLUCOSE BLD-MCNC: 189 MG/DL (ref 70–99)
GLUCOSE BLD-MCNC: 192 MG/DL (ref 70–99)
GLUCOSE BLD-MCNC: 194 MG/DL (ref 70–99)
GLUCOSE BLD-MCNC: 203 MG/DL (ref 70–99)
GLUCOSE BLD-MCNC: 211 MG/DL (ref 70–99)
GLUCOSE BLD-MCNC: 219 MG/DL (ref 70–99)
GLUCOSE BLD-MCNC: 230 MG/DL (ref 70–99)
GLUCOSE BLD-MCNC: 242 MG/DL (ref 70–99)
GLUCOSE BLD-MCNC: 343 MG/DL (ref 70–99)
GLUCOSE BLD-MCNC: 479 MG/DL (ref 70–99)
GLUCOSE BLDC GLUCOMTR-MCNC: 110 MG/DL (ref 70–99)
GLUCOSE BLDC GLUCOMTR-MCNC: 115 MG/DL (ref 70–99)
GLUCOSE BLDC GLUCOMTR-MCNC: 118 MG/DL (ref 70–99)
GLUCOSE BLDC GLUCOMTR-MCNC: 142 MG/DL (ref 70–99)
GLUCOSE BLDC GLUCOMTR-MCNC: 143 MG/DL (ref 70–99)
GLUCOSE BLDC GLUCOMTR-MCNC: 143 MG/DL (ref 70–99)
GLUCOSE BLDC GLUCOMTR-MCNC: 149 MG/DL (ref 70–99)
GLUCOSE BLDC GLUCOMTR-MCNC: 152 MG/DL (ref 70–99)
GLUCOSE BLDC GLUCOMTR-MCNC: 153 MG/DL (ref 70–99)
GLUCOSE BLDC GLUCOMTR-MCNC: 153 MG/DL (ref 70–99)
GLUCOSE BLDC GLUCOMTR-MCNC: 160 MG/DL (ref 70–99)
GLUCOSE BLDC GLUCOMTR-MCNC: 166 MG/DL (ref 70–99)
GLUCOSE BLDC GLUCOMTR-MCNC: 169 MG/DL (ref 70–99)
GLUCOSE BLDC GLUCOMTR-MCNC: 169 MG/DL (ref 70–99)
GLUCOSE BLDC GLUCOMTR-MCNC: 173 MG/DL (ref 70–99)
GLUCOSE BLDC GLUCOMTR-MCNC: 174 MG/DL (ref 70–99)
GLUCOSE BLDC GLUCOMTR-MCNC: 174 MG/DL (ref 70–99)
GLUCOSE BLDC GLUCOMTR-MCNC: 175 MG/DL (ref 70–99)
GLUCOSE BLDC GLUCOMTR-MCNC: 178 MG/DL (ref 70–99)
GLUCOSE BLDC GLUCOMTR-MCNC: 179 MG/DL (ref 70–99)
GLUCOSE BLDC GLUCOMTR-MCNC: 179 MG/DL (ref 70–99)
GLUCOSE BLDC GLUCOMTR-MCNC: 186 MG/DL (ref 70–99)
GLUCOSE BLDC GLUCOMTR-MCNC: 186 MG/DL (ref 70–99)
GLUCOSE BLDC GLUCOMTR-MCNC: 187 MG/DL (ref 70–99)
GLUCOSE BLDC GLUCOMTR-MCNC: 190 MG/DL (ref 70–99)
GLUCOSE BLDC GLUCOMTR-MCNC: 192 MG/DL (ref 70–99)
GLUCOSE BLDC GLUCOMTR-MCNC: 193 MG/DL (ref 70–99)
GLUCOSE BLDC GLUCOMTR-MCNC: 197 MG/DL (ref 70–99)
GLUCOSE BLDC GLUCOMTR-MCNC: 201 MG/DL (ref 70–99)
GLUCOSE BLDC GLUCOMTR-MCNC: 201 MG/DL (ref 70–99)
GLUCOSE BLDC GLUCOMTR-MCNC: 204 MG/DL (ref 70–99)
GLUCOSE BLDC GLUCOMTR-MCNC: 204 MG/DL (ref 70–99)
GLUCOSE BLDC GLUCOMTR-MCNC: 206 MG/DL (ref 70–99)
GLUCOSE BLDC GLUCOMTR-MCNC: 208 MG/DL (ref 70–99)
GLUCOSE BLDC GLUCOMTR-MCNC: 211 MG/DL (ref 70–99)
GLUCOSE BLDC GLUCOMTR-MCNC: 212 MG/DL (ref 70–99)
GLUCOSE BLDC GLUCOMTR-MCNC: 214 MG/DL (ref 70–99)
GLUCOSE BLDC GLUCOMTR-MCNC: 218 MG/DL (ref 70–99)
GLUCOSE BLDC GLUCOMTR-MCNC: 221 MG/DL (ref 70–99)
GLUCOSE BLDC GLUCOMTR-MCNC: 222 MG/DL (ref 70–99)
GLUCOSE BLDC GLUCOMTR-MCNC: 222 MG/DL (ref 70–99)
GLUCOSE BLDC GLUCOMTR-MCNC: 223 MG/DL (ref 70–99)
GLUCOSE BLDC GLUCOMTR-MCNC: 223 MG/DL (ref 70–99)
GLUCOSE BLDC GLUCOMTR-MCNC: 227 MG/DL (ref 70–99)
GLUCOSE BLDC GLUCOMTR-MCNC: 229 MG/DL (ref 70–99)
GLUCOSE BLDC GLUCOMTR-MCNC: 230 MG/DL (ref 70–99)
GLUCOSE BLDC GLUCOMTR-MCNC: 234 MG/DL (ref 70–99)
GLUCOSE BLDC GLUCOMTR-MCNC: 235 MG/DL (ref 70–99)
GLUCOSE BLDC GLUCOMTR-MCNC: 235 MG/DL (ref 70–99)
GLUCOSE BLDC GLUCOMTR-MCNC: 236 MG/DL (ref 70–99)
GLUCOSE BLDC GLUCOMTR-MCNC: 238 MG/DL (ref 70–99)
GLUCOSE BLDC GLUCOMTR-MCNC: 244 MG/DL (ref 70–99)
GLUCOSE BLDC GLUCOMTR-MCNC: 245 MG/DL (ref 70–99)
GLUCOSE BLDC GLUCOMTR-MCNC: 246 MG/DL (ref 70–99)
GLUCOSE BLDC GLUCOMTR-MCNC: 252 MG/DL (ref 70–99)
GLUCOSE BLDC GLUCOMTR-MCNC: 255 MG/DL (ref 70–99)
GLUCOSE BLDC GLUCOMTR-MCNC: 259 MG/DL (ref 70–99)
GLUCOSE BLDC GLUCOMTR-MCNC: 260 MG/DL (ref 70–99)
GLUCOSE BLDC GLUCOMTR-MCNC: 265 MG/DL (ref 70–99)
GLUCOSE BLDC GLUCOMTR-MCNC: 269 MG/DL (ref 70–99)
GLUCOSE BLDC GLUCOMTR-MCNC: 270 MG/DL (ref 70–99)
GLUCOSE BLDC GLUCOMTR-MCNC: 270 MG/DL (ref 70–99)
GLUCOSE BLDC GLUCOMTR-MCNC: 271 MG/DL (ref 70–99)
GLUCOSE BLDC GLUCOMTR-MCNC: 276 MG/DL (ref 70–99)
GLUCOSE BLDC GLUCOMTR-MCNC: 283 MG/DL (ref 70–99)
GLUCOSE BLDC GLUCOMTR-MCNC: 289 MG/DL (ref 70–99)
GLUCOSE BLDC GLUCOMTR-MCNC: 294 MG/DL (ref 70–99)
GLUCOSE BLDC GLUCOMTR-MCNC: 298 MG/DL (ref 70–99)
GLUCOSE BLDC GLUCOMTR-MCNC: 298 MG/DL (ref 70–99)
GLUCOSE BLDC GLUCOMTR-MCNC: 304 MG/DL (ref 70–99)
GLUCOSE BLDC GLUCOMTR-MCNC: 309 MG/DL (ref 70–99)
GLUCOSE BLDC GLUCOMTR-MCNC: 311 MG/DL (ref 70–99)
GLUCOSE BLDC GLUCOMTR-MCNC: 312 MG/DL (ref 70–99)
GLUCOSE BLDC GLUCOMTR-MCNC: 313 MG/DL (ref 70–99)
GLUCOSE BLDC GLUCOMTR-MCNC: 344 MG/DL (ref 70–99)
GLUCOSE BLDC GLUCOMTR-MCNC: 380 MG/DL (ref 70–99)
GLUCOSE BLDC GLUCOMTR-MCNC: 463 MG/DL (ref 70–99)
GLUCOSE BLDC GLUCOMTR-MCNC: 72 MG/DL (ref 70–99)
GLUCOSE BLDC GLUCOMTR-MCNC: 98 MG/DL (ref 70–99)
GLUCOSE SERPL-MCNC: 289 MG/DL (ref 70–99)
GLUCOSE UR STRIP-MCNC: >=1000 MG/DL
GLUCOSE UR STRIP-MCNC: NEGATIVE MG/DL
HBA1C MFR BLD: 7.2 % (ref 0–5.6)
HBA1C MFR BLD: 7.5 % (ref 0–5.6)
HBA1C MFR BLD: 8.3 % (ref 0–5.6)
HCO3 BLD-SCNC: 29 MMOL/L (ref 21–28)
HCO3 BLDV-SCNC: 22 MMOL/L (ref 21–28)
HCT VFR BLD AUTO: 39 % (ref 35–47)
HCT VFR BLD AUTO: 39.6 % (ref 35–47)
HCT VFR BLD AUTO: 39.6 % (ref 35–47)
HCT VFR BLD AUTO: 40.2 % (ref 35–47)
HCT VFR BLD AUTO: 41.3 % (ref 35–47)
HCT VFR BLD AUTO: 41.5 % (ref 35–47)
HCT VFR BLD AUTO: 41.6 % (ref 35–47)
HCT VFR BLD AUTO: 41.9 % (ref 35–47)
HCT VFR BLD AUTO: 42.1 % (ref 35–47)
HCT VFR BLD AUTO: 42.3 % (ref 35–47)
HCT VFR BLD AUTO: 44.8 % (ref 35–47)
HCT VFR BLD AUTO: 44.9 % (ref 35–47)
HCT VFR BLD AUTO: 46 % (ref 35–47)
HCT VFR BLD AUTO: 46.4 % (ref 35–47)
HCT VFR BLD AUTO: 48.3 % (ref 35–47)
HCT VFR BLD AUTO: 48.5 % (ref 35–47)
HCT VFR BLD AUTO: 48.7 % (ref 35–47)
HCT VFR BLD AUTO: 48.7 % (ref 35–47)
HCT VFR BLD AUTO: 49 % (ref 35–47)
HDLC SERPL-MCNC: 51 MG/DL
HDLC SERPL-MCNC: 53 MG/DL
HGB BLD-MCNC: 12.8 G/DL (ref 11.7–15.7)
HGB BLD-MCNC: 13.2 G/DL (ref 11.7–15.7)
HGB BLD-MCNC: 13.2 G/DL (ref 11.7–15.7)
HGB BLD-MCNC: 13.4 G/DL (ref 11.7–15.7)
HGB BLD-MCNC: 13.4 G/DL (ref 11.7–15.7)
HGB BLD-MCNC: 13.5 G/DL (ref 11.7–15.7)
HGB BLD-MCNC: 13.6 G/DL (ref 11.7–15.7)
HGB BLD-MCNC: 14.1 G/DL (ref 11.7–15.7)
HGB BLD-MCNC: 14.1 G/DL (ref 11.7–15.7)
HGB BLD-MCNC: 14.2 G/DL (ref 11.7–15.7)
HGB BLD-MCNC: 14.5 G/DL (ref 11.7–15.7)
HGB BLD-MCNC: 14.7 G/DL (ref 11.7–15.7)
HGB BLD-MCNC: 14.9 G/DL (ref 11.7–15.7)
HGB BLD-MCNC: 15.2 G/DL (ref 11.7–15.7)
HGB BLD-MCNC: 15.5 G/DL (ref 11.7–15.7)
HGB BLD-MCNC: 15.7 G/DL (ref 11.7–15.7)
HGB BLD-MCNC: 16.1 G/DL (ref 11.7–15.7)
HGB BLD-MCNC: 16.2 G/DL (ref 11.7–15.7)
HGB BLD-MCNC: 16.3 G/DL (ref 11.7–15.7)
HGB UR QL STRIP: ABNORMAL
HGB UR QL STRIP: ABNORMAL
HOLD SPECIMEN: NORMAL
HOLD SPECIMEN: NORMAL
HYALINE CASTS: 1 /LPF
IMM GRANULOCYTES # BLD: 0.1 10E3/UL
IMM GRANULOCYTES NFR BLD: 0 %
IMM GRANULOCYTES NFR BLD: 0 %
IMM GRANULOCYTES NFR BLD: 1 %
IMM GRANULOCYTES NFR BLD: 1 %
INR PPP: 0.97 (ref 0.85–1.15)
INTERPRETATION ECG - MUSE: NORMAL
KETONES UR STRIP-MCNC: ABNORMAL MG/DL
KETONES UR STRIP-MCNC: NEGATIVE MG/DL
LACTATE SERPL-SCNC: 1.2 MMOL/L (ref 0.7–2)
LACTATE SERPL-SCNC: 1.5 MMOL/L (ref 0.7–2)
LACTATE SERPL-SCNC: 1.5 MMOL/L (ref 0.7–2)
LACTATE SERPL-SCNC: 1.7 MMOL/L (ref 0.7–2)
LACTATE SERPL-SCNC: 1.7 MMOL/L (ref 0.7–2)
LACTATE SERPL-SCNC: 1.9 MMOL/L (ref 0.7–2)
LACTATE SERPL-SCNC: 2.2 MMOL/L (ref 0.7–2)
LACTATE SERPL-SCNC: 2.3 MMOL/L (ref 0.7–2)
LACTATE SERPL-SCNC: 2.4 MMOL/L (ref 0.7–2)
LACTATE SERPL-SCNC: 2.5 MMOL/L (ref 0.7–2)
LACTATE SERPL-SCNC: 2.6 MMOL/L (ref 0.7–2)
LACTATE SERPL-SCNC: 2.7 MMOL/L (ref 0.7–2)
LACTATE SERPL-SCNC: 3.8 MMOL/L (ref 0.7–2)
LDLC SERPL CALC-MCNC: 54 MG/DL
LDLC SERPL CALC-MCNC: 55 MG/DL
LEUKOCYTE ESTERASE UR QL STRIP: ABNORMAL
LEUKOCYTE ESTERASE UR QL STRIP: ABNORMAL
LVEF ECHO: NORMAL
LYMPHOCYTES # BLD AUTO: 1.2 10E3/UL (ref 0.8–5.3)
LYMPHOCYTES # BLD AUTO: 1.5 10E3/UL (ref 0.8–5.3)
LYMPHOCYTES # BLD AUTO: 1.7 10E3/UL (ref 0.8–5.3)
LYMPHOCYTES # BLD AUTO: 1.9 10E3/UL (ref 0.8–5.3)
LYMPHOCYTES NFR BLD AUTO: 12 %
LYMPHOCYTES NFR BLD AUTO: 13 %
LYMPHOCYTES NFR BLD AUTO: 15 %
LYMPHOCYTES NFR BLD AUTO: 8 %
M TB IFN-G BLD-IMP: NEGATIVE
M TB IFN-G CD4+ BCKGRND COR BLD-ACNC: 9.98 IU/ML
MAGNESIUM SERPL-MCNC: 1.8 MG/DL (ref 1.6–2.3)
MAGNESIUM SERPL-MCNC: 1.9 MG/DL (ref 1.6–2.3)
MAGNESIUM SERPL-MCNC: 2 MG/DL (ref 1.6–2.3)
MAGNESIUM SERPL-MCNC: 2.1 MG/DL (ref 1.6–2.3)
MAGNESIUM SERPL-MCNC: 2.1 MG/DL (ref 1.6–2.3)
MAGNESIUM SERPL-MCNC: 2.3 MG/DL (ref 1.6–2.3)
MAGNESIUM SERPL-MCNC: 2.3 MG/DL (ref 1.6–2.3)
MAGNESIUM SERPL-MCNC: 2.4 MG/DL (ref 1.6–2.3)
MAGNESIUM SERPL-MCNC: 2.5 MG/DL (ref 1.6–2.3)
MAGNESIUM SERPL-MCNC: 2.6 MG/DL (ref 1.6–2.3)
MAGNESIUM SERPL-MCNC: 2.7 MG/DL (ref 1.6–2.3)
MCH RBC QN AUTO: 33.3 PG (ref 26.5–33)
MCH RBC QN AUTO: 33.6 PG (ref 26.5–33)
MCH RBC QN AUTO: 33.6 PG (ref 26.5–33)
MCH RBC QN AUTO: 33.8 PG (ref 26.5–33)
MCH RBC QN AUTO: 33.9 PG (ref 26.5–33)
MCH RBC QN AUTO: 34 PG (ref 26.5–33)
MCH RBC QN AUTO: 34.1 PG (ref 26.5–33)
MCH RBC QN AUTO: 34.2 PG (ref 26.5–33)
MCH RBC QN AUTO: 34.3 PG (ref 26.5–33)
MCH RBC QN AUTO: 34.4 PG (ref 26.5–33)
MCH RBC QN AUTO: 34.5 PG (ref 26.5–33)
MCH RBC QN AUTO: 34.6 PG (ref 26.5–33)
MCHC RBC AUTO-ENTMCNC: 31.3 G/DL (ref 31.5–36.5)
MCHC RBC AUTO-ENTMCNC: 32 G/DL (ref 31.5–36.5)
MCHC RBC AUTO-ENTMCNC: 32.1 G/DL (ref 31.5–36.5)
MCHC RBC AUTO-ENTMCNC: 32.3 G/DL (ref 31.5–36.5)
MCHC RBC AUTO-ENTMCNC: 32.4 G/DL (ref 31.5–36.5)
MCHC RBC AUTO-ENTMCNC: 32.5 G/DL (ref 31.5–36.5)
MCHC RBC AUTO-ENTMCNC: 32.5 G/DL (ref 31.5–36.5)
MCHC RBC AUTO-ENTMCNC: 32.8 G/DL (ref 31.5–36.5)
MCHC RBC AUTO-ENTMCNC: 32.8 G/DL (ref 31.5–36.5)
MCHC RBC AUTO-ENTMCNC: 32.9 G/DL (ref 31.5–36.5)
MCHC RBC AUTO-ENTMCNC: 33.3 G/DL (ref 31.5–36.5)
MCHC RBC AUTO-ENTMCNC: 33.5 G/DL (ref 31.5–36.5)
MCHC RBC AUTO-ENTMCNC: 33.7 G/DL (ref 31.5–36.5)
MCHC RBC AUTO-ENTMCNC: 33.7 G/DL (ref 31.5–36.5)
MCHC RBC AUTO-ENTMCNC: 33.8 G/DL (ref 31.5–36.5)
MCHC RBC AUTO-ENTMCNC: 33.9 G/DL (ref 31.5–36.5)
MCV RBC AUTO: 101 FL (ref 78–100)
MCV RBC AUTO: 102 FL (ref 78–100)
MCV RBC AUTO: 103 FL (ref 78–100)
MCV RBC AUTO: 103 FL (ref 78–100)
MCV RBC AUTO: 104 FL (ref 78–100)
MCV RBC AUTO: 106 FL (ref 78–100)
MCV RBC AUTO: 106 FL (ref 78–100)
MCV RBC AUTO: 107 FL (ref 78–100)
MCV RBC AUTO: 109 FL (ref 78–100)
MITOGEN IGNF BCKGRD COR BLD-ACNC: 0 IU/ML
MITOGEN IGNF BCKGRD COR BLD-ACNC: 0 IU/ML
MONOCYTES # BLD AUTO: 0.6 10E3/UL (ref 0–1.3)
MONOCYTES # BLD AUTO: 0.8 10E3/UL (ref 0–1.3)
MONOCYTES # BLD AUTO: 1 10E3/UL (ref 0–1.3)
MONOCYTES # BLD AUTO: 1.1 10E3/UL (ref 0–1.3)
MONOCYTES NFR BLD AUTO: 5 %
MONOCYTES NFR BLD AUTO: 6 %
MONOCYTES NFR BLD AUTO: 7 %
MONOCYTES NFR BLD AUTO: 9 %
MUCOUS THREADS #/AREA URNS LPF: PRESENT /LPF
NEUTROPHILS # BLD AUTO: 10.5 10E3/UL (ref 1.6–8.3)
NEUTROPHILS # BLD AUTO: 11.3 10E3/UL (ref 1.6–8.3)
NEUTROPHILS # BLD AUTO: 13.1 10E3/UL (ref 1.6–8.3)
NEUTROPHILS # BLD AUTO: 8.8 10E3/UL (ref 1.6–8.3)
NEUTROPHILS NFR BLD AUTO: 77 %
NEUTROPHILS NFR BLD AUTO: 78 %
NEUTROPHILS NFR BLD AUTO: 81 %
NEUTROPHILS NFR BLD AUTO: 85 %
NITRATE UR QL: NEGATIVE
NITRATE UR QL: NEGATIVE
NONHDLC SERPL-MCNC: 78 MG/DL
NONHDLC SERPL-MCNC: 79 MG/DL
NRBC # BLD AUTO: 0 10E3/UL
NRBC BLD AUTO-RTO: 0 /100
NT-PROBNP SERPL-MCNC: 1078 PG/ML (ref 0–1800)
NT-PROBNP SERPL-MCNC: 5061 PG/ML (ref 0–1800)
O2/TOTAL GAS SETTING VFR VENT: 21 %
O2/TOTAL GAS SETTING VFR VENT: 21 %
P AXIS - MUSE: NORMAL DEGREES
PCO2 BLD: 42 MM HG (ref 35–45)
PCO2 BLDV: 29 MM HG (ref 40–50)
PH BLD: 7.45 [PH] (ref 7.35–7.45)
PH BLDV: 7.49 [PH] (ref 7.32–7.43)
PH UR STRIP: 5 [PH] (ref 5–7)
PH UR STRIP: 6.5 [PH] (ref 5–7)
PLATELET # BLD AUTO: 145 10E3/UL (ref 150–450)
PLATELET # BLD AUTO: 151 10E3/UL (ref 150–450)
PLATELET # BLD AUTO: 153 10E3/UL (ref 150–450)
PLATELET # BLD AUTO: 159 10E3/UL (ref 150–450)
PLATELET # BLD AUTO: 159 10E3/UL (ref 150–450)
PLATELET # BLD AUTO: 165 10E3/UL (ref 150–450)
PLATELET # BLD AUTO: 185 10E3/UL (ref 150–450)
PLATELET # BLD AUTO: 192 10E3/UL (ref 150–450)
PLATELET # BLD AUTO: 197 10E3/UL (ref 150–450)
PLATELET # BLD AUTO: 203 10E3/UL (ref 150–450)
PLATELET # BLD AUTO: 205 10E3/UL (ref 150–450)
PLATELET # BLD AUTO: 208 10E3/UL (ref 150–450)
PLATELET # BLD AUTO: 209 10E3/UL (ref 150–450)
PLATELET # BLD AUTO: 216 10E3/UL (ref 150–450)
PLATELET # BLD AUTO: 226 10E3/UL (ref 150–450)
PLATELET # BLD AUTO: 233 10E3/UL (ref 150–450)
PLATELET # BLD AUTO: 250 10E3/UL (ref 150–450)
PLATELET # BLD AUTO: 253 10E3/UL (ref 150–450)
PLATELET # BLD AUTO: 256 10E3/UL (ref 150–450)
PO2 BLD: 81 MM HG (ref 80–105)
PO2 BLDV: 38 MM HG (ref 25–47)
POTASSIUM BLD-SCNC: 2.9 MMOL/L (ref 3.4–5.3)
POTASSIUM BLD-SCNC: 3.1 MMOL/L (ref 3.4–5.3)
POTASSIUM BLD-SCNC: 3.4 MMOL/L (ref 3.4–5.3)
POTASSIUM BLD-SCNC: 3.5 MMOL/L (ref 3.4–5.3)
POTASSIUM BLD-SCNC: 3.5 MMOL/L (ref 3.4–5.3)
POTASSIUM BLD-SCNC: 3.6 MMOL/L (ref 3.4–5.3)
POTASSIUM BLD-SCNC: 3.8 MMOL/L (ref 3.4–5.3)
POTASSIUM BLD-SCNC: 3.9 MMOL/L (ref 3.4–5.3)
POTASSIUM BLD-SCNC: 4 MMOL/L (ref 3.4–5.3)
POTASSIUM BLD-SCNC: 4.1 MMOL/L (ref 3.4–5.3)
POTASSIUM BLD-SCNC: 4.1 MMOL/L (ref 3.4–5.3)
POTASSIUM BLD-SCNC: 4.2 MMOL/L (ref 3.4–5.3)
POTASSIUM BLD-SCNC: 4.3 MMOL/L (ref 3.4–5.3)
POTASSIUM BLD-SCNC: 4.3 MMOL/L (ref 3.4–5.3)
POTASSIUM BLD-SCNC: 4.8 MMOL/L (ref 3.4–5.3)
POTASSIUM BLD-SCNC: 4.8 MMOL/L (ref 3.4–5.3)
POTASSIUM SERPL-SCNC: 4.2 MMOL/L (ref 3.4–5.3)
PR INTERVAL - MUSE: NORMAL MS
PROCALCITONIN SERPL-MCNC: <0.05 NG/ML
PROCALCITONIN SERPL-MCNC: <0.05 NG/ML
PROT SERPL-MCNC: 7.3 G/DL (ref 6.8–8.8)
PROT SERPL-MCNC: 7.4 G/DL (ref 6.8–8.8)
PROT SERPL-MCNC: 7.5 G/DL (ref 6.8–8.8)
PROT SERPL-MCNC: 7.6 G/DL (ref 6.8–8.8)
QRS DURATION - MUSE: 84 MS
QRS DURATION - MUSE: 86 MS
QRS DURATION - MUSE: 88 MS
QRS DURATION - MUSE: 90 MS
QT - MUSE: 320 MS
QT - MUSE: 370 MS
QT - MUSE: 372 MS
QT - MUSE: 392 MS
QTC - MUSE: 456 MS
QTC - MUSE: 472 MS
QTC - MUSE: 482 MS
QTC - MUSE: 503 MS
QUANTIFERON MITOGEN: 10 IU/ML
QUANTIFERON NIL TUBE: 0.02 IU/ML
QUANTIFERON TB1 TUBE: 0.02 IU/ML
QUANTIFERON TB2 TUBE: 0.02
R AXIS - MUSE: -3 DEGREES
R AXIS - MUSE: 0 DEGREES
R AXIS - MUSE: 1 DEGREES
R AXIS - MUSE: 204 DEGREES
RBC # BLD AUTO: 3.79 10E6/UL (ref 3.8–5.2)
RBC # BLD AUTO: 3.87 10E6/UL (ref 3.8–5.2)
RBC # BLD AUTO: 3.88 10E6/UL (ref 3.8–5.2)
RBC # BLD AUTO: 3.93 10E6/UL (ref 3.8–5.2)
RBC # BLD AUTO: 3.94 10E6/UL (ref 3.8–5.2)
RBC # BLD AUTO: 3.98 10E6/UL (ref 3.8–5.2)
RBC # BLD AUTO: 4.03 10E6/UL (ref 3.8–5.2)
RBC # BLD AUTO: 4.08 10E6/UL (ref 3.8–5.2)
RBC # BLD AUTO: 4.11 10E6/UL (ref 3.8–5.2)
RBC # BLD AUTO: 4.15 10E6/UL (ref 3.8–5.2)
RBC # BLD AUTO: 4.31 10E6/UL (ref 3.8–5.2)
RBC # BLD AUTO: 4.32 10E6/UL (ref 3.8–5.2)
RBC # BLD AUTO: 4.32 10E6/UL (ref 3.8–5.2)
RBC # BLD AUTO: 4.47 10E6/UL (ref 3.8–5.2)
RBC # BLD AUTO: 4.52 10E6/UL (ref 3.8–5.2)
RBC # BLD AUTO: 4.58 10E6/UL (ref 3.8–5.2)
RBC # BLD AUTO: 4.7 10E6/UL (ref 3.8–5.2)
RBC # BLD AUTO: 4.71 10E6/UL (ref 3.8–5.2)
RBC # BLD AUTO: 4.8 10E6/UL (ref 3.8–5.2)
RBC URINE: 12 /HPF
RBC URINE: 7 /HPF
RSV RNA SPEC NAA+PROBE: NEGATIVE
SARS-COV-2 RNA RESP QL NAA+PROBE: NEGATIVE
SODIUM SERPL-SCNC: 133 MMOL/L (ref 133–144)
SODIUM SERPL-SCNC: 135 MMOL/L (ref 133–144)
SODIUM SERPL-SCNC: 135 MMOL/L (ref 133–144)
SODIUM SERPL-SCNC: 136 MMOL/L (ref 133–144)
SODIUM SERPL-SCNC: 137 MMOL/L (ref 133–144)
SODIUM SERPL-SCNC: 138 MMOL/L (ref 133–144)
SODIUM SERPL-SCNC: 138 MMOL/L (ref 136–145)
SODIUM SERPL-SCNC: 140 MMOL/L (ref 133–144)
SODIUM SERPL-SCNC: 141 MMOL/L (ref 133–144)
SODIUM SERPL-SCNC: 142 MMOL/L (ref 133–144)
SODIUM SERPL-SCNC: 144 MMOL/L (ref 133–144)
SODIUM SERPL-SCNC: 146 MMOL/L (ref 133–144)
SODIUM SERPL-SCNC: 149 MMOL/L (ref 133–144)
SODIUM SERPL-SCNC: 150 MMOL/L (ref 133–144)
SP GR UR STRIP: 1.01 (ref 1–1.03)
SP GR UR STRIP: 1.03 (ref 1–1.03)
SQUAMOUS EPITHELIAL: 1 /HPF
SYSTOLIC BLOOD PRESSURE - MUSE: NORMAL MMHG
T AXIS - MUSE: 107 DEGREES
T AXIS - MUSE: 206 DEGREES
T AXIS - MUSE: 25 DEGREES
T AXIS - MUSE: 26 DEGREES
TRIGL SERPL-MCNC: 119 MG/DL
TRIGL SERPL-MCNC: 120 MG/DL
TROPONIN I SERPL HS-MCNC: 12 NG/L
TROPONIN I SERPL HS-MCNC: 19 NG/L
TSH SERPL DL<=0.005 MIU/L-ACNC: 1.19 MU/L (ref 0.4–4)
TSH SERPL DL<=0.005 MIU/L-ACNC: 1.74 MU/L (ref 0.4–4)
UROBILINOGEN UR STRIP-MCNC: NORMAL MG/DL
UROBILINOGEN UR STRIP-MCNC: NORMAL MG/DL
VENTRICULAR RATE- MUSE: 110 BPM
VENTRICULAR RATE- MUSE: 122 BPM
VENTRICULAR RATE- MUSE: 91 BPM
VENTRICULAR RATE- MUSE: 98 BPM
WBC # BLD AUTO: 10.6 10E3/UL (ref 4–11)
WBC # BLD AUTO: 10.7 10E3/UL (ref 4–11)
WBC # BLD AUTO: 11.4 10E3/UL (ref 4–11)
WBC # BLD AUTO: 11.5 10E3/UL (ref 4–11)
WBC # BLD AUTO: 11.6 10E3/UL (ref 4–11)
WBC # BLD AUTO: 11.9 10E3/UL (ref 4–11)
WBC # BLD AUTO: 12 10E3/UL (ref 4–11)
WBC # BLD AUTO: 12.4 10E3/UL (ref 4–11)
WBC # BLD AUTO: 12.6 10E3/UL (ref 4–11)
WBC # BLD AUTO: 13.1 10E3/UL (ref 4–11)
WBC # BLD AUTO: 13.3 10E3/UL (ref 4–11)
WBC # BLD AUTO: 13.8 10E3/UL (ref 4–11)
WBC # BLD AUTO: 14 10E3/UL (ref 4–11)
WBC # BLD AUTO: 15.4 10E3/UL (ref 4–11)
WBC # BLD AUTO: 25 10E3/UL (ref 4–11)
WBC # BLD AUTO: 8 10E3/UL (ref 4–11)
WBC # BLD AUTO: 8.4 10E3/UL (ref 4–11)
WBC # BLD AUTO: 8.9 10E3/UL (ref 4–11)
WBC # BLD AUTO: 9.8 10E3/UL (ref 4–11)
WBC CLUMPS #/AREA URNS HPF: PRESENT /HPF
WBC URINE: 54 /HPF
WBC URINE: >182 /HPF

## 2022-01-01 PROCEDURE — 92610 EVALUATE SWALLOWING FUNCTION: CPT | Mod: GN

## 2022-01-01 PROCEDURE — U0003 INFECTIOUS AGENT DETECTION BY NUCLEIC ACID (DNA OR RNA); SEVERE ACUTE RESPIRATORY SYNDROME CORONAVIRUS 2 (SARS-COV-2) (CORONAVIRUS DISEASE [COVID-19]), AMPLIFIED PROBE TECHNIQUE, MAKING USE OF HIGH THROUGHPUT TECHNOLOGIES AS DESCRIBED BY CMS-2020-01-R: HCPCS | Performed by: INTERNAL MEDICINE

## 2022-01-01 PROCEDURE — 70553 MRI BRAIN STEM W/O & W/DYE: CPT

## 2022-01-01 PROCEDURE — 99233 SBSQ HOSP IP/OBS HIGH 50: CPT | Performed by: STUDENT IN AN ORGANIZED HEALTH CARE EDUCATION/TRAINING PROGRAM

## 2022-01-01 PROCEDURE — 97530 THERAPEUTIC ACTIVITIES: CPT | Mod: GP | Performed by: PHYSICAL THERAPIST

## 2022-01-01 PROCEDURE — 250N000013 HC RX MED GY IP 250 OP 250 PS 637: Performed by: INTERNAL MEDICINE

## 2022-01-01 PROCEDURE — 83605 ASSAY OF LACTIC ACID: CPT | Performed by: HOSPITALIST

## 2022-01-01 PROCEDURE — 82374 ASSAY BLOOD CARBON DIOXIDE: CPT | Performed by: INTERNAL MEDICINE

## 2022-01-01 PROCEDURE — 99214 OFFICE O/P EST MOD 30 MIN: CPT | Performed by: INTERNAL MEDICINE

## 2022-01-01 PROCEDURE — 85027 COMPLETE CBC AUTOMATED: CPT | Performed by: INTERNAL MEDICINE

## 2022-01-01 PROCEDURE — 84145 PROCALCITONIN (PCT): CPT | Performed by: HOSPITALIST

## 2022-01-01 PROCEDURE — 80048 BASIC METABOLIC PNL TOTAL CA: CPT | Performed by: INTERNAL MEDICINE

## 2022-01-01 PROCEDURE — 71045 X-RAY EXAM CHEST 1 VIEW: CPT

## 2022-01-01 PROCEDURE — 999N000127 HC STATISTIC PERIPHERAL IV START W US GUIDANCE

## 2022-01-01 PROCEDURE — 97161 PT EVAL LOW COMPLEX 20 MIN: CPT | Mod: GP | Performed by: PHYSICAL THERAPIST

## 2022-01-01 PROCEDURE — 250N000012 HC RX MED GY IP 250 OP 636 PS 637: Performed by: HOSPITALIST

## 2022-01-01 PROCEDURE — 250N000009 HC RX 250: Performed by: INTERNAL MEDICINE

## 2022-01-01 PROCEDURE — 99223 1ST HOSP IP/OBS HIGH 75: CPT | Mod: FS | Performed by: PSYCHIATRY & NEUROLOGY

## 2022-01-01 PROCEDURE — 250N000009 HC RX 250: Performed by: NURSE PRACTITIONER

## 2022-01-01 PROCEDURE — 93306 TTE W/DOPPLER COMPLETE: CPT | Mod: 26 | Performed by: INTERNAL MEDICINE

## 2022-01-01 PROCEDURE — P9604 ONE-WAY ALLOW PRORATED TRIP: HCPCS | Performed by: NURSE PRACTITIONER

## 2022-01-01 PROCEDURE — 250N000011 HC RX IP 250 OP 636: Performed by: INTERNAL MEDICINE

## 2022-01-01 PROCEDURE — 99233 SBSQ HOSP IP/OBS HIGH 50: CPT | Performed by: INTERNAL MEDICINE

## 2022-01-01 PROCEDURE — 85610 PROTHROMBIN TIME: CPT | Performed by: EMERGENCY MEDICINE

## 2022-01-01 PROCEDURE — 92610 EVALUATE SWALLOWING FUNCTION: CPT | Mod: GN | Performed by: SPEECH-LANGUAGE PATHOLOGIST

## 2022-01-01 PROCEDURE — 120N000001 HC R&B MED SURG/OB

## 2022-01-01 PROCEDURE — 97110 THERAPEUTIC EXERCISES: CPT | Mod: GP

## 2022-01-01 PROCEDURE — 99356 PR PROLONGED SERV,INPATIENT,1ST HR: CPT | Mod: FS | Performed by: PSYCHIATRY & NEUROLOGY

## 2022-01-01 PROCEDURE — 250N000013 HC RX MED GY IP 250 OP 250 PS 637: Performed by: HOSPITALIST

## 2022-01-01 PROCEDURE — 36415 COLL VENOUS BLD VENIPUNCTURE: CPT | Performed by: INTERNAL MEDICINE

## 2022-01-01 PROCEDURE — 250N000011 HC RX IP 250 OP 636: Performed by: EMERGENCY MEDICINE

## 2022-01-01 PROCEDURE — 999N000208 ECHOCARDIOGRAM COMPLETE

## 2022-01-01 PROCEDURE — 84132 ASSAY OF SERUM POTASSIUM: CPT | Performed by: INTERNAL MEDICINE

## 2022-01-01 PROCEDURE — 99233 SBSQ HOSP IP/OBS HIGH 50: CPT | Performed by: HOSPITALIST

## 2022-01-01 PROCEDURE — 258N000003 HC RX IP 258 OP 636: Performed by: INTERNAL MEDICINE

## 2022-01-01 PROCEDURE — 83735 ASSAY OF MAGNESIUM: CPT | Performed by: INTERNAL MEDICINE

## 2022-01-01 PROCEDURE — U0005 INFEC AGEN DETEC AMPLI PROBE: HCPCS | Performed by: NURSE PRACTITIONER

## 2022-01-01 PROCEDURE — 250N000013 HC RX MED GY IP 250 OP 250 PS 637: Performed by: STUDENT IN AN ORGANIZED HEALTH CARE EDUCATION/TRAINING PROGRAM

## 2022-01-01 PROCEDURE — 250N000013 HC RX MED GY IP 250 OP 250 PS 637: Performed by: NURSE PRACTITIONER

## 2022-01-01 PROCEDURE — 97167 OT EVAL HIGH COMPLEX 60 MIN: CPT | Mod: GO | Performed by: OCCUPATIONAL THERAPIST

## 2022-01-01 PROCEDURE — 93005 ELECTROCARDIOGRAM TRACING: CPT

## 2022-01-01 PROCEDURE — 36415 COLL VENOUS BLD VENIPUNCTURE: CPT | Performed by: PHYSICIAN ASSISTANT

## 2022-01-01 PROCEDURE — 258N000003 HC RX IP 258 OP 636: Performed by: HOSPITALIST

## 2022-01-01 PROCEDURE — 83605 ASSAY OF LACTIC ACID: CPT | Performed by: PHYSICIAN ASSISTANT

## 2022-01-01 PROCEDURE — 80048 BASIC METABOLIC PNL TOTAL CA: CPT | Performed by: HOSPITALIST

## 2022-01-01 PROCEDURE — 36415 COLL VENOUS BLD VENIPUNCTURE: CPT | Performed by: HOSPITALIST

## 2022-01-01 PROCEDURE — 91312 COVID-19,PF,PFIZER BOOSTER BIVALENT: CPT | Performed by: INTERNAL MEDICINE

## 2022-01-01 PROCEDURE — 250N000011 HC RX IP 250 OP 636: Performed by: NURSE PRACTITIONER

## 2022-01-01 PROCEDURE — 255N000002 HC RX 255 OP 636: Performed by: INTERNAL MEDICINE

## 2022-01-01 PROCEDURE — 99238 HOSP IP/OBS DSCHRG MGMT 30/<: CPT | Performed by: NURSE PRACTITIONER

## 2022-01-01 PROCEDURE — 97530 THERAPEUTIC ACTIVITIES: CPT | Mod: GP

## 2022-01-01 PROCEDURE — 99222 1ST HOSP IP/OBS MODERATE 55: CPT | Mod: GC | Performed by: STUDENT IN AN ORGANIZED HEALTH CARE EDUCATION/TRAINING PROGRAM

## 2022-01-01 PROCEDURE — 97535 SELF CARE MNGMENT TRAINING: CPT | Mod: GO

## 2022-01-01 PROCEDURE — 83605 ASSAY OF LACTIC ACID: CPT | Performed by: INTERNAL MEDICINE

## 2022-01-01 PROCEDURE — 999N000190 HC STATISTIC VAT ROUNDS

## 2022-01-01 PROCEDURE — 99223 1ST HOSP IP/OBS HIGH 75: CPT | Performed by: INTERNAL MEDICINE

## 2022-01-01 PROCEDURE — 85049 AUTOMATED PLATELET COUNT: CPT | Performed by: PHYSICIAN ASSISTANT

## 2022-01-01 PROCEDURE — 80053 COMPREHEN METABOLIC PANEL: CPT | Performed by: EMERGENCY MEDICINE

## 2022-01-01 PROCEDURE — 70551 MRI BRAIN STEM W/O DYE: CPT

## 2022-01-01 PROCEDURE — 83605 ASSAY OF LACTIC ACID: CPT | Performed by: EMERGENCY MEDICINE

## 2022-01-01 PROCEDURE — 70450 CT HEAD/BRAIN W/O DYE: CPT

## 2022-01-01 PROCEDURE — 82310 ASSAY OF CALCIUM: CPT | Performed by: INTERNAL MEDICINE

## 2022-01-01 PROCEDURE — 80048 BASIC METABOLIC PNL TOTAL CA: CPT | Performed by: EMERGENCY MEDICINE

## 2022-01-01 PROCEDURE — 250N000011 HC RX IP 250 OP 636: Performed by: HOSPITALIST

## 2022-01-01 PROCEDURE — 250N000012 HC RX MED GY IP 250 OP 636 PS 637: Performed by: INTERNAL MEDICINE

## 2022-01-01 PROCEDURE — 99223 1ST HOSP IP/OBS HIGH 75: CPT | Mod: AI | Performed by: HOSPITALIST

## 2022-01-01 PROCEDURE — 99232 SBSQ HOSP IP/OBS MODERATE 35: CPT | Performed by: INTERNAL MEDICINE

## 2022-01-01 PROCEDURE — 258N000003 HC RX IP 258 OP 636: Performed by: EMERGENCY MEDICINE

## 2022-01-01 PROCEDURE — 99233 SBSQ HOSP IP/OBS HIGH 50: CPT | Mod: FS | Performed by: INTERNAL MEDICINE

## 2022-01-01 PROCEDURE — 83735 ASSAY OF MAGNESIUM: CPT | Performed by: PHYSICIAN ASSISTANT

## 2022-01-01 PROCEDURE — 99207 PR NO BILLABLE SERVICE THIS VISIT: CPT | Performed by: PHYSICIAN ASSISTANT

## 2022-01-01 PROCEDURE — 120N000013 HC R&B IMCU

## 2022-01-01 PROCEDURE — 99496 TRANSJ CARE MGMT HIGH F2F 7D: CPT | Performed by: INTERNAL MEDICINE

## 2022-01-01 PROCEDURE — 250N000009 HC RX 250: Performed by: HOSPITALIST

## 2022-01-01 PROCEDURE — 96376 TX/PRO/DX INJ SAME DRUG ADON: CPT | Mod: 59

## 2022-01-01 PROCEDURE — 99232 SBSQ HOSP IP/OBS MODERATE 35: CPT | Mod: GV | Performed by: INTERNAL MEDICINE

## 2022-01-01 PROCEDURE — 258N000003 HC RX IP 258 OP 636

## 2022-01-01 PROCEDURE — 110N000005 HC R&B HOSPICE, ACCENT

## 2022-01-01 PROCEDURE — 81001 URINALYSIS AUTO W/SCOPE: CPT | Performed by: INTERNAL MEDICINE

## 2022-01-01 PROCEDURE — 85027 COMPLETE CBC AUTOMATED: CPT | Performed by: HOSPITALIST

## 2022-01-01 PROCEDURE — 87637 SARSCOV2&INF A&B&RSV AMP PRB: CPT | Performed by: EMERGENCY MEDICINE

## 2022-01-01 PROCEDURE — 82140 ASSAY OF AMMONIA: CPT | Performed by: INTERNAL MEDICINE

## 2022-01-01 PROCEDURE — 36569 INSJ PICC 5 YR+ W/O IMAGING: CPT

## 2022-01-01 PROCEDURE — G0008 ADMIN INFLUENZA VIRUS VAC: HCPCS | Mod: 59 | Performed by: INTERNAL MEDICINE

## 2022-01-01 PROCEDURE — 80048 BASIC METABOLIC PNL TOTAL CA: CPT | Performed by: PHYSICIAN ASSISTANT

## 2022-01-01 PROCEDURE — 272N000433 HC KIT CATH IV 18 OR 20G CM, POWERGLIDE W MAX BARRIER

## 2022-01-01 PROCEDURE — 83880 ASSAY OF NATRIURETIC PEPTIDE: CPT

## 2022-01-01 PROCEDURE — G0463 HOSPITAL OUTPT CLINIC VISIT: HCPCS

## 2022-01-01 PROCEDURE — 250N000011 HC RX IP 250 OP 636

## 2022-01-01 PROCEDURE — 92526 ORAL FUNCTION THERAPY: CPT | Mod: GN

## 2022-01-01 PROCEDURE — 93970 EXTREMITY STUDY: CPT

## 2022-01-01 PROCEDURE — U0005 INFEC AGEN DETEC AMPLI PROBE: HCPCS | Performed by: HOSPITALIST

## 2022-01-01 PROCEDURE — C9803 HOPD COVID-19 SPEC COLLECT: HCPCS

## 2022-01-01 PROCEDURE — 83880 ASSAY OF NATRIURETIC PEPTIDE: CPT | Performed by: INTERNAL MEDICINE

## 2022-01-01 PROCEDURE — 84145 PROCALCITONIN (PCT): CPT

## 2022-01-01 PROCEDURE — 84443 ASSAY THYROID STIM HORMONE: CPT | Performed by: HOSPITALIST

## 2022-01-01 PROCEDURE — 70549 MR ANGIOGRAPH NECK W/O&W/DYE: CPT

## 2022-01-01 PROCEDURE — 99309 SBSQ NF CARE MODERATE MDM 30: CPT | Performed by: NURSE PRACTITIONER

## 2022-01-01 PROCEDURE — 80053 COMPREHEN METABOLIC PANEL: CPT | Performed by: STUDENT IN AN ORGANIZED HEALTH CARE EDUCATION/TRAINING PROGRAM

## 2022-01-01 PROCEDURE — 92526 ORAL FUNCTION THERAPY: CPT | Mod: GN | Performed by: SPEECH-LANGUAGE PATHOLOGIST

## 2022-01-01 PROCEDURE — G0439 PPPS, SUBSEQ VISIT: HCPCS | Performed by: INTERNAL MEDICINE

## 2022-01-01 PROCEDURE — 99305 1ST NF CARE MODERATE MDM 35: CPT | Performed by: INTERNAL MEDICINE

## 2022-01-01 PROCEDURE — 90662 IIV NO PRSV INCREASED AG IM: CPT | Performed by: INTERNAL MEDICINE

## 2022-01-01 PROCEDURE — 96374 THER/PROPH/DIAG INJ IV PUSH: CPT | Mod: 59

## 2022-01-01 PROCEDURE — 36600 WITHDRAWAL OF ARTERIAL BLOOD: CPT

## 2022-01-01 PROCEDURE — 99356 PR PROLONGED SERV,INPATIENT,1ST HR: CPT | Performed by: HOSPITALIST

## 2022-01-01 PROCEDURE — 99213 OFFICE O/P EST LOW 20 MIN: CPT | Mod: GE | Performed by: STUDENT IN AN ORGANIZED HEALTH CARE EDUCATION/TRAINING PROGRAM

## 2022-01-01 PROCEDURE — 250N000013 HC RX MED GY IP 250 OP 250 PS 637: Performed by: EMERGENCY MEDICINE

## 2022-01-01 PROCEDURE — 250N000013 HC RX MED GY IP 250 OP 250 PS 637: Performed by: PHYSICIAN ASSISTANT

## 2022-01-01 PROCEDURE — 82330 ASSAY OF CALCIUM: CPT | Performed by: NURSE PRACTITIONER

## 2022-01-01 PROCEDURE — 84484 ASSAY OF TROPONIN QUANT: CPT | Performed by: HOSPITALIST

## 2022-01-01 PROCEDURE — 99285 EMERGENCY DEPT VISIT HI MDM: CPT | Mod: 25

## 2022-01-01 PROCEDURE — 84484 ASSAY OF TROPONIN QUANT: CPT | Performed by: EMERGENCY MEDICINE

## 2022-01-01 PROCEDURE — 86481 TB AG RESPONSE T-CELL SUSP: CPT | Performed by: NURSE PRACTITIONER

## 2022-01-01 PROCEDURE — 99223 1ST HOSP IP/OBS HIGH 75: CPT | Performed by: NURSE PRACTITIONER

## 2022-01-01 PROCEDURE — 70544 MR ANGIOGRAPHY HEAD W/O DYE: CPT

## 2022-01-01 PROCEDURE — 36415 COLL VENOUS BLD VENIPUNCTURE: CPT | Performed by: STUDENT IN AN ORGANIZED HEALTH CARE EDUCATION/TRAINING PROGRAM

## 2022-01-01 PROCEDURE — 72125 CT NECK SPINE W/O DYE: CPT

## 2022-01-01 PROCEDURE — 85025 COMPLETE CBC W/AUTO DIFF WBC: CPT | Performed by: INTERNAL MEDICINE

## 2022-01-01 PROCEDURE — 82550 ASSAY OF CK (CPK): CPT | Performed by: INTERNAL MEDICINE

## 2022-01-01 PROCEDURE — 96375 TX/PRO/DX INJ NEW DRUG ADDON: CPT

## 2022-01-01 PROCEDURE — 97116 GAIT TRAINING THERAPY: CPT | Mod: GP

## 2022-01-01 PROCEDURE — 80053 COMPREHEN METABOLIC PANEL: CPT | Performed by: HOSPITALIST

## 2022-01-01 PROCEDURE — 85027 COMPLETE CBC AUTOMATED: CPT

## 2022-01-01 PROCEDURE — 87040 BLOOD CULTURE FOR BACTERIA: CPT | Performed by: INTERNAL MEDICINE

## 2022-01-01 PROCEDURE — 83036 HEMOGLOBIN GLYCOSYLATED A1C: CPT | Performed by: INTERNAL MEDICINE

## 2022-01-01 PROCEDURE — 99207 PR FOOT EXAM NO CHARGE: CPT | Mod: 25 | Performed by: INTERNAL MEDICINE

## 2022-01-01 PROCEDURE — 80048 BASIC METABOLIC PNL TOTAL CA: CPT | Performed by: STUDENT IN AN ORGANIZED HEALTH CARE EDUCATION/TRAINING PROGRAM

## 2022-01-01 PROCEDURE — 99291 CRITICAL CARE FIRST HOUR: CPT

## 2022-01-01 PROCEDURE — 83036 HEMOGLOBIN GLYCOSYLATED A1C: CPT | Performed by: STUDENT IN AN ORGANIZED HEALTH CARE EDUCATION/TRAINING PROGRAM

## 2022-01-01 PROCEDURE — 87086 URINE CULTURE/COLONY COUNT: CPT | Performed by: EMERGENCY MEDICINE

## 2022-01-01 PROCEDURE — 83605 ASSAY OF LACTIC ACID: CPT | Performed by: NURSE PRACTITIONER

## 2022-01-01 PROCEDURE — 99233 SBSQ HOSP IP/OBS HIGH 50: CPT | Performed by: PHYSICIAN ASSISTANT

## 2022-01-01 PROCEDURE — 36416 COLLJ CAPILLARY BLOOD SPEC: CPT | Performed by: EMERGENCY MEDICINE

## 2022-01-01 PROCEDURE — 85041 AUTOMATED RBC COUNT: CPT | Performed by: EMERGENCY MEDICINE

## 2022-01-01 PROCEDURE — 99233 SBSQ HOSP IP/OBS HIGH 50: CPT | Mod: FS | Performed by: PSYCHIATRY & NEUROLOGY

## 2022-01-01 PROCEDURE — 97116 GAIT TRAINING THERAPY: CPT | Mod: GP | Performed by: PHYSICAL THERAPIST

## 2022-01-01 PROCEDURE — 99316 NF DSCHRG MGMT 30 MIN+: CPT | Performed by: NURSE PRACTITIONER

## 2022-01-01 PROCEDURE — 36415 COLL VENOUS BLD VENIPUNCTURE: CPT | Performed by: EMERGENCY MEDICINE

## 2022-01-01 PROCEDURE — G0378 HOSPITAL OBSERVATION PER HR: HCPCS

## 2022-01-01 PROCEDURE — 99357 PR PROLONGED SERV,INPATIENT,EA ADDL 30 MIN: CPT | Mod: FS | Performed by: PSYCHIATRY & NEUROLOGY

## 2022-01-01 PROCEDURE — 96361 HYDRATE IV INFUSION ADD-ON: CPT

## 2022-01-01 PROCEDURE — 87086 URINE CULTURE/COLONY COUNT: CPT | Performed by: INTERNAL MEDICINE

## 2022-01-01 PROCEDURE — 250N000009 HC RX 250: Performed by: PHYSICIAN ASSISTANT

## 2022-01-01 PROCEDURE — 83735 ASSAY OF MAGNESIUM: CPT | Performed by: HOSPITALIST

## 2022-01-01 PROCEDURE — 97530 THERAPEUTIC ACTIVITIES: CPT | Mod: GO | Performed by: OCCUPATIONAL THERAPIST

## 2022-01-01 PROCEDURE — 36415 COLL VENOUS BLD VENIPUNCTURE: CPT | Performed by: NURSE PRACTITIONER

## 2022-01-01 PROCEDURE — 82803 BLOOD GASES ANY COMBINATION: CPT | Performed by: INTERNAL MEDICINE

## 2022-01-01 PROCEDURE — 93010 ELECTROCARDIOGRAM REPORT: CPT | Performed by: INTERNAL MEDICINE

## 2022-01-01 PROCEDURE — 250N000012 HC RX MED GY IP 250 OP 636 PS 637: Performed by: PHYSICIAN ASSISTANT

## 2022-01-01 PROCEDURE — 99223 1ST HOSP IP/OBS HIGH 75: CPT | Mod: AI | Performed by: INTERNAL MEDICINE

## 2022-01-01 PROCEDURE — 255N000002 HC RX 255 OP 636: Performed by: EMERGENCY MEDICINE

## 2022-01-01 PROCEDURE — 80061 LIPID PANEL: CPT | Performed by: STUDENT IN AN ORGANIZED HEALTH CARE EDUCATION/TRAINING PROGRAM

## 2022-01-01 PROCEDURE — 81001 URINALYSIS AUTO W/SCOPE: CPT | Performed by: EMERGENCY MEDICINE

## 2022-01-01 PROCEDURE — 0124A COVID-19,PF,PFIZER BOOSTER BIVALENT: CPT | Performed by: INTERNAL MEDICINE

## 2022-01-01 PROCEDURE — 99207 PR NO BILLABLE SERVICE THIS VISIT: CPT | Mod: GV | Performed by: HOSPITALIST

## 2022-01-01 PROCEDURE — 93005 ELECTROCARDIOGRAM TRACING: CPT | Mod: RTG

## 2022-01-01 PROCEDURE — 85027 COMPLETE CBC AUTOMATED: CPT | Performed by: NURSE PRACTITIONER

## 2022-01-01 PROCEDURE — A9585 GADOBUTROL INJECTION: HCPCS | Performed by: EMERGENCY MEDICINE

## 2022-01-01 PROCEDURE — 85018 HEMOGLOBIN: CPT | Performed by: EMERGENCY MEDICINE

## 2022-01-01 PROCEDURE — U0003 INFECTIOUS AGENT DETECTION BY NUCLEIC ACID (DNA OR RNA); SEVERE ACUTE RESPIRATORY SYNDROME CORONAVIRUS 2 (SARS-COV-2) (CORONAVIRUS DISEASE [COVID-19]), AMPLIFIED PROBE TECHNIQUE, MAKING USE OF HIGH THROUGHPUT TECHNOLOGIES AS DESCRIBED BY CMS-2020-01-R: HCPCS | Performed by: EMERGENCY MEDICINE

## 2022-01-01 PROCEDURE — 80061 LIPID PANEL: CPT | Performed by: HOSPITALIST

## 2022-01-01 PROCEDURE — 97161 PT EVAL LOW COMPLEX 20 MIN: CPT | Mod: GP

## 2022-01-01 PROCEDURE — 258N000003 HC RX IP 258 OP 636: Performed by: NURSE PRACTITIONER

## 2022-01-01 PROCEDURE — 84443 ASSAY THYROID STIM HORMONE: CPT | Performed by: EMERGENCY MEDICINE

## 2022-01-01 PROCEDURE — 83605 ASSAY OF LACTIC ACID: CPT

## 2022-01-01 PROCEDURE — 70450 CT HEAD/BRAIN W/O DYE: CPT | Mod: 77

## 2022-01-01 PROCEDURE — 82803 BLOOD GASES ANY COMBINATION: CPT | Performed by: NURSE PRACTITIONER

## 2022-01-01 PROCEDURE — 36415 COLL VENOUS BLD VENIPUNCTURE: CPT

## 2022-01-01 PROCEDURE — 99215 OFFICE O/P EST HI 40 MIN: CPT | Mod: 25 | Performed by: INTERNAL MEDICINE

## 2022-01-01 PROCEDURE — 80048 BASIC METABOLIC PNL TOTAL CA: CPT

## 2022-01-01 PROCEDURE — 250N000009 HC RX 250

## 2022-01-01 PROCEDURE — 85027 COMPLETE CBC AUTOMATED: CPT | Performed by: STUDENT IN AN ORGANIZED HEALTH CARE EDUCATION/TRAINING PROGRAM

## 2022-01-01 RX ORDER — DILTIAZEM HYDROCHLORIDE 30 MG/1
30 TABLET, FILM COATED ORAL 2 TIMES DAILY
Status: DISCONTINUED | OUTPATIENT
Start: 2022-01-01 | End: 2022-01-01 | Stop reason: HOSPADM

## 2022-01-01 RX ORDER — ACETAMINOPHEN 325 MG/1
650 TABLET ORAL EVERY 4 HOURS PRN
Status: CANCELLED | OUTPATIENT
Start: 2022-01-01

## 2022-01-01 RX ORDER — HYDRALAZINE HYDROCHLORIDE 20 MG/ML
10-20 INJECTION INTRAMUSCULAR; INTRAVENOUS
Status: DISCONTINUED | OUTPATIENT
Start: 2022-01-01 | End: 2022-01-01

## 2022-01-01 RX ORDER — LIDOCAINE 40 MG/G
CREAM TOPICAL
Status: DISCONTINUED | OUTPATIENT
Start: 2022-01-01 | End: 2022-01-01 | Stop reason: HOSPADM

## 2022-01-01 RX ORDER — MENTHOL AND METHYL SALICYLATE 7.6; 29 G/100G; G/100G
OINTMENT TOPICAL EVERY 6 HOURS PRN
Status: DISCONTINUED | OUTPATIENT
Start: 2022-01-01 | End: 2022-01-01 | Stop reason: HOSPADM

## 2022-01-01 RX ORDER — GLIPIZIDE 10 MG/1
10 TABLET, FILM COATED, EXTENDED RELEASE ORAL
Qty: 30 TABLET | Refills: 1 | Status: SHIPPED | OUTPATIENT
Start: 2022-01-01

## 2022-01-01 RX ORDER — ACETAMINOPHEN 325 MG/1
650 TABLET ORAL EVERY 6 HOURS PRN
Qty: 30 TABLET | Refills: 0 | DISCHARGE
Start: 2022-01-01

## 2022-01-01 RX ORDER — ONDANSETRON 4 MG/1
4 TABLET, ORALLY DISINTEGRATING ORAL EVERY 6 HOURS PRN
Status: CANCELLED | OUTPATIENT
Start: 2022-01-01

## 2022-01-01 RX ORDER — NYSTATIN 100000 [USP'U]/G
POWDER TOPICAL
Qty: 60 G | Refills: 0 | Status: SHIPPED | OUTPATIENT
Start: 2022-01-01 | End: 2022-01-01

## 2022-01-01 RX ORDER — PRAVASTATIN SODIUM 40 MG
40 TABLET ORAL DAILY
Qty: 90 TABLET | Refills: 0 | Status: SHIPPED | OUTPATIENT
Start: 2022-01-01

## 2022-01-01 RX ORDER — ONDANSETRON 4 MG/1
4 TABLET, ORALLY DISINTEGRATING ORAL EVERY 6 HOURS PRN
Status: DISCONTINUED | OUTPATIENT
Start: 2022-01-01 | End: 2022-01-01 | Stop reason: HOSPADM

## 2022-01-01 RX ORDER — LISINOPRIL 2.5 MG/1
2.5 TABLET ORAL DAILY
Qty: 90 TABLET | Refills: 1 | Status: SHIPPED | OUTPATIENT
Start: 2022-01-01 | End: 2022-01-01

## 2022-01-01 RX ORDER — OLANZAPINE 5 MG/1
5 TABLET, ORALLY DISINTEGRATING ORAL
Status: DISCONTINUED | OUTPATIENT
Start: 2022-01-01 | End: 2022-01-01

## 2022-01-01 RX ORDER — CEFTRIAXONE 2 G/1
2 INJECTION, POWDER, FOR SOLUTION INTRAMUSCULAR; INTRAVENOUS EVERY 24 HOURS
Status: DISCONTINUED | OUTPATIENT
Start: 2022-01-01 | End: 2022-01-01

## 2022-01-01 RX ORDER — BISACODYL 10 MG
10 SUPPOSITORY, RECTAL RECTAL DAILY PRN
Status: DISCONTINUED | OUTPATIENT
Start: 2022-01-01 | End: 2022-01-01 | Stop reason: HOSPADM

## 2022-01-01 RX ORDER — LORAZEPAM 2 MG/ML
.5-1 INJECTION INTRAMUSCULAR
Status: DISCONTINUED | OUTPATIENT
Start: 2022-01-01 | End: 2022-01-01 | Stop reason: HOSPADM

## 2022-01-01 RX ORDER — METOPROLOL TARTRATE 1 MG/ML
5 INJECTION, SOLUTION INTRAVENOUS EVERY 5 MIN PRN
Status: DISCONTINUED | OUTPATIENT
Start: 2022-01-01 | End: 2022-01-01

## 2022-01-01 RX ORDER — MORPHINE SULFATE 20 MG/ML
10-20 SOLUTION ORAL
Status: DISCONTINUED | OUTPATIENT
Start: 2022-01-01 | End: 2022-01-01 | Stop reason: HOSPADM

## 2022-01-01 RX ORDER — METOPROLOL TARTRATE 50 MG
100 TABLET ORAL 2 TIMES DAILY
Status: DISCONTINUED | OUTPATIENT
Start: 2022-01-01 | End: 2022-01-01

## 2022-01-01 RX ORDER — LORAZEPAM 0.5 MG/1
0.5 TABLET ORAL 2 TIMES DAILY PRN
Status: DISCONTINUED | OUTPATIENT
Start: 2022-01-01 | End: 2022-01-01

## 2022-01-01 RX ORDER — NALOXONE HYDROCHLORIDE 0.4 MG/ML
0.1 INJECTION, SOLUTION INTRAMUSCULAR; INTRAVENOUS; SUBCUTANEOUS
Status: CANCELLED | OUTPATIENT
Start: 2022-01-01

## 2022-01-01 RX ORDER — METOPROLOL TARTRATE 1 MG/ML
INJECTION, SOLUTION INTRAVENOUS
Status: DISPENSED
Start: 2022-01-01 | End: 2022-01-01

## 2022-01-01 RX ORDER — METOPROLOL TARTRATE 50 MG
100 TABLET ORAL ONCE
Status: COMPLETED | OUTPATIENT
Start: 2022-01-01 | End: 2022-01-01

## 2022-01-01 RX ORDER — LORAZEPAM 2 MG/ML
.5-1 CONCENTRATE ORAL
Status: CANCELLED | OUTPATIENT
Start: 2022-01-01

## 2022-01-01 RX ORDER — GLYCOPYRROLATE 0.2 MG/ML
0.2 INJECTION, SOLUTION INTRAMUSCULAR; INTRAVENOUS EVERY 4 HOURS PRN
Status: DISCONTINUED | OUTPATIENT
Start: 2022-01-01 | End: 2022-01-01 | Stop reason: HOSPADM

## 2022-01-01 RX ORDER — HALOPERIDOL 5 MG/ML
.5-1 INJECTION INTRAMUSCULAR
Status: CANCELLED | OUTPATIENT
Start: 2022-01-01

## 2022-01-01 RX ORDER — ACETAMINOPHEN 650 MG/1
650 SUPPOSITORY RECTAL EVERY 4 HOURS PRN
Status: DISCONTINUED | OUTPATIENT
Start: 2022-01-01 | End: 2022-01-01 | Stop reason: HOSPADM

## 2022-01-01 RX ORDER — DILTIAZEM HYDROCHLORIDE 30 MG/1
30 TABLET, FILM COATED ORAL 2 TIMES DAILY
Status: DISCONTINUED | OUTPATIENT
Start: 2022-01-01 | End: 2022-01-01

## 2022-01-01 RX ORDER — ACETAMINOPHEN 325 MG/1
650 TABLET ORAL EVERY 4 HOURS PRN
Status: DISCONTINUED | OUTPATIENT
Start: 2022-01-01 | End: 2022-01-01 | Stop reason: HOSPADM

## 2022-01-01 RX ORDER — NICOTINE POLACRILEX 4 MG
15-30 LOZENGE BUCCAL
Status: DISCONTINUED | OUTPATIENT
Start: 2022-01-01 | End: 2022-01-01 | Stop reason: HOSPADM

## 2022-01-01 RX ORDER — FUROSEMIDE 10 MG/ML
60 INJECTION INTRAMUSCULAR; INTRAVENOUS ONCE
Status: COMPLETED | OUTPATIENT
Start: 2022-01-01 | End: 2022-01-01

## 2022-01-01 RX ORDER — CEFTRIAXONE 1 G/1
1 INJECTION, POWDER, FOR SOLUTION INTRAMUSCULAR; INTRAVENOUS EVERY 24 HOURS
Status: DISCONTINUED | OUTPATIENT
Start: 2022-01-01 | End: 2022-01-01

## 2022-01-01 RX ORDER — ASPIRIN 325 MG
325 TABLET ORAL ONCE
Status: COMPLETED | OUTPATIENT
Start: 2022-01-01 | End: 2022-01-01

## 2022-01-01 RX ORDER — GLIPIZIDE 10 MG/1
10 TABLET, FILM COATED, EXTENDED RELEASE ORAL
Qty: 30 TABLET | Refills: 0 | Status: SHIPPED | OUTPATIENT
Start: 2022-01-01 | End: 2022-01-01

## 2022-01-01 RX ORDER — NALOXONE HYDROCHLORIDE 0.4 MG/ML
0.1 INJECTION, SOLUTION INTRAMUSCULAR; INTRAVENOUS; SUBCUTANEOUS
Status: DISCONTINUED | OUTPATIENT
Start: 2022-01-01 | End: 2022-01-01 | Stop reason: HOSPADM

## 2022-01-01 RX ORDER — DEXTROSE MONOHYDRATE 25 G/50ML
25-50 INJECTION, SOLUTION INTRAVENOUS
Status: DISCONTINUED | OUTPATIENT
Start: 2022-01-01 | End: 2022-01-01

## 2022-01-01 RX ORDER — DEXTROSE MONOHYDRATE 25 G/50ML
25-50 INJECTION, SOLUTION INTRAVENOUS
Status: DISCONTINUED | OUTPATIENT
Start: 2022-01-01 | End: 2022-01-01 | Stop reason: HOSPADM

## 2022-01-01 RX ORDER — METOPROLOL SUCCINATE 100 MG/1
100 TABLET, EXTENDED RELEASE ORAL 2 TIMES DAILY
Status: DISCONTINUED | OUTPATIENT
Start: 2022-01-01 | End: 2022-01-01

## 2022-01-01 RX ORDER — CEFTRIAXONE 2 G/1
2 INJECTION, POWDER, FOR SOLUTION INTRAMUSCULAR; INTRAVENOUS ONCE
Status: COMPLETED | OUTPATIENT
Start: 2022-01-01 | End: 2022-01-01

## 2022-01-01 RX ORDER — LISINOPRIL 2.5 MG/1
5 TABLET ORAL DAILY
Status: DISCONTINUED | OUTPATIENT
Start: 2022-01-01 | End: 2022-01-01

## 2022-01-01 RX ORDER — SODIUM CHLORIDE, SODIUM LACTATE, POTASSIUM CHLORIDE, CALCIUM CHLORIDE 600; 310; 30; 20 MG/100ML; MG/100ML; MG/100ML; MG/100ML
INJECTION, SOLUTION INTRAVENOUS CONTINUOUS
Status: ACTIVE | OUTPATIENT
Start: 2022-01-01 | End: 2022-01-01

## 2022-01-01 RX ORDER — CLOTRIMAZOLE 1 %
CREAM (GRAM) TOPICAL 2 TIMES DAILY PRN
Status: DISCONTINUED | OUTPATIENT
Start: 2022-01-01 | End: 2022-01-01 | Stop reason: HOSPADM

## 2022-01-01 RX ORDER — ONDANSETRON 2 MG/ML
4 INJECTION INTRAMUSCULAR; INTRAVENOUS EVERY 6 HOURS PRN
Status: DISCONTINUED | OUTPATIENT
Start: 2022-01-01 | End: 2022-01-01 | Stop reason: HOSPADM

## 2022-01-01 RX ORDER — MAGNESIUM OXIDE 400 MG/1
400 TABLET ORAL EVERY 4 HOURS
Status: COMPLETED | OUTPATIENT
Start: 2022-01-01 | End: 2022-01-01

## 2022-01-01 RX ORDER — BENZTROPINE MESYLATE 1 MG/1
1 TABLET ORAL 3 TIMES DAILY PRN
Status: DISCONTINUED | OUTPATIENT
Start: 2022-01-01 | End: 2022-01-01 | Stop reason: HOSPADM

## 2022-01-01 RX ORDER — FUROSEMIDE 10 MG/ML
20 INJECTION INTRAMUSCULAR; INTRAVENOUS EVERY 12 HOURS
Status: DISCONTINUED | OUTPATIENT
Start: 2022-01-01 | End: 2022-01-01

## 2022-01-01 RX ORDER — NALOXONE HYDROCHLORIDE 0.4 MG/ML
0.4 INJECTION, SOLUTION INTRAMUSCULAR; INTRAVENOUS; SUBCUTANEOUS
Status: DISCONTINUED | OUTPATIENT
Start: 2022-01-01 | End: 2022-01-01 | Stop reason: HOSPADM

## 2022-01-01 RX ORDER — NALOXONE HYDROCHLORIDE 0.4 MG/ML
0.2 INJECTION, SOLUTION INTRAMUSCULAR; INTRAVENOUS; SUBCUTANEOUS
Status: DISCONTINUED | OUTPATIENT
Start: 2022-01-01 | End: 2022-01-01 | Stop reason: HOSPADM

## 2022-01-01 RX ORDER — METOPROLOL TARTRATE 50 MG
50 TABLET ORAL 2 TIMES DAILY
Status: DISCONTINUED | OUTPATIENT
Start: 2022-01-01 | End: 2022-01-01

## 2022-01-01 RX ORDER — PROCHLORPERAZINE MALEATE 5 MG
5 TABLET ORAL EVERY 6 HOURS PRN
Status: DISCONTINUED | OUTPATIENT
Start: 2022-01-01 | End: 2022-01-01 | Stop reason: HOSPADM

## 2022-01-01 RX ORDER — METOPROLOL TARTRATE 50 MG
50 TABLET ORAL ONCE
Status: COMPLETED | OUTPATIENT
Start: 2022-01-01 | End: 2022-01-01

## 2022-01-01 RX ORDER — MORPHINE SULFATE 20 MG/ML
20 SOLUTION ORAL
Status: DISCONTINUED | OUTPATIENT
Start: 2022-01-01 | End: 2022-01-01 | Stop reason: HOSPADM

## 2022-01-01 RX ORDER — ATROPINE SULFATE 10 MG/ML
2 SOLUTION/ DROPS OPHTHALMIC EVERY 4 HOURS PRN
Status: DISCONTINUED | OUTPATIENT
Start: 2022-01-01 | End: 2022-01-01 | Stop reason: HOSPADM

## 2022-01-01 RX ORDER — MECLIZINE HYDROCHLORIDE 25 MG/1
25 TABLET ORAL ONCE
Status: DISCONTINUED | OUTPATIENT
Start: 2022-01-01 | End: 2022-01-01 | Stop reason: HOSPADM

## 2022-01-01 RX ORDER — FUROSEMIDE 10 MG/ML
40 INJECTION INTRAMUSCULAR; INTRAVENOUS ONCE
Status: COMPLETED | OUTPATIENT
Start: 2022-01-01 | End: 2022-01-01

## 2022-01-01 RX ORDER — CLOTRIMAZOLE 1 %
CREAM (GRAM) TOPICAL 2 TIMES DAILY
Status: DISCONTINUED | OUTPATIENT
Start: 2022-01-01 | End: 2022-01-01

## 2022-01-01 RX ORDER — GLIPIZIDE 10 MG/1
TABLET, FILM COATED, EXTENDED RELEASE ORAL
Qty: 180 TABLET | Refills: 0 | Status: SHIPPED | OUTPATIENT
Start: 2022-01-01 | End: 2022-01-01

## 2022-01-01 RX ORDER — HYDRALAZINE HYDROCHLORIDE 20 MG/ML
10 INJECTION INTRAMUSCULAR; INTRAVENOUS EVERY 4 HOURS PRN
Status: DISCONTINUED | OUTPATIENT
Start: 2022-01-01 | End: 2022-01-01 | Stop reason: HOSPADM

## 2022-01-01 RX ORDER — ACETAMINOPHEN 650 MG/1
650 SUPPOSITORY RECTAL EVERY 4 HOURS PRN
Status: CANCELLED | OUTPATIENT
Start: 2022-01-01

## 2022-01-01 RX ORDER — MORPHINE SULFATE 20 MG/ML
5-10 SOLUTION ORAL
Status: CANCELLED | OUTPATIENT
Start: 2022-01-01

## 2022-01-01 RX ORDER — SODIUM CHLORIDE 9 MG/ML
INJECTION, SOLUTION INTRAVENOUS CONTINUOUS
Status: DISCONTINUED | OUTPATIENT
Start: 2022-01-01 | End: 2022-01-01

## 2022-01-01 RX ORDER — CEFTRIAXONE 1 G/1
1 INJECTION, POWDER, FOR SOLUTION INTRAMUSCULAR; INTRAVENOUS EVERY 24 HOURS
Status: COMPLETED | OUTPATIENT
Start: 2022-01-01 | End: 2022-01-01

## 2022-01-01 RX ORDER — DILTIAZEM HYDROCHLORIDE 30 MG/1
TABLET, FILM COATED ORAL
Qty: 180 TABLET | Refills: 0 | Status: SHIPPED | OUTPATIENT
Start: 2022-01-01 | End: 2022-01-01

## 2022-01-01 RX ORDER — PROCHLORPERAZINE 25 MG
12.5 SUPPOSITORY, RECTAL RECTAL EVERY 12 HOURS PRN
Status: DISCONTINUED | OUTPATIENT
Start: 2022-01-01 | End: 2022-01-01 | Stop reason: HOSPADM

## 2022-01-01 RX ORDER — GLIPIZIDE 10 MG/1
10 TABLET, FILM COATED, EXTENDED RELEASE ORAL
Qty: 30 TABLET | Refills: 0 | DISCHARGE
Start: 2022-01-01 | End: 2022-01-01

## 2022-01-01 RX ORDER — ACETAMINOPHEN 325 MG/1
650 TABLET ORAL EVERY 6 HOURS PRN
Status: DISCONTINUED | OUTPATIENT
Start: 2022-01-01 | End: 2022-01-01 | Stop reason: HOSPADM

## 2022-01-01 RX ORDER — METOPROLOL TARTRATE 100 MG
100 TABLET ORAL 2 TIMES DAILY
Qty: 180 TABLET | Refills: 3 | Status: SHIPPED | OUTPATIENT
Start: 2022-01-01

## 2022-01-01 RX ORDER — LIDOCAINE 40 MG/G
CREAM TOPICAL
Status: DISCONTINUED | OUTPATIENT
Start: 2022-01-01 | End: 2022-01-01

## 2022-01-01 RX ORDER — GLIPIZIDE 5 MG/1
10 TABLET, FILM COATED, EXTENDED RELEASE ORAL
Status: DISCONTINUED | OUTPATIENT
Start: 2022-01-01 | End: 2022-01-01 | Stop reason: HOSPADM

## 2022-01-01 RX ORDER — MORPHINE SULFATE 4 MG/ML
1-2 INJECTION, SOLUTION INTRAMUSCULAR; INTRAVENOUS
Status: DISCONTINUED | OUTPATIENT
Start: 2022-01-01 | End: 2022-01-01 | Stop reason: HOSPADM

## 2022-01-01 RX ORDER — LEVOFLOXACIN 5 MG/ML
500 INJECTION, SOLUTION INTRAVENOUS EVERY 24 HOURS
Status: DISCONTINUED | OUTPATIENT
Start: 2022-01-01 | End: 2022-01-01

## 2022-01-01 RX ORDER — DILTIAZEM HYDROCHLORIDE 30 MG/1
30 TABLET, FILM COATED ORAL 2 TIMES DAILY
Qty: 180 TABLET | Refills: 0 | Status: SHIPPED | OUTPATIENT
Start: 2022-01-01 | End: 2022-01-01

## 2022-01-01 RX ORDER — MORPHINE SULFATE 20 MG/ML
5-10 SOLUTION ORAL
Status: DISCONTINUED | OUTPATIENT
Start: 2022-01-01 | End: 2022-01-01

## 2022-01-01 RX ORDER — NALOXONE HYDROCHLORIDE 0.4 MG/ML
0.2 INJECTION, SOLUTION INTRAMUSCULAR; INTRAVENOUS; SUBCUTANEOUS
Status: CANCELLED | OUTPATIENT
Start: 2022-01-01

## 2022-01-01 RX ORDER — LISINOPRIL 2.5 MG/1
2.5 TABLET ORAL DAILY
Status: DISCONTINUED | OUTPATIENT
Start: 2022-01-01 | End: 2022-01-01

## 2022-01-01 RX ORDER — ONDANSETRON 2 MG/ML
4 INJECTION INTRAMUSCULAR; INTRAVENOUS EVERY 30 MIN PRN
Status: DISCONTINUED | OUTPATIENT
Start: 2022-01-01 | End: 2022-01-01 | Stop reason: HOSPADM

## 2022-01-01 RX ORDER — METOPROLOL TARTRATE 1 MG/ML
5 INJECTION, SOLUTION INTRAVENOUS ONCE
Status: COMPLETED | OUTPATIENT
Start: 2022-01-01 | End: 2022-01-01

## 2022-01-01 RX ORDER — SENNOSIDES 8.6 MG
1-2 TABLET ORAL 2 TIMES DAILY PRN
Status: DISCONTINUED | OUTPATIENT
Start: 2022-01-01 | End: 2022-01-01 | Stop reason: HOSPADM

## 2022-01-01 RX ORDER — LEVOFLOXACIN 5 MG/ML
500 INJECTION, SOLUTION INTRAVENOUS EVERY 24 HOURS
Status: COMPLETED | OUTPATIENT
Start: 2022-01-01 | End: 2022-01-01

## 2022-01-01 RX ORDER — LORAZEPAM 2 MG/ML
.5-1 INJECTION INTRAMUSCULAR
Status: CANCELLED | OUTPATIENT
Start: 2022-01-01

## 2022-01-01 RX ORDER — AMOXICILLIN 250 MG
2 CAPSULE ORAL
Status: DISCONTINUED | OUTPATIENT
Start: 2022-01-01 | End: 2022-01-01

## 2022-01-01 RX ORDER — DIGOXIN 0.25 MG/ML
250 INJECTION INTRAMUSCULAR; INTRAVENOUS ONCE
Status: COMPLETED | OUTPATIENT
Start: 2022-01-01 | End: 2022-01-01

## 2022-01-01 RX ORDER — ATROPINE SULFATE 10 MG/ML
2 SOLUTION/ DROPS OPHTHALMIC EVERY 4 HOURS PRN
Status: CANCELLED | OUTPATIENT
Start: 2022-01-01

## 2022-01-01 RX ORDER — PROCHLORPERAZINE 25 MG
12.5 SUPPOSITORY, RECTAL RECTAL EVERY 12 HOURS PRN
Status: CANCELLED | OUTPATIENT
Start: 2022-01-01

## 2022-01-01 RX ORDER — MORPHINE SULFATE 20 MG/ML
5-10 SOLUTION ORAL
Status: DISCONTINUED | OUTPATIENT
Start: 2022-01-01 | End: 2022-01-01 | Stop reason: HOSPADM

## 2022-01-01 RX ORDER — GADOBUTROL 604.72 MG/ML
10 INJECTION INTRAVENOUS ONCE
Status: COMPLETED | OUTPATIENT
Start: 2022-01-01 | End: 2022-01-01

## 2022-01-01 RX ORDER — DILTIAZEM HYDROCHLORIDE 30 MG/1
30 TABLET, FILM COATED ORAL ONCE
Status: COMPLETED | OUTPATIENT
Start: 2022-01-01 | End: 2022-01-01

## 2022-01-01 RX ORDER — LORAZEPAM 2 MG/ML
.5-1 CONCENTRATE ORAL
Status: DISCONTINUED | OUTPATIENT
Start: 2022-01-01 | End: 2022-01-01 | Stop reason: HOSPADM

## 2022-01-01 RX ORDER — MENTHOL AND METHYL SALICYLATE 7.6; 29 G/100G; G/100G
OINTMENT TOPICAL EVERY 6 HOURS PRN
Status: CANCELLED | OUTPATIENT
Start: 2022-01-01

## 2022-01-01 RX ORDER — PRAVASTATIN SODIUM 40 MG
40 TABLET ORAL DAILY
Qty: 90 TABLET | Refills: 0 | Status: SHIPPED | OUTPATIENT
Start: 2022-01-01 | End: 2022-01-01

## 2022-01-01 RX ORDER — PRAVASTATIN SODIUM 20 MG
40 TABLET ORAL DAILY
Status: DISCONTINUED | OUTPATIENT
Start: 2022-01-01 | End: 2022-01-01

## 2022-01-01 RX ORDER — LIDOCAINE 40 MG/G
CREAM TOPICAL
Status: CANCELLED | OUTPATIENT
Start: 2022-01-01

## 2022-01-01 RX ORDER — BISACODYL 10 MG
10 SUPPOSITORY, RECTAL RECTAL DAILY PRN
Status: CANCELLED | OUTPATIENT
Start: 2022-01-01

## 2022-01-01 RX ORDER — LISINOPRIL 2.5 MG/1
5 TABLET ORAL DAILY
Status: ON HOLD
Start: 2022-01-01 | End: 2022-01-01

## 2022-01-01 RX ORDER — LEVOFLOXACIN 500 MG/1
500 TABLET, FILM COATED ORAL DAILY
Status: DISCONTINUED | OUTPATIENT
Start: 2022-01-01 | End: 2022-01-01

## 2022-01-01 RX ORDER — DILTIAZEM HYDROCHLORIDE 30 MG/1
30 TABLET, FILM COATED ORAL EVERY 6 HOURS PRN
Status: DISCONTINUED | OUTPATIENT
Start: 2022-01-01 | End: 2022-01-01

## 2022-01-01 RX ORDER — METOPROLOL TARTRATE 50 MG
100 TABLET ORAL 2 TIMES DAILY
Status: DISCONTINUED | OUTPATIENT
Start: 2022-01-01 | End: 2022-01-01 | Stop reason: HOSPADM

## 2022-01-01 RX ORDER — MORPHINE SULFATE 4 MG/ML
1-2 INJECTION, SOLUTION INTRAMUSCULAR; INTRAVENOUS
Status: CANCELLED | OUTPATIENT
Start: 2022-01-01

## 2022-01-01 RX ORDER — BENZTROPINE MESYLATE 1 MG/1
1 TABLET ORAL 3 TIMES DAILY PRN
Status: CANCELLED | OUTPATIENT
Start: 2022-01-01

## 2022-01-01 RX ORDER — PRAVASTATIN SODIUM 40 MG
TABLET ORAL
Qty: 90 TABLET | Refills: 0 | Status: SHIPPED | OUTPATIENT
Start: 2022-01-01 | End: 2022-01-01

## 2022-01-01 RX ORDER — LISINOPRIL 2.5 MG/1
TABLET ORAL
Qty: 90 TABLET | OUTPATIENT
Start: 2022-01-01

## 2022-01-01 RX ORDER — CLOTRIMAZOLE 1 %
CREAM (GRAM) TOPICAL 2 TIMES DAILY PRN
Qty: 113 G | Refills: 11 | Status: SHIPPED | OUTPATIENT
Start: 2022-01-01

## 2022-01-01 RX ORDER — ACETAMINOPHEN 650 MG/1
650 SUPPOSITORY RECTAL EVERY 6 HOURS PRN
Status: DISCONTINUED | OUTPATIENT
Start: 2022-01-01 | End: 2022-01-01 | Stop reason: HOSPADM

## 2022-01-01 RX ORDER — ONDANSETRON 2 MG/ML
4 INJECTION INTRAMUSCULAR; INTRAVENOUS EVERY 6 HOURS PRN
Status: CANCELLED | OUTPATIENT
Start: 2022-01-01

## 2022-01-01 RX ORDER — LABETALOL HYDROCHLORIDE 5 MG/ML
10-20 INJECTION, SOLUTION INTRAVENOUS EVERY 10 MIN PRN
Status: DISCONTINUED | OUTPATIENT
Start: 2022-01-01 | End: 2022-01-01

## 2022-01-01 RX ORDER — METOPROLOL TARTRATE 1 MG/ML
5 INJECTION, SOLUTION INTRAVENOUS EVERY 6 HOURS PRN
Status: DISCONTINUED | OUTPATIENT
Start: 2022-01-01 | End: 2022-01-01

## 2022-01-01 RX ORDER — FUROSEMIDE 10 MG/ML
20 INJECTION INTRAMUSCULAR; INTRAVENOUS ONCE
Status: COMPLETED | OUTPATIENT
Start: 2022-01-01 | End: 2022-01-01

## 2022-01-01 RX ORDER — RIVAROXABAN 20 MG/1
TABLET, FILM COATED ORAL
Qty: 90 TABLET | Refills: 0 | Status: SHIPPED | OUTPATIENT
Start: 2022-01-01 | End: 2022-01-01

## 2022-01-01 RX ORDER — HALOPERIDOL 5 MG/ML
2 INJECTION INTRAMUSCULAR EVERY 6 HOURS PRN
Status: DISCONTINUED | OUTPATIENT
Start: 2022-01-01 | End: 2022-01-01

## 2022-01-01 RX ORDER — GLYCOPYRROLATE 0.2 MG/ML
0.2 INJECTION, SOLUTION INTRAMUSCULAR; INTRAVENOUS EVERY 4 HOURS PRN
Status: CANCELLED | OUTPATIENT
Start: 2022-01-01

## 2022-01-01 RX ORDER — POLYETHYLENE GLYCOL 3350 17 G/17G
17 POWDER, FOR SOLUTION ORAL 2 TIMES DAILY PRN
Status: DISCONTINUED | OUTPATIENT
Start: 2022-01-01 | End: 2022-01-01

## 2022-01-01 RX ORDER — POTASSIUM CHLORIDE 1500 MG/1
40 TABLET, EXTENDED RELEASE ORAL ONCE
Status: COMPLETED | OUTPATIENT
Start: 2022-01-01 | End: 2022-01-01

## 2022-01-01 RX ORDER — CEFTRIAXONE 2 G/1
2 INJECTION, POWDER, FOR SOLUTION INTRAMUSCULAR; INTRAVENOUS EVERY 24 HOURS
Status: COMPLETED | OUTPATIENT
Start: 2022-01-01 | End: 2022-01-01

## 2022-01-01 RX ORDER — GLIPIZIDE 10 MG/1
20 TABLET, FILM COATED, EXTENDED RELEASE ORAL DAILY
Qty: 180 TABLET | Refills: 0 | Status: ON HOLD | OUTPATIENT
Start: 2022-01-01 | End: 2022-01-01

## 2022-01-01 RX ORDER — HALOPERIDOL 5 MG/ML
.5-1 INJECTION INTRAMUSCULAR
Status: DISCONTINUED | OUTPATIENT
Start: 2022-01-01 | End: 2022-01-01 | Stop reason: HOSPADM

## 2022-01-01 RX ORDER — METOPROLOL TARTRATE 100 MG
TABLET ORAL
Qty: 180 TABLET | Refills: 0 | Status: SHIPPED | OUTPATIENT
Start: 2022-01-01 | End: 2022-01-01

## 2022-01-01 RX ORDER — PRAVASTATIN SODIUM 20 MG
40 TABLET ORAL DAILY
Status: DISCONTINUED | OUTPATIENT
Start: 2022-01-01 | End: 2022-01-01 | Stop reason: HOSPADM

## 2022-01-01 RX ORDER — METOPROLOL TARTRATE 1 MG/ML
5 INJECTION, SOLUTION INTRAVENOUS EVERY 6 HOURS
Status: DISCONTINUED | OUTPATIENT
Start: 2022-01-01 | End: 2022-01-01

## 2022-01-01 RX ORDER — METOPROLOL TARTRATE 100 MG
100 TABLET ORAL 2 TIMES DAILY
Qty: 180 TABLET | Refills: 3 | Status: SHIPPED | OUTPATIENT
Start: 2022-01-01 | End: 2022-01-01

## 2022-01-01 RX ORDER — CLOTRIMAZOLE 1 %
CREAM (GRAM) TOPICAL 2 TIMES DAILY
Qty: 113 G | Refills: 0 | Status: SHIPPED | OUTPATIENT
Start: 2022-01-01

## 2022-01-01 RX ORDER — PROCHLORPERAZINE MALEATE 5 MG
5 TABLET ORAL EVERY 6 HOURS PRN
Status: CANCELLED | OUTPATIENT
Start: 2022-01-01

## 2022-01-01 RX ORDER — POLYETHYLENE GLYCOL 3350 17 G/17G
17 POWDER, FOR SOLUTION ORAL 2 TIMES DAILY
Status: DISCONTINUED | OUTPATIENT
Start: 2022-01-01 | End: 2022-01-01

## 2022-01-01 RX ORDER — POTASSIUM CHLORIDE 1.5 G/1.58G
40 POWDER, FOR SOLUTION ORAL ONCE
Status: COMPLETED | OUTPATIENT
Start: 2022-01-01 | End: 2022-01-01

## 2022-01-01 RX ORDER — NICOTINE POLACRILEX 4 MG
15-30 LOZENGE BUCCAL
Status: DISCONTINUED | OUTPATIENT
Start: 2022-01-01 | End: 2022-01-01

## 2022-01-01 RX ORDER — LISINOPRIL 5 MG/1
5 TABLET ORAL DAILY
Qty: 90 TABLET | Refills: 0 | Status: SHIPPED | OUTPATIENT
Start: 2022-01-01

## 2022-01-01 RX ORDER — POTASSIUM CHLORIDE 20MEQ/15ML
40 LIQUID (ML) ORAL ONCE
Status: DISCONTINUED | OUTPATIENT
Start: 2022-01-01 | End: 2022-01-01

## 2022-01-01 RX ORDER — LANOLIN ALCOHOL/MO/W.PET/CERES
3 CREAM (GRAM) TOPICAL
Status: DISCONTINUED | OUTPATIENT
Start: 2022-01-01 | End: 2022-01-01

## 2022-01-01 RX ORDER — DIGOXIN 0.25 MG/ML
125 INJECTION INTRAMUSCULAR; INTRAVENOUS ONCE
Status: COMPLETED | OUTPATIENT
Start: 2022-01-01 | End: 2022-01-01

## 2022-01-01 RX ORDER — LISINOPRIL 5 MG/1
5 TABLET ORAL DAILY
Qty: 90 TABLET | Refills: 0 | Status: SHIPPED | OUTPATIENT
Start: 2022-01-01 | End: 2022-01-01

## 2022-01-01 RX ORDER — DILTIAZEM HYDROCHLORIDE 30 MG/1
30 TABLET, FILM COATED ORAL 2 TIMES DAILY
Qty: 180 TABLET | Refills: 0 | Status: SHIPPED | OUTPATIENT
Start: 2022-01-01

## 2022-01-01 RX ORDER — POTASSIUM CHLORIDE 1.5 G/1.58G
20 POWDER, FOR SOLUTION ORAL ONCE
Status: COMPLETED | OUTPATIENT
Start: 2022-01-01 | End: 2022-01-01

## 2022-01-01 RX ADMIN — METOPROLOL SUCCINATE 100 MG: 100 TABLET, EXTENDED RELEASE ORAL at 09:28

## 2022-01-01 RX ADMIN — HALOPERIDOL LACTATE 1 MG: 5 INJECTION, SOLUTION INTRAMUSCULAR at 13:11

## 2022-01-01 RX ADMIN — RIVAROXABAN 20 MG: 20 TABLET, FILM COATED ORAL at 16:44

## 2022-01-01 RX ADMIN — DILTIAZEM HYDROCHLORIDE 30 MG: 30 TABLET, FILM COATED ORAL at 20:35

## 2022-01-01 RX ADMIN — INSULIN GLARGINE 10 UNITS: 100 INJECTION, SOLUTION SUBCUTANEOUS at 21:53

## 2022-01-01 RX ADMIN — INSULIN ASPART 2 UNITS: 100 INJECTION, SOLUTION INTRAVENOUS; SUBCUTANEOUS at 14:16

## 2022-01-01 RX ADMIN — INSULIN ASPART 1 UNITS: 100 INJECTION, SOLUTION INTRAVENOUS; SUBCUTANEOUS at 22:39

## 2022-01-01 RX ADMIN — LISINOPRIL 2.5 MG: 2.5 TABLET ORAL at 09:28

## 2022-01-01 RX ADMIN — DIGOXIN 250 MCG: 0.25 INJECTION INTRAMUSCULAR; INTRAVENOUS at 03:32

## 2022-01-01 RX ADMIN — INSULIN ASPART 1 UNITS: 100 INJECTION, SOLUTION INTRAVENOUS; SUBCUTANEOUS at 03:55

## 2022-01-01 RX ADMIN — MICONAZOLE NITRATE: 2 POWDER TOPICAL at 21:17

## 2022-01-01 RX ADMIN — DILTIAZEM HYDROCHLORIDE 30 MG: 30 TABLET, FILM COATED ORAL at 21:14

## 2022-01-01 RX ADMIN — RIVAROXABAN 20 MG: 20 TABLET, FILM COATED ORAL at 16:15

## 2022-01-01 RX ADMIN — INSULIN ASPART 5 UNITS: 100 INJECTION, SOLUTION INTRAVENOUS; SUBCUTANEOUS at 18:10

## 2022-01-01 RX ADMIN — FUROSEMIDE 20 MG: 10 INJECTION, SOLUTION INTRAVENOUS at 16:17

## 2022-01-01 RX ADMIN — METOPROLOL TARTRATE 25 MG: 25 TABLET, FILM COATED ORAL at 18:27

## 2022-01-01 RX ADMIN — INSULIN ASPART 4 UNITS: 100 INJECTION, SOLUTION INTRAVENOUS; SUBCUTANEOUS at 18:09

## 2022-01-01 RX ADMIN — ACETAMINOPHEN 650 MG: 325 TABLET, FILM COATED ORAL at 21:01

## 2022-01-01 RX ADMIN — RIVAROXABAN 20 MG: 20 TABLET, FILM COATED ORAL at 19:19

## 2022-01-01 RX ADMIN — MORPHINE SULFATE 2 MG: 4 INJECTION, SOLUTION INTRAMUSCULAR; INTRAVENOUS at 00:28

## 2022-01-01 RX ADMIN — DILTIAZEM HYDROCHLORIDE 30 MG: 30 TABLET, FILM COATED ORAL at 20:38

## 2022-01-01 RX ADMIN — ACETAMINOPHEN 650 MG: 325 TABLET, FILM COATED ORAL at 16:17

## 2022-01-01 RX ADMIN — INSULIN ASPART 5 UNITS: 100 INJECTION, SOLUTION INTRAVENOUS; SUBCUTANEOUS at 12:21

## 2022-01-01 RX ADMIN — SODIUM CHLORIDE: 9 INJECTION, SOLUTION INTRAVENOUS at 22:31

## 2022-01-01 RX ADMIN — SODIUM CHLORIDE, POTASSIUM CHLORIDE, SODIUM LACTATE AND CALCIUM CHLORIDE: 600; 310; 30; 20 INJECTION, SOLUTION INTRAVENOUS at 14:03

## 2022-01-01 RX ADMIN — ACETAMINOPHEN 650 MG: 325 TABLET, FILM COATED ORAL at 20:04

## 2022-01-01 RX ADMIN — LEVOFLOXACIN 500 MG: 5 INJECTION, SOLUTION INTRAVENOUS at 12:24

## 2022-01-01 RX ADMIN — INSULIN ASPART 2 UNITS: 100 INJECTION, SOLUTION INTRAVENOUS; SUBCUTANEOUS at 10:44

## 2022-01-01 RX ADMIN — INSULIN ASPART 4 UNITS: 100 INJECTION, SOLUTION INTRAVENOUS; SUBCUTANEOUS at 18:27

## 2022-01-01 RX ADMIN — Medication 1 MG: at 22:38

## 2022-01-01 RX ADMIN — SENNOSIDES AND DOCUSATE SODIUM 2 TABLET: 50; 8.6 TABLET ORAL at 12:41

## 2022-01-01 RX ADMIN — CEFTRIAXONE SODIUM 2 G: 2 INJECTION, POWDER, FOR SOLUTION INTRAMUSCULAR; INTRAVENOUS at 18:45

## 2022-01-01 RX ADMIN — FUROSEMIDE 20 MG: 10 INJECTION, SOLUTION INTRAMUSCULAR; INTRAVENOUS at 10:48

## 2022-01-01 RX ADMIN — METOPROLOL TARTRATE 5 MG: 5 INJECTION INTRAVENOUS at 16:17

## 2022-01-01 RX ADMIN — MICONAZOLE NITRATE: 2 POWDER TOPICAL at 06:41

## 2022-01-01 RX ADMIN — CEFTRIAXONE SODIUM 1 G: 1 INJECTION, POWDER, FOR SOLUTION INTRAMUSCULAR; INTRAVENOUS at 16:17

## 2022-01-01 RX ADMIN — RIVAROXABAN 20 MG: 20 TABLET, FILM COATED ORAL at 17:32

## 2022-01-01 RX ADMIN — ACETAMINOPHEN 650 MG: 650 SUPPOSITORY RECTAL at 13:48

## 2022-01-01 RX ADMIN — LEVOFLOXACIN 500 MG: 5 INJECTION, SOLUTION INTRAVENOUS at 13:40

## 2022-01-01 RX ADMIN — METOPROLOL TARTRATE 50 MG: 50 TABLET, FILM COATED ORAL at 21:34

## 2022-01-01 RX ADMIN — INSULIN GLARGINE 10 UNITS: 100 INJECTION, SOLUTION SUBCUTANEOUS at 22:35

## 2022-01-01 RX ADMIN — METOPROLOL TARTRATE 100 MG: 50 TABLET, FILM COATED ORAL at 20:35

## 2022-01-01 RX ADMIN — METOPROLOL TARTRATE 5 MG: 5 INJECTION INTRAVENOUS at 20:18

## 2022-01-01 RX ADMIN — GLIPIZIDE 10 MG: 5 TABLET, FILM COATED, EXTENDED RELEASE ORAL at 07:52

## 2022-01-01 RX ADMIN — RIVAROXABAN 20 MG: 20 TABLET, FILM COATED ORAL at 18:15

## 2022-01-01 RX ADMIN — RIVAROXABAN 20 MG: 20 TABLET, FILM COATED ORAL at 18:03

## 2022-01-01 RX ADMIN — METOPROLOL TARTRATE 100 MG: 50 TABLET, FILM COATED ORAL at 20:01

## 2022-01-01 RX ADMIN — DIGOXIN 62.5 MCG: 125 TABLET ORAL at 09:45

## 2022-01-01 RX ADMIN — Medication 400 MG: at 21:01

## 2022-01-01 RX ADMIN — SODIUM CHLORIDE, POTASSIUM CHLORIDE, SODIUM LACTATE AND CALCIUM CHLORIDE 125 ML: 600; 310; 30; 20 INJECTION, SOLUTION INTRAVENOUS at 07:50

## 2022-01-01 RX ADMIN — GADOBUTROL 10 ML: 604.72 INJECTION INTRAVENOUS at 17:02

## 2022-01-01 RX ADMIN — DILTIAZEM HYDROCHLORIDE 30 MG: 30 TABLET, FILM COATED ORAL at 22:30

## 2022-01-01 RX ADMIN — RIVAROXABAN 20 MG: 20 TABLET, FILM COATED ORAL at 17:24

## 2022-01-01 RX ADMIN — MORPHINE SULFATE 20 MG: 100 SOLUTION ORAL at 18:17

## 2022-01-01 RX ADMIN — GLIPIZIDE 10 MG: 5 TABLET, FILM COATED, EXTENDED RELEASE ORAL at 08:32

## 2022-01-01 RX ADMIN — METOPROLOL TARTRATE 5 MG: 5 INJECTION INTRAVENOUS at 20:19

## 2022-01-01 RX ADMIN — MORPHINE SULFATE 1 MG: 4 INJECTION, SOLUTION INTRAMUSCULAR; INTRAVENOUS at 21:52

## 2022-01-01 RX ADMIN — ACETAMINOPHEN 650 MG: 325 TABLET, FILM COATED ORAL at 18:26

## 2022-01-01 RX ADMIN — DILTIAZEM HYDROCHLORIDE 30 MG: 30 TABLET, FILM COATED ORAL at 08:51

## 2022-01-01 RX ADMIN — QUETIAPINE 12.5 MG: 25 TABLET, FILM COATED ORAL at 15:18

## 2022-01-01 RX ADMIN — INSULIN ASPART 3 UNITS: 100 INJECTION, SOLUTION INTRAVENOUS; SUBCUTANEOUS at 09:41

## 2022-01-01 RX ADMIN — FUROSEMIDE 20 MG: 10 INJECTION, SOLUTION INTRAMUSCULAR; INTRAVENOUS at 08:50

## 2022-01-01 RX ADMIN — FUROSEMIDE 20 MG: 10 INJECTION, SOLUTION INTRAMUSCULAR; INTRAVENOUS at 21:16

## 2022-01-01 RX ADMIN — GLIPIZIDE 10 MG: 5 TABLET, FILM COATED, EXTENDED RELEASE ORAL at 08:00

## 2022-01-01 RX ADMIN — METOPROLOL TARTRATE 100 MG: 50 TABLET, FILM COATED ORAL at 20:11

## 2022-01-01 RX ADMIN — HUMAN ALBUMIN MICROSPHERES AND PERFLUTREN 9 ML: 10; .22 INJECTION, SOLUTION INTRAVENOUS at 10:59

## 2022-01-01 RX ADMIN — CEFTRIAXONE 1 G: 1 INJECTION, POWDER, FOR SOLUTION INTRAMUSCULAR; INTRAVENOUS at 18:01

## 2022-01-01 RX ADMIN — METOPROLOL TARTRATE 5 MG: 5 INJECTION INTRAVENOUS at 19:50

## 2022-01-01 RX ADMIN — POLYETHYLENE GLYCOL 3350 17 G: 17 POWDER, FOR SOLUTION ORAL at 09:49

## 2022-01-01 RX ADMIN — DILTIAZEM HYDROCHLORIDE 30 MG: 30 TABLET, FILM COATED ORAL at 07:52

## 2022-01-01 RX ADMIN — METOPROLOL TARTRATE 100 MG: 50 TABLET, FILM COATED ORAL at 21:47

## 2022-01-01 RX ADMIN — SODIUM CHLORIDE, PRESERVATIVE FREE: 5 INJECTION INTRAVENOUS at 21:44

## 2022-01-01 RX ADMIN — LIDOCAINE HYDROCHLORIDE 2 ML: 10 INJECTION, SOLUTION INFILTRATION; PERINEURAL at 10:05

## 2022-01-01 RX ADMIN — METOPROLOL TARTRATE 100 MG: 50 TABLET, FILM COATED ORAL at 20:12

## 2022-01-01 RX ADMIN — DILTIAZEM HYDROCHLORIDE 30 MG: 30 TABLET, FILM COATED ORAL at 20:30

## 2022-01-01 RX ADMIN — RIVAROXABAN 20 MG: 20 TABLET, FILM COATED ORAL at 16:36

## 2022-01-01 RX ADMIN — INSULIN ASPART 1 UNITS: 100 INJECTION, SOLUTION INTRAVENOUS; SUBCUTANEOUS at 22:28

## 2022-01-01 RX ADMIN — CEFTRIAXONE 1 G: 1 INJECTION, POWDER, FOR SOLUTION INTRAMUSCULAR; INTRAVENOUS at 17:24

## 2022-01-01 RX ADMIN — SODIUM CHLORIDE, PRESERVATIVE FREE: 5 INJECTION INTRAVENOUS at 22:31

## 2022-01-01 RX ADMIN — METOPROLOL TARTRATE 100 MG: 50 TABLET, FILM COATED ORAL at 08:08

## 2022-01-01 RX ADMIN — MICONAZOLE NITRATE: 2 POWDER TOPICAL at 09:18

## 2022-01-01 RX ADMIN — INSULIN GLARGINE 10 UNITS: 100 INJECTION, SOLUTION SUBCUTANEOUS at 13:38

## 2022-01-01 RX ADMIN — MORPHINE SULFATE 20 MG: 100 SOLUTION ORAL at 12:32

## 2022-01-01 RX ADMIN — MORPHINE SULFATE 1 MG: 4 INJECTION, SOLUTION INTRAMUSCULAR; INTRAVENOUS at 11:15

## 2022-01-01 RX ADMIN — METOPROLOL TARTRATE 5 MG: 5 INJECTION INTRAVENOUS at 01:00

## 2022-01-01 RX ADMIN — INSULIN ASPART 1 UNITS: 100 INJECTION, SOLUTION INTRAVENOUS; SUBCUTANEOUS at 22:35

## 2022-01-01 RX ADMIN — INSULIN ASPART 5 UNITS: 100 INJECTION, SOLUTION INTRAVENOUS; SUBCUTANEOUS at 08:38

## 2022-01-01 RX ADMIN — MORPHINE SULFATE 20 MG: 100 SOLUTION ORAL at 21:46

## 2022-01-01 RX ADMIN — ONDANSETRON 4 MG: 2 INJECTION INTRAMUSCULAR; INTRAVENOUS at 12:27

## 2022-01-01 RX ADMIN — MICONAZOLE NITRATE: 2 POWDER TOPICAL at 09:43

## 2022-01-01 RX ADMIN — GLIPIZIDE 10 MG: 5 TABLET, FILM COATED, EXTENDED RELEASE ORAL at 08:43

## 2022-01-01 RX ADMIN — SODIUM CHLORIDE, PRESERVATIVE FREE: 5 INJECTION INTRAVENOUS at 10:51

## 2022-01-01 RX ADMIN — POTASSIUM CHLORIDE 40 MEQ: 1500 TABLET, EXTENDED RELEASE ORAL at 04:17

## 2022-01-01 RX ADMIN — METOPROLOL TARTRATE 5 MG: 5 INJECTION INTRAVENOUS at 04:22

## 2022-01-01 RX ADMIN — LISINOPRIL 5 MG: 2.5 TABLET ORAL at 08:58

## 2022-01-01 RX ADMIN — Medication 1 MG: at 02:58

## 2022-01-01 RX ADMIN — METOPROLOL TARTRATE 50 MG: 50 TABLET, FILM COATED ORAL at 09:16

## 2022-01-01 RX ADMIN — LEVOFLOXACIN 500 MG: 5 INJECTION, SOLUTION INTRAVENOUS at 13:17

## 2022-01-01 RX ADMIN — PRAVASTATIN SODIUM 40 MG: 20 TABLET ORAL at 09:16

## 2022-01-01 RX ADMIN — ACETAMINOPHEN 650 MG: 325 TABLET, FILM COATED ORAL at 20:53

## 2022-01-01 RX ADMIN — INSULIN ASPART 3 UNITS: 100 INJECTION, SOLUTION INTRAVENOUS; SUBCUTANEOUS at 13:08

## 2022-01-01 RX ADMIN — PRAVASTATIN SODIUM 40 MG: 20 TABLET ORAL at 08:58

## 2022-01-01 RX ADMIN — ONDANSETRON 4 MG: 2 INJECTION INTRAMUSCULAR; INTRAVENOUS at 11:28

## 2022-01-01 RX ADMIN — CEFTRIAXONE 1 G: 1 INJECTION, POWDER, FOR SOLUTION INTRAMUSCULAR; INTRAVENOUS at 18:04

## 2022-01-01 RX ADMIN — MORPHINE SULFATE 10 MG: 100 SOLUTION ORAL at 10:49

## 2022-01-01 RX ADMIN — INSULIN GLARGINE 10 UNITS: 100 INJECTION, SOLUTION SUBCUTANEOUS at 22:28

## 2022-01-01 RX ADMIN — MICONAZOLE NITRATE: 2 POWDER TOPICAL at 13:52

## 2022-01-01 RX ADMIN — MICONAZOLE NITRATE: 2 POWDER TOPICAL at 22:04

## 2022-01-01 RX ADMIN — QUETIAPINE 12.5 MG: 25 TABLET, FILM COATED ORAL at 21:14

## 2022-01-01 RX ADMIN — POTASSIUM CHLORIDE 40 MEQ: 1.5 POWDER, FOR SOLUTION ORAL at 21:00

## 2022-01-01 RX ADMIN — ACETAMINOPHEN 650 MG: 325 TABLET, FILM COATED ORAL at 16:42

## 2022-01-01 RX ADMIN — RIVAROXABAN 20 MG: 20 TABLET, FILM COATED ORAL at 18:09

## 2022-01-01 RX ADMIN — MORPHINE SULFATE 1 MG: 4 INJECTION, SOLUTION INTRAMUSCULAR; INTRAVENOUS at 16:57

## 2022-01-01 RX ADMIN — INSULIN ASPART 2 UNITS: 100 INJECTION, SOLUTION INTRAVENOUS; SUBCUTANEOUS at 01:09

## 2022-01-01 RX ADMIN — METOPROLOL TARTRATE 100 MG: 50 TABLET, FILM COATED ORAL at 20:38

## 2022-01-01 RX ADMIN — DILTIAZEM HYDROCHLORIDE 30 MG: 30 TABLET, FILM COATED ORAL at 20:12

## 2022-01-01 RX ADMIN — METOPROLOL TARTRATE 100 MG: 50 TABLET, FILM COATED ORAL at 08:47

## 2022-01-01 RX ADMIN — DILTIAZEM HYDROCHLORIDE 30 MG: 30 TABLET, FILM COATED ORAL at 08:43

## 2022-01-01 RX ADMIN — MICONAZOLE NITRATE: 2 POWDER TOPICAL at 12:27

## 2022-01-01 RX ADMIN — SODIUM CHLORIDE, PRESERVATIVE FREE: 5 INJECTION INTRAVENOUS at 11:45

## 2022-01-01 RX ADMIN — METOPROLOL TARTRATE 100 MG: 50 TABLET, FILM COATED ORAL at 20:30

## 2022-01-01 RX ADMIN — RIVAROXABAN 20 MG: 20 TABLET, FILM COATED ORAL at 22:37

## 2022-01-01 RX ADMIN — LORAZEPAM 0.5 MG: 0.5 TABLET ORAL at 17:39

## 2022-01-01 RX ADMIN — METOPROLOL TARTRATE 5 MG: 5 INJECTION INTRAVENOUS at 00:17

## 2022-01-01 RX ADMIN — PRAVASTATIN SODIUM 40 MG: 20 TABLET ORAL at 08:32

## 2022-01-01 RX ADMIN — MICONAZOLE NITRATE: 2 POWDER TOPICAL at 21:51

## 2022-01-01 RX ADMIN — METOPROLOL SUCCINATE 100 MG: 100 TABLET, EXTENDED RELEASE ORAL at 21:47

## 2022-01-01 RX ADMIN — METOPROLOL TARTRATE 100 MG: 50 TABLET, FILM COATED ORAL at 08:01

## 2022-01-01 RX ADMIN — INSULIN ASPART 6 UNITS: 100 INJECTION, SOLUTION INTRAVENOUS; SUBCUTANEOUS at 12:30

## 2022-01-01 RX ADMIN — INSULIN ASPART 3 UNITS: 100 INJECTION, SOLUTION INTRAVENOUS; SUBCUTANEOUS at 23:07

## 2022-01-01 RX ADMIN — PRAVASTATIN SODIUM 40 MG: 20 TABLET ORAL at 09:45

## 2022-01-01 RX ADMIN — METOPROLOL TARTRATE 100 MG: 50 TABLET, FILM COATED ORAL at 21:04

## 2022-01-01 RX ADMIN — INSULIN ASPART 12 UNITS: 100 INJECTION, SOLUTION INTRAVENOUS; SUBCUTANEOUS at 09:16

## 2022-01-01 RX ADMIN — METOPROLOL TARTRATE 100 MG: 50 TABLET, FILM COATED ORAL at 08:44

## 2022-01-01 RX ADMIN — MORPHINE SULFATE 2 MG: 4 INJECTION, SOLUTION INTRAMUSCULAR; INTRAVENOUS at 14:26

## 2022-01-01 RX ADMIN — SODIUM CHLORIDE, POTASSIUM CHLORIDE, SODIUM LACTATE AND CALCIUM CHLORIDE: 600; 310; 30; 20 INJECTION, SOLUTION INTRAVENOUS at 19:36

## 2022-01-01 RX ADMIN — PRAVASTATIN SODIUM 40 MG: 20 TABLET ORAL at 08:01

## 2022-01-01 RX ADMIN — DILTIAZEM HYDROCHLORIDE 30 MG: 30 TABLET, FILM COATED ORAL at 19:56

## 2022-01-01 RX ADMIN — METOPROLOL TARTRATE 5 MG: 5 INJECTION INTRAVENOUS at 13:16

## 2022-01-01 RX ADMIN — INSULIN ASPART 3 UNITS: 100 INJECTION, SOLUTION INTRAVENOUS; SUBCUTANEOUS at 22:24

## 2022-01-01 RX ADMIN — PRAVASTATIN SODIUM 40 MG: 20 TABLET ORAL at 08:51

## 2022-01-01 RX ADMIN — ACETAMINOPHEN 650 MG: 650 SUPPOSITORY RECTAL at 14:35

## 2022-01-01 RX ADMIN — INSULIN ASPART 3 UNITS: 100 INJECTION, SOLUTION INTRAVENOUS; SUBCUTANEOUS at 18:41

## 2022-01-01 RX ADMIN — INSULIN ASPART 1 UNITS: 100 INJECTION, SOLUTION INTRAVENOUS; SUBCUTANEOUS at 22:29

## 2022-01-01 RX ADMIN — RIVAROXABAN 20 MG: 20 TABLET, FILM COATED ORAL at 18:26

## 2022-01-01 RX ADMIN — GLYCOPYRROLATE 0.2 MG: 0.2 INJECTION, SOLUTION INTRAMUSCULAR; INTRAVENOUS at 13:11

## 2022-01-01 RX ADMIN — MORPHINE SULFATE 2 MG: 4 INJECTION, SOLUTION INTRAMUSCULAR; INTRAVENOUS at 06:46

## 2022-01-01 RX ADMIN — METOPROLOL TARTRATE 5 MG: 5 INJECTION INTRAVENOUS at 13:27

## 2022-01-01 RX ADMIN — MORPHINE SULFATE 2 MG: 4 INJECTION, SOLUTION INTRAMUSCULAR; INTRAVENOUS at 03:55

## 2022-01-01 RX ADMIN — INSULIN ASPART 2 UNITS: 100 INJECTION, SOLUTION INTRAVENOUS; SUBCUTANEOUS at 17:39

## 2022-01-01 RX ADMIN — INSULIN ASPART 3 UNITS: 100 INJECTION, SOLUTION INTRAVENOUS; SUBCUTANEOUS at 10:07

## 2022-01-01 RX ADMIN — ONDANSETRON 4 MG: 2 INJECTION INTRAMUSCULAR; INTRAVENOUS at 15:36

## 2022-01-01 RX ADMIN — INSULIN ASPART 2 UNITS: 100 INJECTION, SOLUTION INTRAVENOUS; SUBCUTANEOUS at 08:33

## 2022-01-01 RX ADMIN — MORPHINE SULFATE 2 MG: 4 INJECTION, SOLUTION INTRAMUSCULAR; INTRAVENOUS at 19:51

## 2022-01-01 RX ADMIN — DIGOXIN 62.5 MCG: 125 TABLET ORAL at 20:01

## 2022-01-01 RX ADMIN — METOPROLOL TARTRATE 100 MG: 50 TABLET, FILM COATED ORAL at 08:33

## 2022-01-01 RX ADMIN — RIVAROXABAN 20 MG: 20 TABLET, FILM COATED ORAL at 17:05

## 2022-01-01 RX ADMIN — SODIUM CHLORIDE, POTASSIUM CHLORIDE, SODIUM LACTATE AND CALCIUM CHLORIDE 250 ML: 600; 310; 30; 20 INJECTION, SOLUTION INTRAVENOUS at 21:02

## 2022-01-01 RX ADMIN — SODIUM CHLORIDE, POTASSIUM CHLORIDE, SODIUM LACTATE AND CALCIUM CHLORIDE: 600; 310; 30; 20 INJECTION, SOLUTION INTRAVENOUS at 05:49

## 2022-01-01 RX ADMIN — MELATONIN TAB 3 MG 3 MG: 3 TAB at 22:23

## 2022-01-01 RX ADMIN — GLIPIZIDE 10 MG: 5 TABLET, FILM COATED, EXTENDED RELEASE ORAL at 08:09

## 2022-01-01 RX ADMIN — DILTIAZEM HYDROCHLORIDE 30 MG: 30 TABLET, FILM COATED ORAL at 08:00

## 2022-01-01 RX ADMIN — CEFTRIAXONE 1 G: 1 INJECTION, POWDER, FOR SOLUTION INTRAMUSCULAR; INTRAVENOUS at 17:33

## 2022-01-01 RX ADMIN — MICONAZOLE NITRATE: 2 POWDER TOPICAL at 08:45

## 2022-01-01 RX ADMIN — METOPROLOL TARTRATE 100 MG: 50 TABLET, FILM COATED ORAL at 07:52

## 2022-01-01 RX ADMIN — MICONAZOLE NITRATE: 2 POWDER TOPICAL at 20:01

## 2022-01-01 RX ADMIN — SODIUM CHLORIDE, PRESERVATIVE FREE: 5 INJECTION INTRAVENOUS at 05:20

## 2022-01-01 RX ADMIN — ONDANSETRON 4 MG: 2 INJECTION INTRAMUSCULAR; INTRAVENOUS at 21:54

## 2022-01-01 RX ADMIN — DILTIAZEM HYDROCHLORIDE 30 MG: 30 TABLET, FILM COATED ORAL at 12:48

## 2022-01-01 RX ADMIN — MICONAZOLE NITRATE: 2 POWDER TOPICAL at 10:18

## 2022-01-01 RX ADMIN — METOPROLOL TARTRATE 100 MG: 50 TABLET, FILM COATED ORAL at 06:24

## 2022-01-01 RX ADMIN — MICONAZOLE NITRATE: 2 POWDER TOPICAL at 21:35

## 2022-01-01 RX ADMIN — POLYETHYLENE GLYCOL 3350 17 G: 17 POWDER, FOR SOLUTION ORAL at 21:47

## 2022-01-01 RX ADMIN — MORPHINE SULFATE 20 MG: 100 SOLUTION ORAL at 16:23

## 2022-01-01 RX ADMIN — METOPROLOL TARTRATE 5 MG: 5 INJECTION INTRAVENOUS at 05:56

## 2022-01-01 RX ADMIN — MICONAZOLE NITRATE: 2 POWDER TOPICAL at 08:29

## 2022-01-01 RX ADMIN — CEFTRIAXONE SODIUM 2 G: 2 INJECTION, POWDER, FOR SOLUTION INTRAMUSCULAR; INTRAVENOUS at 17:39

## 2022-01-01 RX ADMIN — INSULIN ASPART 1 UNITS: 100 INJECTION, SOLUTION INTRAVENOUS; SUBCUTANEOUS at 21:53

## 2022-01-01 RX ADMIN — POTASSIUM CHLORIDE 20 MEQ: 1.5 POWDER, FOR SOLUTION ORAL at 11:33

## 2022-01-01 RX ADMIN — INSULIN ASPART 7 UNITS: 100 INJECTION, SOLUTION INTRAVENOUS; SUBCUTANEOUS at 12:43

## 2022-01-01 RX ADMIN — LEVOFLOXACIN 500 MG: 5 INJECTION, SOLUTION INTRAVENOUS at 11:33

## 2022-01-01 RX ADMIN — METOPROLOL TARTRATE 100 MG: 50 TABLET, FILM COATED ORAL at 12:48

## 2022-01-01 RX ADMIN — LORAZEPAM 0.5 MG: 0.5 TABLET ORAL at 09:44

## 2022-01-01 RX ADMIN — SODIUM CHLORIDE 1000 ML: 9 INJECTION, SOLUTION INTRAVENOUS at 14:20

## 2022-01-01 RX ADMIN — DIGOXIN 125 MCG: 0.25 INJECTION INTRAMUSCULAR; INTRAVENOUS at 15:22

## 2022-01-01 RX ADMIN — LISINOPRIL 2.5 MG: 2.5 TABLET ORAL at 12:42

## 2022-01-01 RX ADMIN — PRAVASTATIN SODIUM 40 MG: 20 TABLET ORAL at 09:15

## 2022-01-01 RX ADMIN — PRAVASTATIN SODIUM 40 MG: 20 TABLET ORAL at 09:48

## 2022-01-01 RX ADMIN — Medication 1 MG: at 22:30

## 2022-01-01 RX ADMIN — DILTIAZEM HYDROCHLORIDE 30 MG: 30 TABLET, FILM COATED ORAL at 21:04

## 2022-01-01 RX ADMIN — RIVAROXABAN 20 MG: 20 TABLET, FILM COATED ORAL at 16:33

## 2022-01-01 RX ADMIN — GLIPIZIDE 10 MG: 5 TABLET, FILM COATED, EXTENDED RELEASE ORAL at 08:33

## 2022-01-01 RX ADMIN — METOPROLOL TARTRATE 100 MG: 50 TABLET, FILM COATED ORAL at 22:37

## 2022-01-01 RX ADMIN — MICONAZOLE NITRATE: 2 POWDER TOPICAL at 09:21

## 2022-01-01 RX ADMIN — DILTIAZEM HYDROCHLORIDE 30 MG: 30 TABLET, FILM COATED ORAL at 08:08

## 2022-01-01 RX ADMIN — INSULIN ASPART 1 UNITS: 100 INJECTION, SOLUTION INTRAVENOUS; SUBCUTANEOUS at 09:31

## 2022-01-01 RX ADMIN — METOPROLOL TARTRATE 100 MG: 50 TABLET, FILM COATED ORAL at 19:39

## 2022-01-01 RX ADMIN — MAGNESIUM SULFATE HEPTAHYDRATE 1 G: 500 INJECTION, SOLUTION INTRAMUSCULAR; INTRAVENOUS at 20:42

## 2022-01-01 RX ADMIN — Medication 400 MG: at 16:44

## 2022-01-01 RX ADMIN — METOPROLOL TARTRATE 100 MG: 50 TABLET, FILM COATED ORAL at 09:16

## 2022-01-01 RX ADMIN — DILTIAZEM HYDROCHLORIDE 30 MG: 30 TABLET, FILM COATED ORAL at 06:24

## 2022-01-01 RX ADMIN — METOPROLOL SUCCINATE 100 MG: 100 TABLET, EXTENDED RELEASE ORAL at 08:21

## 2022-01-01 RX ADMIN — ASPIRIN 325 MG ORAL TABLET 325 MG: 325 PILL ORAL at 10:05

## 2022-01-01 RX ADMIN — ACETAMINOPHEN 325 MG: 325 TABLET, FILM COATED ORAL at 13:05

## 2022-01-01 RX ADMIN — INSULIN ASPART 3 UNITS: 100 INJECTION, SOLUTION INTRAVENOUS; SUBCUTANEOUS at 17:34

## 2022-01-01 RX ADMIN — PRAVASTATIN SODIUM 40 MG: 20 TABLET ORAL at 08:43

## 2022-01-01 RX ADMIN — METOPROLOL TARTRATE 100 MG: 50 TABLET, FILM COATED ORAL at 08:50

## 2022-01-01 RX ADMIN — PROCHLORPERAZINE EDISYLATE 5 MG: 5 INJECTION INTRAMUSCULAR; INTRAVENOUS at 06:35

## 2022-01-01 RX ADMIN — DILTIAZEM HYDROCHLORIDE 30 MG: 30 TABLET, FILM COATED ORAL at 20:10

## 2022-01-01 RX ADMIN — INSULIN ASPART 5 UNITS: 100 INJECTION, SOLUTION INTRAVENOUS; SUBCUTANEOUS at 23:12

## 2022-01-01 RX ADMIN — PRAVASTATIN SODIUM 40 MG: 20 TABLET ORAL at 08:48

## 2022-01-01 RX ADMIN — FUROSEMIDE 20 MG: 10 INJECTION, SOLUTION INTRAMUSCULAR; INTRAVENOUS at 13:14

## 2022-01-01 RX ADMIN — METOPROLOL TARTRATE 50 MG: 50 TABLET, FILM COATED ORAL at 08:57

## 2022-01-01 RX ADMIN — RIVAROXABAN 20 MG: 20 TABLET, FILM COATED ORAL at 16:52

## 2022-01-01 RX ADMIN — MICONAZOLE NITRATE: 2 POWDER TOPICAL at 08:21

## 2022-01-01 RX ADMIN — ONDANSETRON 4 MG: 2 INJECTION INTRAMUSCULAR; INTRAVENOUS at 17:07

## 2022-01-01 RX ADMIN — METOPROLOL TARTRATE 100 MG: 50 TABLET, FILM COATED ORAL at 22:30

## 2022-01-01 RX ADMIN — DILTIAZEM HYDROCHLORIDE 30 MG: 30 TABLET, FILM COATED ORAL at 20:01

## 2022-01-01 RX ADMIN — PRAVASTATIN SODIUM 40 MG: 20 TABLET ORAL at 08:09

## 2022-01-01 RX ADMIN — METOPROLOL TARTRATE 5 MG: 5 INJECTION INTRAVENOUS at 09:44

## 2022-01-01 RX ADMIN — PRAVASTATIN SODIUM 40 MG: 20 TABLET ORAL at 08:11

## 2022-01-01 RX ADMIN — PRAVASTATIN SODIUM 40 MG: 20 TABLET ORAL at 12:48

## 2022-01-01 RX ADMIN — METOPROLOL TARTRATE 100 MG: 50 TABLET, FILM COATED ORAL at 21:03

## 2022-01-01 RX ADMIN — FUROSEMIDE 60 MG: 10 INJECTION, SOLUTION INTRAMUSCULAR; INTRAVENOUS at 15:13

## 2022-01-01 RX ADMIN — RIVAROXABAN 20 MG: 20 TABLET, FILM COATED ORAL at 16:18

## 2022-01-01 RX ADMIN — DILTIAZEM HYDROCHLORIDE 30 MG: 30 TABLET, FILM COATED ORAL at 22:37

## 2022-01-01 RX ADMIN — PRAVASTATIN SODIUM 40 MG: 20 TABLET ORAL at 07:51

## 2022-01-01 RX ADMIN — METOPROLOL TARTRATE 100 MG: 50 TABLET, FILM COATED ORAL at 19:56

## 2022-01-01 RX ADMIN — INSULIN ASPART 5 UNITS: 100 INJECTION, SOLUTION INTRAVENOUS; SUBCUTANEOUS at 23:55

## 2022-01-01 RX ADMIN — SENNOSIDES AND DOCUSATE SODIUM 2 TABLET: 50; 8.6 TABLET ORAL at 09:46

## 2022-01-01 RX ADMIN — METOPROLOL TARTRATE 5 MG: 5 INJECTION INTRAVENOUS at 05:09

## 2022-01-01 RX ADMIN — METOPROLOL TARTRATE 50 MG: 50 TABLET, FILM COATED ORAL at 12:42

## 2022-01-01 RX ADMIN — DILTIAZEM HYDROCHLORIDE 30 MG: 30 TABLET, FILM COATED ORAL at 08:32

## 2022-01-01 RX ADMIN — POLYETHYLENE GLYCOL 3350 17 G: 17 POWDER, FOR SOLUTION ORAL at 21:34

## 2022-01-01 RX ADMIN — CEFTRIAXONE 1 G: 1 INJECTION, POWDER, FOR SOLUTION INTRAMUSCULAR; INTRAVENOUS at 19:51

## 2022-01-01 RX ADMIN — BISACODYL 10 MG: 10 SUPPOSITORY RECTAL at 16:30

## 2022-01-01 RX ADMIN — LEVOFLOXACIN 500 MG: 5 INJECTION, SOLUTION INTRAVENOUS at 12:42

## 2022-01-01 RX ADMIN — MICONAZOLE NITRATE: 2 POWDER TOPICAL at 21:56

## 2022-01-01 RX ADMIN — MICONAZOLE NITRATE: 2 POWDER TOPICAL at 21:48

## 2022-01-01 RX ADMIN — INSULIN GLARGINE 10 UNITS: 100 INJECTION, SOLUTION SUBCUTANEOUS at 23:07

## 2022-01-01 RX ADMIN — INSULIN ASPART 6 UNITS: 100 INJECTION, SOLUTION INTRAVENOUS; SUBCUTANEOUS at 17:29

## 2022-01-01 RX ADMIN — SODIUM CHLORIDE: 9 INJECTION, SOLUTION INTRAVENOUS at 06:37

## 2022-01-01 RX ADMIN — MELATONIN TAB 3 MG 3 MG: 3 TAB at 20:53

## 2022-01-01 RX ADMIN — MORPHINE SULFATE 1 MG: 4 INJECTION, SOLUTION INTRAMUSCULAR; INTRAVENOUS at 07:30

## 2022-01-01 RX ADMIN — DILTIAZEM HYDROCHLORIDE 30 MG: 30 TABLET, FILM COATED ORAL at 09:16

## 2022-01-01 RX ADMIN — GLIPIZIDE 10 MG: 5 TABLET, FILM COATED, EXTENDED RELEASE ORAL at 08:48

## 2022-01-01 RX ADMIN — DILTIAZEM HYDROCHLORIDE 30 MG: 30 TABLET, FILM COATED ORAL at 08:48

## 2022-01-01 RX ADMIN — FUROSEMIDE 20 MG: 10 INJECTION, SOLUTION INTRAMUSCULAR; INTRAVENOUS at 21:42

## 2022-01-01 RX ADMIN — GLIPIZIDE 10 MG: 5 TABLET, FILM COATED, EXTENDED RELEASE ORAL at 09:16

## 2022-01-01 RX ADMIN — FUROSEMIDE 40 MG: 10 INJECTION, SOLUTION INTRAVENOUS at 09:37

## 2022-01-01 RX ADMIN — ACETAMINOPHEN 650 MG: 325 TABLET, FILM COATED ORAL at 09:44

## 2022-01-01 RX ADMIN — INSULIN ASPART 2 UNITS: 100 INJECTION, SOLUTION INTRAVENOUS; SUBCUTANEOUS at 05:20

## 2022-01-01 RX ADMIN — METOPROLOL TARTRATE 100 MG: 50 TABLET, FILM COATED ORAL at 08:32

## 2022-01-01 RX ADMIN — INSULIN ASPART 6 UNITS: 100 INJECTION, SOLUTION INTRAVENOUS; SUBCUTANEOUS at 08:54

## 2022-01-01 RX ADMIN — MORPHINE SULFATE 10 MG: 100 SOLUTION ORAL at 08:55

## 2022-01-01 SDOH — HEALTH STABILITY: PHYSICAL HEALTH: ON AVERAGE, HOW MANY DAYS PER WEEK DO YOU ENGAGE IN MODERATE TO STRENUOUS EXERCISE (LIKE A BRISK WALK)?: 0 DAYS

## 2022-01-01 SDOH — ECONOMIC STABILITY: FOOD INSECURITY: WITHIN THE PAST 12 MONTHS, THE FOOD YOU BOUGHT JUST DIDN'T LAST AND YOU DIDN'T HAVE MONEY TO GET MORE.: NEVER TRUE

## 2022-01-01 SDOH — ECONOMIC STABILITY: TRANSPORTATION INSECURITY
IN THE PAST 12 MONTHS, HAS THE LACK OF TRANSPORTATION KEPT YOU FROM MEDICAL APPOINTMENTS OR FROM GETTING MEDICATIONS?: NO

## 2022-01-01 SDOH — ECONOMIC STABILITY: INCOME INSECURITY: IN THE LAST 12 MONTHS, WAS THERE A TIME WHEN YOU WERE NOT ABLE TO PAY THE MORTGAGE OR RENT ON TIME?: NO

## 2022-01-01 SDOH — HEALTH STABILITY: PHYSICAL HEALTH: ON AVERAGE, HOW MANY MINUTES DO YOU ENGAGE IN EXERCISE AT THIS LEVEL?: 0 MIN

## 2022-01-01 SDOH — ECONOMIC STABILITY: TRANSPORTATION INSECURITY
IN THE PAST 12 MONTHS, HAS LACK OF TRANSPORTATION KEPT YOU FROM MEETINGS, WORK, OR FROM GETTING THINGS NEEDED FOR DAILY LIVING?: NO

## 2022-01-01 SDOH — ECONOMIC STABILITY: FOOD INSECURITY: WITHIN THE PAST 12 MONTHS, YOU WORRIED THAT YOUR FOOD WOULD RUN OUT BEFORE YOU GOT MONEY TO BUY MORE.: NEVER TRUE

## 2022-01-01 SDOH — ECONOMIC STABILITY: INCOME INSECURITY: HOW HARD IS IT FOR YOU TO PAY FOR THE VERY BASICS LIKE FOOD, HOUSING, MEDICAL CARE, AND HEATING?: NOT HARD AT ALL

## 2022-01-01 ASSESSMENT — ENCOUNTER SYMPTOMS
WEAKNESS: 0
NAUSEA: 0
FREQUENCY: 0
COUGH: 0
FEVER: 0
JOINT SWELLING: 0
HEMATURIA: 0
MYALGIAS: 0
CHILLS: 0
CARDIOVASCULAR NEGATIVE: 1
NUMBNESS: 0
DIFFICULTY URINATING: 0
WEAKNESS: 0
DIZZINESS: 0
ABDOMINAL PAIN: 0
SORE THROAT: 0
FEVER: 0
DIZZINESS: 1
NECK PAIN: 0
FEVER: 0
PALPITATIONS: 0
HEADACHES: 0
PALPITATIONS: 0
NAUSEA: 0
NECK STIFFNESS: 0
NAUSEA: 1
VOMITING: 1
RESPIRATORY NEGATIVE: 1
CONSTIPATION: 0
DIARRHEA: 0
VOMITING: 0
ABDOMINAL PAIN: 0
PARESTHESIAS: 0
DIARRHEA: 0
BACK PAIN: 0
MYALGIAS: 0
DYSURIA: 0
HEARTBURN: 0
HEMATOCHEZIA: 0
COUGH: 0
EYE PAIN: 0
WEAKNESS: 1
DIARRHEA: 0
HEADACHES: 0
ARTHRALGIAS: 0
NERVOUS/ANXIOUS: 0
FREQUENCY: 0
SHORTNESS OF BREATH: 0
ARTHRALGIAS: 0
SPEECH DIFFICULTY: 0

## 2022-01-01 ASSESSMENT — ACTIVITIES OF DAILY LIVING (ADL)
ADLS_ACUITY_SCORE: 46
ADLS_ACUITY_SCORE: 44
ADLS_ACUITY_SCORE: 44
ADLS_ACUITY_SCORE: 40
ADLS_ACUITY_SCORE: 40
ADLS_ACUITY_SCORE: 44
ADLS_ACUITY_SCORE: 44
SWALLOWING: 2-->DIFFICULTY SWALLOWING FOODS
ADLS_ACUITY_SCORE: 38
ADLS_ACUITY_SCORE: 45
ADLS_ACUITY_SCORE: 42
ADLS_ACUITY_SCORE: 44
ADLS_ACUITY_SCORE: 42
ADLS_ACUITY_SCORE: 41
ADLS_ACUITY_SCORE: 36
ADLS_ACUITY_SCORE: 47
ADLS_ACUITY_SCORE: 40
ADLS_ACUITY_SCORE: 43
ADLS_ACUITY_SCORE: 35
ADLS_ACUITY_SCORE: 32
ADLS_ACUITY_SCORE: 44
ADLS_ACUITY_SCORE: 42
ADLS_ACUITY_SCORE: 44
ADLS_ACUITY_SCORE: 42
ADLS_ACUITY_SCORE: 46
ADLS_ACUITY_SCORE: 44
ADLS_ACUITY_SCORE: 40
ADLS_ACUITY_SCORE: 44
ADLS_ACUITY_SCORE: 44
ADLS_ACUITY_SCORE: 40
DIFFICULTY_EATING/SWALLOWING: YES
ADLS_ACUITY_SCORE: 46
ADLS_ACUITY_SCORE: 40
ADLS_ACUITY_SCORE: 35
ADLS_ACUITY_SCORE: 41
ADLS_ACUITY_SCORE: 47
ADLS_ACUITY_SCORE: 40
ADLS_ACUITY_SCORE: 36
ADLS_ACUITY_SCORE: 42
ADLS_ACUITY_SCORE: 44
ADLS_ACUITY_SCORE: 46
ADLS_ACUITY_SCORE: 42
ADLS_ACUITY_SCORE: 35
ADLS_ACUITY_SCORE: 47
ADLS_ACUITY_SCORE: 36
ADLS_ACUITY_SCORE: 51
DIFFICULTY_COMMUNICATING: NO
ADLS_ACUITY_SCORE: 45
ADLS_ACUITY_SCORE: 41
ADLS_ACUITY_SCORE: 46
ADLS_ACUITY_SCORE: 46
ADLS_ACUITY_SCORE: 44
ADLS_ACUITY_SCORE: 44
ADLS_ACUITY_SCORE: 40
ADLS_ACUITY_SCORE: 47
ADLS_ACUITY_SCORE: 42
ADLS_ACUITY_SCORE: 44
ADLS_ACUITY_SCORE: 51
ADLS_ACUITY_SCORE: 45
ADLS_ACUITY_SCORE: 39
ADLS_ACUITY_SCORE: 47
ADLS_ACUITY_SCORE: 44
ADLS_ACUITY_SCORE: 51
ADLS_ACUITY_SCORE: 40
ADLS_ACUITY_SCORE: 40
ADLS_ACUITY_SCORE: 43
ADLS_ACUITY_SCORE: 40
NUMBER_OF_TIMES_PATIENT_HAS_FALLEN_WITHIN_LAST_SIX_MONTHS: 1
EATING: 0-->INDEPENDENT
ADLS_ACUITY_SCORE: 35
ADLS_ACUITY_SCORE: 35
CONCENTRATING,_REMEMBERING_OR_MAKING_DECISIONS_DIFFICULTY: NO
ADLS_ACUITY_SCORE: 40
ADLS_ACUITY_SCORE: 47
ADLS_ACUITY_SCORE: 44
ADLS_ACUITY_SCORE: 40
ADLS_ACUITY_SCORE: 42
ADLS_ACUITY_SCORE: 40
ADLS_ACUITY_SCORE: 42
ADLS_ACUITY_SCORE: 35
CURRENT_FUNCTION: HOUSEWORK REQUIRES ASSISTANCE
ADLS_ACUITY_SCORE: 39
ADLS_ACUITY_SCORE: 40
WERE_AUXILIARY_AIDS_OFFERED?: YES
ADLS_ACUITY_SCORE: 47
TRANSFERRING: 1-->ASSISTANCE (EQUIPMENT/PERSON) NEEDED
ADLS_ACUITY_SCORE: 40
ADLS_ACUITY_SCORE: 47
ADLS_ACUITY_SCORE: 41
ADLS_ACUITY_SCORE: 40
ADLS_ACUITY_SCORE: 46
ADLS_ACUITY_SCORE: 39
ADLS_ACUITY_SCORE: 44
ADLS_ACUITY_SCORE: 41
ADLS_ACUITY_SCORE: 42
DRESSING/BATHING_DIFFICULTY: NO
ADLS_ACUITY_SCORE: 45
TOILETING_ISSUES: NO
ADLS_ACUITY_SCORE: 44
WEAR_GLASSES_OR_BLIND: YES
ADLS_ACUITY_SCORE: 42
ADLS_ACUITY_SCORE: 46
ADLS_ACUITY_SCORE: 40
ADLS_ACUITY_SCORE: 47
ADLS_ACUITY_SCORE: 51
FALL_HISTORY_WITHIN_LAST_SIX_MONTHS: YES
ADLS_ACUITY_SCORE: 32
ADLS_ACUITY_SCORE: 47
ADLS_ACUITY_SCORE: 41
ADLS_ACUITY_SCORE: 44
ADLS_ACUITY_SCORE: 47
ADLS_ACUITY_SCORE: 32
ADLS_ACUITY_SCORE: 38
ADLS_ACUITY_SCORE: 35
ADLS_ACUITY_SCORE: 45
ADLS_ACUITY_SCORE: 36
ADLS_ACUITY_SCORE: 42
ADLS_ACUITY_SCORE: 44
ADLS_ACUITY_SCORE: 40
ADLS_ACUITY_SCORE: 42
CURRENT_FUNCTION: SHOPPING REQUIRES ASSISTANCE
ADLS_ACUITY_SCORE: 41
ADLS_ACUITY_SCORE: 45
ADLS_ACUITY_SCORE: 44
ADLS_ACUITY_SCORE: 40
ADLS_ACUITY_SCORE: 41
ADLS_ACUITY_SCORE: 40
ADLS_ACUITY_SCORE: 32
ADLS_ACUITY_SCORE: 35
ADLS_ACUITY_SCORE: 45
ADLS_ACUITY_SCORE: 41
ADLS_ACUITY_SCORE: 40
ADLS_ACUITY_SCORE: 40
SWALLOWING: 2-->DIFFICULTY SWALLOWING FOODS
ADLS_ACUITY_SCORE: 43
CURRENT_FUNCTION: TRANSPORTATION REQUIRES ASSISTANCE
ADLS_ACUITY_SCORE: 40
ADLS_ACUITY_SCORE: 40
THE_FOLLOWING_AIDS_WERE_PROVIDED;: PATIENT DECLINED OFFER OF AUXILIARY AIDS
ADLS_ACUITY_SCORE: 43
ADLS_ACUITY_SCORE: 44
ADLS_ACUITY_SCORE: 40
ADLS_ACUITY_SCORE: 45
ADLS_ACUITY_SCORE: 36
ADLS_ACUITY_SCORE: 43
ADLS_ACUITY_SCORE: 43
ADLS_ACUITY_SCORE: 39
ADLS_ACUITY_SCORE: 51
ADLS_ACUITY_SCORE: 40
ADLS_ACUITY_SCORE: 44
ADLS_ACUITY_SCORE: 44
ADLS_ACUITY_SCORE: 41
ADLS_ACUITY_SCORE: 42
ADLS_ACUITY_SCORE: 46
ADLS_ACUITY_SCORE: 38
ADLS_ACUITY_SCORE: 40
ADLS_ACUITY_SCORE: 41
ADLS_ACUITY_SCORE: 42
ADLS_ACUITY_SCORE: 45
ADLS_ACUITY_SCORE: 42
ADLS_ACUITY_SCORE: 40
ADLS_ACUITY_SCORE: 40
ADLS_ACUITY_SCORE: 51
ADLS_ACUITY_SCORE: 35
ADLS_ACUITY_SCORE: 44
ADLS_ACUITY_SCORE: 40
ADLS_ACUITY_SCORE: 40
ADLS_ACUITY_SCORE: 36
ADLS_ACUITY_SCORE: 44
ADLS_ACUITY_SCORE: 42
ADLS_ACUITY_SCORE: 45
ADLS_ACUITY_SCORE: 44
ADLS_ACUITY_SCORE: 39
ADLS_ACUITY_SCORE: 36
ADLS_ACUITY_SCORE: 36
ADLS_ACUITY_SCORE: 51
ADLS_ACUITY_SCORE: 44
ADLS_ACUITY_SCORE: 44
EATING/SWALLOWING: SWALLOWING SOLID FOOD
ADLS_ACUITY_SCORE: 35
ADLS_ACUITY_SCORE: 40
ADLS_ACUITY_SCORE: 36
ADLS_ACUITY_SCORE: 51
ADLS_ACUITY_SCORE: 47
ADLS_ACUITY_SCORE: 32
ADLS_ACUITY_SCORE: 39
ADLS_ACUITY_SCORE: 43
ADLS_ACUITY_SCORE: 51
ADLS_ACUITY_SCORE: 40
ADLS_ACUITY_SCORE: 47
ADLS_ACUITY_SCORE: 46
ADLS_ACUITY_SCORE: 40
ADLS_ACUITY_SCORE: 36
ADLS_ACUITY_SCORE: 36
PATIENT'S_PREFERRED_MEANS_OF_COMMUNICATION: VERBAL
ADLS_ACUITY_SCORE: 46
ADLS_ACUITY_SCORE: 33
ADLS_ACUITY_SCORE: 44
ADLS_ACUITY_SCORE: 40
CHANGE_IN_FUNCTIONAL_STATUS_SINCE_ONSET_OF_CURRENT_ILLNESS/INJURY: YES
ADLS_ACUITY_SCORE: 40
ADLS_ACUITY_SCORE: 47
ADLS_ACUITY_SCORE: 42
ADLS_ACUITY_SCORE: 46
ADLS_ACUITY_SCORE: 40
ADLS_ACUITY_SCORE: 47
ADLS_ACUITY_SCORE: 44
ADLS_ACUITY_SCORE: 40
ADLS_ACUITY_SCORE: 47
ADLS_ACUITY_SCORE: 39
ADLS_ACUITY_SCORE: 36
ADLS_ACUITY_SCORE: 43
ADLS_ACUITY_SCORE: 42
WALKING_OR_CLIMBING_STAIRS: AMBULATION DIFFICULTY, REQUIRES EQUIPMENT;STAIR CLIMBING DIFFICULTY, ASSISTANCE 1 PERSON;TRANSFERRING DIFFICULTY, REQUIRES EQUIPMENT
ADLS_ACUITY_SCORE: 46
WALKING_OR_CLIMBING_STAIRS_DIFFICULTY: YES
ADLS_ACUITY_SCORE: 42
ADLS_ACUITY_SCORE: 44
ADLS_ACUITY_SCORE: 39
ADLS_ACUITY_SCORE: 44
ADLS_ACUITY_SCORE: 32
ADLS_ACUITY_SCORE: 44
ADLS_ACUITY_SCORE: 32
ADLS_ACUITY_SCORE: 46
HEARING_DIFFICULTY_OR_DEAF: YES
ADLS_ACUITY_SCORE: 42
ADLS_ACUITY_SCORE: 41
ADLS_ACUITY_SCORE: 40
ADLS_ACUITY_SCORE: 44
TRANSFERRING: 1-->ASSISTANCE (EQUIPMENT/PERSON) NEEDED (NOT DEVELOPMENTALLY APPROPRIATE)
ADLS_ACUITY_SCORE: 45
ADLS_ACUITY_SCORE: 41
ADLS_ACUITY_SCORE: 35
ADLS_ACUITY_SCORE: 40
ADLS_ACUITY_SCORE: 44
ADLS_ACUITY_SCORE: 44
ADLS_ACUITY_SCORE: 39
ADLS_ACUITY_SCORE: 35
VISION_MANAGEMENT: GLASSES
ADLS_ACUITY_SCORE: 46
DOING_ERRANDS_INDEPENDENTLY_DIFFICULTY: YES
ADLS_ACUITY_SCORE: 42
ADLS_ACUITY_SCORE: 41
ADLS_ACUITY_SCORE: 41
ADLS_ACUITY_SCORE: 51
ADLS_ACUITY_SCORE: 44
ADLS_ACUITY_SCORE: 39
ADLS_ACUITY_SCORE: 36
ADLS_ACUITY_SCORE: 44
ADLS_ACUITY_SCORE: 40
ADLS_ACUITY_SCORE: 36
ADLS_ACUITY_SCORE: 41
ADLS_ACUITY_SCORE: 44
ADLS_ACUITY_SCORE: 44
EATING: 0-->INDEPENDENT
ADLS_ACUITY_SCORE: 47
ADLS_ACUITY_SCORE: 45
ADLS_ACUITY_SCORE: 42
ADLS_ACUITY_SCORE: 42
ADLS_ACUITY_SCORE: 44
ADLS_ACUITY_SCORE: 39
ADLS_ACUITY_SCORE: 35
ADLS_ACUITY_SCORE: 36
ADLS_ACUITY_SCORE: 32

## 2022-01-01 ASSESSMENT — PAIN SCALES - GENERAL: PAINLEVEL: NO PAIN (0)

## 2022-01-01 ASSESSMENT — LIFESTYLE VARIABLES
HOW OFTEN DO YOU HAVE SIX OR MORE DRINKS ON ONE OCCASION: NEVER
HOW MANY STANDARD DRINKS CONTAINING ALCOHOL DO YOU HAVE ON A TYPICAL DAY: 1 OR 2
AUDIT-C TOTAL SCORE: 1
SKIP TO QUESTIONS 9-10: 1
HOW OFTEN DO YOU HAVE A DRINK CONTAINING ALCOHOL: MONTHLY OR LESS

## 2022-01-01 ASSESSMENT — SOCIAL DETERMINANTS OF HEALTH (SDOH)
IN A TYPICAL WEEK, HOW MANY TIMES DO YOU TALK ON THE PHONE WITH FAMILY, FRIENDS, OR NEIGHBORS?: MORE THAN THREE TIMES A WEEK
DO YOU BELONG TO ANY CLUBS OR ORGANIZATIONS SUCH AS CHURCH GROUPS UNIONS, FRATERNAL OR ATHLETIC GROUPS, OR SCHOOL GROUPS?: NO
HOW OFTEN DO YOU ATTEND CHURCH OR RELIGIOUS SERVICES?: NEVER
HOW OFTEN DO YOU GET TOGETHER WITH FRIENDS OR RELATIVES?: MORE THAN THREE TIMES A WEEK

## 2022-02-18 NOTE — TELEPHONE ENCOUNTER
Routing refill request to provider for review/approval because:  Labs not current:  CR, K  Elevated BP    BP Readings from Last 3 Encounters:   09/01/21 (!) 144/80   01/20/21 130/74   12/18/19 108/68     Mary Anderson RN on 2/18/2022 at 3:59 PM

## 2022-02-22 NOTE — TELEPHONE ENCOUNTER
Routing refill request to provider for review/approval because:  Labs not current:  platelets, creatinine clearance, cr++< 1.4 in past 3 months  Valarie Richardson RN, BSN  [facility]

## 2022-02-25 NOTE — TELEPHONE ENCOUNTER
Routing refill request to provider for review/approval because:  Alicja given x1 and patient did not follow up, please advise  Labs not current:  CR, LDL, ALT  Elevated BP    BP Readings from Last 3 Encounters:   09/01/21 (!) 144/80   01/20/21 130/74   12/18/19 108/68     Mary Anderson RN on 2/25/2022 at 11:41 AM

## 2022-03-15 NOTE — NURSING NOTE
"Chief Complaint   Patient presents with     Diabetes       Initial /68 (Cuff Size: Adult Large)  Pulse 52  Temp 97.7  F (36.5  C) (Oral)  Ht 5' 4.56\" (1.64 m)  Wt 252 lb 12.8 oz (114.7 kg)  BMI 42.64 kg/m2 Estimated body mass index is 42.64 kg/(m^2) as calculated from the following:    Height as of this encounter: 5' 4.56\" (1.64 m).    Weight as of this encounter: 252 lb 12.8 oz (114.7 kg).  Medication Reconciliation: complete   Patinece Henry LPN    "
none

## 2022-03-16 NOTE — PROGRESS NOTES
"Assessment & Plan     1. Type 2 diabetes mellitus with complication, without long-term current use of insulin (H)  CKD Stage 3  Tolerating meds well; last A1c was within goal range at 7.7%. Will recheck today, continue meds for now pending A1c.   - Hemoglobin A1c  - Comprehensive metabolic panel (BMP + Alb, Alk Phos, ALT, AST, Total. Bili, TP)  - Albumin Random Urine Quantitative with Creat Ratio  - Patient to schedule eye exam at outside clinic, will have records faxed    2. Hyperlipidemia LDL goal <100  Continue statin.  - Lipid panel reflex to direct LDL Fasting  - Comprehensive metabolic panel (BMP + Alb, Alk Phos, ALT, AST, Total. Bili, TP)    3. Persistent atrial fibrillation (H)  4. H/O ischemic left MCA stroke  Rate controlled on metoprolol, diltiazem. Tolerating anti-coagulation well, will continue.  - metoprolol tartrate (LOPRESSOR) 100 MG tablet; Take 1 tablet (100 mg) by mouth 2 times daily  Dispense: 180 tablet; Refill: 3  - CBC with platelets    5. Bilateral hearing loss, unspecified hearing loss type  Patient and daughter concerned about hearing loss worsening recently; has never been formally evaluated by audiology. Will refer today.  - Adult Audiology Referral; Future    6. Benign hypertension  Elevated today on lisinopril 2.5 mg daily. Will increase to 5 mg and have her continue ambulatory monitoring; will check in w/ nurse visit via phone in a few weeks to monitor given history of low BPs.     BMI:   Estimated body mass index is 40.77 kg/m  as calculated from the following:    Height as of 9/1/21: 1.62 m (5' 3.78\").    Weight as of this encounter: 107 kg (235 lb 14.4 oz).   Weight management plan: Discussed healthy diet and exercise guidelines    Return on 5/31 (scheduled) for Routine preventive.    Monalisa Tovar MD  [facility] NIRMALA Rabago is a 77 year old who presents for the following health issues  accompanied by her daughter.    HPI     Diabetes Follow-up    How " often are you checking your blood sugar? A few times a week  What time of day are you checking your blood sugars (select all that apply)?  Right way in the morning before eating   Have you had any blood sugars above 200?  No  Have you had any blood sugars below 70?  No; blood sugars around 120s    What symptoms do you notice when your blood sugar is low?  None and Not applicable    What concerns do you have today about your diabetes? None     Do you have any of these symptoms? (Select all that apply)  No numbness or tingling in feet.  No redness, sores or blisters on feet.  No complaints of excessive thirst.  No reports of blurry vision.  No significant changes to weight.    Have you had a diabetic eye exam in the last 12 months? Yes-  Location: last eye exam was a year ago, [facility] - will call and make appointment     Trulicity once weekly, glipizide daily. Tolerating both well, no GI side effects. No low blood sugars.    Hyperlipidemia Follow-Up      Are you regularly taking any medication or supplement to lower your cholesterol?   Yes- Pravastatin 40 MG     Are you having muscle aches or other side effects that you think could be caused by your cholesterol lowering medication?  No    Hypertension Follow-up      Do you check your blood pressure regularly outside of the clinic? Yes     Are you following a low salt diet? Yes    Are your blood pressures ever more than 140 on the top number (systolic) OR more   than 90 on the bottom number (diastolic), for example 140/90? Yes     Occasionally checks blood pressure at home - 130s-140s/70-80s. Taking lisinopril 2.5 mg daily, no issues. Had low BPs after stroke years ago, was on lisinopril 20 mg at the time.    BP Readings from Last 2 Encounters:   03/16/22 (!) 152/80   09/01/21 (!) 144/80     Hemoglobin A1C POCT (%)   Date Value   01/20/2021 8.5 (H)   07/10/2020 8.5 (H)     Hemoglobin A1C (%)   Date Value   03/16/2022 7.5 (H)   09/01/2021 7.7 (H)     LDL  Cholesterol Calculated (mg/dL)   Date Value   01/20/2021 59   12/18/2019 76       How many servings of fruits and vegetables do you eat daily?  2-3    On average, how many sweetened beverages do you drink each day (Examples: soda, juice, sweet tea, etc.  Do NOT count diet or artificially sweetened beverages)?   0    How many days per week do you exercise enough to make your heart beat faster? 3 or less    How many minutes a day do you exercise enough to make your heart beat faster? 9 or less    How many days per week do you miss taking your medication? 0    Review of Systems   Constitutional, HEENT, cardiovascular, pulmonary, gi and gu systems are negative, except as otherwise noted.      Objective    BP (!) 152/80   Pulse 64   Temp 97.6  F (36.4  C)   Wt 107 kg (235 lb 14.4 oz)   SpO2 97%   BMI 40.77 kg/m    Body mass index is 40.77 kg/m .  Physical Exam   GENERAL: healthy, alert and no distress  RESP: lungs clear to auscultation - no rales, rhonchi or wheezes  CV: irregularly irregular rhythm, normal rate; no lower extremity edema  ABDOMEN: soft, nontender, no hepatosplenomegaly, no masses and bowel sounds normal  MS: no gross musculoskeletal defects noted, no edema  NEURO: normal strength and tone, mentation intact and speech normal  PSYCH: mentation appears normal, affect normal/bright    Results for orders placed or performed in visit on 03/16/22 (from the past 24 hour(s))   Hemoglobin A1c   Result Value Ref Range    Hemoglobin A1C 7.5 (H) 0.0 - 5.6 %   CBC with platelets   Result Value Ref Range    WBC Count 8.0 4.0 - 11.0 10e3/uL    RBC Count 4.08 3.80 - 5.20 10e6/uL    Hemoglobin 14.1 11.7 - 15.7 g/dL    Hematocrit 42.3 35.0 - 47.0 %     (H) 78 - 100 fL    MCH 34.6 (H) 26.5 - 33.0 pg    MCHC 33.3 31.5 - 36.5 g/dL    RDW 11.7 10.0 - 15.0 %    Platelet Count 197 150 - 450 10e3/uL       Physician Attestation   I, Lm Miller MD, discussed the patient with the resident during the  patient s visit on Mar 16, 2022, and agree with the resident s assessment and plan of care.  I was immediately available to the patient should the need have arisen.  Adjusted lisinopril as above. Has upcoming wellness with PCP in 2 months.

## 2022-03-16 NOTE — PATIENT INSTRUCTIONS
It was nice to meet you today, Hima.    For your diabetes, continue your current meds and we'll let you know how your A1c looks today. When you get your eye exam done, have them fax us your records.    For your blood pressure, increase your lisinopril to 5 mg daily and keep checking your blood pressure at home as you have been. Write down your numbers and we'll have a nurse call you to check in on your blood pressure in a few weeks.    You have an appointment with Dr. Joy scheduled for May 31st - you can keep this appointment and check in with her then as well.

## 2022-03-16 NOTE — TELEPHONE ENCOUNTER
Prescription approved per [facility] Refill Protocol.  Supriya Rosenthal RN

## 2022-03-18 NOTE — TELEPHONE ENCOUNTER
Please call pt in 2 weeks and see how increase in lisinopril is going. Can enter in pt reported vitals as well.     DERICK Miller MD  Internal Medicine-Pediatrics

## 2022-03-18 NOTE — TELEPHONE ENCOUNTER
Received a call from lab that Folate cannot be added to previously drawn labs, as sample must be frozen right away.  Patient will need to make separate appointment for re-draw.  Message was left for patient to schedule new lab appointment.  Provided clinic number.    Noelle Ramirez  Lead

## 2022-03-18 NOTE — RESULT ENCOUNTER NOTE
MICHAEL to Dr. Joy - added folate, if nl can do peripheral smear w/ you this spring and recheck BMP.     ---------------------    Hi Ms. Griffiths,     I am one of Dr. Tovar's supervising physicians and am reviewing her results while she is out of clinic today. Enclosed are your lab results.     Your cholesterol looks good - please keep taking your medication.     Your hemoglobin a1c (average blood sugar over the last 3 months) looks good. Your goal is <8%.     Your blood counts look okay - your red blood cells are a little larger than normal. I added on a few vitamin levels to make sure you don't have any deficiencies.     Your electrolytes look okay - your bicarb (Co2) was just slightly low - this is something Dr. Joy can check when she sees you this spring.     Please let me know if you have any questions,   E. Óscar Miller MD  Internal Medicine-Pediatrics

## 2022-03-21 NOTE — TELEPHONE ENCOUNTER
Pt calling in regarding requested forms, pt states has forms to be filled out by PCP for pt to receive free dulaglutide (TRULICITY) 1.5 MG/0.5ML pen  medication. Pt understands that provider is out of clinic and can take up to 7 days for forms to be completed. Pt states she cannot receive medication until form is completed. Pt states she will drop off forms at . Will route to PCP.    Bear FOWLER RN

## 2022-03-24 NOTE — TELEPHONE ENCOUNTER
Form has been signed by Dr. Gutierrez. Form has been faxed to [facility]. Left message for patient that we are mailing original to her and also sending copy to abstracting.

## 2022-04-01 NOTE — TELEPHONE ENCOUNTER
Reviewed - please f/up with pt in 2-4 weeks to see how lisinopril increase has gone.     E. Óscar Miller MD  Internal Medicine-Pediatrics

## 2022-04-01 NOTE — TELEPHONE ENCOUNTER
Call placed to pt with message from provider    Pt reports that she not increased her Lisinopril dose yet.  She did elaborate as to why    A nurse comes in 2 times a month to check her BP but pt does not know what it is. She thinks in might have been 120/80?    Pt does have a new BP cuff at home but does not have batteries for it yet    Advised to pt to start taking the increased dose of Lisinopril today and get batteries for her cuff  Also advised to write down her BP's with the date that it was checked    Routing back to provider    Thank you  Pam Wiley RN on 4/1/2022 at 8:55 AM

## 2022-04-15 NOTE — TELEPHONE ENCOUNTER
"Called and spoke with patient, per patient she did not start increased dose right away, started lisinopril increase 1 week ago. No side effects per patient. \"When I checked my numbers were good, not great, but good\". Last checked blood pressure on Tuesday 4/12/22 BP = 140/79. Patient reportedly needs to get batteries for her home BP monitor, daughter will be coming today and could potentially bring her batteries. RN encourage patient to obtain batteries to continue to monitor BP at home. RN encouraged patient to contact clinic if develops any side effects. Patient verbalized understanding and agreed with plan. Routing to update provider.     Riley VIZCARRA RN    "

## 2022-04-15 NOTE — TELEPHONE ENCOUNTER
Sounds good- since it hasn't been that long and I'm not sure about her cuff can we check in again in 2 weeks?    Thanks!  DERICK Miller MD  Internal Medicine-Pediatrics

## 2022-05-11 NOTE — TELEPHONE ENCOUNTER
Call placed to pt to see what her BP's have been  Left detailed message    Thank you  Pam Wiley RN on 5/11/2022 at 9:24 AM

## 2022-05-13 NOTE — TELEPHONE ENCOUNTER
BP's entered  Call placed to pt to let her know that there are no med changes  Pt verbalized understanding and agrees to the plan    Thank you  Pam Wiley RN on 5/13/2022 at 10:40 AM

## 2022-05-13 NOTE — TELEPHONE ENCOUNTER
Call placed to pt  Bp readings  5/13/22 117/83  5/12/2    120/80  5/10/22 117/75    Pt did not write any more down but stated that they are all around those same numbers    Routing to provider    Thank you  Pam Wiley RN on 5/13/2022 at 10:21 AM

## 2022-05-13 NOTE — TELEPHONE ENCOUNTER
These look great!     Continue meds as prescribed.     Please add to pt reported vitals.     DERICK Miller MD  Internal Medicine-Pediatrics

## 2022-05-20 NOTE — TELEPHONE ENCOUNTER
General Call:     Who is calling:  Patient  Reason for Call:  Sore foot    What are your questions or concerns:  Patient has a sore foot and wants to talk to an RN to see what she should do. Please call her back at [phone].    Date of last appointment with provider: 3/16/22    Okay to leave a detailed message:Yes at Home number on file [phone] (home)

## 2022-05-20 NOTE — TELEPHONE ENCOUNTER
"Call placed to pt to get more information  Pt has pain on the top of her left foot for an unknown length of time    Pt is wanting to be seen in clinic on either a Friday or Tuesday so her daughter can drive her  Appt made for Tuesday May 24th    Pt verbalized understanding and agrees to the plan    Thank you  Pam Wiley RN on 5/20/2022 at 10:43 AM  Reason for Disposition    Minor ankle or foot injury    Answer Assessment - Initial Assessment Questions  1. MECHANISM: \"How did the injury happen?\" (e.g., twisting injury, direct blow)       No injury. Pt has had pain for an unknown time  2. ONSET: \"When did the injury happen?\" (Minutes or hours ago)       See above  3. LOCATION: \"Where is the injury located?\"       Top of left foot.  4. APPEARANCE of INJURY: \"What does the injury look like?\"       Looks normal  5. WEIGHT-BEARING: \"Can you put weight on that foot?\" \"Can you walk (four steps or more)?\"        Hurts when she bears weight  6. SIZE: For cuts, bruises, or swelling, ask: \"How large is it?\" (e.g., inches or centimeters;  entire joint)       no  7. PAIN: \"Is there pain?\" If so, ask: \"How bad is the pain?\"    (e.g., Scale 1-10; or mild, moderate, severe)      6-7/10  8. TETANUS: For any breaks in the skin, ask: \"When was the last tetanus booster?\"      NA  9. OTHER SYMPTOMS: \"Do you have any other symptoms?\"       no  10. PREGNANCY: \"Is there any chance you are pregnant?\" \"When was your last menstrual period?\"        NA    Protocols used: ANKLE AND FOOT INJURY-A-[place]      "

## 2022-05-27 NOTE — TELEPHONE ENCOUNTER
Thank you!  New dose of 5mg sent to pharmacy.  Please notify the patient to pay close attention to the new dose so she doesn't take two pills.    Lilo Gallo MD  Internal Medicine/Pediatrics  [facility]

## 2022-05-27 NOTE — TELEPHONE ENCOUNTER
Dose on med list historical and different than requested refill.  Routing to provider.  Cora Cadet RN

## 2022-05-27 NOTE — TELEPHONE ENCOUNTER
Called and informed patient of Dr. Gallo's message below. Patient will make sure to read the label of her medication bottle and take as prescribed.     Mary Anderson RN on 5/27/2022 at 3:56 PM

## 2022-05-27 NOTE — TELEPHONE ENCOUNTER
Patient takes 2, 2.5mg lisinopril tablets for a total of 5mg of lisinopril daily. Patient has been on this increased dosage for the past few months. The last few times she has checked her BP it was within normal limits.     Mary Anderson RN on 5/27/2022 at 2:22 PM

## 2022-06-16 PROBLEM — S09.90XA CLOSED HEAD INJURY, INITIAL ENCOUNTER: Status: ACTIVE | Noted: 2022-01-01

## 2022-06-16 PROBLEM — R53.1 WEAKNESS: Status: ACTIVE | Noted: 2022-01-01

## 2022-06-16 PROBLEM — N39.0 URINARY TRACT INFECTION IN ELDERLY PATIENT: Status: ACTIVE | Noted: 2022-01-01
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
## 2022-06-16 PROBLEM — R73.9 HYPERGLYCEMIA: Status: ACTIVE | Noted: 2022-01-01

## 2022-06-16 NOTE — ED NOTES
Bed: ED29  Expected date:   Expected time:   Means of arrival: Ambulance  Comments:  [id number]. [id number]. Weak, fall

## 2022-06-16 NOTE — ED TRIAGE NOTES
Pt arrives by EMS with c collar r/t fall. Pt reports legs just gave out because she has felt weak for a week. Her daughter called the apartments she lived at and had them do a safety check. Found on floor. .     Triage Assessment     Row Name 06/16/22 4309       Triage Assessment (Adult)    Airway WDL WDL       Respiratory WDL    Respiratory WDL WDL       Skin Circulation/Temperature WDL    Skin Circulation/Temperature WDL WDL       Cardiac WDL    Cardiac WDL WDL       Peripheral/Neurovascular WDL    Peripheral Neurovascular WDL X  c spine

## 2022-06-16 NOTE — ED PROVIDER NOTES
History     Chief Complaint:  Fall (Weakness) and Generalized Weakness     HPI   Hima Griffiths is a 77 year old female who presents via EMS for evaluation after a fall. The patient states that she has been sick for about a 6 days with symptoms starting as nausea and vomiting with subsequent diarrhea. This cleared up within the first few days of her illness, but she has continued to feel weak even afterwards. She has tried to have good PO intake this week, but isn't sure how much she has succeeded. She was getting up from a chair today on her way to her computer when she slowly lost strength and fell to the floor without losing consciousness or hitting her head, she believes. Her daughter noticed she was not answering her phone, so sent a wellness check and they found her on the ground. She says she was laying there for about 2 hours. On arrival, she remembers the whole event and feels weak but has no pain. No chest pain, abdominal pain, NVD, fevers, cough, or other complaints.     ROS:  Review of Systems   Constitutional: Negative for fever.   Respiratory: Negative for cough.    Cardiovascular: Negative for chest pain and palpitations.   Gastrointestinal: Negative for abdominal pain, diarrhea, nausea and vomiting.   Genitourinary: Negative for difficulty urinating and frequency.   Musculoskeletal: Negative for arthralgias, back pain, myalgias, neck pain and neck stiffness.   Neurological: Positive for weakness.   All other systems reviewed and are negative.    Allergies:  Alkylamines  Sudafed [Pseudoephedrine]     Medications:    ACE/ARB NOT PRESCRIBED, INTENTIONAL,  ASPIRIN NOT PRESCRIBED (INTENTIONAL)  blood glucose (NO BRAND SPECIFIED) lancets standard  blood glucose (NO BRAND SPECIFIED) test strip  blood glucose monitoring (NO BRAND SPECIFIED) meter device kit  clotrimazole (LOTRIMIN) 1 % external cream  diltiazem (CARDIZEM) 30 MG tablet  dulaglutide (TRULICITY) 1.5 MG/0.5ML pen  glipiZIDE (GLUCOTROL XL)  10 MG 24 hr tablet  lisinopril (ZESTRIL) 2.5 MG tablet  lisinopril (ZESTRIL) 5 MG tablet  metoprolol tartrate (LOPRESSOR) 100 MG tablet  pravastatin (PRAVACHOL) 40 MG tablet  rivaroxaban ANTICOAGULANT (XARELTO ANTICOAGULANT) 20 MG TABS tablet        Past Medical History:    Past Medical History:   Diagnosis Date     Benign hypertension      Benign paroxysmal positional vertigo      Colitis 11/28/2016     Diabetes mellitus (H)      Family history of atrial fibrillation      Osteoarthritis of right knee, unspecified osteoarthritis type 8/2/2019     Persistent atrial fibrillation (H) 11/2015     Stroke (H) 10/2015       Past Surgical History:    Past Surgical History:   Procedure Laterality Date     AS LOOP RECORDER IMPLANT  11/9/15     CATARACT IOL, RT/LT  8/13/14        Family History:    family history includes Arrhythmia in her mother; Coronary Artery Disease in her father; Hypertension in her father and mother.    Social History:   reports that she quit smoking about 15 years ago. She started smoking about 58 years ago. She has never used smokeless tobacco. She reports current alcohol use.  PCP: Georgiana Joy     Physical Exam     Patient Vitals for the past 24 hrs:   BP Temp Temp src Pulse Resp SpO2   06/16/22 1715 (!) 167/102 -- -- 95 9 98 %   06/16/22 1700 -- 97.3  F (36.3  C) Temporal -- -- --   06/16/22 1500 (!) 148/79 -- -- 90 11 98 %   06/16/22 1426 (!) 146/123 -- -- 86 (!) 38 99 %   06/16/22 1411 (!) 132/92 -- -- -- -- 94 %   06/16/22 1356 (!) 143/59 -- -- 78 18 98 %      Physical Exam  General: Alert, appears elderly, otherwise, well-developed and well-nourished. Cooperative.     In mild distress  HEENT:  Head:  Atraumatic  Ears:  External ears are normal  Mouth/Throat:  Oropharynx is without erythema or exudate and mucous membranes are dry.   Eyes:   Conjunctivae normal and EOM are normal. No scleral icterus.    Pupils are equal, round, and reactive to light.   Neck:   Normal range of motion. Neck  supple.  CV:  Irregular rate, irregular rhythm, normal heart sounds and radial pulses are 2+ and symmetric.  No murmur.  Resp:  Breath sounds are clear bilaterally    Non-labored, no retractions or accessory muscle use  GI:  Abdomen is soft, no distension, no tenderness. No rebound or guarding.  No CVA tenderness bilaterally  MS:  Normal range of motion. No edema.    Back atraumatic.    No midline cervical, thoracic, or lumbar tenderness  Skin:  Warm and dry.  No rash or lesions noted.  Neuro: Alert. Globally weak.  Sensation intact in all 4 extremities. GCS: 15  Psych:  Normal mood and affect.    Emergency Department Course   ECG:  ECG results from 06/16/22   EKG 12-lead, tracing only     Value    Systolic Blood Pressure     Diastolic Blood Pressure     Ventricular Rate 98    Atrial Rate 92    AL Interval     QRS Duration 90        QTc 472    P Axis     R AXIS -3    T Axis 26    Interpretation ECG      Atrial fibrillation  Nonspecific ST abnormality  Abnormal ECG  No previous ECGs available  No significant change in comparison with EKG dated 7/19/2018     Imaging:  CT Cervical Spine w/o Contrast   Final Result      CT Head w/o Contrast   Final Result      Report per radiology    Laboratory:  Labs Ordered and Resulted from Time of ED Arrival to Time of ED Departure   COMPREHENSIVE METABOLIC PANEL - Abnormal       Result Value    Sodium 135      Potassium 4.8      Chloride 100      Carbon Dioxide (CO2) 24      Anion Gap 11      Urea Nitrogen 24      Creatinine 0.75      Calcium 8.9      Glucose 479 (*)     Alkaline Phosphatase 112      AST 35      ALT 43      Protein Total 7.3      Albumin 3.2 (*)     Bilirubin Total 1.0      GFR Estimate 81     ROUTINE UA WITH MICROSCOPIC REFLEX TO CULTURE - Abnormal    Color Urine Yellow      Appearance Urine Slightly Cloudy (*)     Glucose Urine >=1000 (*)     Bilirubin Urine Negative      Ketones Urine Trace (*)     Specific Gravity Urine 1.035      Blood Urine Small (*)      pH Urine 5.0      Protein Albumin Urine 10  (*)     Urobilinogen Urine Normal      Nitrite Urine Negative      Leukocyte Esterase Urine Moderate (*)     Mucus Urine Present (*)     RBC Urine 7 (*)     WBC Urine 54 (*)     Squamous Epithelials Urine 1      Hyaline Casts Urine 1     CBC WITH PLATELETS AND DIFFERENTIAL - Abnormal    WBC Count 15.4 (*)     RBC Count 4.80      Hemoglobin 16.3 (*)     Hematocrit 48.7 (*)      (*)     MCH 34.0 (*)     MCHC 33.5      RDW 11.9      Platelet Count 216      % Neutrophils 85      % Lymphocytes 8      % Monocytes 6      % Eosinophils 0      % Basophils 0      % Immature Granulocytes 1      NRBCs per 100 WBC 0      Absolute Neutrophils 13.1 (*)     Absolute Lymphocytes 1.2      Absolute Monocytes 1.0      Absolute Eosinophils 0.0      Absolute Basophils 0.0      Absolute Immature Granulocytes 0.1      Absolute NRBCs 0.0     TROPONIN I - Normal    Troponin I High Sensitivity 12     TSH WITH FREE T4 REFLEX - Normal    TSH 1.74     INFLUENZA A/B & SARS-COV2 PCR MULTIPLEX - Normal    Influenza A PCR Negative      Influenza B PCR Negative      RSV PCR Negative      SARS CoV2 PCR Negative     CK TOTAL   URINE CULTURE      Procedures     Emergency Department Course:     Reviewed:  I reviewed nursing notes, vitals and past medical history    Assessments:  1400 I obtained history and examined the patient as noted above.   1745 I rechecked the patient and explained findings.     Consults:   None    Interventions:  Medications   cefTRIAXone (ROCEPHIN) 2 g vial to attach to  ml bag for ADULTS or NS 50 ml bag for PEDS (has no administration in time range)   0.9% sodium chloride BOLUS (0 mLs Intravenous Stopped 6/16/22 1723)      Disposition:  The patient was admitted to the hospital under the care of Dr. Waldrop.     Impression & Plan    CMS Diagnoses: None    Medical Decision Making:  Hima Griffiths is a 77 year old female who presents for evaluation of a fall.  This was  an unwitnessed fall and patient has associated weakness.  Thankfully this is global weakness and not focal.  Patient had CT imaging of the head and neck which both were unremarkable for acute traumatic injuries.  She had no traumatic injuries identified elsewhere on examination.  EKG showed normal sinus rhythm with no concerning ischemic changes in comparison with historic EKGs.  Troponin within normal range and lower concern for ACS particularly with no chest pain or shortness of breath.  Urinalysis highly suspicious for urinary tract infection.  Urine culture in process the patient was given a dose of ceftriaxone due to concerns for UTI.  Patient was also found to be hyperglycemic but reassuringly blood work does not show any evidence of acidosis much lower concern for other sinister etiology as cause of her hypoglycemia.  She was resuscitated with IV fluids and antibiotics.  Patient continues to feel globally weak and unable to care for self at her independent senior living facility.  She will be admitted for observation for further antibiotics, rehydration, and physical therapy evaluation.  Spoke with Dr. Waldrop who agreed to admission.     Diagnosis:    ICD-10-CM    1. Urinary tract infection in elderly patient  N39.0    2. Fall, initial encounter  W19.XXXA    3. Closed head injury, initial encounter  S09.90XA    4. Weakness  R53.1    5. Hyperglycemia  R73.9       Discharge Medications:  New Prescriptions    No medications on file      6/16/2022   Daryl Marley MD White, Scott, MD  06/16/22 9770

## 2022-06-16 NOTE — TELEPHONE ENCOUNTER
"S-(situation): RN calling to triage patient's symptoms, call placed to daughter in response to MyC message received, CTC on file.     B-(background): Patient's daughter sent MyC message regarding patient. Per [facility] message: \"I wanted to send a message for guidance in regards to my mom. First of all, she threw up twice on Saturday and missed her grand daughters wedding - she was nauseated for a few days and had a little bit of diarrhea. That s fine now since Monday but she has continued to have light headed and also feels very weak. Her voice is off and today she said she feels too weak to walk. She also couldn t remember taking her bottom teeth out or find them but I easily found them in the bathroom and on the floor. Something seems off to me and I m wondering if she should be seen.     Second- she has weird things periodically with her voice being hoarse and also swallowing issues. Kind of chokes on her food once in a while. I m wondering if she should see a GI and if so, any suggestions?     Thanks for your thoughts and insights.\"     A-(assessment): Per patient's daughter, \"Something just seems off to me. We were out friday night at a pre-wedding celebration, she didn't feel well. The ended up throwing up in the morning twice\". Patient experienced a \"small amount\" of diarrhea on Monday. No vomiting or diarrhea since. Patient's daughter notes she still feels nauseous and weak, dizzy, lightheaded. Per patient's daughter, \"she just seems really off and forgetful\". Per daughter, patient did not feel able to go down to breakfast at her living facility, feels really unsteady and weak upon standing.     R-(recommendations): Per protocol, based on daughter's report of symptoms, advised ED. Patient's daugther states \"we were already leaning that way, I think you're right\". Patient's daughter planning to bring patient to ED today. No further questions at this time.     Reason for Disposition    SEVERE dizziness (e.g., " unable to stand, requires support to walk, feels like passing out now)    Additional Information    Negative: Chest pain    Negative: Rectal bleeding, bloody stool, or tarry-black stool    Negative: Vomiting is the main symptom    Negative: Diarrhea is the main symptom    Negative: Headache is the main symptom    Negative: Heat exhaustion suspected (i.e., dehydration from heat exposure)    Negative: Patient states that he/she is having an anxiety/panic attack    Negative: Shock suspected (e.g., cold/pale/clammy skin, too weak to stand, low BP, rapid pulse)    Negative: Difficult to awaken or acting confused (e.g., disoriented, slurred speech)    Negative: Fainted, and still feels dizzy afterwards    Negative: Severe difficulty breathing (e.g., struggling for each breath, speaks in single words)    Negative: Overdose (accidental or intentional) of medications    Negative: New neurologic deficit that is present now: * Weakness of the face, arm, or leg on one side of the body * Numbness of the face, arm, or leg on one side of the body * Loss of speech or garbled speech    Negative: Heart beating < 50 beats per minute OR > 140 beats per minute    Negative: Sounds like a life-threatening emergency to the triager    Protocols used: JOHN-ROSIBEL VIZCARRA RN

## 2022-06-17 NOTE — PROGRESS NOTES
"Clinical Swallow Evaluation (CSE):     Recommendations: soft/bite sized diet (IDDSI 6), thin liquids (IDDSI 0) when fully upright, frequent liquid wash to assist in pharyngoesopahgeal clearance.       06/17/22 1881   General Information   Onset of Illness/Injury or Date of Surgery 06/16/22   Referring Physician Dr. Slade   Patient/Family Therapy Goal Statement (SLP) Did not state   Pertinent History of Current Problem \"Hima Griffiths is a 77 year old female admitted on 6/16/2022. She has a past medical history significant for atrial fibrillation, previous stroke, osteoarthritis, hypertension, hyperlipidemia, vertigo, and diabetes mellitus type 2.  She began having nausea, vomiting, diarrhea 6 days ago.  Has been feeling weak.  Daughter is noted that she has been started to get confused.  Had a fall earlier today.  Was unable to get up off the ground.  Brought to emergency room for further evaluation.  Found to have urinary tract infection.\"     Currently admitted with UTI, acute encephalopathy.     Pt also reported dysphagia within DO's H&P note \"She does note that she has been having problems feeling like she is sometimes choking when she swallows.  Has not been drinking as much fluid as she probably needs to\" and therefore SLP consulted.     General Observations Pt alert, upright in bed, pleasant   Past History of Dysphagia   Remote hx of SLP services in 2015 following L MCA CVA with VFSS completed at that time. Premature bolus spillage noted though complete epiglottic inversion. Penetration with thin liquids, chin tuck effective. Pt discharged from hospital on regular/thin wtih chin tuck. Today, pt reports solid food dysphagia specifically with dryer/harder foods such as breads, salads, meats and typically avoids them when provided at her facility. She denies any difficulty with liquids. States she is very careful and takes her time when eating/drinking.     Pain Assessment   Patient Currently in Pain No "   Type of Evaluation   Type of Evaluation Swallow Evaluation   Oral Motor   Oral Musculature generally intact   Structural Abnormalities none present   Mucosal Quality good   Dentition (Oral Motor)   Dentition (Oral Motor) dental appliance/dentures   Dental Appliance/Denture (Oral Motor) upper and lower   Cough/Swallow/Gag Reflex (Oral Motor)   Volitional Throat Clear/Cough (Oral Motor) WNL   Volitional Swallow (Oral Motor) WNL   Vocal Quality/Secretion Management (Oral Motor)   Vocal Quality (Oral Motor) WNL   Secretion Management (Oral Motor) WNL   General Swallowing Observations   Respiratory Support (General Swallowing Observations) none   Current Diet/Method of Nutritional Intake (General Swallowing Observations, NIS) regular diet;thin liquids (level 0)   Swallowing Evaluation Clinical swallow evaluation   Clinical Swallow Evaluation   Feeding Assistance no assistance needed   Clinical Swallow Evaluation Textures Trialed thin liquids;solid foods   Clinical Swallow Eval: Thin Liquid Texture Trial   Mode of Presentation, Thin Liquids cup;straw;self-fed   Volume of Liquid or Food Presented 4 oz   Oral Phase of Swallow WFL   Pharyngeal Phase of Swallow intact   Diagnostic Statement no overt clinical signs/sx aspiration noted   Clinical Swallow Evaluation: Solid Food Texture Trial   Mode of Presentation self-fed   Volume Presented x5 grapes   Oral Phase WFL   Pharyngeal Phase intact   Diagnostic Statement no overt clinical signs/sx aspiration noted   Esophageal Phase of Swallow   Esophageal comments ?sticking sensation could be pharyngeal vs esophageal dysphagia   Swallowing Recommendations   Diet Consistency Recommendations soft & bite-sized (level 6);thin liquids (level 0)   Supervision Level for Intake patient independent   Mode of Delivery Recommendations bolus size, small;slow rate of intake;food moistened   Swallowing Maneuver Recommendations alternate food and liquid intake;extra swallow   Recommended  "Feeding/Eating Techniques (Swallow Eval) maintain upright sitting position for eating;maintain upright posture during/after eating for 30 minutes   Medication Administration Recommendations, Swallowing (SLP) whole as tolerated   General Therapy Interventions   Planned Therapy Interventions Dysphagia Treatment   Clinical Impression   Criteria for Skilled Therapeutic Interventions Met (SLP Eval) Yes, treatment indicated   SLP Diagnosis pharyngeal vs esophageal dysphagia   Risks & Benefits of therapy have been explained evaluation/treatment results reviewed;care plan/treatment goals reviewed;participants voiced agreement with care plan;participants included;patient   Clinical Impression Comments   Clinical swallow evaluation completed, pt currently presents with pharyngeal vs esophageal dysphagia with primary symptom of globus sensation/food sticking in throat. Mastication, oral clearance appear WFL; mild prolonged but pt reports being very \"careful.\" No percievable swallow delay and laryngeal elevation present to palpation. No overt clinical signs/sx aspiration noted and no sticking sensation with consistencies trialed today. Given pt's symptom report/trouble foods identified, she is agreeable to trialing soft/bite sized diet modifications for softer/moister food options. Recommended frequent liquid wash to assist in pharyngoesopahgeal clearance as well. Anticipate short-term SLP f/u for strategy training/ monitor tolerance.     SLP Discharge Planning   SLP Discharge Recommendation home   SLP Rationale for DC Rec Pt likely near baseline with ?chronic dysphagia.  Anticipate short-term SLP f/u for strategy training/ monitor tolerance.   SLP Brief overview of current status  Recommendations: soft/bite sized diet (IDDSI 6), thin liquids (IDDSI 0) when fully upright, frequent liquid wash to assist in pharyngoesopahgeal clearance.    Total Evaluation Time   Total Evaluation Time (Minutes) 12   SLP Goals   Therapy Frequency " (SLP Eval) 3 times/wk   SLP Predicted Duration/Target Date for Goal Attainment 06/24/22   SLP Goals Swallow   SLP: Safely tolerate diet without signs/symptoms of aspiration Easy to chew diet;Thin liquids;Independently;With use of compensatory swallow strategies

## 2022-06-17 NOTE — PROGRESS NOTES
[facility]  Hospitalist Progress Note  Celeste Rosenthal PA-C 06/17/2022    Reason for Stay (Diagnosis): Weakness, UTI         Assessment and Plan:      Summary of Stay: Hima Griffiths is a 77 year old female with history of atrial fibrillation/previous stroke on anticoagulation, type 2 diabetes, hypertension, hyperlipidemia, vertigo and osteoarthritis admitted on 6/16/2022 with complaints of nausea, vomiting, diarrhea, weakness and a fall.    In the emergency room she was afebrile with blood pressure 132/106, pulse 112 (EKG showed atrial fibrillation) and breathing comfortably on room air without hypoxia.  WBC was elevated to 15.4 and BMP was unremarkable with the exception of glucose of 479.  Ammonia level was less than 10.  UA was concerning for infection and ceftriaxone was started.  Cultures x2 were drawn and urine culture was sent.  COVID/influenza testing is negative. CXR negative. CT head and cervical spine unremarkable. Since admission she continues to feel weak but has not had further nausea, vomiting or uncontrolled diarrhea.     #Nausea, vomiting and diarrhea suspect recent gastroenteritis  -Patient reports multiple days of nausea, vomiting and diarrhea without significant abdominal pain or fever  -On admission was quite dehydrated with volume contraction noted on CBC  -She is eating now and feeling much better  -Discontinued C. difficile test for now but will reorder should she develop fever, abdominal cramping or continued loose stools  -Continue IV fluids at 100 mL/h    #UTI  -Urine culture sent  -Continue ceftriaxone    #Weakness  -Likely due to recent GI losses as well as UTI  -PT and social work consults    #Leukocytosis  -WBC 15.4 with left shift on admission, elevated to 25 today yet patient feels better  -Suspect she may still be volume contracted as her hemoglobin is also elevated at 16.2  -Continuing IV fluids and will recheck in a.m. with differential    #Atrial  "fibrillation  -Currently in a rate controlled rhythm  -Continue PTA metoprolol, diltiazem and Xarelto    #Difficulty swallowing  -She reports approximately 1 time per week staff seems to get stuck in the middle of her throat  -Speech pathology consult was ordered by admitting provider    #Hyperglycemia  #Type 2 diabetes  -Sugar on admission was 479 with A1c of 8.3  -Continue PTA glipizide 10 mg daily  -Also on Trulicity weekly  -Insulin sliding scale started  -Pharmacy is recommending starting new Lantus. Will start with 10 units at bedtime      #Hypertension  -Continue Metoprolol and Diltiazem    #Hyperlipidemia  -Continue PTA pravastatin    #Vertigo  -No complaints of vertigo at this time    #Osteoarthritis      DVT Prophylaxis: On Xarelto  Code Status: Full Code  Discharge Dispo: Anticipate discharge in 1-2 days          Interval History (Subjective):      Feels better today but has not been out of bed. Ordered breakfast. No shortness of breath or cough                  Physical Exam:      Last Vital Signs:  /81 (BP Location: Left arm)   Pulse 68   Temp 97.7  F (36.5  C) (Oral)   Resp 16   Ht 1.626 m (5' 4\")   Wt 103.4 kg (227 lb 14.4 oz)   SpO2 96%   BMI 39.12 kg/m        Intake/Output Summary (Last 24 hours) at 6/17/2022 1351  Last data filed at 6/17/2022 1016  Gross per 24 hour   Intake 240 ml   Output --   Net 240 ml       Constitutional: Awake, alert, cooperative, no apparent distress   Respiratory: Clear to auscultation bilaterally, no crackles or wheezing   Cardiovascular: Irregularly irregular with normal S1 and S2, and no murmur noted   Abdomen: Normal bowel sounds, soft, non-distended, non-tender   Skin: No rashes, no cyanosis, dry to touch   Neuro: Alert and oriented x3, no weakness, numbness, memory loss   Extremities: No edema, normal range of motion   Other(s):        All other systems: Negative          Medications:      All current medications were reviewed with changes reflected in " problem list.         Data:      All new lab and imaging data was reviewed.   Labs:  Recent Labs   Lab 06/17/22  0824 06/17/22  0811   NA  --  136   POTASSIUM  --  4.8   CHLORIDE  --  105   CO2  --  19*   ANIONGAP  --  12   * 343*   BUN  --  21   CR  --  0.73   GFRESTIMATED  --  83   EZEKIEL  --  8.5     Recent Labs   Lab 06/17/22  0945   WBC 25.0*   HGB 16.2*   HCT 48.7*   *        Recent Labs   Lab 06/16/22  1651   COLOR Yellow   APPEARANCE Slightly Cloudy*   URINEGLC >=1000*   URINEBILI Negative   URINEKETONE Trace*   SG 1.035   UBLD Small*   URINEPH 5.0   PROTEIN 10 *   NITRITE Negative   LEUKEST Moderate*   RBCU 7*   WBCU 54*      Imaging:   Recent Results (from the past 24 hour(s))   CT Head w/o Contrast    Narrative    CT HEAD W/O CONTRAST, CT CERVICAL SPINE W/O CONTRAST  6/16/2022 3:28 PM    INDICATION: Trauma, headache, neck pain  TECHNIQUE:   HEAD CT: Routine. Dose reduction techniques were used.  CERVICAL SPINE CT: Axial images were obtained through the cervical  spine and were evaluated in the axial plane with sagittal and coronal  reformations. Dose reduction techniques were used.  CONTRAST: None.  COMPARISON:  CT head 2/20/2019    FINDINGS:   HEAD CT: No intracranial hemorrhage, extraaxial collection, mass  effect or CT evidence of acute infarct.  Unchanged moderate presumed  chronic small vessel ischemic changes. Unchanged moderate generalized  volume loss. The ventricles are proportional to the sulci. Osseous  structures are intact. The visualized orbits, paranasal sinuses and  mastoid air cells are free of significant disease.     CERVICAL SPINE CT:   Normal vertebral body heights. No fracture or post-traumatic  subluxation. No high-grade spinal canal or neural foraminal stenosis.    Grossly normal paraspinal soft tissues. Normal lung apices.     CONCLUSION:  HEAD CT:  1.  No acute intracranial abnormality.    CERVICAL SPINE CT:  1.  No acute fracture.    RON LOMAS MD          SYSTEM ID:  [id number]   CT Cervical Spine w/o Contrast    Narrative    CT HEAD W/O CONTRAST, CT CERVICAL SPINE W/O CONTRAST  6/16/2022 3:28 PM    INDICATION: Trauma, headache, neck pain  TECHNIQUE:   HEAD CT: Routine. Dose reduction techniques were used.  CERVICAL SPINE CT: Axial images were obtained through the cervical  spine and were evaluated in the axial plane with sagittal and coronal  reformations. Dose reduction techniques were used.  CONTRAST: None.  COMPARISON:  CT head 2/20/2019    FINDINGS:   HEAD CT: No intracranial hemorrhage, extraaxial collection, mass  effect or CT evidence of acute infarct.  Unchanged moderate presumed  chronic small vessel ischemic changes. Unchanged moderate generalized  volume loss. The ventricles are proportional to the sulci. Osseous  structures are intact. The visualized orbits, paranasal sinuses and  mastoid air cells are free of significant disease.     CERVICAL SPINE CT:   Normal vertebral body heights. No fracture or post-traumatic  subluxation. No high-grade spinal canal or neural foraminal stenosis.    Grossly normal paraspinal soft tissues. Normal lung apices.     CONCLUSION:  HEAD CT:  1.  No acute intracranial abnormality.    CERVICAL SPINE CT:  1.  No acute fracture.    RON LOMAS MD         SYSTEM ID:  [id number]   XR Chest Port 1 View    Narrative    EXAM: XR CHEST PORT 1 VIEW  LOCATION: [facility]  DATE/TIME: 6/16/2022 9:24 PM    INDICATION: Encephalopathy.  COMPARISON: None.      Impression    IMPRESSION:     No focal airspace disease. No pleural effusion or pneumothorax.    The cardiomediastinal silhouette is unremarkable. Aortic calcifications.

## 2022-06-17 NOTE — ED NOTES
[facility]  ED Nurse Handoff Report    Hima Griffiths is a 77 year old female   ED Chief complaint: Fall (Weakness) and Generalized Weakness  . ED Diagnosis:   Final diagnoses:   Urinary tract infection in elderly patient   Fall, initial encounter   Closed head injury, initial encounter   Weakness   Hyperglycemia     Allergies:   Allergies   Allergen Reactions     Alkylamines Other (See Comments)     Comment: Weakness tingling arms and legs, Description:      Sudafed [Pseudoephedrine]      Legs and arms get weak       Code Status: Full Code  Activity level - Baseline/Home:  Independent. Activity Level - Current:   Assist X 1. Lift room needed: No. Bariatric: No   Needed: No   Isolation: No. Infection: Not Applicable.     Vital Signs:   Vitals:    06/16/22 1426 06/16/22 1500 06/16/22 1700 06/16/22 1715   BP: (!) 146/123 (!) 148/79  (!) 167/102   Pulse: 86 90  95   Resp: (!) 38 11  9   Temp:   97.3  F (36.3  C)    TempSrc:   Temporal    SpO2: 99% 98%  98%       Cardiac Rhythm:  ,      Pain level:    Patient confused: No. Patient Falls Risk: Yes.   Elimination Status: Has voided   Patient Report - Initial Complaint: fall, generalized weakness. Focused Assessment: neuro, musculoskeletal   Tests Performed:   Labs Ordered and Resulted from Time of ED Arrival to Time of ED Departure   COMPREHENSIVE METABOLIC PANEL - Abnormal       Result Value    Sodium 135      Potassium 4.8      Chloride 100      Carbon Dioxide (CO2) 24      Anion Gap 11      Urea Nitrogen 24      Creatinine 0.75      Calcium 8.9      Glucose 479 (*)     Alkaline Phosphatase 112      AST 35      ALT 43      Protein Total 7.3      Albumin 3.2 (*)     Bilirubin Total 1.0      GFR Estimate 81     ROUTINE UA WITH MICROSCOPIC REFLEX TO CULTURE - Abnormal    Color Urine Yellow      Appearance Urine Slightly Cloudy (*)     Glucose Urine >=1000 (*)     Bilirubin Urine Negative      Ketones Urine Trace (*)     Specific Gravity Urine  1.035      Blood Urine Small (*)     pH Urine 5.0      Protein Albumin Urine 10  (*)     Urobilinogen Urine Normal      Nitrite Urine Negative      Leukocyte Esterase Urine Moderate (*)     Mucus Urine Present (*)     RBC Urine 7 (*)     WBC Urine 54 (*)     Squamous Epithelials Urine 1      Hyaline Casts Urine 1     CBC WITH PLATELETS AND DIFFERENTIAL - Abnormal    WBC Count 15.4 (*)     RBC Count 4.80      Hemoglobin 16.3 (*)     Hematocrit 48.7 (*)      (*)     MCH 34.0 (*)     MCHC 33.5      RDW 11.9      Platelet Count 216      % Neutrophils 85      % Lymphocytes 8      % Monocytes 6      % Eosinophils 0      % Basophils 0      % Immature Granulocytes 1      NRBCs per 100 WBC 0      Absolute Neutrophils 13.1 (*)     Absolute Lymphocytes 1.2      Absolute Monocytes 1.0      Absolute Eosinophils 0.0      Absolute Basophils 0.0      Absolute Immature Granulocytes 0.1      Absolute NRBCs 0.0     TROPONIN I - Normal    Troponin I High Sensitivity 12     TSH WITH FREE T4 REFLEX - Normal    TSH 1.74     INFLUENZA A/B & SARS-COV2 PCR MULTIPLEX - Normal    Influenza A PCR Negative      Influenza B PCR Negative      RSV PCR Negative      SARS CoV2 PCR Negative     CK TOTAL - Normal    CK 70     URINE CULTURE     . Abnormal Results: see above.   Treatments provided: see MAR  Family Comments: n/a  OBS brochure/video discussed/provided to patient:  Yes  ED Medications:   Medications   cefTRIAXone (ROCEPHIN) 2 g vial to attach to  ml bag for ADULTS or NS 50 ml bag for PEDS (2 g Intravenous New Bag 6/16/22 1845)   0.9% sodium chloride BOLUS (0 mLs Intravenous Stopped 6/16/22 1723)     Drips infusing:  Yes  For the majority of the shift, the patient's behavior Green. Interventions performed were n/a.    Sepsis treatment initiated: No     Patient tested for COVID 19 prior to admission: YES    ED Nurse Name/Phone Number: Susan Huynh RN,   6:52 PM    RECEIVING UNIT ED HANDOFF REVIEW    Above ED Nurse Handoff Report  was reviewed: Yes  Reviewed by: Clarissa Hanks RN on June 16, 2022 at 8:45 PM

## 2022-06-17 NOTE — PROGRESS NOTES
06/17/22 1400   Quick Adds   Type of Visit Initial PT Evaluation   Living Environment   People in Home alone   Current Living Arrangements independent living facility   Home Accessibility wheelchair accessible   Transportation Anticipated family or friend will provide   Living Environment Comments Pt lives in ILF apt with elevators, uses 4WW and can usually walk short distances independently. Pt has walk-in shower, raised toilet seats and grab bars in bathroom   Self-Care   Usual Activity Tolerance moderate   Current Activity Tolerance poor   Regular Exercise No   Equipment Currently Used at Home walker, rolling;grab bar, tub/shower;grab bar, toilet;raised toilet seat;shower chair   Fall history within last six months yes   Number of times patient has fallen within last six months 1   Activity/Exercise/Self-Care Comment normally independent with all ADLs, mobility, still drives.   General Information   Onset of Illness/Injury or Date of Surgery 06/16/22   Referring Physician Mitchell Waldrop D, DO   Patient/Family Therapy Goals Statement (PT) get more therapy   Pertinent History of Current Problem (include personal factors and/or comorbidities that impact the POC) 77 year old female with history of atrial fibrillation/previous stroke on anticoagulation, type 2 diabetes, hypertension, hyperlipidemia, vertigo and osteoarthritis admitted on 6/16/2022 with complaints of nausea, vomiting, diarrhea, weakness and a fall.   Existing Precautions/Restrictions fall   Weight-Bearing Status - LLE full weight-bearing   Weight-Bearing Status - RLE full weight-bearing   Cognition   Affect/Mental Status (Cognition) flat/blunted affect   Orientation Status (Cognition) oriented to;person;situation;place   Follows Commands (Cognition) delayed response/completion;increased processing time needed;initiation impaired   Safety Deficit (Cognition) problem-solving   Pain Assessment   Patient Currently in Pain No   Posture    Posture  Forward head position;Protracted shoulders;Kyphosis   Posture Comments R face looks asymmetrical, RN alerted and pt and daughter reports they are unsure if this was present from previous stroke affecting R side.   Strength (Manual Muscle Testing)   Strength Comments R UE drifting compared to L UE, RN alerted and aware. R LE weaker than L LE at baseline. R LE: 2+ with inabiltiy to complete full ROM against gravity at ankle, knee, and hip. Poor R foot clearance with gait/pivots   Bed Mobility   Comment, (Bed Mobility) Min A x 1 supine <> sit   Transfers   Comment, (Transfers) Min A x 2 sit <> stand   Gait/Stairs (Locomotion)   Lindsay Level (Gait) minimum assist (75% patient effort);2 person assist   Assistive Device (Gait) walker, front-wheeled   Distance in Feet (Required for LE Total Joints) 3'   Comment, (Gait/Stairs) unsteady gait with FWW, difficulty with R LE clearance needing chair brought to her for safety   Balance   Balance Comments impaired standing balance d/t R LE weakness and generalized fatigue   Sensory Examination   Sensory Perception patient reports no sensory changes   Clinical Impression   Criteria for Skilled Therapeutic Intervention Yes, treatment indicated   PT Diagnosis (PT) impaired mobility   Influenced by the following impairments weakness, pain, impaired balance, nausea   Functional limitations due to impairments fall risk, decreased activity tolerance   Clinical Presentation (PT Evaluation Complexity) Stable/Uncomplicated   Clinical Presentation Rationale clinical judgement   Clinical Decision Making (Complexity) low complexity   Planned Therapy Interventions (PT) balance training;bed mobility training;gait training;neuromuscular re-education;patient/family education;strengthening;stair training;transfer training   Anticipated Equipment Needs at Discharge (PT) walker, rolling   Risk & Benefits of therapy have been explained evaluation/treatment results reviewed;care plan/treatment  goals reviewed;risks/benefits reviewed;current/potential barriers reviewed;participants voiced agreement with care plan;participants included;patient   PT Discharge Planning   PT Discharge Recommendation (DC Rec) Transitional Care Facility   PT Rationale for DC Rec Pt currently needing 1-2 person assist with mobility and normally ambulates independently with 4WW, lives in ILF. Recommend TCU for continued strengthening to assist pt return to baseline.   PT Brief overview of current status A x 2 bed <> chair with FWW, R LE weak (previous stroke 6 years ago but worsening weakness over the past week per dtr and pt), fall risk   Total Evaluation Time   Total Evaluation Time (Minutes) 15   Physical Therapy Goals   PT Frequency Daily   PT Predicted Duration/Target Date for Goal Attainment 06/24/22   PT Goals Bed Mobility;Transfers;Gait   PT: Bed Mobility Supervision/stand-by assist;Supine to/from sit   PT: Transfers Supervision/stand-by assist;Sit to/from stand;Bed to/from chair;Assistive device   PT: Gait Supervision/stand-by assist;Rolling walker;50 feet

## 2022-06-17 NOTE — PLAN OF CARE
Shift from [id number]     Inpatient Progress Note:  For complete assessment see flow sheet documentation.      Orientation: AO x4 with some forgetfulness  Neuro: Intact  Pain status: Denies pain  Activity: Up with A1  Resp: LS clear  Cardiac: WDL   GI: Continent  : Purewick in place  Skin: Redness noted to abdominal folds, blanchable redness noted to coccyx/sacrum area  LDA: PIV in R wrist infusing   Infusions:  ml/hr  Diet: Combination regular diet  Consults: PT/ST  Discharge Plan:

## 2022-06-17 NOTE — PHARMACY-ADMISSION MEDICATION HISTORY
Admission medication history interview status for this patient is complete. See [facility] admission navigator for allergy information, prior to admission medications and immunization status.     Medication history interview done, indicate source(s): Velvet (daughter) @ [phone]   Medication history resources (including written lists, pill bottles, clinic record):None      Changes made to PTA medication list:  Added: none  Changed: glipizde XL 20mg daily to 10mg BID  Reported as Not Taking: none  Removed: Lisinolril (duplicate)    Actions taken by pharmacist (provider contacted, etc):None     Additional medication history information:None    Medication reconciliation/reorder completed by provider prior to medication history?  n   (Y/N)     For patients on insulin therapy:   Do you use sliding scale insulin based on blood sugars?   What is your pre-meal insulin coverage?    Do you typically eat three meals a day?   How many times do you check your blood glucose per day?   How many episodes of hypoglycemia do you typically have per month?   Do you have a Continuous Glucose Monitor (CGM)?      Prior to Admission medications    Medication Sig Last Dose Taking? Auth Provider Long Term End Date   clotrimazole (LOTRIMIN) 1 % external cream Apply topically 2 times daily as needed  Yes Reported, Patient     diltiazem (CARDIZEM) 30 MG tablet Take 1 tablet (30 mg) by mouth 2 times daily 6/16/2022 at am Yes Lm Miller MD Yes    dulaglutide (TRULICITY) 1.5 MG/0.5ML pen Inject 1.5 mg Subcutaneous every 7 days Past Week at sun Yes Lm Miller MD     glipiZIDE (GLUCOTROL XL) 10 MG 24 hr tablet Take 2 tablets (20 mg) by mouth daily  Patient taking differently: Take 10 mg by mouth 2 times daily 6/16/2022 at am Yes Lm Miller MD Yes    lisinopril (ZESTRIL) 5 MG tablet Take 1 tablet (5 mg) by mouth daily 6/16/2022 at am Yes Lilo Gallo MD Yes    metoprolol tartrate (LOPRESSOR) 100  MG tablet Take 1 tablet (100 mg) by mouth 2 times daily 6/16/2022 at am Yes Lm Miller MD Yes    pravastatin (PRAVACHOL) 40 MG tablet Take 1 tablet (40 mg) by mouth daily 6/15/2022 at hs Yes Lm Miller MD Yes    rivaroxaban ANTICOAGULANT (XARELTO ANTICOAGULANT) 20 MG TABS tablet Take 1 tablet (20 mg) by mouth daily (with dinner) 6/15/2022 at Unknown time Yes Lm Miller MD Yes    ACE/ARB NOT PRESCRIBED, INTENTIONAL, Please choose reason not prescribed, below   Georgiana Joy MD     ASPIRIN NOT PRESCRIBED (INTENTIONAL) Please choose reason not prescribed, below   Georgiana Joy MD     blood glucose (NO BRAND SPECIFIED) lancets standard Use to test blood sugar 2 times daily or as directed.   Georgiana Joy MD     blood glucose (NO BRAND SPECIFIED) test strip Use to test blood sugar 2 times daily or as directed.   Georgiana Joy MD     blood glucose monitoring (NO BRAND SPECIFIED) meter device kit Use to test blood sugar 2 times daily or as directed.   Georgiana Joy MD

## 2022-06-17 NOTE — UTILIZATION REVIEW
Admission Status; Secondary Review Determination         Under the authority of the Utilization Management Committee, the utilization review process indicated a secondary review on the above patient.  The review outcome is based on review of the medical records, discussions with staff, and applying clinical experience noted on the date of the review.        (x)      Inpatient Status Appropriate - This patient's medical care is consistent with medical management for inpatient care and reasonable inpatient medical practice.                RATIONALE FOR DETERMINATION   The patient is a 77-year-old female admitted on 6/16/2022.  She presented to the ED via EMS for evaluation after a fall.  She had illness symptoms for 6 days prior to admission including nausea and vomiting and subsequent diarrhea.  She has a history of diabetes mellitus.  She lost strength and spent 2 hours been unable to get off the floor.  She has had some increased confusion and some encephalopathic symptoms according to notes.  Urinalysis is abnormal consistent with urinary tract infection and white count is elevated at greater than 15,000.  Blood cultures and urine cultures are pending.  Patient was started on ceftriaxone IV antibiotic.  Blood sugars have been in the 300s even today.  Based on need for further evaluation given encephalopathy, significant elevated white blood cell count, acute onset of weakness, poorly controlled diabetes mellitus, recommend continuation of inpatient status.  Conditionally, if the patient improves significantly today and is discharged, would recommend changing to observation status.  Based on current notes in the chart, this is less likely.    The severity of illness, intensity of service provided, expected LOS and risk for adverse outcome make the care complex, high risk and appropriate for hospital admission.        The information on this document is developed by the utilization review team in order for the  business office to ensure compliance.  This only denotes the appropriateness of proper admission status and does not reflect the quality of care rendered.         The definitions of Inpatient Status and Observation Status used in making the determination above are those provided in the CMS Coverage Manual, Chapter 1 and Chapter 6, section 70.4.      Sincerely,     Vishnu Cisneros MD  Physician Advisor  Utilization Review/ Case Management  [facility].

## 2022-06-17 NOTE — CONSULTS
Care Management Initial Consult    General Information  Assessment completed with: Patient, Children,    Type of CM/SW Visit: Initial Assessment    Primary Care Provider verified and updated as needed: Yes   Readmission within the last 30 days: no previous admission in last 30 days      Reason for Consult: care coordination/care conference, discharge planning    Communication Assessment  Patient's communication style: spoken language (English or Bilingual)    Hearing Difficulty or Deaf: yes   Wear Glasses or Blind: yes    Cognitive  Cognitive/Neuro/Behavioral: WDL                      Living Environment:   People in home: alone     Current living Arrangements: apartment      Able to return to prior arrangements: no     Family/Social Support:  Care provided by: self  Provides care for: no one  Marital Status:   Children            Description of Support System: Supportive, Involved   Support Assessment: Adequate family and caregiver support    Current Resources:   Patient receiving home care services: No  Community Resources: None  Equipment currently used at home: walker, rolling, grab bar, tub/shower, grab bar, toilet, raised toilet seat, shower chair  Supplies currently used at home: None    Lifestyle & Psychosocial Needs:  Social Determinants of Health     Tobacco Use: Medium Risk     Smoking Tobacco Use: Former Smoker     Smokeless Tobacco Use: Never Used   Alcohol Use: Not on file   Financial Resource Strain: Not on file   Food Insecurity: Not on file   Transportation Needs: Not on file   Physical Activity: Not on file   Stress: Not on file   Social Connections: Not on file   Intimate Partner Violence: Not on file   Depression: Not at risk     PHQ-2 Score: 0   Housing Stability: Not on file       Additional Information:  CM following for discharge planning, PT evaluated and is recommending TCU. Met with pt and dtr at bedside, they are agreeable to placement. Reviewed [facility] and Medicare.gov ratings, they  would like referrals sent for private room to [facility], [facility], [facility], [facility] and [facility]. Referrals sent, awaiting responses.     TCU will need to get insurance authorization before discharge to TCU.     Reviewed out of pocket cost for [facility] transport, $81.80 for base rate and $5.26 per mile to the destination. Spoke with pt and dtr, they expressed understanding and are agreeable to this.     Megan Tello RN BSN   Inpatient Care Coordination  [facility]   Phone [phone]

## 2022-06-17 NOTE — PROGRESS NOTES
"  Inpatient Progress Note:    Vitals: /82 (BP Location: Right arm)   Pulse 100   Temp 97.7  F (36.5  C) (Oral)   Resp 18   Ht 1.626 m (5' 4\")   Wt 103.4 kg (227 lb 14.4 oz)   SpO2 96%   BMI 39.12 kg/m      Neuro: A&O x4, cognitive intact, clear speech  GI: N/V  : Purewick on  Skin: redness grion/abd folds, questionable pressure injury on coccyx meplex applied.  Activity: Two staff assist with walker and giate belt  Diet: Comb regular diet,   Pain: Denies   Plan: Encourage activity. Patient got to the edge of bed, and PT ambulate pt to recliner. Pt reported N/V but declined medication. Denies pain. PT recommended TCU, Sw is following to find placement. BS checks. On iv rocephin. NS & 100 ml/hr.      "

## 2022-06-17 NOTE — PROGRESS NOTES
ROOM # 215    Living Situation (if not independent, order SW consult): Home independently  Facility name:  : Daughter [facility]    Activity level at baseline: Ind with walker  Activity level on admit: A1-2    Who will be transporting you at discharge: [facility]    Patient registered to observation; given Patient Bill of Rights; given the opportunity to ask questions about observation status and their plan of care.  Patient has been oriented to the observation room, bathroom and call light is in place.    Discussed discharge goals and expectations with patient/family.

## 2022-06-18 NOTE — PROGRESS NOTES
[facility]  Hospitalist Progress Note  Admit 6/16/2022  1:53 PM    Name: Hima Griffiths    MRN: [id number]  Provider:  Thiago Castañeda MD, [facility]    Date of Service: 06/18/2022     Reason for Stay (Diagnosis): Gastroenteritis with dehydration, UTI         Summary of hospital stay & Assessment/Plan:   Summary of Stay: Hima Griffiths is a 77 year old female who was admitted on 6/16/2022    77 year old female with history of atrial fibrillation/previous stroke on anticoagulation, type 2 diabetes, hypertension, hyperlipidemia, vertigo and osteoarthritis admitted on 6/16/2022 with complaints of nausea, vomiting, diarrhea, weakness and a fall.  the emergency room she was afebrile with blood pressure 132/106, pulse 112 (EKG showed atrial fibrillation) and breathing comfortably on room air without hypoxia.  WBC was elevated to 15.4 and BMP was unremarkable with the exception of glucose of 479.  Ammonia level was less than 10.  UA was concerning for infection and ceftriaxone was started.  Cultures x2 were drawn and urine culture was sent.  COVID/influenza testing is negative. CXR negative. CT head and cervical spine unremarkable. Since admission she continues to feel weak but has not had further nausea, vomiting or uncontrolled diarrhea.        Problem List:   #Nausea, vomiting and diarrhea suspect recent gastroenteritis  -Patient reports multiple days of nausea, vomiting and diarrhea without significant abdominal pain or fever  -On admission was quite dehydrated with volume contraction noted on CBC  -Symptoms did improve  -Decrease IV fluid     #UTI with encephalopathy on admission  -Urine culture sent E. coli growing follow-up on sensitivity  -Continue ceftriaxone     #Weakness  -Likely due to recent GI losses as well as UTI in the setting of previous stroke and more pronounced weakness in the right lower extremity  -PT and social work consults patient needs TCU bed availability pending [facility]  authorization       #Atrial fibrillation  -Currently in a rate controlled rhythm  -Continue PTA metoprolol, diltiazem and Xarelto     #Difficulty swallowing  -She reports approximately 1 time per week staff seems to get stuck in the middle of her throat  -Speech pathology consult was ordered by admitting provider     #Hyperglycemia  #Type 2 diabetes  -Sugar on admission was 479 with A1c of 8.3  -Continue PTA glipizide 10 mg daily  -Also on Trulicity weekly  -Insulin sliding scale started  -Pharmacy is recommending starting new Lantus. Will start with 10 units at bedtime        #Hypertension-Continue Metoprolol and Diltiazem     #Hyperlipidemia  -Continue PTA pravastatin     #Vertigo  -No complaints of vertigo at this time     #Osteoarthritis         DVT Prophylaxis: DOAC  Code Status:  Full Code    Disposition Plan     Expected discharge in 2 days to transitional care unit once [facility] authorizes her TCU stay.  Unfortunately her insurance [facility] is not responding over the weekend for request for authorization which will delay transfer to a lower level of care till Monday at least       Entered: Thiago Castañeda MD 06/18/2022, 11:53 AM                 Interval History:       Gastroenteritis symptoms improved  Therapy recommending transitional care unit  Pending authorization by [facility]    Today's plan detailed above discussed with nursing               Physical Exam:   Physical Exam   Temp: 98.1  F (36.7  C) Temp src: Oral BP: (!) 129/105 Pulse: 84   Resp: 16 SpO2: 95 % O2 Device: None (Room air)    Vitals:    06/16/22 2122   Weight: 103.4 kg (227 lb 14.4 oz)     I/O last 3 completed shifts:  In: 240 [P.O.:240]  Out: 100 [Urine:100]          GENERAL:  Comfortable.   PSYCH: pleasant, oriented, No acute distress.  EYES: PERRLA, Normal conjunctiva.  HEART:  Normal S1, S2 with no edema.  LUNGS:  Clear to auscultation, normal Respiratory effort.  ABDOMEN:  Soft, no hepatosplenomegaly, normal bowel sounds.  SKIN:  Dry to touch,  No rash.  Neuro: Non focal with normal motor power, sensation, CN's and Reflexes.  She does have weakness 3/5 in the right lower extremity, reports weakness since her stroke in the right lower extremity but usually she is able to ambulate without that much difficulty so it is more pronounced now    Medications     - MEDICATION INSTRUCTIONS -       sodium chloride 100 mL/hr at 06/18/22 1051       cefTRIAXone  1 g Intravenous Q24H     diltiazem  30 mg Oral BID     glipiZIDE  10 mg Oral Daily with breakfast     insulin aspart  1-10 Units Subcutaneous TID AC     insulin aspart  1-7 Units Subcutaneous At Bedtime     insulin aspart   Subcutaneous TID AC     insulin glargine  10 Units Subcutaneous At Bedtime     metoprolol tartrate  100 mg Oral BID     pravastatin  40 mg Oral Daily     rivaroxaban ANTICOAGULANT  20 mg Oral Daily with supper     sodium chloride (PF)  3 mL Intracatheter Q8H     Data     -Data reviewed today:  I personally reviewed  all new labs and imaging results over the last 24 hours.    Recent Labs   Lab 06/18/22  0520 06/17/22  0945 06/16/22  1415   WBC 13.3* 25.0* 15.4*   HGB 13.4 16.2* 16.3*   HCT 41.9 48.7* 48.7*   * 103* 102*   * 192 216     Recent Labs   Lab 06/18/22  0826 06/18/22  0520 06/18/22  0150 06/17/22  0824 06/17/22  0811 06/16/22  2244 06/16/22  1415   NA  --  138  --   --  136  --  135   POTASSIUM  --  3.8  --   --  4.8  --  4.8   CHLORIDE  --  110*  --   --  105  --  100   CO2  --  23  --   --  19*  --  24   ANIONGAP  --  5  --   --  12  --  11   * 162* 178*   < > 343*   < > 479*   BUN  --  19  --   --  21  --  24   CR  --  0.72  --   --  0.73  --  0.75   GFRESTIMATED  --  85  --   --  83  --  81   EZEKIEL  --  8.3*  --   --  8.5  --  8.9    < > = values in this interval not displayed.       No results found for this or any previous visit (from the past 24 hour(s)).    This document was produced using voice recognition software

## 2022-06-18 NOTE — PLAN OF CARE
"Care from [id number]    Inpatient Progress Note:  For complete assessment see flow sheet documentation.    BP 93/76 (BP Location: Left arm)   Pulse 84   Temp 97.7  F (36.5  C) (Axillary)   Resp 20   Ht 1.626 m (5' 4\")   Wt 103.4 kg (227 lb 14.4 oz)   SpO2 96%   BMI 39.12 kg/m         Orientation: A &O X 3  Neuro: Intact   Pain status: Denies pain   Activity: Assist of two  Peripheral edema: No edema   Resp: WDL, Lung sounds clear   Cardiac: WDL  GI: No diarrhea at this shift  : Purewick in place  Skin: Open sore on right side buttocks, redness on abdominal folds, blanchable redness on coccyx area.  LDA: PIV in R wrist infusing   Infusions:  ml/hr  Diet:Regualr soft & bite size diet   Consults: WOC for open sore.   Discharge Plan: TCU placement     Pt trigger sepsis, lactic lab initiated, VS completed every 30 min X 2. Day shift nurse updated for follow up of Sepsis protocol.   Continue to reposition as tolerated.     Will continue to monitor and provide cares.         "

## 2022-06-18 NOTE — PROGRESS NOTES
SW contact ER regarding placement and full till end of next week.  [facility] left message on [facility] L[facility]/TCU no admissions staff on weekends  [facility] message left on   [facility] - message left on VM

## 2022-06-18 NOTE — PROGRESS NOTES
"  Inpatient Progress Note:    BP (!) 143/86 (BP Location: Right arm)   Pulse 54   Temp 98  F (36.7  C) (Oral)   Resp 16   Ht 1.626 m (5' 4\")   Wt 103.4 kg (227 lb 14.4 oz)   SpO2 98%   BMI 39.12 kg/m      Neuro: A&O x4, cognitive intact, clear speech  GI: Denies N/V, normative bowel sounds.   : Incontinent   Skin: redness grion/abd folds, questionable pressure injury on coccyx meplex applied.  Activity: Two staff assist with walker and giate belt  Diet: Comb regular diet,   Pain: Denies   Plan: Encourage activity.Ambulated in room, get up in chair for few ours.Denies pain. PT recommended TCU, SW is following to find placement. BS checks. On iv rocephin. NS & 75ml/hr.      "

## 2022-06-18 NOTE — PROVIDER NOTIFICATION
Pt BS reading 110. Has Insulin glargine 10 unit at 10pm. Pt with low appetite. Per Attending provider gave order to hold 10 pm Insulin glargine 10 unit.

## 2022-06-19 NOTE — PROGRESS NOTES
"  Inpatient Progress Note:    BP (!) 161/105 (BP Location: Right arm)   Pulse 76   Temp 97.6  F (36.4  C) (Oral)   Resp 16   Ht 1.626 m (5' 4\")   Wt 103.4 kg (227 lb 14.4 oz)   SpO2 98%   BMI 39.12 kg/m      Neuro: A&O x4, cognitive intact, clear speech  GI: Denies N/V, normative bowel sounds.   : Incontinent   Skin: redness grion/abd folds, questionable pressure injury on coccyx meplex applied.  Activity: Two staff assist with walker and giate belt  Diet: Comb regular diet,   Pain: Denies   Plan: Encourage activity.Ambulated in room, get up in chair for few ours.PT recommended TCU, SW is following to find placement. BS checks. On IV rocephin.  "

## 2022-06-19 NOTE — PROGRESS NOTES
Care Management Follow Up    Length of Stay (days): 3    Expected Discharge Date: 06/20/2022     Concerns to be Addressed: care coordination/care conferences, discharge planning     Patient plan of care discussed at interdisciplinary rounds: Yes    Anticipated Discharge Disposition: Transitional Care     Anticipated Discharge Services: None  Anticipated Discharge DME: None    Patient/family educated on Medicare website which has current facility and service quality ratings: yes  Patient/Family in Agreement with the Plan: yes    Referrals Placed by CM/SW: Post Acute Facilities    Additional Information:  Pt has been declined at [facility] and [facility] TCU due to bed availability.     [facility], [facility] and [facility]. Awaiting responses, do not expect response before 6/20 due to it being the weekend. Once accepting facility is found, TCU will need to submit for insurance authorization.     Megan Tello RN BSN   Inpatient Care Coordination  [facility]   Phone [phone]

## 2022-06-19 NOTE — PROGRESS NOTES
[facility]  Hospitalist Progress Note  Admit 6/16/2022  1:53 PM    Name: Hima Griffiths    MRN: [id number]  Provider:  Thiago Castañeda MD, [facility]    Date of Service: 06/19/2022     Reason for Stay (Diagnosis): Gastroenteritis with dehydration, UTI         Summary of hospital stay & Assessment/Plan:   Summary of Stay: Hima Griffiths is a 77 year old female who was admitted on 6/16/2022    77 year old female with history of atrial fibrillation/previous stroke on anticoagulation, type 2 diabetes, hypertension, hyperlipidemia, vertigo and osteoarthritis admitted on 6/16/2022 with complaints of nausea, vomiting, diarrhea, weakness and a fall.  the emergency room she was afebrile with blood pressure 132/106, pulse 112 (EKG showed atrial fibrillation) and breathing comfortably on room air without hypoxia.  WBC was elevated to 15.4 and BMP was unremarkable with the exception of glucose of 479.  Ammonia level was less than 10.  UA was concerning for infection and ceftriaxone was started.  Cultures x2 were drawn and urine culture was sent.  COVID/influenza testing is negative. CXR negative. CT head and cervical spine unremarkable. Since admission she continues to feel weak but has not had further nausea, vomiting or uncontrolled diarrhea.        Problem List:   #Nausea, vomiting and diarrhea suspect recent gastroenteritis  -Patient reports multiple days of nausea, vomiting and diarrhea without significant abdominal pain or fever  -On admission was quite dehydrated with volume contraction noted on CBC  -Symptoms did improve  -Decrease IV fluid     #UTI with encephalopathy on admission  -Urine culture sent E. coli growing sensitive  -Continue ceftriaxone while here likely can discontinue on discharge if she had 5 days  -Leukocytosis her white count did not go back to normal we will recheck in the morning     #Weakness  -Likely due to recent GI losses as well as UTI in the setting of previous stroke and more  pronounced weakness in the right lower extremity  -PT and social work consults patient needs TCU bed availability pending [facility] authorization  -PT will reevaluate her today       #Atrial fibrillation  -Currently in a rate controlled rhythm  -Continue PTA metoprolol, diltiazem and Xarelto     #Difficulty swallowing  -She reports approximately 1 time per week staff seems to get stuck in the middle of her throat  -Speech pathology consult was ordered by admitting provider     #Hyperglycemia  #Type 2 diabetes  -Sugar on admission was 479 with A1c of 8.3  -Continue PTA glipizide 10 mg daily  -Also on Trulicity weekly  -Insulin sliding scale started  -Pharmacy is recommending starting new Lantus. Will start with 10 units at bedtime        #Hypertension-Continue Metoprolol and Diltiazem     #Hyperlipidemia-Continue PTA pravastatin     #Vertigo-No complaints of vertigo at this time     #Osteoarthritis         DVT Prophylaxis: DOAC  Code Status:  Full Code    Disposition Plan     Expected discharge in 1-2 days to transitional care unit once [facility] authorizes her TCU stay.  Unfortunately her insurance [facility] is not responding over the weekend for request for authorization which will delay transfer to a lower level of care till Monday or Tuesday.       Entered: Thiago Castañeda MD 06/19/2022, 10:22 AM                 Interval History:       Feels a little bit stronger today  Therapy recommending transitional care unit  Pending authorization by [facility]    Today's plan detailed above discussed with nursing               Physical Exam:   Physical Exam   Temp: 97.4  F (36.3  C) Temp src: Oral BP: (!) 146/86 Pulse: 65   Resp: 18 SpO2: 95 % O2 Device: None (Room air)    Vitals:    06/16/22 2122   Weight: 103.4 kg (227 lb 14.4 oz)     I/O last 3 completed shifts:  In: 210 [P.O.:210]  Out: 1650 [Urine:1650]          GENERAL:  Comfortable.   PSYCH: pleasant, oriented, No acute distress.  EYES: PERRLA, Normal conjunctiva.  HEART:  Normal S1, S2  with no edema.  LUNGS:  Clear to auscultation, normal Respiratory effort.  ABDOMEN:  Soft, no hepatosplenomegaly, normal bowel sounds.  SKIN:  Dry to touch, No rash.  Neuro: Non focal with normal motor power, sensation, CN's and Reflexes.  She does have weakness 4/5 in the right lower extremity, reports weakness since her stroke in the right lower extremity but usually she is able to ambulate without that much difficulty so it is more pronounced now    Medications     - MEDICATION INSTRUCTIONS -       sodium chloride 75 mL/hr at 06/19/22 0520       cefTRIAXone  1 g Intravenous Q24H     diltiazem  30 mg Oral BID     glipiZIDE  10 mg Oral Daily with breakfast     insulin aspart  1-10 Units Subcutaneous TID AC     insulin aspart  1-7 Units Subcutaneous At Bedtime     insulin aspart   Subcutaneous TID AC     insulin glargine  10 Units Subcutaneous At Bedtime     metoprolol tartrate  100 mg Oral BID     pravastatin  40 mg Oral Daily     rivaroxaban ANTICOAGULANT  20 mg Oral Daily with supper     sodium chloride (PF)  3 mL Intracatheter Q8H     Data     -Data reviewed today:  I personally reviewed  all new labs and imaging results over the last 24 hours.    Recent Labs   Lab 06/19/22  0535 06/18/22  0520 06/17/22  0945   WBC 13.8* 13.3* 25.0*   HGB 13.2 13.4 16.2*   HCT 39.6 41.9 48.7*   * 104* 103*    145* 192     Recent Labs   Lab 06/19/22  0746 06/19/22  0535 06/19/22  0124 06/18/22  0826 06/18/22  0520 06/17/22  0824 06/17/22  0811   NA  --  138  --   --  138  --  136   POTASSIUM  --  3.5  --   --  3.8  --  4.8   CHLORIDE  --  110*  --   --  110*  --  105   CO2  --  22  --   --  23  --  19*   ANIONGAP  --  6  --   --  5  --  12   * 178* 143*   < > 162*   < > 343*   BUN  --  15  --   --  19  --  21   CR  --  0.59  --   --  0.72  --  0.73   GFRESTIMATED  --  >90  --   --  85  --  83   EZEKIEL  --  8.2*  --   --  8.3*  --  8.5    < > = values in this interval not displayed.       No results found for  this or any previous visit (from the past 24 hour(s)).    This document was produced using voice recognition software

## 2022-06-20 NOTE — PROGRESS NOTES
[facility]    Medicine Progress Note - Hospitalist Service    Date of Admission:  6/16/2022    Assessment & Plan     Hima Griffiths is a 77 year old female with history of atrial fibrillation, previous stroke, chronic anticoagulation with xarelto, type 2 diabetes, hypertension, hyperlipidemia, vertigo and osteoarthritis. He presented to the  ED on 6/16/2022 with complaints of nausea, vomiting, diarrhea, weakness, and a fall. ED work up showed no fever, blood pressure 132/106, and pulse 112 (EKG showed atrial fibrillation), She was breathing comfortably on room air without hypoxia.  WBC was elevated to 15.4 and BMP was unremarkable with the exception of glucose of 479.  Ammonia level was less than 10.  UA was concerning for infection.    COVID/influenza testing was negative. CXR was negative. CT head and cervical spine was unremarkable. She was admitted to the hospital with suspected gastroenteritis. After admission she continued to feel weak but nausea, vomiting , and diarrhea resolved. Urine culture grew E coli for which she was treated with Rocephin.  She was quite weak. PT saw patient and recommended TCU on discharge. SW was consulted to arrange placement.        Problem List:   #Nausea, vomiting and diarrhea suspect recent gastroenteritis  -Patient reported multiple days of nausea, vomiting and diarrhea without significant abdominal pain or fever  -On admission she was quite dehydrated with volume contraction noted on CBC  -Symptoms did improve  -Off IV fluid     #UTI with encephalopathy on admission  -Urine culture sent and grew E coli  -Continue ceftriaxone for 5 days.   -Leukocytosis is improving.   -AM CBC     #Weakness  -Likely due to recent GI losses as well as UTI in the setting of previous stroke and more pronounced weakness in the right lower extremity  -PT and social work consults patient needs TCU bed availability pending [facility] authorization  -Social work is involved for  "placement        #Atrial fibrillation  -Currently in a rate controlled rhythm  -Continue PTA metoprolol, diltiazem and Xarelto     #Difficulty swallowing  -She reports approximately 1 time per week staff seems to get stuck in the middle of her throat  -Speech pathology consult was ordered by admitting provider     #Hyperglycemia  #Type 2 diabetes  -Sugar on admission was 479 with A1c of 8.3  -Continue PTA glipizide 10 mg daily  -Also on Trulicity weekly  -Insulin sliding scale started  -Continue Lantus 10 units at bedtime        #Hypertension-Continue Metoprolol and Diltiazem     #Hyperlipidemia-Continue PTA pravastatin     #Vertigo-No complaints of vertigo at this time     #Osteoarthritis          Diet: Combination Diet Regular Diet Adult; Soft and Bite Sized Diet (level 6); Thin Liquids (level 0)    DVT Prophylaxis: DOAC  Hernandez Catheter: Not present  Central Lines: None  Cardiac Monitoring: None  Code Status: Full Code      Disposition Plan   Expected Discharge: 06/21/2022     Anticipated discharge location: inpatient rehabilitation facility    Delays:     Placement - TCU            The patient's care was discussed with the Bedside Nurse, Patient and Patient's Family.    Raffaele Webb MD  Hospitalist Service  [facility]  Securely message with the Vocera Web Console (learn more here)  Text page via [employer] Paging/Directory         Clinically Significant Risk Factors Present on Admission             # Diabetes, type II: last A1C 8.3 % (Ref range: 0.0 - 5.6 %)  # Obesity: Estimated body mass index is 39.12 kg/m  as calculated from the following:    Height as of this encounter: 1.626 m (5' 4\").    Weight as of this encounter: 103.4 kg (227 lb 14.4 oz).      ______________________________________________________________________    Interval History   Feeling better- just weak. No new problems. No nausea, vomiting, or diarrhea.     Data reviewed today: I reviewed all medications, new labs and " imaging results over the last 24 hours. I personally reviewed no images or EKG's today.    Physical Exam   Vital Signs: Temp: 97.7  F (36.5  C) Temp src: Oral BP: (!) 150/88 Pulse: 81   Resp: 18 SpO2: 96 % O2 Device: None (Room air)    Weight: 227 lbs 14.4 oz  GENERAL:  Comfortable. Cooperative.  PSYCH: pleasant, oriented, No acute distress.  EYES: PERRLA, Normal conjunctiva.  HEART:  Regular rate and rhythm. No JVD. Pulses normal. No edema.  LUNGS:  Clear to auscultation, normal Respiratory effort.  ABDOMEN:  Soft, no hepatosplenomegaly, normal bowel sounds.  EXTREMETIES: No clubbing, cyanosis or ischemia  SKIN:  Dry to touch, No rash.      Data   Recent Labs   Lab 06/20/22  1228 06/20/22  0758 06/20/22  0650 06/19/22  0746 06/19/22  0535 06/18/22  0826 06/18/22  0520 06/16/22  2244 06/16/22  1415   WBC  --   --  12.6*  --  13.8*  --  13.3*   < > 15.4*   HGB  --   --  13.6  --  13.2  --  13.4   < > 16.3*   MCV  --   --  102*  --  101*  --  104*   < > 102*   PLT  --   --  153  --  159  --  145*   < > 216   NA  --   --  141  --  138  --  138   < > 135   POTASSIUM  --   --  3.5  --  3.5  --  3.8   < > 4.8   CHLORIDE  --   --  110*  --  110*  --  110*   < > 100   CO2  --   --  24  --  22  --  23   < > 24   BUN  --   --  12  --  15  --  19   < > 24   CR  --   --  0.60  --  0.59  --  0.72   < > 0.75   ANIONGAP  --   --  7  --  6  --  5   < > 11   EZEKIEL  --   --  8.5  --  8.2*  --  8.3*   < > 8.9   * 206* 192*   < > 178*   < > 162*   < > 479*   ALBUMIN  --   --   --   --   --   --   --   --  3.2*   PROTTOTAL  --   --   --   --   --   --   --   --  7.3   BILITOTAL  --   --   --   --   --   --   --   --  1.0   ALKPHOS  --   --   --   --   --   --   --   --  112   ALT  --   --   --   --   --   --   --   --  43   AST  --   --   --   --   --   --   --   --  35    < > = values in this interval not displayed.

## 2022-06-20 NOTE — PLAN OF CARE
Patient is alert and oriented. On lung sounds clear, on room air. Bowels are active. Tolerating soft-bite size diet. Iv is saline locked. Patient is assist x 2. Pure wick in place. Incentive spirometer in room, patient using independently. Rocephin given, pt tolerated well. Seen by WOC today. Plan for TCU placement.

## 2022-06-20 NOTE — CONSULTS
[facility] Nurse Inpatient Assessment     Consulted for: Right buttock    Patient History (according to provider note(s):      77 year old female with history of atrial fibrillation/previous stroke on anticoagulation, type 2 diabetes, hypertension, hyperlipidemia, vertigo and osteoarthritis admitted on 6/16/2022 with complaints of nausea, vomiting, diarrhea, weakness and a fall    Areas Assessed:      Areas visualized during today's visit: Focused:    Pressure Injury Location: Right buttock  Last photo: none  Wound type: Pressure Injury     Pressure Injury Stage: 2, present on admission   Wound history/plan of care:   Patient reports she has had this for a few weeks.  Wound base: 100 % dermis     Palpation of the wound bed: normal      Drainage: small     Description of drainage: serosanguinous     Measurements (length x width x depth, in cm) 0.5  x 0.5  cm      Tunneling N/A     Undermining N/A  Periwound skin: Intact      Color: normal and consistent with surrounding tissue      Temperature: normal   Odor: none  Pain: denies , none  Pain intervention prior to dressing change: N/A  Treatment goal: Heal   STATUS: initial assessment  Supplies ordered: supplies stored on unit           Treatment Plan:     Right buttock wound(s): Every 3 days   1. Cleanse with wound cleanser and dry  2. Apply Mepilex 4x4     Pressure Injury Prevention (PIP) Plan:      Moisture management: Avoid brief in bed      Mattress: Follow bed algorithm, reassess daily and order specialty mattress, if indicated.      HOB: Maintain at or below 30 degrees, unless contraindicated      Repositioning in bed: Every 1-2 hours  and Left/right positioning; avoid supine      Heels: Pillows under calves      Under devices: Inspect skin under all medical devices during skin inspection              Ask provider to discontinue device when no longer needed.      If patient is declining pressure injury prevention interventions:  "Explore reason why and address patient's concerns, Educate on pressure injury risk and prevention intervention(s) and If patient is still declining, document \"informed refusal\"     Orders: Written    RECOMMEND PRIMARY TEAM ORDER: None, at this time  Education provided: importance of repositioning, plan of care and Off-loading pressure  Discussed plan of care with: Patient and Nurse  WOC nurse follow-up plan: weekly  Notify WOC if wound(s) deteriorate.  Nursing to notify the Provider(s) and re-consult the WOC Nurse if new skin concern.    DATA:     Current support surface: Standard  Atmos Air mattress  BMI: Body mass index is 39.12 kg/m .   Active diet order: Orders Placed This Encounter      Combination Diet Regular Diet Adult; Soft and Bite Sized Diet (level 6); Thin Liquids (level 0)     Output: I/O last 3 completed shifts:  In: 360 [P.O.:360]  Out: 1500 [Urine:1500]     Labs: Recent Labs   Lab 06/20/22  0650 06/17/22  0945 06/16/22  1415   ALBUMIN  --   --  3.2*   HGB 13.6   < > 16.3*   WBC 12.6*   < > 15.4*   A1C  --   --  8.3*    < > = values in this interval not displayed.     Pressure injury risk assessment:   Sensory Perception: 3-->slightly limited  Moisture: 3-->occasionally moist  Activity: 3-->walks occasionally  Mobility: 3-->slightly limited  Nutrition: 3-->adequate  Friction and Shear: 3-->no apparent problem  Dominic Score: 18    Kailash Samaniego RN [facility]OCN  Dept. Pager: [phone]  Dept. Office Number: [phone]      "

## 2022-06-20 NOTE — PLAN OF CARE
"Care from [id number]    Inpatient Progress Note:  For complete assessment see flow sheet documentation.    BP (!) 148/90 (BP Location: Right arm)   Pulse 83   Temp 98.2  F (36.8  C) (Oral)   Resp 16   Ht 1.626 m (5' 4\")   Wt 103.4 kg (227 lb 14.4 oz)   SpO2 96%   BMI 39.12 kg/m         Orientation: A & O X 3  Neuro: Intact   Pain status: Denies    Activity: Assist of one   Peripheral edema: No edema   Resp: WDL   Cardiac: WDL  GI: Abdominal sounds present X 4  :  Incontinent and use purewick  Skin: Redness abd folds and groin area. Skin clean, pat dry and applied new Interdry.   Infusions: Saline lack  Pertinent Labs: Elevated WBC's  Diet: Comb regular diet  Discharge Plan: Encourage fluids, waiting for TCU.     Pt reported about 1030 unable to sleep, Change and reposition completed, PRN Melatonin given. No nausea, vomiting or dicheiria at this time.      Will continue to monitor and provide cares.       "

## 2022-06-21 NOTE — PROGRESS NOTES
[facility]    Medicine Progress Note - Hospitalist Service    Date of Admission:  6/16/2022    Assessment & Plan     Hima Griffiths is a 77 year old female with history of atrial fibrillation, previous stroke, chronic anticoagulation with xarelto, type 2 diabetes, hypertension, hyperlipidemia, vertigo and osteoarthritis. He presented to the  ED on 6/16/2022 with complaints of nausea, vomiting, diarrhea, weakness, and a fall. ED work up showed no fever, blood pressure 132/106, and pulse 112 (EKG showed atrial fibrillation), She was breathing comfortably on room air without hypoxia.  WBC was elevated to 15.4 and BMP was unremarkable with the exception of glucose of 479.  Ammonia level was less than 10.  UA was concerning for infection.    COVID/influenza testing was negative. CXR was negative. CT head and cervical spine was unremarkable. She was admitted to the hospital with suspected gastroenteritis. After admission she continued to feel weak but nausea, vomiting , and diarrhea resolved. Urine culture grew E coli for which she was treated with Rocephin.  She was quite weak. PT saw patient and recommended TCU on discharge. SW was consulted to arrange placement.        Problem List:   #Nausea, vomiting and diarrhea suspect recent gastroenteritis: Symptoms resolved.  -Patient reported multiple days of nausea, vomiting and diarrhea without significant abdominal pain or fever  -On admission she was quite dehydrated with volume contraction noted on CBC  -Symptoms did improve  -Off IV fluid     #UTI with encephalopathy on admission  -Urine culture sent and grew E coli  -Continued ceftriaxone for 5 days.   -Leukocytosis overall much improved but not yet normal.     #Weakness  -Likely due to recent GI losses as well as UTI in the setting of previous stroke and more pronounced weakness in the right lower extremity  -PT and social work consults patient needs TCU bed availability pending [facility]  authorization  -Social work is involved for placement        #Atrial fibrillation  -Currently in a rate controlled rhythm  -Continue PTA metoprolol, diltiazem and Xarelto     #Difficulty swallowing  -She reports approximately 1 time per week staff seems to get stuck in the middle of her throat  -Speech pathology consult was ordered by admitting provider     #Hyperglycemia  #Type 2 diabetes  -Sugar on admission was 479 with A1c of 8.3  -Continue PTA glipizide 10 mg daily  -Also on Trulicity weekly  -Insulin sliding scale started  -Continue Lantus 10 units at bedtime        #Hypertension-Continue Metoprolol and Diltiazem     #Hyperlipidemia-Continue PTA pravastatin     #Vertigo-No complaints of vertigo at this time     #Osteoarthritis          Diet: Combination Diet Regular Diet Adult; Soft and Bite Sized Diet (level 6); Thin Liquids (level 0)    DVT Prophylaxis: DOAC  Hernandez Catheter: Not present  Central Lines: None  Cardiac Monitoring: None  Code Status: Full Code      Disposition Plan   Expected Discharge: 06/22/2022     Anticipated discharge location: inpatient rehabilitation facility    Delays:     Placement - TCU            The patient's care was discussed with the Bedside Nurse, Patient and Patient's Family.    Keyon Partida MD  Hospitalist Service  [facility]  Securely message with the Vocera Web Console (learn more here)  Text page via [employer] Paging/Directory         Clinically Significant Risk Factors Present on Admission                   ______________________________________________________________________    Interval History   I assumed care today, I met with the patient and her daughter at the bedside  Feels well overall but is still much more weak than usual  Working on mobilization and awaiting TCU placement.  Medically stable whenever placement is found.    Data reviewed today: I reviewed all medications, new labs and imaging results over the last 24 hours. I  personally reviewed no images or EKG's today.    Physical Exam   Vital Signs: Temp: 97.4  F (36.3  C) Temp src: Oral BP: (!) 169/99 Pulse: 106   Resp: 18 SpO2: 98 % O2 Device: None (Room air)    Weight: 227 lbs 14.4 oz  GENERAL:  Comfortable. Cooperative.  PSYCH: pleasant, oriented, No acute distress.  EYES: PERRLA, Normal conjunctiva.  HEART:  Regular rate and rhythm. No JVD. Pulses normal. No edema.  LUNGS:  Clear to auscultation, normal Respiratory effort.  ABDOMEN:  Soft, no hepatosplenomegaly, normal bowel sounds.  EXTREMETIES: No clubbing, cyanosis or ischemia  SKIN:  Dry to touch, No rash.      Data   Recent Labs   Lab 06/21/22  0740 06/21/22  0557 06/21/22  0143 06/20/22  2107 06/20/22  0758 06/20/22  0650 06/19/22  0746 06/19/22  0535 06/18/22  0826 06/18/22  0520 06/16/22  2244 06/16/22  1415   WBC  --  12.4*  --   --   --  12.6*  --  13.8*  --  13.3*   < > 15.4*   HGB  --  14.1  --   --   --  13.6  --  13.2  --  13.4   < > 16.3*   MCV  --  101*  --   --   --  102*  --  101*  --  104*   < > 102*   PLT  --  159  --   --   --  153  --  159  --  145*   < > 216   NA  --   --   --   --   --  141  --  138  --  138   < > 135   POTASSIUM  --   --   --   --   --  3.5  --  3.5  --  3.8   < > 4.8   CHLORIDE  --   --   --   --   --  110*  --  110*  --  110*   < > 100   CO2  --   --   --   --   --  24  --  22  --  23   < > 24   BUN  --   --   --   --   --  12  --  15  --  19   < > 24   CR  --   --   --   --   --  0.60  --  0.59  --  0.72   < > 0.75   ANIONGAP  --   --   --   --   --  7  --  6  --  5   < > 11   EZEKIEL  --   --   --   --   --  8.5  --  8.2*  --  8.3*   < > 8.9   *  --  186* 223*   < > 192*   < > 178*   < > 162*   < > 479*   ALBUMIN  --   --   --   --   --   --   --   --   --   --   --  3.2*   PROTTOTAL  --   --   --   --   --   --   --   --   --   --   --  7.3   BILITOTAL  --   --   --   --   --   --   --   --   --   --   --  1.0   ALKPHOS  --   --   --   --   --   --   --   --   --   --   --  112    ALT  --   --   --   --   --   --   --   --   --   --   --  43   AST  --   --   --   --   --   --   --   --   --   --   --  35    < > = values in this interval not displayed.

## 2022-06-21 NOTE — PLAN OF CARE
Goal Outcome Evaluation:      Pt is AOX4, VSS, Ax2 with lift (Right leg weakness) Pt can pivot to the bedside commode only not far distances. She denies pain, Pt has BLE weakness. Pt is constipated, tried to use a bed pan with her, but it didn't work. Pt does not have any stool softeners. Provider paged to have stool softeners ordered. Pure wick in, constipated and denies abd discomfort or fullness.

## 2022-06-21 NOTE — PLAN OF CARE
Patient is alert and oriented. Able to make needs known. Lung sounds clear, on room air. Bowels are active, tolerating bite soft-size diet. Iv is saline locked. Received Rocephin. Pure wick in place. Pt had bowel movement. Assist x 1-2 with gait belt and walker. Pt using comode at bedside. Incentive spirometer in room, pt using independently. Denies pain. Has small open wound on Rt buttock, covered with mapilex. Seen by PT. Plan for TCU placement.

## 2022-06-21 NOTE — PROVIDER NOTIFICATION
Hi Pt in room #215 has not had a BM since Friday 6/17 and is constipate. She does not have any orders for stool softeners or laxatives. Can you please pace these orders for her?. Thanks  MATTHIEU Verdin RN

## 2022-06-21 NOTE — PLAN OF CARE
Shift from [id number]     Inpatient Progress Note:  For complete assessment see flow sheet documentation.      Orientation: AO x4  Neuro: Intact  Pain status: Pt denies pain  Activity: Assist of 2  Resp: LS clear on RA  Cardiac: WDL  GI: Continent  : Purewick in place  LDA: IV SL  Infusions: Rocephin ordered  Diet: combination regular diet, soft and bite size, thin liquids  Consults: PT, wound  Discharge Plan: awaiting TCU placement

## 2022-06-22 NOTE — PROGRESS NOTES
[facility]    Medicine Progress Note - Hospitalist Service    Date of Admission:  6/16/2022    Assessment & Plan     Hima Griffiths is a 77 year old female with history of atrial fibrillation, previous stroke, chronic anticoagulation with xarelto, type 2 diabetes, hypertension, hyperlipidemia, vertigo and osteoarthritis. He presented to the  ED on 6/16/2022 with complaints of nausea, vomiting, diarrhea, weakness, and a fall. ED work up showed no fever, blood pressure 132/106, and pulse 112 (EKG showed atrial fibrillation), She was breathing comfortably on room air without hypoxia.  WBC was elevated to 15.4 and BMP was unremarkable with the exception of glucose of 479.  Ammonia level was less than 10.  UA was concerning for infection.    COVID/influenza testing was negative. CXR was negative. CT head and cervical spine was unremarkable. She was admitted to the hospital with suspected gastroenteritis. After admission she continued to feel weak but nausea, vomiting , and diarrhea resolved. Urine culture grew E coli for which she was treated with Rocephin.  She was quite weak. PT saw patient and recommended TCU on discharge. SW was consulted to arrange placement.        Problem List:   #Nausea, vomiting and diarrhea suspect recent gastroenteritis: Symptoms resolved.  -Patient reported multiple days of nausea, vomiting and diarrhea without significant abdominal pain or fever  -On admission she was quite dehydrated with volume contraction noted on CBC  -Symptoms did improve  -Off IV fluid     #UTI with encephalopathy on admission  -Urine culture sent and grew E coli  -Continued ceftriaxone for 5 days.   -Leukocytosis overall much improved but not yet normal.     #Weakness  -Likely due to recent GI losses as well as UTI in the setting of previous stroke and more pronounced weakness in the right lower extremity  -PT and social work consults patient needs TCU bed availability pending [facility]  authorization  -Social work is involved for placement        #Atrial fibrillation  -Currently in a rate controlled rhythm  -Continue PTA metoprolol, diltiazem and Xarelto     #Difficulty swallowing  -She reports approximately 1 time per week staff seems to get stuck in the middle of her throat  -Speech pathology consult was ordered by admitting provider     #Hyperglycemia  #Type 2 diabetes  -Sugar on admission was 479 with A1c of 8.3  -Continue PTA glipizide 10 mg daily  -Also on Trulicity weekly  -Insulin sliding scale started as well as carb counting insulin.  -Continue Lantus at bedtime, increased to 14 units.        #Hypertension-Continue Metoprolol and Diltiazem     #Hyperlipidemia-Continue PTA pravastatin     #Vertigo-No complaints of vertigo at this time     #Osteoarthritis          Diet: Combination Diet Regular Diet Adult    DVT Prophylaxis: DOAC  Hernandez Catheter: Not present  Central Lines: None  Cardiac Monitoring: None  Code Status: Full Code      Disposition Plan   Expected Discharge: 06/23/2022     Anticipated discharge location: inpatient rehabilitation facility    Delays:     Placement - TCU           Keyon Partida MD  Hospitalist Service  [facility]  Securely message with the Vocera Web Console (learn more here)  Text page via code-laboration Paging/Directory         Clinically Significant Risk Factors Present on Admission                   ______________________________________________________________________    Interval History     Feels well overall but is still much more weak than usual.    Working on mobilization and awaiting TCU placement.  Medically stable whenever placement is found.  No new issues    Data reviewed today: I reviewed all medications, new labs and imaging results over the last 24 hours. I personally reviewed no images or EKG's today.    Physical Exam   Vital Signs: Temp: 98.7  F (37.1  C) Temp src: Oral BP: (!) 168/100 Pulse: 99   Resp: 20 SpO2: 97 % O2  Device: None (Room air)    Weight: 227 lbs 14.4 oz  GENERAL:  Comfortable. Cooperative.  PSYCH: pleasant, oriented, No acute distress.  EYES: PERRLA, Normal conjunctiva.  HEART:  Regular rate and rhythm. No JVD. Pulses normal. No edema.  LUNGS:  Clear to auscultation, normal Respiratory effort.  ABDOMEN:  Soft, no hepatosplenomegaly, normal bowel sounds.  EXTREMETIES: No clubbing, cyanosis or ischemia  SKIN:  Dry to touch, No rash.      Data   Recent Labs   Lab 06/22/22  0803 06/22/22  0704 06/22/22  0118 06/21/22  2155 06/21/22  0740 06/21/22  0557 06/20/22  0758 06/20/22  0650 06/19/22  0746 06/19/22  0535 06/18/22  0826 06/18/22  0520 06/16/22  2244 06/16/22  1415   WBC  --  11.5*  --   --   --  12.4*  --  12.6*  --  13.8*  --  13.3*   < > 15.4*   HGB  --  14.2  --   --   --  14.1  --  13.6  --  13.2  --  13.4   < > 16.3*   MCV  --  101*  --   --   --  101*  --  102*  --  101*  --  104*   < > 102*   PLT  --  165  --   --   --  159  --  153  --  159  --  145*   < > 216   NA  --   --   --   --   --   --   --  141  --  138  --  138   < > 135   POTASSIUM  --   --   --   --   --   --   --  3.5  --  3.5  --  3.8   < > 4.8   CHLORIDE  --   --   --   --   --   --   --  110*  --  110*  --  110*   < > 100   CO2  --   --   --   --   --   --   --  24  --  22  --  23   < > 24   BUN  --   --   --   --   --   --   --  12  --  15  --  19   < > 24   CR  --   --   --   --   --   --   --  0.60  --  0.59  --  0.72   < > 0.75   ANIONGAP  --   --   --   --   --   --   --  7  --  6  --  5   < > 11   EZEKIEL  --   --   --   --   --   --   --  8.5  --  8.2*  --  8.3*   < > 8.9   *  --  313* 223*   < >  --    < > 192*   < > 178*   < > 162*   < > 479*   ALBUMIN  --   --   --   --   --   --   --   --   --   --   --   --   --  3.2*   PROTTOTAL  --   --   --   --   --   --   --   --   --   --   --   --   --  7.3   BILITOTAL  --   --   --   --   --   --   --   --   --   --   --   --   --  1.0   ALKPHOS  --   --   --   --   --   --   --   --    --   --   --   --   --  112   ALT  --   --   --   --   --   --   --   --   --   --   --   --   --  43   AST  --   --   --   --   --   --   --   --   --   --   --   --   --  35    < > = values in this interval not displayed.

## 2022-06-22 NOTE — PLAN OF CARE
"Patient is alert and oriented x4. VS WNL and documented on the FS. Lung sounds clear and patient is on RA. Denies SOB. Active bowel sounds with LBM today. Patient has the purewick in place (wears a brief at baseline). Denies pain. SL. Regular diet. X1 assist with walker/gait belt when up. Groin/abdominal folds have redness present, but intradry in place. BS was 235 at lunch and 294 at dinner which was corrected.       Plan:  [facility] has clinically accepted patient, pending insurance authorization. Daughter at bedside and asked if [facility] would transport?.     BP (!) 152/82 (BP Location: Right arm)   Pulse 87   Temp 97.7  F (36.5  C) (Oral)   Resp 22   Ht 1.626 m (5' 4\")   Wt 103.4 kg (227 lb 14.4 oz)   SpO2 98%   BMI 39.12 kg/m           "

## 2022-06-22 NOTE — PROGRESS NOTES
"BP (!) 168/100   Pulse 99   Temp 98.7  F (37.1  C) (Oral)   Resp 20   Ht 1.626 m (5' 4\")   Wt 103.4 kg (227 lb 14.4 oz)   SpO2 97%   BMI 39.12 kg/m      Pt alert and oriented X 4; VSS; Afebrile; On RA. Tolerating regular diet well with good appetite. Up with X 1 assist, gait belt and walker. Inter-dry in abd folds intact; mepilix on buttocks intact. Up in chair with family at bedside  "

## 2022-06-22 NOTE — PROGRESS NOTES
Care Management Follow Up    Length of Stay (days): 6    Expected Discharge Date: 06/23/2022     Concerns to be Addressed: care coordination/care conferences, discharge planning     Patient plan of care discussed at interdisciplinary rounds: Yes    Anticipated Discharge Disposition: Transitional Care     Anticipated Discharge Services: None  Anticipated Discharge DME: None    Patient/family educated on Medicare website which has current facility and service quality ratings: yes  Education Provided on the Discharge Plan:    Patient/Family in Agreement with the Plan: yes    Referrals Placed by CM/SW: Post Acute Facilities  Private pay costs discussed: Not applicable    Additional Information:  CTS following for discharge planning. Patient needing TCU, pending insurance. Received update on TCU referrals. [facility] has no available beds until next week. [facility] for [place]. [facility] requested CC resend referral. Referral sent, pending response.     Patient updated on discharge plan and discussed needing additional referrals option to [facility] TCU providers. Patient has no preference of agency. Additional referrals sent to [facility], [facility], [facility], and [facility]. TCU response pending.       Margarita Antoine RN Case Manager  Inpatient Care Coordination   [facility]   [phone]      Margarita Antoine RN    Update 1523: [facility], pending insurance authorization. Patient and daughter, billie Verdin are agreeable to a semi- private room. They would like a private room if available and are aware of the additional cost.    Updated [facility].  [facility] is submitting [facility] auth.     Will continue to follow.     Margarita Antoine RN Case Manager  Inpatient Care Coordination   [facility]   [phone]

## 2022-06-22 NOTE — PLAN OF CARE
"Care from [id number]    Inpatient Progress Note:  For complete assessment see flow sheet documentation.    BP (!) 155/98 (BP Location: Right arm)   Pulse 95   Temp 97.9  F (36.6  C) (Oral)   Resp 20   Ht 1.626 m (5' 4\")   Wt 103.4 kg (227 lb 14.4 oz)   SpO2 97%   BMI 39.12 kg/m       Pt A & O X 3, lung sounds clear, abd sounds present X 4. Scatter bruising bilateral upper forearm. Redness under left breast, and abd folds interdry placed. Open spot on left buttocks mepilex intact. Sepsis protocol triggered due to WBC 12.4. Lactic acid 1.5 @ 2126. VS X 2 for one hour. Pt repositioned as tolerated for skin protection.     Will continue to monitor and provide cares.       "

## 2022-06-23 NOTE — PROGRESS NOTES
Care Management Follow Up    Length of Stay (days): 7    Expected Discharge Date: 06/23/2022     Concerns to be Addressed: care coordination/care conferences, discharge planning     Patient plan of care discussed at interdisciplinary rounds: Yes    Anticipated Discharge Disposition: Transitional Care     Anticipated Discharge Services: None  Anticipated Discharge DME: None    Patient/family educated on Medicare website which has current facility and service quality ratings: yes  Education Provided on the Discharge Plan: yes    Patient/Family in Agreement with the Plan: yes    Referrals Placed by CM/SW: Post Acute Facilities    Additional Information:  CM following for discharge planning, pt has been accepted [facility] pending insurance authorization. Called to check on status of this, LM requesting call back.     Update 1535: No insurance authorization obtained at this time. Hopeful we will have insurance authorization tomorrow. PATRICK updating pt's faustino Verdin.     Megan Tello RN BSN   Inpatient Care Coordination  [facility]   Phone [phone]

## 2022-06-23 NOTE — PLAN OF CARE
Pt A & O x 4. Lung sounds clear, O2 reading 96-98 % in room air, denies SOB. Abdominal sounds active X 4. Pt had BM today, denies pain/ discomfort. Purewick in place. Scattered bruising bilateral upper extremities. Redness on abdominal folds and groin area (Interdry placed) Redness under right breast (Interdry placed). Small open skin area on right site buttocks (top of pencil eraser size) Skin cleaned and applied Mepilex on R side buttocks applied. Pt denies pain. Waiting for placement.

## 2022-06-23 NOTE — PROGRESS NOTES
[facility]    Medicine Progress Note - Hospitalist Service    Date of Admission:  6/16/2022    Assessment & Plan     Hima Griffiths is a 77 year old female with history of atrial fibrillation, previous stroke, chronic anticoagulation with xarelto, type 2 diabetes, hypertension, hyperlipidemia, vertigo and osteoarthritis. He presented to the  ED on 6/16/2022 with complaints of nausea, vomiting, diarrhea, weakness, and a fall. ED work up showed no fever, blood pressure 132/106, and pulse 112 (EKG showed atrial fibrillation), She was breathing comfortably on room air without hypoxia.  WBC was elevated to 15.4 and BMP was unremarkable with the exception of glucose of 479.  Ammonia level was less than 10.  UA was concerning for infection.    COVID/influenza testing was negative. CXR was negative. CT head and cervical spine was unremarkable. She was admitted to the hospital with suspected gastroenteritis. After admission she continued to feel weak but nausea, vomiting , and diarrhea resolved. Urine culture grew E coli for which she was treated with Rocephin.  She was quite weak. PT saw patient and recommended TCU on discharge. SW was consulted to arrange placement.    She is medically stable to discharge.  Awaiting insurance authorization.  Has completed antibiotics.        Problem List:   #Nausea, vomiting and diarrhea suspect recent gastroenteritis: Symptoms resolved.  -Patient reported multiple days of nausea, vomiting and diarrhea without significant abdominal pain or fever  -On admission she was quite dehydrated with volume contraction noted on CBC  -Symptoms did improve  -Off IV fluid     #UTI with encephalopathy on admission  -Urine culture sent and grew E coli  -Continued ceftriaxone for 5 days.   -Leukocytosis overall much improved but not yet normal.     #Weakness  -Likely due to recent GI losses as well as UTI in the setting of previous stroke and more pronounced weakness in the  right lower extremity  -PT and social work consults patient needs TCU bed availability pending [facility] authorization  -Social work is involved for placement        #Atrial fibrillation  -Currently in a rate controlled rhythm  -Continue PTA metoprolol, diltiazem and Xarelto     #Difficulty swallowing  -She reports approximately 1 time per week staff seems to get stuck in the middle of her throat  -Speech pathology consult was ordered by admitting provider     #Hyperglycemia  #Type 2 diabetes  -Sugar on admission was 479 with A1c of 8.3  -Continue PTA glipizide 10 mg daily  -Also on Trulicity weekly, this has not been continued here for formulary reasons.  -Insulin sliding scale started here as well as carb counting insulin 1 unit per 15 g.  -Continue Lantus at bedtime, increased to 18 units.        #Hypertension-Continue Metoprolol and Diltiazem     #Hyperlipidemia-Continue PTA pravastatin     #Vertigo-No complaints of vertigo at this time     #Osteoarthritis          Diet: Combination Diet Regular Diet Adult    DVT Prophylaxis: DOAC  Hernandez Catheter: Not present  Central Lines: None  Cardiac Monitoring: None  Code Status: Full Code      Disposition Plan   Expected Discharge:    Expected Discharge Date: 06/23/2022    Discharge Delays: Placement - TCU  Destination: inpatient rehabilitation facility       Anticipated discharge location: inpatient rehabilitation facility    Delays:       Discharge Delays: Placement - TCU         Keyon Partida MD  Hospitalist Service  [facility]  Securely message with the Vocera Web Console (learn more here)  Text page via [employer] Paging/Directory         Clinically Significant Risk Factors Present on Admission                   ______________________________________________________________________    Interval History     Increasing Lantus, Trulicity is on hold  Feels well overall, working on mobilizing  No new issues, awaiting insurance authorization    Data  reviewed today: I reviewed all medications, new labs and imaging results over the last 24 hours. I personally reviewed no images or EKG's today.    Physical Exam   Vital Signs: Temp: 97.8  F (36.6  C) Temp src: Oral BP: 119/76 Pulse: 84   Resp: 18 SpO2: 96 % O2 Device: None (Room air)    Weight: 227 lbs 14.4 oz  GENERAL:  Comfortable. Cooperative.  PSYCH: pleasant, oriented, No acute distress.  EYES: PERRLA, Normal conjunctiva.  HEART:  Regular rate and rhythm. No JVD. Pulses normal. No edema.  LUNGS:  Clear to auscultation, normal Respiratory effort.  ABDOMEN:  Soft, no hepatosplenomegaly, normal bowel sounds.  EXTREMETIES: No clubbing, cyanosis or ischemia  SKIN:  Dry to touch, No rash.      Data   Recent Labs   Lab 06/23/22  1254 06/23/22  0837 06/23/22  0209 06/22/22  0803 06/22/22  0704 06/21/22  0740 06/21/22  0557 06/20/22  0758 06/20/22  0650 06/19/22  0746 06/19/22  0535 06/18/22  0826 06/18/22  0520   WBC  --   --   --   --  11.5*  --  12.4*  --  12.6*  --  13.8*  --  13.3*   HGB  --   --   --   --  14.2  --  14.1  --  13.6  --  13.2  --  13.4   MCV  --   --   --   --  101*  --  101*  --  102*  --  101*  --  104*   PLT  --   --   --   --  165  --  159  --  153  --  159  --  145*   NA  --   --   --   --   --   --   --   --  141  --  138  --  138   POTASSIUM  --   --   --   --   --   --   --   --  3.5  --  3.5  --  3.8   CHLORIDE  --   --   --   --   --   --   --   --  110*  --  110*  --  110*   CO2  --   --   --   --   --   --   --   --  24  --  22  --  23   BUN  --   --   --   --   --   --   --   --  12  --  15  --  19   CR  --   --   --   --   --   --   --   --  0.60  --  0.59  --  0.72   ANIONGAP  --   --   --   --   --   --   --   --  7  --  6  --  5   EZEKIEL  --   --   --   --   --   --   --   --  8.5  --  8.2*  --  8.3*   * 193* 208*   < >  --    < >  --    < > 192*   < > 178*   < > 162*    < > = values in this interval not displayed.

## 2022-06-23 NOTE — PROGRESS NOTES
"/76 (BP Location: Left arm)   Pulse 84   Temp 97.8  F (36.6  C) (Oral)   Resp 18   Ht 1.626 m (5' 4\")   Wt 103.4 kg (227 lb 14.4 oz)   SpO2 96%   BMI 39.12 kg/m        Pt alert and oriented X 4; VSS; Afebrile; On RA. Tolerating regular diet well with good appetite. Covid test collected and sent to lab. Up with X 1 assist, gait belt and walker. Ambulated in hallway with staff. Inter-dry in abd folds intact; mepilix on buttocks intact. SW following regarding TCU placement and insurance authorization  "

## 2022-06-24 NOTE — PROGRESS NOTES
Care Management Discharge Note    Discharge Date: 06/24/2022     Discharge Disposition: Transitional Care    Discharge Services: None    Discharge DME: None    Discharge Transportation: agency    Private pay costs discussed: transportation costs    PAS Confirmation Code: [id number]  Patient/family educated on Medicare website which has current facility and service quality ratings: yes    Education Provided on the Discharge Plan:  Yes   Persons Notified of Discharge Plans: Pt and dtr   Patient/Family in Agreement with the Plan: yes    Additional Information:  Pt has been accepted to [facility], insurance authorization has been obtained.  BYRON ride arranged for today at 12pm. Pt updated, LM with dtr Velvet. Orders completed and have been sent. No other needs anticipated.     Megan Tello RN BSN   Inpatient Care Coordination  [facility]   Phone [phone]

## 2022-06-24 NOTE — PLAN OF CARE
Speech Language Therapy Discharge Summary    Reason for therapy discharge:    Discharged to transitional care facility.    Progress towards therapy goal(s). See goals on Care Plan in [facility] electronic health record for goal details.  Goals partially met.  Barriers to achieving goals:   discharge from facility.    Therapy recommendation(s):    Pt was upgraded to regular/thin diet, planned for x1 meal f/u while IP for tolerance/strategies though pt discharging; could consider short-term SLP services at TCU though pt appears near baseline.

## 2022-06-24 NOTE — DISCHARGE SUMMARY
[facility]  Discharge Summary        Hima Griffiths MRN# [id number]   YOB: 1945 Age: 77 year old     Date of Admission:  6/16/2022  Date of Discharge:  6/24/2022 12:28 PM  Admitting Physician:  Mitchell Waldrop DO  Discharge Physician: Stella Stevenson PA-C  Discharging Service: Hospitalist     Primary Provider: Georgiana Joy  Primary Care Physician Phone Number: [phone]         Discharge Diagnoses/Problem Oriented Hospital Course (Providers):    Hima Griffiths was admitted on 6/16/2022 by Mitchell Waldrop DO and I would refer you to their history and physical.  The following problems were addressed during her hospitalization:    1.  Nausea vomiting diarrhea due to viral gastroenteritis  2.  Infectious/Metabolic encephalopathy due to UTI  3.  E. coli UTI  4.  Generalized weakness due to #1 and 2  5. Right buttock pressure injury.     Chronic medical illness  Chronic atrial fibrillation-stable  Hyperglycemia in the setting of type 2 diabetes  Hypertension  Hyperlipidemia  Chronic vertigo   osteoarthritis           Code Status:      Full Code        Brief Hospital Stay Summary Sent Home With Patient in AVS:        Reason for your hospital stay      You admitted for concerns of nausea vomiting and diarrhea likely due to   viral gastroenteritis.  You did have a UTI and is currently treated with   antibiotics.  Your blood sugar had been not well controlled so we had to   titrate your Lantus and insulin dosing.  We will continue current insulin   dosing at the TCU and they can adjust it further if need be.  Ultimately   you will need to follow-up with your primary care provider after discharge   from TCU for medication management.  You will now be discharged to a   transitional care unit to get more physical therapy and rehab.        Hima Griffiths is a 77 year old female with history of atrial fibrillation, previous stroke, chronic anticoagulation with xarelto, type  2 diabetes, hypertension, hyperlipidemia, vertigo and osteoarthritis. He presented to the  ED on 6/16/2022 with complaints of nausea, vomiting, diarrhea, weakness, and a fall. ED work up showed no fever, blood pressure 132/106, and pulse 112 (EKG showed atrial fibrillation), She was breathing comfortably on room air without hypoxia.  WBC was elevated to 15.4 and BMP was unremarkable with the exception of glucose of 479.  Ammonia level was less than 10.  UA was concerning for infection.    COVID/influenza testing was negative. CXR was negative. CT head and cervical spine was unremarkable. She was admitted to the hospital with suspected gastroenteritis. After admission she continued to feel weak but nausea, vomiting , and diarrhea resolved.    She did have presence of decub ulceration seen by WO.    Pressure Injury Location: Right buttock  Wound type: Pressure Injury     Pressure Injury Stage: 2, present on admission   Wound history/plan of care:   Patient reports she has had this for a few weeks.  Wound base: 100 % dermis     Palpation of the wound bed: normal      Drainage: small     Description of drainage: serosanguinous     Measurements (length x width x depth, in cm) 0.5  x 0.5  cm          Urine culture grew E coli for which she was treated with RocepIn addition her diabetes was not well controlled.  She was placed on insulin sliding scale with carb counting in addition to increase of her Lantus.  At the time of discharge we discharged with increase Lantus dosing as well as Premeal insulin.  She will follow-up with her primary care provider for further titration.  She remains quite weak. PT saw patient and recommended TCU on discharge. SW was consulted to arrange placement.     She is medically stable to discharge.  Has completed antibiotics.            Important Results:      No results for input(s): WBC, HGB, HCT, MCV, PLT in the last 168 hours.       Lab Results   Component Value Date     06/29/2022      06/20/2022     06/19/2022     01/20/2021     07/10/2020     12/18/2019    Lab Results   Component Value Date    CHLORIDE 104 06/29/2022    CHLORIDE 110 06/20/2022    CHLORIDE 110 06/19/2022    CHLORIDE 106 01/20/2021    CHLORIDE 104 07/10/2020    CHLORIDE 104 12/18/2019    Lab Results   Component Value Date    BUN 26 06/29/2022    BUN 12 06/20/2022    BUN 15 06/19/2022    BUN 16 01/20/2021    BUN 10 07/10/2020    BUN 21 12/18/2019      Lab Results   Component Value Date    POTASSIUM 4.1 06/29/2022    POTASSIUM 4.1 06/24/2022    POTASSIUM 3.5 06/20/2022    POTASSIUM 4.6 01/20/2021    POTASSIUM 4.3 07/10/2020    POTASSIUM 5.3 12/18/2019    Lab Results   Component Value Date    CO2 24 06/29/2022    CO2 24 06/20/2022    CO2 22 06/19/2022    CO2 25 01/20/2021    CO2 25 07/10/2020    CO2 24 12/18/2019    Lab Results   Component Value Date    CR 1.03 06/29/2022    CR 0.60 06/20/2022    CR 0.59 06/19/2022    CR 0.96 01/20/2021    CR 0.78 07/10/2020    CR 0.95 12/18/2019                 Pending Results:        Unresulted Labs Ordered in the Past 30 Days of this Admission     No orders found from 5/17/2022 to 6/17/2022.            Discharge Instructions and Follow-Up:      Follow-up Appointments     Follow Up and recommended labs and tests      Follow up with [facility] physician.  The following labs/tests are   recommended: Needs more insulin dosing titration.               Discharge Disposition:      Discharged to home         Discharge Medications:        Current Discharge Medication List      START taking these medications    Details   acetaminophen (TYLENOL) 325 MG tablet Take 2 tablets (650 mg) by mouth every 6 hours as needed for mild pain or other (and adjunct with moderate or severe pain or per patient request)  Qty: 30 tablet, Refills: 0    Associated Diagnoses: Osteoarthritis of right knee, unspecified osteoarthritis type      insulin aspart (NOVOLOG PEN) 100 UNIT/ML pen Inject 5  Units Subcutaneous 3 times daily (with meals)  Qty: 15 mL, Refills: 0    Associated Diagnoses: Type 2 diabetes mellitus with complication, without long-term current use of insulin (H)      insulin glargine (LANTUS PEN) 100 UNIT/ML pen Inject 18 Units Subcutaneous At Bedtime  Qty: 15 mL, Refills: 0    Comments: If Lantus is not covered by insurance, may substitute Basaglar or Semglee or other insulin glargine product per insurance preference at same dose and frequency.    Associated Diagnoses: Type 2 diabetes mellitus with complication, without long-term current use of insulin (H)         CONTINUE these medications which have CHANGED    Details   glipiZIDE (GLUCOTROL XL) 10 MG 24 hr tablet Take 1 tablet (10 mg) by mouth daily (with breakfast)  Qty: 30 tablet, Refills: 0    Associated Diagnoses: Type 2 diabetes mellitus with complication, without long-term current use of insulin (H)         CONTINUE these medications which have NOT CHANGED    Details   clotrimazole (LOTRIMIN) 1 % external cream Apply topically 2 times daily as needed      diltiazem (CARDIZEM) 30 MG tablet Take 1 tablet (30 mg) by mouth 2 times daily  Qty: 180 tablet, Refills: 0    Associated Diagnoses: Persistent atrial fibrillation (H)      dulaglutide (TRULICITY) 1.5 MG/0.5ML pen Inject 1.5 mg Subcutaneous every 7 days  Qty: 8 mL, Refills: 3    Associated Diagnoses: Type 2 diabetes mellitus with complication, without long-term current use of insulin (H)      lisinopril (ZESTRIL) 5 MG tablet Take 1 tablet (5 mg) by mouth daily  Qty: 90 tablet, Refills: 0    Associated Diagnoses: Type 2 diabetes mellitus with complication, without long-term current use of insulin (H); Stage 3a chronic kidney disease (H)      metoprolol tartrate (LOPRESSOR) 100 MG tablet Take 1 tablet (100 mg) by mouth 2 times daily  Qty: 180 tablet, Refills: 3    Associated Diagnoses: Persistent atrial fibrillation (H)      pravastatin (PRAVACHOL) 40 MG tablet Take 1 tablet (40 mg) by  mouth daily  Qty: 90 tablet, Refills: 0    Associated Diagnoses: Hyperlipidemia LDL goal <100      rivaroxaban ANTICOAGULANT (XARELTO ANTICOAGULANT) 20 MG TABS tablet Take 1 tablet (20 mg) by mouth daily (with dinner)  Qty: 90 tablet, Refills: 0    Associated Diagnoses: Type 2 diabetes mellitus with complication, without long-term current use of insulin (H); Persistent atrial fibrillation (H); H/O ischemic left MCA stroke; Benign hypertension      ACE/ARB NOT PRESCRIBED, INTENTIONAL, Please choose reason not prescribed, below    Associated Diagnoses: Type 2 diabetes mellitus with complication, without long-term current use of insulin (H)      ASPIRIN NOT PRESCRIBED (INTENTIONAL) Please choose reason not prescribed, below    Associated Diagnoses: Uncontrolled type 2 diabetes mellitus with complication, without long-term current use of insulin (H)      blood glucose (NO BRAND SPECIFIED) lancets standard Use to test blood sugar 2 times daily or as directed.  Qty: 100 each, Refills: 11    Associated Diagnoses: Type 2 diabetes mellitus with complication, without long-term current use of insulin (H)      blood glucose (NO BRAND SPECIFIED) test strip Use to test blood sugar 2 times daily or as directed.  Qty: 200 each, Refills: 11    Associated Diagnoses: Type 2 diabetes mellitus with complication, without long-term current use of insulin (H)      blood glucose monitoring (NO BRAND SPECIFIED) meter device kit Use to test blood sugar 2 times daily or as directed.  Qty: 1 kit, Refills: 1    Associated Diagnoses: Type 2 diabetes mellitus with complication, without long-term current use of insulin (H)               Allergies:         Allergies   Allergen Reactions     Alkylamines Other (See Comments)     Comment: Weakness tingling arms and legs, Description:      Sudafed [Pseudoephedrine]      Legs and arms get weak           Consultations This Hospital Stay:      No consultations were requested during this admission          "Condition and Physical on Discharge:      Discharge condition: Stable   Vitals: Blood pressure (!) 145/92, pulse 85, temperature 97.4  F (36.3  C), temperature source Oral, resp. rate 16, height 1.626 m (5' 4\"), weight 103.4 kg (227 lb 14.4 oz), SpO2 97 %, not currently breastfeeding.  227 lbs 14.4 oz      GENERAL:  Comfortable. Cooperative.  PSYCH: pleasant, oriented, No acute distress.  EYES: PERRLA, Normal conjunctiva.  HEART:  Regular rate and rhythm. No JVD. Pulses normal. No edema.  LUNGS:  Clear to auscultation, normal Respiratory effort.  ABDOMEN:  Soft, no hepatosplenomegaly, normal bowel sounds.  EXTREMETIES: No clubbing, cyanosis or ischemia  SKIN:  Dry to touch, No rash.         Discharge Time:      <30 mins         Image Results From This Hospital Stay (For Non-EPIC Providers):        Results for orders placed or performed during the hospital encounter of 06/16/22   CT Head w/o Contrast    Narrative    CT HEAD W/O CONTRAST, CT CERVICAL SPINE W/O CONTRAST  6/16/2022 3:28 PM    INDICATION: Trauma, headache, neck pain  TECHNIQUE:   HEAD CT: Routine. Dose reduction techniques were used.  CERVICAL SPINE CT: Axial images were obtained through the cervical  spine and were evaluated in the axial plane with sagittal and coronal  reformations. Dose reduction techniques were used.  CONTRAST: None.  COMPARISON:  CT head 2/20/2019    FINDINGS:   HEAD CT: No intracranial hemorrhage, extraaxial collection, mass  effect or CT evidence of acute infarct.  Unchanged moderate presumed  chronic small vessel ischemic changes. Unchanged moderate generalized  volume loss. The ventricles are proportional to the sulci. Osseous  structures are intact. The visualized orbits, paranasal sinuses and  mastoid air cells are free of significant disease.     CERVICAL SPINE CT:   Normal vertebral body heights. No fracture or post-traumatic  subluxation. No high-grade spinal canal or neural foraminal stenosis.    Grossly normal paraspinal " soft tissues. Normal lung apices.     CONCLUSION:  HEAD CT:  1.  No acute intracranial abnormality.    CERVICAL SPINE CT:  1.  No acute fracture.    RON LOMAS MD         SYSTEM ID:  [id number]   CT Cervical Spine w/o Contrast    Narrative    CT HEAD W/O CONTRAST, CT CERVICAL SPINE W/O CONTRAST  6/16/2022 3:28 PM    INDICATION: Trauma, headache, neck pain  TECHNIQUE:   HEAD CT: Routine. Dose reduction techniques were used.  CERVICAL SPINE CT: Axial images were obtained through the cervical  spine and were evaluated in the axial plane with sagittal and coronal  reformations. Dose reduction techniques were used.  CONTRAST: None.  COMPARISON:  CT head 2/20/2019    FINDINGS:   HEAD CT: No intracranial hemorrhage, extraaxial collection, mass  effect or CT evidence of acute infarct.  Unchanged moderate presumed  chronic small vessel ischemic changes. Unchanged moderate generalized  volume loss. The ventricles are proportional to the sulci. Osseous  structures are intact. The visualized orbits, paranasal sinuses and  mastoid air cells are free of significant disease.     CERVICAL SPINE CT:   Normal vertebral body heights. No fracture or post-traumatic  subluxation. No high-grade spinal canal or neural foraminal stenosis.    Grossly normal paraspinal soft tissues. Normal lung apices.     CONCLUSION:  HEAD CT:  1.  No acute intracranial abnormality.    CERVICAL SPINE CT:  1.  No acute fracture.    RON LOMAS MD         SYSTEM ID:  [id number]   XR Chest Port 1 View    Narrative    EXAM: XR CHEST PORT 1 VIEW  LOCATION: [facility]  DATE/TIME: 6/16/2022 9:24 PM    INDICATION: Encephalopathy.  COMPARISON: None.      Impression    IMPRESSION:     No focal airspace disease. No pleural effusion or pneumothorax.    The cardiomediastinal silhouette is unremarkable. Aortic calcifications.

## 2022-06-24 NOTE — PROGRESS NOTES
Patient's After Visit Summary was reviewed with patient and/or family.   Patient verbalized understanding of After Visit Summary, recommended follow up and was given an opportunity to ask questions.   Discharge medications sent home with patient/family: Not applicable   Discharged with [facility] via transport services to another facility.

## 2022-06-24 NOTE — PROGRESS NOTES
Clinic Care Coordination Contact  Care Team Conversations    Situation: RN Clinical Product Navigator following patient through TCU care progression.     Background: Patient was inpatient at [facility] 6/16/22-6/24/22 due to UTI, viral gastroenteritis, hyperglycemia.  [facility]    Assessment: Prior to hospitalization patient was living alone in an apartment with an elevator.  Patient was using the following equipment at home: rolling walker, grab bars for shower and toilet, raised seat and shower chair.  Patient was able to walk short distances independently.    Discharge recommendations:  Follow-up with PCP after discharge from TCU for medication management.    Plan/Recommendations: RN Clinical Product Navigator will send TCU Care Team Care Management hand in communication and request involvement in discharge planning and coordination of care.      Melissa Behl BSN, RN, PHN, [facility]  RN Clinical Product Navigator  [phone]

## 2022-06-24 NOTE — PLAN OF CARE
Physical Therapy Discharge Summary    Reason for therapy discharge:    Discharged to transitional care facility.    Progress towards therapy goal(s). See goals on Care Plan in [facility] electronic health record for goal details.  Goals partially met.  Barriers to achieving goals:   discharge from facility.    Therapy recommendation(s):    Continued therapy is recommended.  Rationale/Recommendations:  Recommended TCU for further progression of functional mobility and safety.

## 2022-06-27 NOTE — PROGRESS NOTES
[facility]  INITIAL VISIT NOTE  June 28, 2022    PRIMARY CARE PROVIDER AND CLINIC:  Georgiana Joy [address] [facility]  / [place] [id number]    CHIEF COMPLAINT:  Hospital follow-up/Initial visit    HPI:    Hima Griffiths is a 77 year old  (1945) female who was seen at [facility] T[facility] on June 28, 2022 for an initial visit.     Medical history is notable for hypertension, dyslipidemia, permanent atrial fibrillation, CVA, BPPV, osteoarthritis, and morbid obesity.    Summary of hospital course:  Patient was hospitalized at [facility] from June 16 through June 24, 2022 for acute gastroenteritis and UTI.  Patient originally presented with generalized weakness, encephalopathy, and evaluation after fall.  Reportedly, she had several days of feeling sick with symptoms as nausea and vomiting with subsequent diarrhea which cleared up but she continued to feel generally weak..  Per ED report, she was getting up from a chair on her way to the computer when she slowly lost strength and fell to the floor without losing consciousness or hitting her head.  Her daughter noticed that she was not answering her phone, so sent a wellness check and they found her on the ground.  Upon admission to ED, EKG showed atrial fibrillation with a heart rate of 98 bpm.  CT head was negative for acute intracranial abnormality.  CT cervical spine showed no acute fracture.  Chest x-ray demonstrated no focal airspace disease or pleural effusion.  CBC was significant for elevated white count of 15.4.  CMP was unremarkable except for hyperglycemia (blood glucose of 479).  TSH was 1.74, high-sensitivity troponin was normal at 12, total CK was 70, and hemoglobin A1c was 8.3%.  Screening for COVID-19, RSV, and influenza was negative.  UA was abnormal showing moderate leukocyte esterase, 54 WBCs, and 7 RBCs.  After admission, she continued to feel weak but nausea, vomiting and diarrhea resolved.  She was  resuscitated with IV fluids and started on IV ceftriaxone.  Urine culture grew more than 100,000 colonies per mL E. coli and less than 10,000 colonies per mL urogenital cy.  While inpatient, she received 5 days of IV ceftriaxone.  Blood cultures remained negative.  Notably, insulin glargine and prandial NovoLog were initiated in the hospital for better management of diabetes.  TCU was recommended per therapies.    Patient is admitted to this facility for medical management, nursing care, and rehab.     Of note, history was obtained from patient, facility RN, and extensive review of the chart.    Today's visit:  Patient was seen in her room, while sitting in a recliner.  She appears comfortable.  She reports no fever, chills, confusion, dysuria, or urinary frequency.  She denies chest pain, palpitation, dyspnea, nausea, vomiting, or abdominal pain.  She had a bowel movement yesterday.  Today's blood glucose is 135.      CODE STATUS:   CPR/Full code     PAST MEDICAL HISTORY:   E. coli UTI in June 2022  Acute gastroenteritis in June 2022  Hypertension  Dyslipidemia  Permanent atrial fibrillation  Left MCA CVA in October 2015  DM type II  BPPV  Alopecia  Osteoarthritis  Morbid obesity, BMI 38.4    Note: No CRISTA    Past Medical History:   Diagnosis Date     Benign hypertension      Benign paroxysmal positional vertigo      Colitis 11/28/2016    Presumed infectious 2009.      Diabetes mellitus (H)      Family history of atrial fibrillation     Mother     Osteoarthritis of right knee, unspecified osteoarthritis type 8/2/2019     Persistent atrial fibrillation (H) 11/2015     Stroke (H) 10/2015       PAST SURGICAL HISTORY:   Tonsillectomy  Cholecystectomy  Right knee arthroscopic surgery  Cataract removal  Breast biopsy    Past Surgical History:   Procedure Laterality Date     AS LOOP RECORDER IMPLANT  11/9/15     CATARACT IOL, RT/LT  8/13/14       FAMILY HISTORY:   Family History   Problem Relation Age of Onset      Hypertension Mother      Arrhythmia Mother      Hypertension Father      Coronary Artery Disease Father        SOCIAL HISTORY:  Social History     Tobacco Use     Smoking status: Former Smoker     Start date: 10/3/1963     Quit date: 10/3/2006     Years since quitting: 15.7     Smokeless tobacco: Never Used     Tobacco comment: 30 years on and off less than a pack a day   Substance Use Topics     Alcohol use: Yes       MEDICATIONS:  Current Outpatient Medications   Medication Sig Dispense Refill     acetaminophen (TYLENOL) 325 MG tablet Take 2 tablets (650 mg) by mouth every 6 hours as needed for mild pain or other (and adjunct with moderate or severe pain or per patient request) 30 tablet 0     ASPIRIN NOT PRESCRIBED (INTENTIONAL) Please choose reason not prescribed, below       blood glucose (NO BRAND SPECIFIED) lancets standard Use to test blood sugar 2 times daily or as directed. 100 each 11     blood glucose (NO BRAND SPECIFIED) test strip Use to test blood sugar 2 times daily or as directed. 200 each 11     blood glucose monitoring (NO BRAND SPECIFIED) meter device kit Use to test blood sugar 2 times daily or as directed. 1 kit 1     clotrimazole (LOTRIMIN) 1 % external cream Apply topically 2 times daily as needed       diltiazem (CARDIZEM) 30 MG tablet Take 1 tablet (30 mg) by mouth 2 times daily 180 tablet 0     dulaglutide (TRULICITY) 1.5 MG/0.5ML pen Inject 1.5 mg Subcutaneous every 7 days 8 mL 3     glipiZIDE (GLUCOTROL XL) 10 MG 24 hr tablet Take 1 tablet (10 mg) by mouth daily (with breakfast) 30 tablet 0     insulin aspart (NOVOLOG PEN) 100 UNIT/ML pen Inject 5 Units Subcutaneous 3 times daily (with meals) 15 mL 0     insulin glargine (LANTUS PEN) 100 UNIT/ML pen Inject 18 Units Subcutaneous At Bedtime 15 mL 0     lisinopril (ZESTRIL) 5 MG tablet Take 1 tablet (5 mg) by mouth daily 90 tablet 0     metoprolol tartrate (LOPRESSOR) 100 MG tablet Take 1 tablet (100 mg) by mouth 2 times daily 180 tablet 3  "    pravastatin (PRAVACHOL) 40 MG tablet Take 1 tablet (40 mg) by mouth daily 90 tablet 0     rivaroxaban ANTICOAGULANT (XARELTO ANTICOAGULANT) 20 MG TABS tablet Take 1 tablet (20 mg) by mouth daily (with dinner) 90 tablet 0       Post Discharge Medication Reconciliation Status: discharge medications reconciled and changed, per note/orders.        ALLERGIES:  Allergies   Allergen Reactions     Alkylamines Other (See Comments)     Comment: Weakness tingling arms and legs, Description:      Sudafed [Pseudoephedrine]      Legs and arms get weak       ROS:  10 point ROS were negative other than the symptoms noted above in the HPI.    PHYSICAL EXAM:  Vital signs were reviewed in the chart.  Vital Signs: /74   Pulse 85   Temp 98.1  F (36.7  C)   Resp 18   Ht 1.626 m (5' 4\")   Wt 101.6 kg (224 lb)   SpO2 97%   BMI 38.45 kg/m    General: Comfortable and in no acute distress  HEENT: No conjunctival pallor, no scleral icterus or injection, moist oral mucosa  Cardiovascular: Normal S1, S2, irregularly irregular rhythm  Respiratory: Lungs clear to auscultation bilaterally  GI: Abdomen soft, non-tender, non-distended, +BS  Extremities: No LE edema  Neuro: CX II-XII grossly intact; ROM in all four extremities grossly intact  Psych: Alert and oriented x3; normal affect  Skin: No acute rash    LABORATORY/IMAGING DATA:  All relevant labs and imaging data in [facility] and/or Care Everywhere were personally reviewed today.      Most Recent 3 CBC's:  Recent Labs   Lab Test 06/22/22  0704 06/21/22  0557 06/20/22  0650   WBC 11.5* 12.4* 12.6*   HGB 14.2 14.1 13.6   * 101* 102*    159 153     Most Recent 3 BMP's:  Recent Labs   Lab Test 06/24/22  0911 06/24/22  0834 06/24/22  0250 06/24/22  0242 06/20/22  0758 06/20/22  0650 06/19/22  0746 06/19/22  0535 06/18/22  0826 06/18/22  0520   NA  --   --   --   --   --  141  --  138  --  138   POTASSIUM  --  4.1  --   --   --  3.5  --  3.5  --  3.8   CHLORIDE  --   --   --  "  --   --  110*  --  110*  --  110*   CO2  --   --   --   --   --  24  --  22  --  23   BUN  --   --   --   --   --  12  --  15  --  19   CR  --   --   --   --   --  0.60  --  0.59  --  0.72   ANIONGAP  --   --   --   --   --  7  --  6  --  5   EZEKIEL  --   --   --   --   --  8.5  --  8.2*  --  8.3*   *  --  234* 463*   < > 192*   < > 178*   < > 162*    < > = values in this interval not displayed.     Most Recent 2 LFT's:  Recent Labs   Lab Test 06/16/22  1415 03/16/22  1127   AST 35 22   ALT 43 19   ALKPHOS 112 83   BILITOTAL 1.0 0.6         ASSESSMENT/PLAN:  E. coli UTI.  Inpatient, she was treated with 5 days of IV ceftriaxone.  Patient is currently asymptomatic and has no urinary symptoms.  Plan:  Monitor for recurrence    Recent history of acute gastroenteritis, resolved,  Dehydration.  Patient was treated with IV fluids in the hospital.  Symptoms of nausea, vomiting, and diarrhea have now resolved.  Plan:  Keep patient adequately hydrated    Acute metabolic encephalopathy.  Due to dehydration as well as UTI.  Resolved.  Plan:  Monitor mental status     DM type II, poorly controlled.  Patient presented with profound hyperglycemia.  Last Hgb A1c was 8.3% June 16.    PTA only on glipizide and Trulicity  She was initiated on insulin glargine and prandial NovoLog in the hospital and glipizide dose was adjusted.  This morning blood glucose 135.  Plan:  Continue diabetic diet  Continue insulin glargine 18 units subcu at bedtime   Continue NovoLog 5 units subcu 3 times daily  Continue glipizide XL 10 mg p.o. daily    Continue Trulicity 1.5 mg subcu every Friday  Monitor blood glucose before meals and at bedtime  Further adjust diabetic regimen as indicated    Essential hypertension.  Blood pressure is fairly controlled.  Plan:  Continue metoprolol 100 mg p.o. twice daily, diltiazem 30 mg p.o. twice daily, and lisinopril 5 mg p.o. daily  Monitor blood pressure    Dyslipidemia.  Plan:  Continue pravastatin 40 mg p.o.  daily    Permanent atrial fibrillation.  Rate is controlled.  Plan:  Continue metoprolol 100 mg p.o. twice daily and diltiazem 30 mg p.o. twice daily  Continue chronic anticoagulation with rivaroxaban 20 mg p.o. daily  Monitor heart rate    History of left MCA CVA in October 2015.  No focal deficit on gross examination.  Plan:  Continue anticoagulation with rivaroxaban 20 mg p.o. daily  Continue pravastatin 40 mg p.o. daily    Morbid obesity, BMI 38.4.  This is clinically significant due to increased nursing cares, use of resources, and specialty equipment.   She does not carry a diagnosis of CRISTA.  Plan:  Encourage participation in a long-term plan for weight loss and healthy lifestyle  Staff to assist with daily care and mobility    Physical deconditioning.  Plan:  Continue PT/OT evaluation and therapy            Orders written by provider at facility:  Hold NovoLog for blood glucose less than 100  Hold insulin glargine for glucose less than 120  CBC and BMP on June 29; DX: UTI      Recommendation by provider at facility:  Discontinue glipizide as able and increase insulin        Disclaimer: This note may contain text created using speech-recognition software and may contain unintended word substitutions.      Electronically signed by:  Jeo Barrera MD

## 2022-06-29 PROBLEM — R52 PAIN: Status: ACTIVE | Noted: 2022-01-01

## 2022-06-29 PROBLEM — Z79.01 ANTICOAGULATED: Status: ACTIVE | Noted: 2022-01-01

## 2022-07-07 NOTE — PROGRESS NOTES
[facility] GERIATRICS DISCHARGE SUMMARY  PATIENT'S NAME: Hima Griffiths  YOB: 1945  MEDICAL RECORD NUMBER:  [id number]  Place of Service where encounter took place[facility] [[id number]]    PRIMARY CARE PROVIDER AND CLINIC RESPONSIBLE AFTER TRANSFER:   Georgiana Joy MD, [address] [facility]  / [place] [address]    [facility] Provider     Transferring providers: ESTHER Saleem CNP, Dr. Holly MD  Recent Hospitalization/ED:  [facility] Hospital stay 6/16/22 to 6/24/22.  Date of SNF Admission: 6/24/22  Date of SNF (anticipated) Discharge: 7/9/22  Discharged to: previous independent home  Cognitive Scores: SLUMS: 25/30  Physical Function: Ambulating 50 ft with 2ww  DME: Wheelchair    CODE STATUS/ADVANCE DIRECTIVES DISCUSSION:  Full Code   ALLERGIES: Alkylamines and Sudafed [pseudoephedrine]    NURSING FACILITY COURSE   Medication Changes/Rationale:     Decreased melatonin to 3 mg PO HS diagnosis insomnia    Stopped novolog with meals due to BG 60-130s    Per recent TCU provider progress notes:   77 year old female PMH HTN, HLD, arial fibrillation, CVA, BPPV, osteoarthritis, and morbid obesity hospitalized due to weakness and fall and treated for acute gastroenteritis and UTI.  CT head negative. CT cervical spine negative.  CXR negative. WBC 15.4.  UA abnormal. Treated with IV fluids and started on IV ceftriaxone.  Blood cultures remained negative.  Insulin glargine and prandial NovoLog were initiated in the hospital for better management of diabetes.  TCU was recommended per therapies.    Seen today for discharge visit. Doing well. No headaches, dizziness, chest pain, dyspnea, bowel or bladder symptoms. BP range 112-142/62-78 and sats 96% room air. BG range . Home with home care as ordered below. Therapies recommend wheelchair for home use due to decreased mobility.     Summary of nursing facility stay:   Urinary tract infection in elderly  patient  Physical deconditioning  Acute onset. Treated with rocephin, resolved without further symptoms. Mobility improving with therapy, home with home care and wheelchair as ordered below.     Persistent atrial fibrillation (H)  Anticoagulated  Benign hypertension  Chronic, stable. Continue Cardizem 30 mg twice daily, lisinopril 5 mg daily and metoprolol tartrate 100 mg twice daily.  Continue rivaroxaban 20 mg at dinner.  Monitor blood pressure and HR and review at PCP follow up.     Type 2 diabetes mellitus without complication, with long-term current use of insulin (H)  Chronic, stable. Last A1c 8.3 on 6/16, continue Trulicity weekly, glipizide 10 mg daily, Lantus 18 units daily. Due to BG  stop Novolog.  Monitor BG QID.     Stage 3a chronic kidney disease (H)  Chronic, stable. Avoid nephrotoxins. Last creatinine 0.60 with GFR >90 on 6/20    Insomnia  Started melatonin 3 mg at bedtime, effective.     HLD  Continue statin as PTA.     Discharge Medications:  Current Outpatient Medications   Medication Sig Dispense Refill     acetaminophen (TYLENOL) 325 MG tablet Take 2 tablets (650 mg) by mouth every 6 hours as needed for mild pain or other (and adjunct with moderate or severe pain or per patient request) 30 tablet 0     clotrimazole (LOTRIMIN) 1 % external cream Apply topically 2 times daily as needed       diltiazem (CARDIZEM) 30 MG tablet Take 1 tablet (30 mg) by mouth 2 times daily 180 tablet 0     dulaglutide (TRULICITY) 1.5 MG/0.5ML pen Inject 1.5 mg Subcutaneous every 7 days 8 mL 3     glipiZIDE (GLUCOTROL XL) 10 MG 24 hr tablet Take 1 tablet (10 mg) by mouth daily (with breakfast) 30 tablet 0     lisinopril (ZESTRIL) 5 MG tablet Take 1 tablet (5 mg) by mouth daily 90 tablet 0     melatonin 5 MG tablet Take 3 mg by mouth At Bedtime       metoprolol tartrate (LOPRESSOR) 100 MG tablet Take 1 tablet (100 mg) by mouth 2 times daily 180 tablet 3     nystatin (MYCOSTATIN) 751501 UNIT/GM external powder  "Apply to all reddened areas BID scheduled 60 g 0     pravastatin (PRAVACHOL) 40 MG tablet Take 1 tablet (40 mg) by mouth daily 90 tablet 0     rivaroxaban ANTICOAGULANT (XARELTO ANTICOAGULANT) 20 MG TABS tablet Take 1 tablet (20 mg) by mouth daily (with dinner) 90 tablet 0     ASPIRIN NOT PRESCRIBED (INTENTIONAL) Please choose reason not prescribed, below (Patient not taking: Reported on 7/7/2022)       blood glucose (NO BRAND SPECIFIED) lancets standard Use to test blood sugar 2 times daily or as directed. (Patient not taking: Reported on 7/7/2022) 100 each 11     blood glucose (NO BRAND SPECIFIED) test strip Use to test blood sugar 2 times daily or as directed. (Patient not taking: Reported on 7/7/2022) 200 each 11     blood glucose monitoring (NO BRAND SPECIFIED) meter device kit Use to test blood sugar 2 times daily or as directed. (Patient not taking: Reported on 7/7/2022) 1 kit 1     insulin glargine (LANTUS PEN) 100 UNIT/ML pen Inject 18 Units Subcutaneous At Bedtime 15 mL 0     insulin pen needle (32G X 6 MM) 32G X 6 MM miscellaneous Use as directed. 100 each 0      Controlled medications:   not applicable/none     Past Medical History:   Past Medical History:   Diagnosis Date     Benign hypertension      Benign paroxysmal positional vertigo      Colitis 11/28/2016    Presumed infectious 2009.      Diabetes mellitus (H)      Family history of atrial fibrillation     Mother     Osteoarthritis of right knee, unspecified osteoarthritis type 8/2/2019     Persistent atrial fibrillation (H) 11/2015     Stroke (H) 10/2015     Physical Exam:   Vitals: /78   Pulse 92   Temp 97.4  F (36.3  C)   Resp 20   Ht 1.626 m (5' 4\")   Wt 101.2 kg (223 lb 3.2 oz)   SpO2 99%   BMI 38.31 kg/m    BMI: Body mass index is 38.31 kg/m .  GENERAL APPEARANCE:  Alert, in no distress, cooperative  ENT:  Mouth normal, moist mucous membranes, normal hearing acuity  EYES:  Conjunctiva and lids normal  RESP:  no respiratory " distress, on room air   CV: no LE edema  NEURO:   No facial asymmetry, speech clear  PSYCH:  oriented X 3, affect and mood normal     SNF labs:   Most Recent 3 CBC's:  Recent Labs   Lab Test 06/22/22  0704 06/21/22  0557 06/20/22  0650   WBC 11.5* 12.4* 12.6*   HGB 14.2 14.1 13.6   * 101* 102*    159 153     Most Recent 3 BMP's:  Recent Labs   Lab Test 06/24/22  0911 06/24/22  0834 06/24/22  0250 06/24/22  0242 06/20/22  0758 06/20/22  0650 06/19/22  0746 06/19/22  0535 06/18/22  0826 06/18/22  0520   NA  --   --   --   --   --  141  --  138  --  138   POTASSIUM  --  4.1  --   --   --  3.5  --  3.5  --  3.8   CHLORIDE  --   --   --   --   --  110*  --  110*  --  110*   CO2  --   --   --   --   --  24  --  22  --  23   BUN  --   --   --   --   --  12  --  15  --  19   CR  --   --   --   --   --  0.60  --  0.59  --  0.72   ANIONGAP  --   --   --   --   --  7  --  6  --  5   EZEKIEL  --   --   --   --   --  8.5  --  8.2*  --  8.3*   *  --  234* 463*   < > 192*   < > 178*   < > 162*    < > = values in this interval not displayed.       DISCHARGE PLAN:    Follow up labs: No labs orders/due    Medical Follow Up:      Follow up with primary care provider in 2-3 weeks    [facility] scheduled appointments:  Next 5 appointments (look out 90 days)    Sep 21, 2022  2:20 PM  (Arrive by 2:00 PM)  Annual Wellness Visit with Georgiana Joy MD  [facility] ([facility] ) [address] [place] [address]  [address]  [facility] [address] [phone]           Discharge Services: Home Care:  Occupational Therapy, Physical Therapy, Registered Nurse, Home Health Aide and From:  [place]    Discharge Instructions Verbalized to Patient at Discharge:     Monitor blood glucose monitoring 4 times a day. Keep a record and bring it to your next primary provider visit.     TOTAL DISCHARGE TIME:   Greater than 30 minutes  Electronically signed by:  ESTHER Saleem CNP      Documentation of Face to Face and Certification for Home Health Services    I certify that services are/were furnished while this patient was under the care of a physician and that a physician or an allowed non-physician practitioner (NPP), had a face-to-face encounter that meets the physician face-to-face encounter requirements. The encounter was in whole, or in part, related to the primary reason for home health. The patient is confined to his/her home and needs intermittent skilled nursing, physical therapy, speech-language pathology, or the continued need for occupational therapy. A plan of care has been established by a physician and is periodically reviewed by a physician.  Date of Face-to-Face Encounter: 2022.    I certify that, based on my findings, the following services are medically necessary home health services: Nursing, Occupational Therapy, Physical Therapy and HHA.    My clinical findings support the need for the above skilled services because: Requires assistance of another person or specialized equipment to access medical services because patient: Is unable to exit home safely on own due to: weakness, risk of falls...    Patient to re-establish plan of care with their PCP within 7-10 days after leaving the facility to reestablish care.  Medicare certified PECOS provider: ESTHER Saleem CNP  Date: 2022      MEDICAL NECESSITY STATEMENT FOR DME    Demographic Information on Hima Griffiths:    Hima Griffiths  Gender: female  : 1945  [id number]  [facility] [place] [address]  [phone] (home)     Medical Record: [id number]  Social Security Number: [id number]  Primary Care Provider: Georgiana Joy  Insurance: Payor: [employer] / Plan: [employer] [facility] MEDICARE ADVANTAGE / Product Type: HMO /        Urinary tract infection in elderly patient  Persistent atrial fibrillation (H)  Anticoagulated  Benign hypertension  Type 2 diabetes mellitus  "without complication, with long-term current use of insulin (H)  Stage 3a chronic kidney disease (H)  Dyslipidemia  Insomnia, unspecified type  Physical deconditioning      DME:  WHEELCHAIR: 18 x 18 standard manual wheelchair with bilateral standard footrests, seat and back cushion   Dose patient use oxygen NO   Able to propel w/c YES    Mobility related ADL that are affected in the home : dressing, toileting, grooming   The wheelchair is suitable and necessary for use in the patient's home and the Home/rooms can accommodate the wheelchair.     Reason why a cane or walker will not meet the patient's needs: weakness, fall risk   The patient has expressed willingness to use the wheelchair in the home and Does have the physical and cognitive ability to maneuver the equipment or has a Caregiver who is available, willing, and able  to provide assistance in the home With the wheelchair.     VITAL SIGNS:  Vitals: /78   Pulse 92   Temp 97.4  F (36.3  C)   Resp 20   Ht 1.626 m (5' 4\")   Wt 101.2 kg (223 lb 3.2 oz)   SpO2 99%   BMI 38.31 kg/m    BMI= Body mass index is 38.31 kg/m .   see above note for exam     MEDICAL NECESSITY STATEMENT : 99 months length of need     Urinary tract infection in elderly patient  Persistent atrial fibrillation (H)  Anticoagulated  Benign hypertension  Type 2 diabetes mellitus without complication, with long-term current use of insulin (H)  Stage 3a chronic kidney disease (H)  Dyslipidemia  Insomnia, unspecified type  Physical deconditioning     ELECTRONICALLY SIGNED BY ADRYAN CERTIFIED PROVIDER:  ESTHER Saleem CNP   NPI [id number]   [facility]  [address] [place] [address], [address]  [place] [address]                  "

## 2022-07-07 NOTE — PROGRESS NOTES
S-(situation): RN Clinical Product Navigator monitoring TCU progression.    B-(background): Patient was inpatient at [facility] 6/16/22-6/24/22 due to UTI, viral gastroenteritis, hyperglycemia.  [facility]       A-(assessment): Patient had a TCU Care Conference 6/28/22.  Plan is to discharge back to [facility]; patient lives alone.  Patient was working with PT and OT; goal was to walk 200-300 feet. Owns a 4WW and 3WW, currently utilizing a 2WW for safety. Goals include increasing activity tolerance and walking endurance and distance.    R-(recommendations/plan): RN Clinical Product Navigator will continue to monitor TCU progression and place a Care Coordination referral within 1 business day of TCU discharge.    Melissa Behl BSN, RN, PHN, [facility]  RN Clinical Product Navigator  [phone]

## 2022-07-11 NOTE — TELEPHONE ENCOUNTER
Called daughter.     Scheduled hosptial follow up for this week    Next 5 appointments (look out 90 days)    Sep 21, 2022  2:20 PM  (Arrive by 2:00 PM)  Annual Wellness Visit with Georgiana Joy MD  [facility] [place] ([facility] [place] ) [address]  [address]  [place] [address] [phone]          Home care  - to assess leg, review meds etc   - states they'll be out today     Leg pain   - some swelling during TCU   - home care to be out there today to assess furhter  - doesn't wear compression stockings   - concerned swelling is worse  - appt this week  - will alert us with concerns    Nocturia  - will address Thursday    Wheelchair  - needs f2f  - home care likely to help with assessment/ seat assessment?

## 2022-07-11 NOTE — PROGRESS NOTES
S-(situation): TCU Discharge    B-(background): Patient was inpatient at [facility] 6/16/22-6/24/22 due to UTI, viral gastroenteritis, hyperglycemia.  Patient was discharged to [facility] TCU for rehabilitation.    A-(assessment): Patient was discharged to home on 7/9/22.  Patient will receive home health care through [facility] [facility] OT, PT, RN and HHA.  Patient will need to schedule a follow-up with PCP in 2-3 weeks.    R-(recommendations/plan): Care Coordination referral placed.  CPN will monitor for any newly identified care navigation needs in 1 month.    Melissa Behl BSN, RN, PHN, [facility]  RN Clinical Product Navigator  [phone]

## 2022-07-12 NOTE — LETTER
[facility] CARE COORDINATION  [address] [place] [address]    July 12, 2022    Hima Griffiths  [address]   [place] [address]      Dear Hima,    I am a clinic community health worker who works with Georgiana Joy MD with the [facility]. I wanted to thank you for spending the time to talk with me.  Below is a description of clinic care coordination and how I can further assist you.       The clinic care coordination team is made up of a registered nurse, , financial resource worker and community health worker who understand the health care system. The goal of clinic care coordination is to help you manage your health and improve access to the health care system. Our team works alongside your provider to assist you in determining your health and social needs. We can help you obtain health care and community resources, providing you with necessary information and education. We can work with you through any barriers and develop a care plan that helps coordinate and strengthen the communication between you and your care team.    Please feel free to contact me with any questions or concerns regarding care coordination and what we can offer.      We are focused on providing you with the highest-quality healthcare experience possible.    Sincerely,     ALMAZ Braswell, B.S. [facility]  Clinic Care Coordination  [facility]:  [facility] [place]  [phone]  [email or web address]

## 2022-07-12 NOTE — PROGRESS NOTES
Clinic Care Coordination Contact  [facility]: Post-Discharge Note  SITUATION                                                      Admission:    Admission Date: 06/16/22   Reason for Admission: fall  Discharge:   Discharge Date: 06/24/22  Discharge Diagnosis: UTI, viral gastroenteritis, hyperglycemia    BACKGROUND                                                      Per hospital discharge summary and inpatient provider notes:  Hima Griffiths is a 77 year old female admitted on 6/16/2022. She has a past medical history significant for atrial fibrillation, previous stroke, osteoarthritis, hypertension, hyperlipidemia, vertigo, and diabetes mellitus type 2.  She began having nausea, vomiting, diarrhea 6 days ago.  Has been feeling weak.  Daughter is noted that she has been started to get confused.  Had a fall earlier today.  Was unable to get up off the ground.  Brought to emergency room for further evaluation.  Found to have urinary tract infection.    ASSESSMENT      Enrollment  Primary Care Care Coordination Status: Declined    Discharge Assessment  How are you doing now that you are home?: Patient states that she is doing well, got up, showered, dressed, breakfast, etc.  How are your symptoms? (Red Flag symptoms escalate to triage hotline per guidelines): Improved  Do you feel your condition is stable enough to be safe at home until your provider visit?: Yes  Does the patient have their discharge instructions? : Yes  Were you started on any new medications or were there changes to any of your previous medications? : Yes  Does the patient have all of their medications?: Yes  Do you have questions regarding any of your medications? : No  Do you have all of your needed medical supplies or equipment (DME)?  (i.e. oxygen tank, CPAP, cane, etc.): Yes  Discharge follow-up appointment scheduled within 14 calendar days? : Yes  Discharge Follow Up Appointment Date: 07/14/22  Discharge Follow Up Appointment Scheduled  with?: Primary Care Provider    Care Management   Community Health Worker Initial Outreach    Chart Review: Referral from CPN, Patient was inpatient at [facility] 6/16/22-6/24/22 due to UTI, viral gastroenteritis, hyperglycemia. [facility]  Patient was discharged to home on 7/9/22. Patient will receive home health care through [facility] OT, PT, RN and HHA. PCP appt 7/14    CHW introduced self and role with CC  Patient states that she is doing really well since being home, no questions or concerns.   Daughter has helped coordinate care and needs, has appt set up and home care is supposed to be calling back today. They weren't able to get connected due to patients voicemail having issues.  CHW inquired if patient is in need of any additional support or resources however patient declines.  CHW provided contact information and encouraged patient to reach out with any future needs or concerns, patient agrees.       Patient accepts CC: No, patient declines outstanding needs for CC at this time. Patient will be sent Care Coordination introduction letter for future reference.     PLAN                                                      Outpatient Plan: F/u with PCP and home care.    Future Appointments   Date Time Provider Department Center   7/14/2022  7:40 AM Ai Tesfaye Mai, MD [facility]   9/21/2022  2:20 PM Georgiana Joy MD [facility] [place]         For any urgent concerns, please contact our 24 hour nurse triage line: [phone] ([phone])       ALMAZ Braswell, B.S. [facility] Care Coordination  [facility] Clinics:  [place], [place] and [place]  [phone]  [email or web address]

## 2022-07-14 NOTE — PROGRESS NOTES
Assessment & Plan       ICD-10-CM    1. Hospital discharge follow-up  Z09     Pt is doing well, however her condition is guarded due to age and multiple comorbidities.  Home health is medically necessary   2. Urinary tract infection in elderly patient  N39.0     Resolved- reason for hospitalization.  Was in TCU due to deconditioning.   3. Persistent atrial fibrillation (H)  I48.19 Home Care Referral    Stable, continue diltiazem, metoprolol, lisinopril, rivaroxaban.   4. Physical deconditioning  R53.81 Home Care Referral    Has home PT, OT, and RN.  I placed orders to ensure they address specific services (see PI)         5. Type 2 diabetes mellitus with complication, without long-term current use of insulin (H)  E11.8     Required insulin in patient - was discharged home with lantus, but pt has not taken after resuming trulicity.  Glucose levels are normal - will monitor.   6. Stage 3a chronic kidney disease (H)  N18.31     Most recent labs with normal GFR - continue to monitor   7. H/O ischemic left MCA stroke  Z86.73 Home Care Referral    On statin, not on ASA due to anticoagulation.  Would benefit from PT due to deconditioning and weakness in RLE.   8. Swelling of left lower extremity  R60.0     Minimal compared to left - has OT and PT coming to home for eval   9. Dermatitis  L30.9 nystatin (MYCOSTATIN) 900197 UNIT/GM external powder    Refilled topical antifungal powder     Patient Instructions   Stop insulin as you have.  Continue to monitor sugars and please let me know if you start having elevated sugar levels.    Continue the remainder of medications as you are.    I would like OT to evaluate for wheeled mobility.    I would like physical therapy and OT to evaluate and treat lower extremity swelling.    I would also like speech therapy to evaluate your swallowing and make recommendation.    Follow-up with Dr. Joy in September as scheduled.          No LOS data to display   Time spent doing chart  review, history and exam, documentation and further activities per the note       See Patient Instructions    Return in about 2 months (around 9/14/2022) for appointment already scheduled.    Miley Tesfaye MD  [facility] NIRMALA Rabago is a 77 year old, presenting for the following health issues:  Hospital F/U      HPI     Post Discharge Outreach 7/12/2022   Admission Date 6/16/2022   Reason for Admission fall   Discharge Date 6/24/2022   Discharge Diagnosis UTI, viral gastroenteritis, hyperglycemia   How are you doing now that you are home? Patient states that she is doing well, got up, showered, dressed, breakfast, etc.   How are your symptoms? (Red Flag symptoms escalate to triage hotline per guidelines) Improved   Do you feel your condition is stable enough to be safe at home until your provider visit? Yes   Does the patient have their discharge instructions?  Yes   Were you started on any new medications or were there changes to any of your previous medications?  Yes   Does the patient have all of their medications? Yes   Do you have questions regarding any of your medications?  No   Do you have all of your needed medical supplies or equipment (DME)?  (i.e. oxygen tank, CPAP, cane, etc.) Yes   Discharge follow-up appointment scheduled within 14 calendar days?  Yes   Discharge Follow Up Appointment Date 7/14/2022   Discharge Follow Up Appointment Scheduled with? Primary Care Provider     Hospital Follow-up Visit:    Hospital/Nursing Home/ Rehab Facility:   Date of Admission: 6/16/22  /Date of Discharge: 6/24/22  Went to TCU and back home on 7/9/22  Reason(s) for Admission: Weakness, fall, UTI, Hyperglycemia      Was your hospitalization related to COVID-19? No   Problems taking medications regularly:  None  Medication changes since discharge: None  Problems adhering to non-medication therapy:  None    Summary of hospitalization:  [facility] discharge summary reviewed    77 year old female PMH HTN, HLD, arial fibrillation, CVA, BPPV, osteoarthritis, and morbid obesity hospitalized due to weakness and fall and treated for acute gastroenteritis and UTI.  CT head negative. CT cervical spine negative.  CXR negative. WBC 15.4.  UA abnormal. Treated with IV fluids and started on IV ceftriaxone.  Blood cultures remained negative.  Insulin glargine and prandial NovoLog were initiated in the hospital for better management of diabetes.  TCU was recommended per therapies.     Seen today for discharge visit. Doing well. No headaches, dizziness, chest pain, dyspnea, bowel or bladder symptoms. BP range 112-142/62-78 and sats 96% room air. BG range . Home with home care as ordered below. Therapies recommend wheelchair for home use due to decreased mobility.      Summary of nursing facility stay:   Urinary tract infection in elderly patient  Physical deconditioning  Acute onset. Treated with rocephin, resolved without further symptoms. Mobility improving with therapy, home with home care and wheelchair as ordered below.      Persistent atrial fibrillation (H)  Anticoagulated  Benign hypertension  Chronic, stable. Continue Cardizem 30 mg twice daily, lisinopril 5 mg daily and metoprolol tartrate 100 mg twice daily.  Continue rivaroxaban 20 mg at dinner.  Monitor blood pressure and HR and review at PCP follow up.      Type 2 diabetes mellitus without complication, with long-term current use of insulin (H)  Chronic, stable. Last A1c 8.3 on 6/16, continue Trulicity weekly, glipizide 10 mg daily, Lantus 18 units daily. Due to BG  stop Novolog.  Monitor BG QID.      Stage 3a chronic kidney disease (H)  Chronic, stable. Avoid nephrotoxins. Last creatinine 0.60 with GFR >90 on 6/20     Insomnia  Started melatonin 3 mg at bedtime, effective.      HLD  Continue statin as PTA.     Diagnostic Tests/Treatments reviewed.  Follow up needed: none  Other  Healthcare Providers Involved in Patient s Care:         Homecare  Update since discharge: improved.   Post Discharge Medication Reconciliation: discharge medications reconciled and changed, per note/orders.  Plan of care communicated with patient and family            Additional concerns:  - over weekend, had increased swelling in the left leg - this has since been resolving.  Is getting PT for strength.  Swelling is better now.    - Order for wheelchair.  Due to deconditioning.  Unable to walk to cafeteria due to shortness of breath and deconditioning.    - nocturia - wakes up frequently - lately has improved to 3 times per night.    - Insomnia - takes zquill and was given melatonin (hasn't been taking as much)    - Swallowing issues - chokes occasionally    - DM - was taking 18 units of lantus in TCU - discontinued and sugars are ranging from  (after meals)    Review of Systems   All other systems on a 10-point review are negative, unless otherwise noted in HPI        Objective    /76 (BP Location: Right arm, Patient Position: Sitting, Cuff Size: Adult Regular)   Pulse 85   Temp 97.5  F (36.4  C) (Tympanic)   Resp 18   Wt 101.6 kg (224 lb)   SpO2 98%   BMI 38.45 kg/m    Body mass index is 38.45 kg/m .  Physical Exam   GENERAL: healthy, alert and no distress  NECK: no adenopathy, no asymmetry, masses, or scars and thyroid normal to palpation  RESP: lungs clear to auscultation - no rales, rhonchi or wheezes  CV: regular rate and rhythm, normal S1 S2, no S3 or S4, no murmur, click or rub, no peripheral edema and peripheral pulses strong  ABDOMEN: soft, nontender, no hepatosplenomegaly, no masses and bowel sounds normal  MS: minimal pitting edema at left ankle and foot, otherwise within normal limits.                    .  ..

## 2022-07-14 NOTE — PATIENT INSTRUCTIONS
Stop insulin as you have.  Continue to monitor sugars and please let me know if you start having elevated sugar levels.    Continue the remainder of medications as you are.    I would like OT to evaluate for wheeled mobility.    I would like physical therapy and OT to evaluate and treat lower extremity swelling.    I would also like speech therapy to evaluate your swallowing and make recommendation.    Follow-up with Dr. Joy in September as scheduled.

## 2022-07-14 NOTE — Clinical Note
OK for Cindy to f/up on when she returns.  Pt requested wheelchair - I requested home OT/PT evaluate her for this first - once this has been done, order will need to be signed (with certain documentation I presume).  Can you call to f/up on this?

## 2022-07-15 NOTE — TELEPHONE ENCOUNTER
Received orders, placed in Dr. Joy  in basket.    Please review, sign and fax back to [phone].

## 2022-07-18 NOTE — TELEPHONE ENCOUNTER
Looks like this is a discharge summary. Nothing to sign or maybe missing page?  Georgiana Joy M.D.

## 2022-07-19 NOTE — TELEPHONE ENCOUNTER
Received this message:  Ai Tesfaye Mai, MD P Ea Sb3/5 Diego URBANO (Rn)  OK for Cindy to f/up on when she returns.  Pt requested wheelchair - I requested home OT/PT evaluate her for this first - once this has been done, order will need to be signed (with certain documentation I presume).  Can you call to f/up on this?     I also received a message from Grecia with OT to call back regarding the wheelchair order.  [phone]    Called Grecia back, DME order prepped-since patient has Medicare, provider has to sign the order.      Wt Readings from Last 5 Encounters:   07/14/22 101.6 kg (224 lb)   07/06/22 101.2 kg (223 lb 3.2 oz)   07/06/22 101.2 kg (223 lb 3.2 oz)   06/28/22 101.6 kg (224 lb)   06/27/22 101.6 kg (224 lb)     Fax to the store: [facility] at FAX: [phone].  Besides the DME order, please include insurance info and progress notes from last visit.    Lea Oliva, RN

## 2022-07-20 NOTE — TELEPHONE ENCOUNTER
Faxed DME  to the store:     [facility]   at FAX: [phone].      also included insurance info   and progress notes from last visit.    Patience Henry LPN

## 2022-08-02 NOTE — TELEPHONE ENCOUNTER
Order/Referral Request    Who is requesting: Franky [facility] [place]    Orders being requested: wheelchair    Reason service is needed/diagnosis: spasticity    When are orders needed by: asap    Has this been discussed with Provider: Yes    Does patient have a preference on a Group/Provider/Facility? no    Does patient have an appointment scheduled?: Yes: 9-21-22    Where to send orders: Fax    Could we send this information to you in [employer] or would you prefer to receive a phone call?:   No preference     Okay to leave a detailed message?: No at Other phone number:  na

## 2022-08-09 NOTE — PROGRESS NOTES
S-(situation): TCU Discharge Progression    B-(background): [facility]    A-(assessment): Patient declined primary care-care coordination services.  Patient had follow-up with PCP as directed.  No new network navigation needs identified.    R-(recommendations/plan): RN Clinical Product Navigator will perform no further monitoring/outreaches at this time.    Melissa Behl BSN, RN, PHN, [facility]  RN Clinical Product Navigator  [phone]

## 2022-08-09 NOTE — TELEPHONE ENCOUNTER
[phone] is the number if we have questions for Veronica regarding request below,  Niki BANDA CMA,BERENICE

## 2022-08-18 NOTE — TELEPHONE ENCOUNTER
Veronica Snow from [facility] left a voicemail on [facility] direct line.   Callback: [phone].    Veronica is inquiring about documents for a manual wheelchair order.  She said that the last she knew, the documents were possibly being handled by PT, but was not sure and would like an update.     Will leave for hospitals to address.    Bandar Andre RN on 8/18/2022 at 9:03 AM

## 2022-08-18 NOTE — PATIENT INSTRUCTIONS
Lymphedema:  Start wearing knee high compression stockings during the day, off at night  Elevate legs as much as possible to reduce swelling

## 2022-08-18 NOTE — PROGRESS NOTES
"  Assessment & Plan     (R60.0) Lower extremity edema  (primary encounter diagnosis)  Comment: 77-year-old with a history of type 2 diabetes, hypertension, hyperlipidemia, morbid obesity and atrial fibrillation presents today for evaluation of lower extremity edema.  At a recent visit patient had been referred to lymphedema clinic but was not contacted to schedule.  Notes swelling mainly over the dorsum of both feet and lower legs worse throughout the day and improves when elevating her legs.  Symptoms present for the past few months.  Patient presents with her daughter, concerns regarding \"water retention\" and requesting a diuretic.  Exam significant for: Lower extremity edema right greater than left.  Lab work: Most recent BMP- normal renal function, most recent echo reviewed: Normal LVEF in 2016, no history of chronic liver disease.  Edema differential: Renal dysfunction, chronic liver disease, CHF, lymphedema, hypoproteinemia.  Based on exam and presentation today suspect chronic lymphedema associated with morbid obesity.  Initially discussed lymphedema clinic referral and lymphedema wraps through occupational therapy.  However patient and daughter states it would be challenging to get back and forth to the clinic as she is mobility impaired and would need her daughter to drive her to the visits.  Plan to start compression stockings on during the day/off at night.  She has a new adjustable bed on the way and recommended having her legs elevated.  With the presenting lower extremity asymmetry did proceed with lower extremity ultrasound which was negative for DVT, did demonstrate incidental Baker's cyst, right lower extremity.    Plan: Compression Sleeve/Stocking Order for DME -         ONLY FOR DME, US Lower Extremity Venous Duplex         Bilateral    Return in about 4 weeks (around 9/15/2022), or if symptoms worsen or fail to improve.    Crispin Mireles MD  [facility] NIRMALA Rabago " is a 77 year old accompanied by her daughter, presenting for the following health issues:      History of Present Illness       Reason for visit:  Swollen feet water retention  Symptom onset:  3-7 days ago  Symptoms include:  Swollen feet  Symptom intensity:  Moderate  Symptom progression:  Staying the same  Had these symptoms before:  Yes  Has tried/received treatment for these symptoms:  No    She eats 2-3 servings of fruits and vegetables daily.She consumes 1 sweetened beverage(s) daily.She exercises with enough effort to increase her heart rate 9 or less minutes per day.  She exercises with enough effort to increase her heart rate 3 or less days per week. She is missing 1 dose(s) of medications per week.     Patient Active Problem List   Diagnosis     Advanced directives, counseling/discussion     Persistent atrial fibrillation (H)     Benign paroxysmal positional vertigo     H/O ischemic left MCA stroke     Type 2 diabetes mellitus with complication, without long-term current use of insulin (H)     Benign hypertension     Facial droop     Hyperlipidemia LDL goal <100     BMI > 40.0 (H)     Osteoarthritis of right knee, unspecified osteoarthritis type     CKD (chronic kidney disease) stage 3, GFR 30-59 ml/min (H)     Weakness     Closed head injury, initial encounter     Fall, initial encounter     Urinary tract infection in elderly patient     Hyperglycemia     Pain     Anticoagulated     Current Outpatient Medications   Medication Sig Dispense Refill     acetaminophen (TYLENOL) 325 MG tablet Take 2 tablets (650 mg) by mouth every 6 hours as needed for mild pain or other (and adjunct with moderate or severe pain or per patient request) 30 tablet 0     ASPIRIN NOT PRESCRIBED (INTENTIONAL) Please choose reason not prescribed, below       blood glucose (NO BRAND SPECIFIED) lancets standard Use to test blood sugar 2 times daily or as directed. 100 each 11     blood glucose (NO BRAND SPECIFIED) test strip Use to  "test blood sugar 2 times daily or as directed. 200 each 11     blood glucose monitoring (NO BRAND SPECIFIED) meter device kit Use to test blood sugar 2 times daily or as directed. 1 kit 1     clotrimazole (LOTRIMIN) 1 % external cream Apply topically 2 times daily as needed       diltiazem (CARDIZEM) 30 MG tablet Take 1 tablet (30 mg) by mouth 2 times daily 180 tablet 0     dulaglutide (TRULICITY) 1.5 MG/0.5ML pen Inject 1.5 mg Subcutaneous every 7 days 8 mL 3     glipiZIDE (GLUCOTROL XL) 10 MG 24 hr tablet Take 1 tablet (10 mg) by mouth daily (with breakfast) 30 tablet 0     insulin pen needle (32G X 6 MM) 32G X 6 MM miscellaneous Use as directed. 100 each 0     lisinopril (ZESTRIL) 5 MG tablet Take 1 tablet (5 mg) by mouth daily 90 tablet 0     melatonin 5 MG tablet Take 1 tablet (5 mg) by mouth At Bedtime       metoprolol tartrate (LOPRESSOR) 100 MG tablet Take 1 tablet (100 mg) by mouth 2 times daily 180 tablet 3     nystatin (MYCOSTATIN) 931899 UNIT/GM external powder Apply to all reddened areas BID scheduled 60 g 0     pravastatin (PRAVACHOL) 40 MG tablet Take 1 tablet (40 mg) by mouth daily 90 tablet 0     rivaroxaban ANTICOAGULANT (XARELTO ANTICOAGULANT) 20 MG TABS tablet Take 1 tablet (20 mg) by mouth daily (with dinner) 90 tablet 0            Objective    /75 (BP Location: Right arm, Cuff Size: Adult Large)   Pulse 80   Temp 97.4  F (36.3  C) (Tympanic)   Ht 1.626 m (5' 4\")   Wt 103.7 kg (228 lb 9.6 oz)   LMP  (LMP Unknown)   SpO2 97%   BMI 39.24 kg/m    Body mass index is 39.24 kg/m .  Physical Exam   GENERAL:alert and no distress  MS: Lower extremities: Pedal edema bilaterally with bilateral lower extremity nonpitting edema, right worse than left extending from mid lower leg to ankle.  Without erythema or warmth.  SKIN: no suspicious lesions or rashes  PSYCH: mentation appears normal, affect normal/bright                    .  ..  "

## 2022-08-19 NOTE — TELEPHONE ENCOUNTER
Yes, a wheelchair was ordered and Dr Joy wanted OT to complete the forms.  Forms were from [employer]. The forms were faxed per chart review (not sure where they were faxed but thinking back to [employer] to inform them)  Called patient to see if she is meeting with physical and occupational therapies-LM for patient to call me back directly.    Lea Oliva RN

## 2022-08-22 NOTE — TELEPHONE ENCOUNTER
Patient called and left a voicemail on my direct line on 19Aug22. Stated she received a call regarding a wheel chair but doesn't believe she needs it anymore and was wondering if it could be canceled.     Routing to [facility].     Josephine Morales, [facility]  [phone]

## 2022-08-24 NOTE — TELEPHONE ENCOUNTER
Manual wheelchair form for medicare needs to be filled out. Called [facility] and per chart patient requested that that order be cancelled as she felt she didn't need it anymore (see encounter 08/18/2022). Informed Veronica of this.    Alicia ROBERT RN on 8/24/2022 at 8:24 AM

## 2022-08-29 NOTE — TELEPHONE ENCOUNTER
Daughter informed of the referral, asked her to contact me if she has not heard from home care.  Lea Oliva RN

## 2022-08-29 NOTE — TELEPHONE ENCOUNTER
Daughter responded, edema in legs continues, see below.  Has seen OT once and then they referred to just physical therapy which is done now as patient reached her goals-getting to the dining room at her facility with a walker.  -avoiding salt, elevating legs, compression stockings have been ordered and planning to start applying on at daytime and off at bedtime  -I asked if interested in OT at home through home care.  Daughter is interested in physical and occupations therapies, more aggressive approach.    Please advise-referral pended.    Lea Oliva RN

## 2022-08-29 NOTE — TELEPHONE ENCOUNTER
"BLEE continues, right leg weakness and pain in the feet, legs and hips  US was negative for a blood clot  Hard time getting out of a chair  Walking with a walker, but painful  Done with physical therapy    Last seen 8/18, plan was lymphedema therapy but this would be too difficult to do, elevate legs, plan was to start compression stockings at daytime, follow up around 9/15 if not better.    MC to daughter regarding compression stockings and lymphedema for wraps.    Visit notes: \"Based on exam and presentation today suspect chronic lymphedema associated with morbid obesity.  Initially discussed lymphedema clinic referral and lymphedema wraps through occupational therapy.  However patient and daughter states it would be challenging to get back and forth to the clinic as she is mobility impaired and would need her daughter to drive her to the visits.  Plan to start compression stockings on during the day/off at night.  She has a new adjustable bed on the way and recommended having her legs elevated.  With the presenting lower extremity asymmetry did proceed with lower extremity ultrasound which was negative for DVT, did demonstrate incidental Baker's cyst, right lower extremity.\"    Lea Oliva RN    "

## 2022-09-21 NOTE — PATIENT INSTRUCTIONS
When you are able, get your pneumonia shot and start your shingles shots.    For your buttock skin injury:  I put in a referral for home care nursing to come take a look and see what we can do. The skin changes are very mild, but I think you are staying in your chair too many  hours a day.   If you're unable to do this through homecare, I've done a referral to the wound care clinic in [place].    For your difficulty swallowing:  You should get a call to schedule your upper GI endoscopy.    For your blood pressure and cholesterol:  Continue your medications, double check you are still taking Pravachol    For your diabetes:  Keep taking your medications as you are.     For your yeast infection of the skin:  I'm sending a rx for yeast cream. Use this all over the areas of your groin and under breasts twice daily until rash is gone. May take several months.     Schedule your bone density test (Dexa scan)    Foot swelling:  I've requested a homecare visit to assist you in management, wraps, or other options.   Patient Education   Personalized Prevention Plan  You are due for the preventive services outlined below.  Your care team is available to assist you in scheduling these services.  If you have already completed any of these items, please share that information with your care team to update in your medical record.  Health Maintenance Due   Topic Date Due    Osteoporosis Screening  Never done    Zoster (Shingles) Vaccine (2 of 3) 04/29/2013    Pneumococcal Vaccine (3 - PPSV23 or PCV20) 10/22/2016    Kidney Microalbumin Urine Test  01/20/2022    Eye Exam  03/01/2022    COVID-19 Vaccine (5 - Booster for Pfizer series) 08/25/2022    Flu Vaccine (1) 09/01/2022     Your Health Risk Assessment indicates you feel you are not in good health    A healthy lifestyle helps keep the body fit and the mind alert. It helps protect you from disease, helps you fight disease, and helps prevent chronic disease (disease that doesn't go  away) from getting worse. This is important as you get older and begin to notice twinges in muscles and joints and a decline in the strength and stamina you once took for granted. A healthy lifestyle includes good healthcare, good nutrition, weight control, recreation, and regular exercise. Avoid harmful substances and do what you can to keep safe. Another part of a healthy lifestyle is stay mentally active and socially involved.    Good healthcare   Have a wellness visit every year.   If you have new symptoms, let us know right away. Don't wait until the next checkup.   Take medicines exactly as prescribed and keep your medicines in a safe place. Tell us if your medicine causes problems.   Healthy diet and weight control   Eat 3 or 4 small, nutritious, low-fat, high-fiber meals a day. Include a variety of fruits, vegetables, and whole-grain foods.   Make sure you get enough calcium in your diet. Calcium, vitamin D, and exercise help prevent osteoporosis (bone thinning).   If you live alone, try eating with others when you can. That way you get a good meal and have company while you eat it.   Try to keep a healthy weight. If you eat more calories than your body uses for energy, it will be stored as fat and you will gain weight.     Recreation   Recreation is not limited to sports and team events. It includes any activity that provides relaxation, interest, enjoyment, and exercise. Recreation provides an outlet for physical, mental, and social energy. It can give a sense of worth and achievement. It can help you stay healthy.    Mental Exercise and Social Involvement  Mental and emotional health is as important as physical health. Keep in touch with friends and family. Stay as active as possible. Continue to learn and challenge yourself.   Things you can do to stay mentally active are:  Learn something new, like a foreign language or musical instrument.   Play SCRABBLE or do crossword puzzles. If you cannot find  people to play these games with you at home, you can play them with others on your computer through the Internet.   Join a games club--anything from card games to chess or checkers or lawn bowling.   Start a new hobby.   Go back to school.   Volunteer.   Read.   Keep up with world events.    Exercise for a Healthier Heart  You may wonder how you can improve the health of your heart. If you re thinking about exercise, you re on the right track. You don t need to become an athlete. But you do need a certain amount of brisk exercise to help strengthen your heart. If you have been diagnosed with a heart condition, your healthcare provider may advise exercise to help stabilize your condition. To help make exercise a habit, choose safe, fun activities.      Exercise with a friend. When activity is fun, you're more likely to stick with it.   Before you start  Check with your healthcare provider before starting an exercise program. This is especially important if you have not been active for a while. It's also important if you have a long-term (chronic) health problem such as heart disease, diabetes, or obesity. Or if you are at high risk for having these problems.   Why exercise?  Exercising regularly offers many healthy rewards. It can help you do all of the following:   Improve your blood cholesterol level to help prevent further heart trouble  Lower your blood pressure to help prevent a stroke or heart attack  Control diabetes, or reduce your risk of getting this disease  Improve your heart and lung function  Reach and stay at a healthy weight  Make your muscles stronger so you can stay active  Prevent falls and fractures by slowing the loss of bone mass (osteoporosis)  Manage stress better  Reduce your blood pressure  Improve your sense of self and your body image  Exercise tips    Ease into your routine. Set small goals. Then build on them. If you are not sure what your activity level should be, talk with your  healthcare provider first before starting an exercise routine.  Exercise on most days. Aim for a total of 150 minutes (2 hours and 30 minutes) or more of moderate-intensity aerobic activity each week. Or 75 minutes (1 hour and 15 minutes) or more of vigorous-intensity aerobic activity each week. Or try for a combination of both. Moderate activity means that you breathe heavier and your heart rate increases but you can still talk. Think about doing 40 minutes of moderate exercise, 3 to 4 times a week. For best results, activity should last for about 40 minutes to lower blood pressure and cholesterol. It's OK to work up to the 40-minute period over time. Examples of moderate-intensity activity are walking 1 mile in 15 minutes. Or doing 30 to 45 minutes of yard work.  Step up your daily activity level.  Along with your exercise program, try being more active the whole day. Walk instead of drive. Or park further away so that you take more steps each day. Do more household tasks or yard work. You may not be able to meet the advised mount of physical activity. But doing some moderate- or vigorous-intensity aerobic activity can help reduce your risk for heart disease. Your healthcare provider can help you figure out what is best for you.  Choose 1 or more activities you enjoy.  Walking is one of the easiest things you can do. You can also try swimming, riding a bike, dancing, or taking an exercise class.    When to call your healthcare provider  Call your healthcare provider if you have any of these:   Chest pain or feel dizzy or lightheaded  Burning, tightness, pressure, or heaviness in your chest, neck, shoulders, back, or arms  Abnormal shortness of breath  More joint or muscle pain  A very fast or irregular heartbeat (palpitations)  [employer] last reviewed this educational content on 7/1/2019 2000-2021 [employer]. All rights reserved. This information is not intended as a substitute for professional  medical care. Always follow your healthcare professional's instructions.          Understanding USDA MyPlate  The USDA has guidelines to help you make healthy food choices. These are called MyPlate. MyPlate shows the food groups that make up healthy meals using the image of a place setting. Before you eat, think about the healthiest choices for what to put on your plate or in your cup or bowl. To learn more about building a healthy plate, visit www.choosemyplate.gov.    The food groups  Fruits. Any fruit or 100% fruit juice counts as part of the Fruit Group. Fruits may be fresh, canned, frozen, or dried, and may be whole, cut-up, or pureed. Make 1/2 of your plate fruits and vegetables.  Vegetables. Any vegetable or 100% vegetable juice counts as a member of the Vegetable Group. Vegetables may be fresh, frozen, canned, or dried. They can be served raw or cooked and may be whole, cut-up, or mashed. Make 1/2 of your plate fruits and vegetables.  Grains. All foods made from grains are part of the Grains Group. These include wheat, rice, oats, cornmeal, and barley. Grains are often used to make foods such as bread, pasta, oatmeal, cereal, tortillas, and grits. Grains should be no more than 1/4 of your plate. At least half of your grains should be whole grains.  Protein. This group includes meat, poultry, seafood, beans and peas, eggs, processed soy products (such as tofu), nuts (including nut butters), and seeds. Make protein choices no more than 1/4 of your plate. Meat and poultry choices should be lean or low fat.  Dairy. The Dairy Group includes all fluid milk products and foods made from milk that contain calcium, such as yogurt and cheese. (Foods that have little calcium, such as cream, butter, and cream cheese, are not part of this group.) Most dairy choices should be low-fat or fat-free.  Oils. Oils aren't a food group, but they do contain essential nutrients. However it's important to watch your intake of oils.  These are fats that are liquid at room temperature. They include canola, corn, olive, soybean, vegetable, and sunflower oil. Foods that are mainly oil include mayonnaise, certain salad dressings, and soft margarines. You likely already get your daily oil allowance from the foods you eat.  Things to limit  Eating healthy also means limiting these things in your diet:     Salt (sodium). Many processed foods have a lot of sodium. To keep sodium intake down, eat fresh vegetables, meats, poultry, and seafood when possible. Purchase low-sodium, reduced-sodium, or no-salt-added food products at the store. And don't add salt to your meals at home. Instead, season them with herbs and spices such as dill, oregano, cumin, and paprika. Or try adding flavor with lemon or lime zest and juice.  Saturated fat. Saturated fats are most often found in animal products such as beef, pork, and chicken. They are often solid at room temperature, such as butter. To reduce your saturated fat intake, choose leaner cuts of meat and poultry. And try healthier cooking methods such as grilling, broiling, roasting, or baking. For a simple lower-fat swap, use plain nonfat yogurt instead of mayonnaise when making potato salad or macaroni salad.  Added sugars. These are sugars added to foods. They are in foods such as ice cream, candy, soda, fruit drinks, sports drinks, energy drinks, cookies, pastries, jams, and syrups. Cut down on added sugars by sharing sweet treats with a family member or friend. You can also choose fruit for dessert, and drink water or other unsweetened beverages.     [employer] last reviewed this educational content on 6/1/2020 2000-2021 [employer]. All rights reserved. This information is not intended as a substitute for professional medical care. Always follow your healthcare professional's instructions.        Activities of Daily Living    Your Health Risk Assessment indicates you have difficulties with  activities of daily living such as housework, bathing, preparing meals, taking medication, etc. Please make a follow up appointment for us to address this issue in more detail.    Signs of Hearing Loss      Hearing much better with one ear can be a sign of hearing loss.   Hearing loss is a problem shared by many people. In fact, it is one of the most common health problems, particularly as people age. Most people age 65 and older have some hearing loss. By age 80, almost everyone does. Hearing loss often occurs slowly over the years. So you may not realize your hearing has gotten worse.  Have your hearing checked  Call your healthcare provider if you:  Have to strain to hear normal conversation  Have to watch other people s faces very carefully to follow what they re saying  Need to ask people to repeat what they ve said  Often misunderstand what people are saying  Turn the volume of the television or radio up so high that others complain  Feel that people are mumbling when they re talking to you  Find that the effort to hear leaves you feeling tired and irritated  Notice, when using the phone, that you hear better with one ear than the other  [employer] last reviewed this educational content on 1/1/2020 2000-2021 [employer]. All rights reserved. This information is not intended as a substitute for professional medical care. Always follow your healthcare professional's instructions.          Urinary Incontinence, Female (Adult)   Urinary incontinence means loss of bladder control. This problem affects many women, especially as they get older. If you have incontinence, you may be embarrassed to ask for help. But know that this problem can be treated.   Types of Incontinence  There are different types of incontinence. Two of the main types are described here. You can have more than one type.   Stress incontinence. With this type, urine leaks when pressure (stress) is put on the bladder. This may happen  when you cough, sneeze, or laugh. Stress incontinence most often occurs because the pelvic floor muscles that support the bladder and urethra are weak. This can happen after pregnancy and vaginal childbirth or a hysterectomy. It can also be due to excess body weight or hormone changes.  Urge incontinence (also called overactive bladder). With this type, a sudden urge to urinate is felt often. This may happen even though there may not be much urine in the bladder. The need to urinate often during the night is common. Urge incontinence most often occurs because of bladder spasms. This may be due to bladder irritation or infection. Damage to bladder nerves or pelvic muscles, constipation, and certain medicines can also lead to urge incontinence.  Treatment depends on the cause. Further evaluation is needed to find the type you have. This will likely include an exam and certain tests. Based on the results, you and your healthcare provider can then plan treatment. Until a diagnosis is made, the home care tips below can help ease symptoms.   Home care  Do pelvic floor muscle exercises, if they are prescribed. The pelvic floor muscles help support the bladder and urethra. Many women find that their symptoms improve when doing special exercises that strengthen these muscles. To do the exercises, contract the muscles you would use to stop your stream of urine. But do this when you re not urinating. Hold for 10 seconds, then relax. Repeat 10 to 20 times in a row, at least 3 times a day. Your healthcare provider may give you other instructions for how to do the exercises and how often.  Keep a bladder diary. This helps track how often and how much you urinate over a set period of time. Bring this diary with you to your next visit with the provider. The information can help your provider learn more about your bladder problem.  Lose weight, if advised to by your provider. Extra weight puts pressure on the bladder. Your provider  can help you create a weight-loss plan that s right for you. This may include exercising more and making certain diet changes.  Don't have foods and drinks that may irritate the bladder. These can include alcohol and caffeinated drinks.  Quit smoking. Smoking and other tobacco use can lead to a long-term (chronic) cough that strains the pelvic floor muscles. Smoking may also damage the bladder and urethra. Talk with your provider about treatments or methods you can use to quit smoking.  If drinking large amounts of fluid makes you have symptoms, you may be advised to limit your fluid intake. You may also be advised to drink most of your fluids during the day and to limit fluids at night.  If you re worried about urine leakage or accidents, you may wear absorbent pads to catch urine. Change the pads often. This helps reduce discomfort. It may also reduce the risk of skin or bladder infections.    Follow-up care  Follow up with your healthcare provider, or as directed. It may take some to find the right treatment for your problem. But healthy lifestyle changes can be made right away. These include such things as exercising on a regular basis, eating a healthy diet, losing weight (if needed), and quitting smoking. Your treatment plan may include special therapies or medicines. Certain procedures or surgery may also be options. Talk about any questions you have with your provider.   When to seek medical advice  Call the healthcare provider right away if any of these occur:  Fever of 100.4 F (38 C) or higher, or as directed by your provider  Bladder pain or fullness  Belly swelling  Nausea or vomiting  Back pain  Weakness, dizziness, or fainting  [employer] last reviewed this educational content on 1/1/2020 2000-2021 [employer]. All rights reserved. This information is not intended as a substitute for professional medical care. Always follow your healthcare professional's instructions.

## 2022-09-21 NOTE — PROGRESS NOTES
"    The patient was provided with suggestions to help her develop a healthy physical lifestyle.  She is at risk for lack of exercise and has been provided with information to increase physical activity for the benefit of her well-being.  The patient was counseled and encouraged to consider modifying their diet and eating habits. She was provided with information on recommended healthy diet options.  The patient reports that she has difficulty with activities of daily living. I have asked that the patient make a follow up appointment in 6 months where this issue will be further evaluated and addressed.  The patient was provided with written information regarding signs of hearing loss.  Information on urinary incontinence and treatment options given to patient.  Answers for HPI/ROS submitted by the patient on 9/21/2022  In general, how would you rate your overall physical health?: fair  Frequency of exercise:: None  Do you usually eat at least 4 servings of fruit and vegetables a day, include whole grains & fiber, and avoid regularly eating high fat or \"junk\" foods? : No  Taking medications regularly:: Yes  Activities of Daily Living: transportation requires assistance, shopping requires assistance, housework requires assistance  Home safety: no safety concerns identified  Hearing Impairment:: difficulty following a conversation in a noisy restaurant or crowded room, need to ask people to speak up or repeat themselves  In the past 6 months, have you been bothered by leaking of urine?: Yes  abdominal pain: No  Blood in stool: No  Blood in urine: No  chest pain: No  chills: No  congestion: No  constipation: No  cough: No  diarrhea: No  dizziness: No  ear pain: No  eye pain: No  nervous/anxious: No  fever: No  frequency: No  genital sores: No  headaches: No  hearing loss: No  heartburn: No  arthralgias: No  joint swelling: No  peripheral edema: Yes  mood changes: No  myalgias: No  nausea: No  dysuria: No  palpitations: " No  Skin sensation changes: No  sore throat: No  urgency: No  rash: No  shortness of breath: No  visual disturbance: No  weakness: No  In general, how would you rate your overall mental or emotional health?: good  Additional concerns today:: Yes

## 2022-09-21 NOTE — PROGRESS NOTES
"SUBJECTIVE:   Hima is a 77 year old who presents for Preventive Visit with her daughter.       Patient has been advised of split billing requirements and indicates understanding: Yes  Are you in the first 12 months of your Medicare coverage?  No    Healthy Habits:     In general, how would you rate your overall health?  Fair    Frequency of exercise:  None    Do you usually eat at least 4 servings of fruit and vegetables a day, include whole grains    & fiber and avoid regularly eating high fat or \"junk\" foods?  No    Taking medications regularly:  Yes    Ability to successfully perform activities of daily living:  Transportation requires assistance, shopping requires assistance and housework requires assistance    Home Safety:  No safety concerns identified    Hearing Impairment:  Difficulty following a conversation in a noisy restaurant or crowded room and need to ask people to speak up or repeat themselves    In the past 6 months, have you been bothered by leaking of urine? Yes    In general, how would you rate your overall mental or emotional health?  Good      PHQ-2 Total Score: 0    Additional concerns today:  Yes    Do you feel safe in your environment? Yes    Have you ever done Advance Care Planning? (For example, a Health Directive, POLST, or a discussion with a medical provider or your loved ones about your wishes): Yes, patient states has an Advance Care Planning document and will bring a copy to the clinic.       Fall risk  Fallen 2 or more times in the past year?: No  Any fall with injury in the past year?: No    Cognitive Screening   1) Repeat 3 items (Leader, Season, Table)    2) Clock draw: NORMAL  3) 3 item recall: Recalls 2 objects   Results: NORMAL clock, 1-2 items recalled: COGNITIVE IMPAIRMENT LESS LIKELY    Mini-CogTM Copyright MINI Berrios. Licensed by the author for use in [facility]; reprinted with permission (rick@.[facility]). All rights reserved.      Do you have sleep apnea, " excessive snoring or daytime drowsiness?: no    Reviewed and updated as needed this visit by clinical staff   Tobacco  Allergies    Med Hx  Surg Hx  Fam Hx  Soc Hx          Reviewed and updated as needed this visit by Provider                   Social History     Tobacco Use     Smoking status: Former Smoker     Start date: 10/3/1963     Quit date: 10/3/2006     Years since quitting: 15.9     Smokeless tobacco: Never Used     Tobacco comment: 30 years on and off less than a pack a day   Substance Use Topics     Alcohol use: Yes         Alcohol Use 9/21/2022   Prescreen: >3 drinks/day or >7 drinks/week? No           Diabetes Follow-up    How often are you checking your blood sugar? One time daily  What time of day are you checking your blood sugars (select all that apply)?  am  Have you had any blood sugars above 200?  No  Have you had any blood sugars below 70?  No    What symptoms do you notice when your blood sugar is low?  None    What concerns do you have today about your diabetes? None     Do you have any of these symptoms? (Select all that apply)  No numbness or tingling in feet.  No redness, sores or blisters on feet.  No complaints of excessive thirst.  No reports of blurry vision.  No significant changes to weight.    Have you had a diabetic eye exam in the last 12 months? No        Hyperlipidemia Follow-Up      Are you regularly taking any medication or supplement to lower your cholesterol?   Yes- pravastatin    Are you having muscle aches or other side effects that you think could be caused by your cholesterol lowering medication?  No    Hypertension Follow-up      Do you check your blood pressure regularly outside of the clinic? Yes     Are you following a low salt diet? No, no added salt    Are your blood pressures ever more than 140 on the top number (systolic) OR more   than 90 on the bottom number (diastolic), for example 140/90? No    BP Readings from Last 2 Encounters:   09/21/22 134/74    08/18/22 111/75     Hemoglobin A1C (%)   Date Value   06/16/2022 8.3 (H)   03/16/2022 7.5 (H)   01/20/2021 8.5 (H)   07/10/2020 8.5 (H)     LDL Cholesterol Calculated (mg/dL)   Date Value   03/16/2022 54   01/20/2021 59   12/18/2019 76         Current providers sharing in care for this patient include:   Patient Care Team:  Georgiana Joy MD as PCP - General (Internal Medicine)  Georgiana Joy MD as Assigned PCP  Lea Oliva RN as Personal Advocate & Liaison (PAL)    The following health maintenance items are reviewed in Epic and correct as of today:  Health Maintenance   Topic Date Due     DEXA  Never done     ZOSTER IMMUNIZATION (2 of 3) 04/29/2013     Pneumococcal Vaccine: 65+ Years (3 - PPSV23 or PCV20) 10/22/2016     ADVANCE CARE PLANNING  10/04/2021     MICROALBUMIN  01/20/2022     DIABETIC FOOT EXAM  01/20/2022     EYE EXAM  03/01/2022     COVID-19 Vaccine (5 - Booster for Pfizer series) 08/25/2022     ANNUAL REVIEW OF HM ORDERS  09/01/2022     INFLUENZA VACCINE (1) 09/01/2022     A1C  12/16/2022     LIPID  03/16/2023     BMP  06/29/2023     CBC  06/29/2023     HEMOGLOBIN  06/29/2023     MEDICARE ANNUAL WELLNESS VISIT  09/21/2023     FALL RISK ASSESSMENT  09/21/2023     DTAP/TDAP/TD IMMUNIZATION (3 - Td or Tdap) 02/20/2029     HEPATITIS C SCREENING  Completed     PHQ-2 (once per calendar year)  Completed     URINALYSIS  Completed     IPV IMMUNIZATION  Aged Out     MENINGITIS IMMUNIZATION  Aged Out     Labs reviewed in EPIC    Mammogram Screening - Patient over age 75, has elected to discontinue screenings.  Pertinent mammograms are reviewed under the imaging tab.    Review of Systems   Constitutional: Negative for chills and fever.   HENT: Negative for congestion, ear pain, hearing loss and sore throat.    Eyes: Negative for pain and visual disturbance.   Respiratory: Negative for cough and shortness of breath.    Cardiovascular: Positive for peripheral edema. Negative for chest pain and  "palpitations.   Gastrointestinal: Negative for abdominal pain, constipation, diarrhea, heartburn, hematochezia and nausea.   Genitourinary: Negative for dysuria, frequency, genital sores, hematuria and urgency.   Musculoskeletal: Negative for arthralgias, joint swelling and myalgias.   Skin: Negative for rash.   Neurological: Negative for dizziness, weakness, headaches and paresthesias.   Psychiatric/Behavioral: Negative for mood changes. The patient is not nervous/anxious.      Edema noted towards end of hospitalization for UTI and fall. Slowly improving. Has not been able to use compression socks, too difficult to get on.     OBJECTIVE:   /74 (BP Location: Right arm, Patient Position: Sitting, Cuff Size: Adult Large)   Pulse 75   Temp 98.4  F (36.9  C) (Tympanic)   Resp 18   Ht 1.626 m (5' 4\")   Wt 104.7 kg (230 lb 12.8 oz)   LMP  (LMP Unknown)   SpO2 96%   BMI 39.62 kg/m   Estimated body mass index is 39.62 kg/m  as calculated from the following:    Height as of this encounter: 1.626 m (5' 4\").    Weight as of this encounter: 104.7 kg (230 lb 12.8 oz).  Physical Exam  GENERAL: healthy, alert and no distress  EYES: Eyes grossly normal to inspection, PERRL and conjunctivae and sclerae normal  NECK: no adenopathy, no asymmetry, masses, or scars and thyroid normal to palpation  RESP: lungs clear to auscultation - no rales, rhonchi or wheezes  CV: regular rate and rhythm, normal S1 S2, no S3 or S4, no murmur, click or rub, no peripheral edema and peripheral pulses strong  SKIN: erythema with scaling at edges under both breasts and bilateral groin region. Baby powder present.   R buttock with 1 cm purple firm nodular plaque, no open areas  L buttock with 1 cm area of pink discoloration.  EXTR: 1+ pitting edema to lower tibia.  Diabetic foot exam: normal DP and PT pulses, no trophic changes or ulcerative lesions and normal sensory exam    Diagnostic Test Results:  Labs reviewed in Epic  Results for orders " placed or performed in visit on 09/21/22 (from the past 24 hour(s))   Hemoglobin A1c   Result Value Ref Range    Hemoglobin A1C 7.2 (H) 0.0 - 5.6 %       ASSESSMENT / PLAN:     1. Encounter for Medicare annual wellness exam      2. Dysphagia, unspecified type  Food gets stuck at upper esophagus. Able to swallow and breathe when something is stuck. Plan EGD. Considered speech/swallow evaluation but symptoms more consistent with stricture or other structural vs achalasia  - Adult GI  Referral - Procedure Only; Future    3. Type 2 diabetes mellitus with complication, without long-term current use of insulin (H)  Doing  Well on trulicity  - REVIEW OF HEALTH MAINTENANCE PROTOCOL ORDERS  - FOOT EXAM  - lisinopril (ZESTRIL) 5 MG tablet; Take 1 tablet (5 mg) by mouth daily  Dispense: 90 tablet; Refill: 0  - dulaglutide (TRULICITY) 1.5 MG/0.5ML pen; Inject 1.5 mg Subcutaneous every 7 days  Dispense: 8 mL; Refill: 3  - glipiZIDE (GLUCOTROL XL) 10 MG 24 hr tablet; Take 1 tablet (10 mg) by mouth daily (with breakfast)  Dispense: 30 tablet; Refill: 0  - Hemoglobin A1c; Future  - Hemoglobin A1c  - Albumin Random Urine Quantitative with Creat Ratio; Future  - **Comprehensive metabolic panel FUTURE 6mo; Future  - Lipid panel reflex to direct LDL Fasting; Future  - Hemoglobin A1c; Future  - **TSH with free T4 reflex FUTURE 6mo; Future    4. Pressure injury of left buttock, stage 1  Unclear why she has this, except she spends most of the day sitting in one chair in her home. Will ask for home care RN evaluation to make recommendations for treatment of injury and prevention.  - Wound Care Referral; Future  - Home Care Referral    5. Lymphedema  Improving in the last few days. Discussed compression therapy. No cardiopulmonary symptoms to suggest CHF, will check lab to rule out worsening renal function. Would treat symptomatically and discussed keeping her legs elevated.  - Home Care Referral    6. Yeast dermatitis  Given  extensive surface area involved, will use lotrimin cream until cleared. Then may use powder prn.  - clotrimazole (LOTRIMIN) 1 % external cream; Apply topically 2 times daily as needed (yeast rash)  Dispense: 113 g; Refill: 11  - clotrimazole (LOTRIMIN) 1 % external cream; Apply topically 2 times daily Under breasts and in groin areas  Dispense: 113 g; Refill: 0    7. Hyperlipidemia LDL goal <100  Not sure if she is taking this. I have encouraged her to check when she gets home.  - pravastatin (PRAVACHOL) 40 MG tablet; Take 1 tablet (40 mg) by mouth daily  Dispense: 90 tablet; Refill: 0    8. Stage 3a chronic kidney disease (H)  stable  - lisinopril (ZESTRIL) 5 MG tablet; Take 1 tablet (5 mg) by mouth daily  Dispense: 90 tablet; Refill: 0  - Basic metabolic panel  (Ca, Cl, CO2, Creat, Gluc, K, Na, BUN); Future  - Basic metabolic panel  (Ca, Cl, CO2, Creat, Gluc, K, Na, BUN)    9. Persistent atrial fibrillation (H)  Stable. Remains on Xarelto. Discussed her prior falls and risk of bleeding with anticoagulation. Most recent falls only with associated UTI and none since then. Encouraged PT, but she does have exercises from before.  - diltiazem (CARDIZEM) 30 MG tablet; Take 1 tablet (30 mg) by mouth 2 times daily  Dispense: 180 tablet; Refill: 0  - metoprolol tartrate (LOPRESSOR) 100 MG tablet; Take 1 tablet (100 mg) by mouth 2 times daily  Dispense: 180 tablet; Refill: 3    10. High priority for COVID-19 vaccination    - COVID-19,PF,PFIZER BOOSTER BIVALENT (12+YRS)    11. Asymptomatic menopausal state    - DEXA HIP/PELVIS/SPINE - Future; Future    50 minutes in addition to preventive wellness visit spent discussing the above diagnoses and plan.    Patient has been advised of split billing requirements and indicates understanding: Yes    COUNSELING:  Reviewed preventive health counseling, as reflected in patient instructions    Estimated body mass index is 39.62 kg/m  as calculated from the following:    Height as of  "this encounter: 1.626 m (5' 4\").    Weight as of this encounter: 104.7 kg (230 lb 12.8 oz).    Weight management plan: Discussed healthy diet and exercise guidelines    She reports that she quit smoking about 15 years ago. She started smoking about 59 years ago. She has never used smokeless tobacco.      Appropriate preventive services were discussed with this patient, including applicable screening as appropriate for cardiovascular disease, diabetes, osteopenia/osteoporosis, and glaucoma.  As appropriate for age/gender, discussed screening for colorectal cancer, prostate cancer, breast cancer, and cervical cancer. Checklist reviewing preventive services available has been given to the patient.    Reviewed patients plan of care and provided an AVS. The Complex Care Plan (for patients with higher acuity and needing more deliberate coordination of services) for Hima meets the Care Plan requirement. This Care Plan has been established and reviewed with the Patient and daughter.    Counseling Resources:  ATP IV Guidelines  Pooled Cohorts Equation Calculator  Breast Cancer Risk Calculator  Breast Cancer: Medication to Reduce Risk  FRAX Risk Assessment  ICSI Preventive Guidelines  Dietary Guidelines for Americans, 2010  USDA's MyPlate  ASA Prophylaxis  Lung CA Screening    Georgiana oJy MD  [facility]    Identified Health Risks:  "

## 2022-09-22 NOTE — TELEPHONE ENCOUNTER
Resent for 90   Prescription approved per [facility] Refill Protocol.  Valarie Richardson RN, BSN  [facility]

## 2022-10-04 NOTE — TELEPHONE ENCOUNTER
Routing refill request to provider for review/approval because:  Labs not current:  CR  A break in medication    Mary Anderson RN on 10/4/2022 at 5:38 PM

## 2022-10-04 NOTE — TELEPHONE ENCOUNTER
Screening Questions  BLUE  KIND OF PREP RED  LOCATION [review exclusion criteria] GREEN  SEDATION TYPE        Y - USPS MAIL PREFERRED  Are you active on mychart?       Georgiana Joy MD  Ordering/Referring Provider?        [facility]/MEDICARE  What type of coverage do you have?      N Have you had a positive covid test in the last 90 days?     39.5 1. BMI  [BMI 40+ - review exclusion criteria]    SELF   2. Are you able to give consent for your medical care? [IF NO,RN REVIEW]        N  3. Are you taking any prescription pain medications on a routine schedule?        3a. EXTENDED PREP What kind of prescription?     N 4. Do you have any chemical dependencies such as alcohol, street drugs, or methadone?    N 5. Do you have any history of post-traumatic stress syndrome, severe anxiety or history of psychosis?      **If yes 3- 5 , please schedule with MAC sedation.**          IF YES TO ANY 6 - 10 - HOSPITAL SETTING ONLY.     Y - SOMETIMES 6.   Do you need assistance transferring?     N 7.   Have you had a heart or lung transplant?    N 8.   Are you currently on dialysis?   N 9.   Do you use daily home oxygen?   N 10. Do you take nitroglycerin?   10a. N If yes, how often?     11. [FEMALES]   Are you currently pregnant?    11a.  If yes, how many weeks? [ Greater than 12 weeks, OR NEEDED]    N 12. Do you have Pulmonary Hypertension? *NEED PAC APPT AT [facility]*     N 13. [review exclusion criteria]  Do you have any implantable devices in your body (pacemaker, defib, LVAD)?    N 14. In the past 6 months, have you had any heart related issues including cardiomyopathy or heart attack?     14a. N If yes, did it require cardiac stenting if so when?     N 15. Have you had a stroke or Transient ischemic attack (TIA - aka  mini stroke ) within 6 months?      N 16. Do you have mod to severe Obstructive Sleep Apnea?  [Hospital only - Ok at [facility]]    N 17. Do you have SEVERE AND UNCONTROLLED asthma? *NEED PAC APPT AT [facility]*     Y -  "BLOOD THINNER  18. Are you currently taking any blood thinners?     18a. If yes, inform patient to \"follow up w/ ordering provider for bridging instructions.\"    N 19. Do you take the medication Phentermine?    19a. If yes, \"Hold for 7 days before procedure.  Please consult your prescribing provider if you have questions about holding this medication.\"     N  20. Do you have chronic kidney disease?      N  21. Do you have a diagnosis of diabetes?     N  22. On a regular basis do you go 3-5 days between bowel movements?      23. Preferred LOCAL Pharmacy for Pre Prescription    [ LIST ONLY ONE PHARMACY]        [id number] - [place], [place] - [address]  [address]      - CLOSING REMINDERS -    Informed patient they will need an adult    Cannot take any type of public or medical transportation alone    Conscious Sedation- Needs  for 6 hours after the procedure       MAC/General-Needs  for 24 hours after procedure    Pre-Procedure Covid test to be completed [[facility] PCR Testing Required]    Confirmed Nurse will call to complete assessment       - SCHEDULING DETAILS -     LILY   Surgeon    10/10/2022  Date of Procedure  Upper Endoscopy [EGD]  Type of Procedure Scheduled   BV  Location  Which Colonoscopy Prep was Sent?     CS Sedation Type     N PAC / Pre-op Required         Additional comments:  N          "

## 2022-10-05 NOTE — TELEPHONE ENCOUNTER
Routing to PCP to review-please advise-needs advice on Xarelto before endoscopy-scheduled for 10/10.  [facility] clinic manages questions about Warfarin and Lovenox, Xarelto questions need to be routed to PVP per protocol.    Please advise  Lea Oliva RN

## 2022-10-06 NOTE — TELEPHONE ENCOUNTER
Called [facility] Phone: [phone].    Leah isidro on site [phone].  Leah Isidro said that they do not communicate with Dr. Li office directly.  Transferred to priority line.  Left a voicemail with instruction to call back.    [facility] office of Dr. Sanchez called back to PAL.  Left a message with his care team to relay the below question from Dr. Joy to Dr. Sanchez regarding possible increased risk if Dilation of Stricture were needed on 10/10/22, and if this would afftect Xarelto dosing.    Dr. Sanchez will call back when able.    Dr. Sanchez called in to [facility] direct line.  Plan:   Hold xarelto 48 hours before procedure on 10/10/22 in case dilation of stricture is needed.   Dr. Sanchez will advise xarelto dosing after procedure depending on what ends up being done.    Called the patient. Spoke with daughter velvet.  Relayed the above plan from Dr. Sanchez to Velvet.  Velvet verbalized understanding.    Bandar Andre RN on 10/6/2022 at 9:03 AM

## 2022-10-06 NOTE — TELEPHONE ENCOUNTER
EGD with biopsy is considered a low risk procedure, so wouldn't need to hold Xarelto. However, please call to confirm with GI doing endoscopy (Addy) to see if he would consider procedure higher risk if dilation of stricture is needed.     For now, would continue Xarelto as she is. If GI is concerned about higher bleeding risk if dilation is done, would stop 48 hours before procedure and resume 48 hours after. If unable to contact GI, would continue Xarelto and ask that GI defer any higher risk procedure during EGD until her anticoagulation can be stopped.    Georgiana Joy M.D.

## 2022-10-09 PROBLEM — N18.30 CKD (CHRONIC KIDNEY DISEASE) STAGE 3, GFR 30-59 ML/MIN (H): Status: ACTIVE | Noted: 2019-12-18

## 2022-10-09 PROBLEM — I63.9 STROKE (H): Status: ACTIVE | Noted: 2022-01-01

## 2022-10-09 PROBLEM — I63.9 ACUTE ISCHEMIC STROKE (H): Status: ACTIVE | Noted: 2022-01-01

## 2022-10-09 NOTE — ED PROVIDER NOTES
History     Chief Complaint:  Dizziness    HPI   Hima Griffiths is a 77 year old female who presents via EMS from her independent senior living facility for evaluation of acute dizziness with associated nausea and vomiting.  She was in her usual state of health on awakening this morning.  Shortly before arrival, when she got up to go to the bathroom, she had acute intense dizziness.  While this is poorly characterized by her, she is describing that it was clearly worse with certain head movements and that she felt as though she might fall sideways.  She had associated nausea and vomiting.  No headache, neck pain, or additional specific neurologic symptoms.  She does have chronic atrial fibrillation for which she is anticoagulated on Xarelto.  Medics checked a blood sugar which was 246.  She was given Zofran and ODT in route.  She continues to endorse dizziness when she turns her head.  She is reporting a history of vertigo on perhaps for 5 previous occasions.  She also does have a history of an ischemic left MCA stroke in October 2015 for which she has been left with some residual right-sided weakness.    Review of Systems   Constitutional: Negative for fever.   HENT: Negative.    Eyes: Negative for visual disturbance.   Respiratory: Negative.    Cardiovascular: Negative.    Gastrointestinal: Positive for nausea and vomiting. Negative for diarrhea.   Neurological: Positive for dizziness. Negative for speech difficulty, weakness, numbness and headaches.   All other systems reviewed and are negative.    Allergies:  Alkylamines  Sudafed [Pseudoephedrine]      Medications:    acetaminophen (TYLENOL) 325 MG tablet  ASPIRIN NOT PRESCRIBED (INTENTIONAL)  blood glucose (NO BRAND SPECIFIED) lancets standard  blood glucose (NO BRAND SPECIFIED) test strip  blood glucose monitoring (NO BRAND SPECIFIED) meter device kit  clotrimazole (LOTRIMIN) 1 % external cream  clotrimazole (LOTRIMIN) 1 % external cream  diltiazem  (CARDIZEM) 30 MG tablet  dulaglutide (TRULICITY) 1.5 MG/0.5ML pen  glipiZIDE (GLUCOTROL XL) 10 MG 24 hr tablet  insulin pen needle (32G X 6 MM) 32G X 6 MM miscellaneous  lisinopril (ZESTRIL) 5 MG tablet  melatonin 5 MG tablet  metoprolol tartrate (LOPRESSOR) 100 MG tablet  pravastatin (PRAVACHOL) 40 MG tablet  rivaroxaban ANTICOAGULANT (XARELTO ANTICOAGULANT) 20 MG TABS tablet        Past Medical History:    Past Medical History:   Diagnosis Date     Benign hypertension      Benign paroxysmal positional vertigo      Colitis 11/28/2016     Diabetes mellitus (H)      Family history of atrial fibrillation      Osteoarthritis of right knee, unspecified osteoarthritis type 8/2/2019     Persistent atrial fibrillation (H) 11/2015     Stroke (H) 10/2015     Patient Active Problem List    Diagnosis Date Noted     Acute ischemic stroke (H) 10/09/2022     Priority: Medium     Pain 06/29/2022     Priority: Medium     Anticoagulated 06/29/2022     Priority: Medium     Weakness 06/16/2022     Priority: Medium     Closed head injury, initial encounter 06/16/2022     Priority: Medium     Fall, initial encounter 06/16/2022     Priority: Medium     Urinary tract infection in elderly patient 06/16/2022     Priority: Medium     Hyperglycemia 06/16/2022     Priority: Medium     CKD (chronic kidney disease) stage 3, GFR 30-59 ml/min (H) 12/18/2019     Priority: Medium     Osteoarthritis of right knee, unspecified osteoarthritis type 08/02/2019     Priority: Medium     BMI > 40.0 (H) 01/02/2019     Priority: Medium     Hyperlipidemia LDL goal <100 01/15/2018     Priority: Medium     Facial droop 11/28/2016     Priority: Medium     Right, since cerebral infarction 2015.       Benign paroxysmal positional vertigo      Priority: Medium     Type 2 diabetes mellitus with complication, without long-term current use of insulin (H)      Priority: Medium     Benign hypertension      Priority: Medium     Advanced directives, counseling/discussion  10/03/2016     Priority: Medium     Advance Care Planning 10/4/2016: ACP Review of Chart / Resources Provided:  Reviewed chart for advance care plan.  Hima Griffiths has no plan or code status on file however states presence of ACP document. Copy requested. Code status updated and sent to provider for review pending receipt of document/advance care plan review.  Confirmed/documented legally designated decision makers.  Added by Janie Ewing           Persistent atrial fibrillation (H) 11/01/2015     Priority: Medium     H/O ischemic left MCA stroke 10/01/2015     Priority: Medium        Past Surgical History:    Past Surgical History:   Procedure Laterality Date     AS LOOP RECORDER IMPLANT  11/9/15     CATARACT IOL, RT/LT  8/13/14       Family History:    Family History   Problem Relation Age of Onset     Hypertension Mother      Arrhythmia Mother      Hypertension Father      Coronary Artery Disease Father        Social History:  Georgiana Joy  Accompanied by daughters.  Lives independently.    Physical Exam     Patient Vitals for the past 24 hrs:   BP Temp Temp src Pulse SpO2   10/09/22 1735 (!) 178/109 -- -- -- 93 %   10/09/22 1720 120/73 -- -- -- 93 %   10/09/22 1717 -- (!) 96.3  F (35.7  C) Tympanic -- --   10/09/22 1517 (!) 161/109 -- -- 74 --   10/09/22 1502 (!) 175/119 -- -- 83 --   10/09/22 1448 (!) 172/96 -- -- 74 --   10/09/22 1433 (!) 149/104 -- -- 84 --       Physical Exam  General: Patient is alert and cooperative.  Overweight.  Holding emesis bag.   HENT:  No facial weakness or asymmetry.  Eyes: EOMI. pupils are pinpoint and symmetric.  No nystagmus.  Neck:  Normal range of motion and appearance.   Cardiovascular:  Normal rate, irregular rhythm and normal heart sounds.   Pulmonary/Chest:  Effort normal. No wheezing or crackles.  Abdominal: Soft. No distension or tenderness.     Musculoskeletal: Normal range of motion. No edema or tenderness.   Neurological: oriented, answering questions  appropriately with fluent speech.  Following commands.  Cranial nerves are intact.  No difficulty with finger-to-nose maneuvers.  She does have right-sided lower extremity weakness 4/5 which is her baseline.  Skin: Warm and dry. No rash or bruising.   Psychiatric: Normal mood and affect. Normal behavior and judgement.      Emergency Department Course       Imaging:  MRA Brain ([facility]) wo Contrast   Final Result   IMPRESSION:   HEAD MRI:    1.  Approximately 3 x 2.5 cm area of ADC restriction in the right inferior cerebellum without corresponding suggestive of acute infarct.   2.  No evidence of hemorrhage. Advanced white matter disease in bilateral cerebral hemispheres with volume loss.   3.  Focal rounded appearance in the right amygdala measuring 8 mm, may represent a small mass versus anatomic variation.       HEAD MRA:    1.  Occlusion versus near occlusive stenosis right vertebral artery V4 segment.   2.  Occlusion right posterior cerebral artery P2 segment and distally without reconstitution. Presumably chronic since there is no evidence of right posterior cerebral artery territory infarct.      NECK MRA:   1.  Diminished flow in the distal V3 and V4 segments right vertebral artery.   2.  No measurable stenosis or dissection in the carotid arteries.      As discussed with Dr. Kaufman at 1727 CDT.      MRA Neck (Carotids) wo & w Contrast   Final Result   IMPRESSION:   HEAD MRI:    1.  Approximately 3 x 2.5 cm area of ADC restriction in the right inferior cerebellum without corresponding suggestive of acute infarct.   2.  No evidence of hemorrhage. Advanced white matter disease in bilateral cerebral hemispheres with volume loss.   3.  Focal rounded appearance in the right amygdala measuring 8 mm, may represent a small mass versus anatomic variation.       HEAD MRA:    1.  Occlusion versus near occlusive stenosis right vertebral artery V4 segment.   2.  Occlusion right posterior cerebral artery P2  segment and distally without reconstitution. Presumably chronic since there is no evidence of right posterior cerebral artery territory infarct.      NECK MRA:   1.  Diminished flow in the distal V3 and V4 segments right vertebral artery.   2.  No measurable stenosis or dissection in the carotid arteries.      As discussed with Dr. Kaufman at 1727 CDT.      MR Brain w/o & w Contrast   Final Result   IMPRESSION:   HEAD MRI:    1.  Approximately 3 x 2.5 cm area of ADC restriction in the right inferior cerebellum without corresponding suggestive of acute infarct.   2.  No evidence of hemorrhage. Advanced white matter disease in bilateral cerebral hemispheres with volume loss.   3.  Focal rounded appearance in the right amygdala measuring 8 mm, may represent a small mass versus anatomic variation.       HEAD MRA:    1.  Occlusion versus near occlusive stenosis right vertebral artery V4 segment.   2.  Occlusion right posterior cerebral artery P2 segment and distally without reconstitution. Presumably chronic since there is no evidence of right posterior cerebral artery territory infarct.      NECK MRA:   1.  Diminished flow in the distal V3 and V4 segments right vertebral artery.   2.  No measurable stenosis or dissection in the carotid arteries.      As discussed with Dr. Kaufman at 1727 CDT.        Report per radiology    Laboratory:  Labs Ordered and Resulted from Time of ED Arrival to Time of ED Departure   BASIC METABOLIC PANEL - Abnormal       Result Value    Sodium 138      Potassium 4.2      Chloride 102      Carbon Dioxide (CO2) 22      Anion Gap 14      Urea Nitrogen 11.7      Creatinine 0.74      Calcium 9.3      Glucose 289 (*)     GFR Estimate 82     CBC WITH PLATELETS AND DIFFERENTIAL - Abnormal    WBC Count 14.0 (*)     RBC Count 3.98      Hemoglobin 13.5      Hematocrit 41.5       (*)     MCH 33.9 (*)     MCHC 32.5      RDW 12.0      Platelet Count 256      % Neutrophils 81      % Lymphocytes 13       % Monocytes 5      % Eosinophils 1      % Basophils 0      % Immature Granulocytes 0      NRBCs per 100 WBC 0      Absolute Neutrophils 11.3 (*)     Absolute Lymphocytes 1.9      Absolute Monocytes 0.6      Absolute Eosinophils 0.1      Absolute Basophils 0.0      Absolute Immature Granulocytes 0.1      Absolute NRBCs 0.0     INR - Normal    INR 0.97     COVID-19 VIRUS (CORONAVIRUS) BY PCR         Emergency Department Course:      Reviewed:  I reviewed nursing notes, vitals, past medical history and Care Everywhere    Assessments:   I obtained history and examined the patient as noted above.    I rechecked the patient and explained findings.       Consults:   Stroke neurology.  They have recommended resumption of Xarelto 20 mg daily.  No aspirin.  Target blood pressure less than 180 systolic.  Then routine acute stroke work-up measures.         Interventions:  Medications   ondansetron (ZOFRAN) injection 4 mg (4 mg Intravenous Given 10/9/22 1707)   meclizine (ANTIVERT) tablet 25 mg (25 mg Oral Not Given 10/9/22 1754)   rivaroxaban ANTICOAGULANT (XARELTO) tablet 20 mg (has no administration in time range)   gadobutrol (GADAVIST) injection 10 mL (10 mLs Intravenous Given 10/9/22 1702)   sodium chloride (PF) 0.9% PF flush 60 mL (100 mLs Intravenous Given 10/9/22 1703)       Disposition:  The patient was transferred to [facility] via ground ambulance. Dr. Onofre accepted the patient for transfer.     Impression & Plan    Medical Decision Makin-year-old female arrives via EMS from home for evaluation of acute dizziness with associated nausea and vomiting beginning around 1 PM.  She was in her usual state of health on awakening and throughout the morning.  See HPI for details.  Medical history includes chronic atrial fibrillation for which she is anticoagulated on Xarelto 20 mg daily.  She did run out of this medication a week or so ago.  Its been refilled, but she was scheduled for an outpatient upper  endoscopy tomorrow and was instructed to hold it for a couple days before then.  She is therefore been off this medication for about a week.  She is reporting a history of previous vertigo perhaps for 5 occasions over the past several years.  On examination, she appears to feel unwell with nausea but has no new acute neurologic findings, including no current nystagmus.  She has baseline weakness on the right side from a prior old stroke.  Given her age and comorbidities, I felt advanced neuroimaging via MRI was warranted to exclude a central etiology for her vertigo.  Unfortunately, the MRI does demonstrate an approximate 3 x 2.5 cm area of ADC restriction in the right inferior cerebellum consistent with an acute infarct.  There is no hemorrhage.  Additionally, there is a focal rounded appearance of the right amygdala measuring 8 mm is uncertain whether this is a small mass versus anatomic variation.  See above reports for additional findings on MR a of the head and neck.  Stroke neurology was consulted.  They have recommended resumption of Xarelto which was administered in the emergency department.  No aspirin.  Target blood pressure less than 180 systolic.  Unfortunately, there are no beds available at [facility].  We were able to secure an inpatient bed at [facility], I have spoken with hospitalist  (Dr. Onofre) there and passed along stroke neuro recommendations and they agree with admission.     Covid-19  Hima Griffiths was evaluated during a global COVID-19 pandemic, which necessitated consideration that the patient might be at risk for infection with the SARS-CoV-2 virus that causes COVID-19.   Applicable protocols for evaluation were followed during the patient's care.   COVID-19 was considered as part of the patient's evaluation.    Diagnosis:    ICD-10-CM    1. Cerebellar stroke, acute (H)  I63.9       2. Chronic atrial fibrillation (H)  I48.20           Discharge Medications:  New  Prescriptions    No medications on file               Garrett Kaufman MD  10/09/22 [id number]

## 2022-10-09 NOTE — ED TRIAGE NOTES
Patient arrives via EMS from independent senior living after standing up to go to the bathroom and felt dizzy which lead to nausea and vomiting. 4-5x emesis, bile in nature prior to transport. Zofran ODT given en route. Hx of A fib and DM2  ( in EMS). Patient alert and orientated.

## 2022-10-09 NOTE — CONSULTS
"    [facility]    Stroke Telephone Note    I was called by Garrett Kaufman on 10/09/22 regarding patient Hima Griffiths. The patient is a 77 year old female with known AFIB on xarelto who stopped taking her xarelto 1 week ago because she ran out and because she is supposed to get EGD tomorrow for dysphagia workup. Today around 1 pm, the patient developed sudden vertigo, nausea/vomiting.     Imaging Findings   MRI brain: R PICA infarction involving the right cerebellum.   MRA neck: diminished flow left V3 and V4  MRA head: diminished V4, R P2 occlusion      Impression  Acute ischemic stroke of the right cerebellum due to cardioembolism vs large vessel disease. Patient has known AFIB and has been off anticoagulation for 1 week.       Recommendations   - Patient will be transferred to [facility] as there are no beds at [facility]   - Use orderset: \"Ischemic Stroke Routine Admission\" or \"Ischemic Stroke No Thrombolytics/No Thrombectomy ICU Admission\"  - Neurochecks and Vital Signs every 4 hours   - Permissive HTN; goal SBP < 220 mmHg  - Rivaroxaban 20 mg/d starting today   - Statin: pravastatin 40   - Telemetry, EKG  - Bedside Glucose Monitoring  - A1c, Lipid Panel, Troponin x 3  - TTE without bubble study   - PT/OT/SLP  - Stroke Education  - Euthermia, Euglycemia    My recommendations are based on the information provided over the phone by Hima Griffiths's in-person providers. They are not intended to replace the clinical judgment of her in-person providers. I was not requested to personally see or examine the patient at this time.        Mario Saldana MD, Msc, FASONIA, FAAN   of Neurology  [facility]     10/09/2022 6:17 PM  To page me or covering stroke neurology team member, click here: AMCOM  Choose \"On Call\" tab at top, then search dropdown box for \"Neurology Adult\" & press Enter, look for Neuro ICU/Stroke      "

## 2022-10-10 NOTE — PHARMACY-ADMISSION MEDICATION HISTORY
Pharmacy Medication History  Admission medication history interview status for the 10/9/2022  admission is complete. See EPIC admission navigator for prior to admission medications     Location of Interview: Patient room  Medication history sources: Patient, Patient's family/friend (daughter) and Surescripts    Significant changes made to the medication list:  Modified melatonin to prn    In the past week, patient estimated taking medication this percent of the time: 50-90% due to illness Unsure if she took her meds today. Rivaroxaban was on hold for procedure    Additional medication history information:   Patient had some confusion answering questions.  Daughter was able to help.  The patient normally manages her medications on her own    Medication reconciliation completed by provider prior to medication history? No    Time spent in this activity: 15 minutes    Prior to Admission medications    Medication Sig Last Dose Taking? Auth Provider Long Term End Date   acetaminophen (TYLENOL) 325 MG tablet Take 2 tablets (650 mg) by mouth every 6 hours as needed for mild pain or other (and adjunct with moderate or severe pain or per patient request) Unknown Yes Stella Stevenson PA-C     clotrimazole (LOTRIMIN) 1 % external cream Apply topically 2 times daily as needed (yeast rash) Unknown Yes Georgiana Joy MD     clotrimazole (LOTRIMIN) 1 % external cream Apply topically 2 times daily Under breasts and in groin areas 10/8/2022 Yes Georgiana Joy MD     diltiazem (CARDIZEM) 30 MG tablet Take 1 tablet (30 mg) by mouth 2 times daily 10/9/2022 at am Yes Georgiana Joy MD Yes    dulaglutide (TRULICITY) 1.5 MG/0.5ML pen Inject 1.5 mg Subcutaneous every 7 days 10/9/2022 Yes Georgiana Joy MD     glipiZIDE (GLUCOTROL XL) 10 MG 24 hr tablet Take 1 tablet (10 mg) by mouth daily (with breakfast) 10/9/2022 at am Yes Georgiana Joy MD Yes    lisinopril (ZESTRIL) 5 MG tablet Take 1 tablet (5 mg) by mouth  daily 10/9/2022? Yes Georgiana Joy MD Yes    melatonin 5 MG tablet Take 1 tablet (5 mg) by mouth nightly as needed for sleep Unknown Yes Ai Tesfaye Mai, MD     metoprolol tartrate (LOPRESSOR) 100 MG tablet Take 1 tablet (100 mg) by mouth 2 times daily 10/9/2022 at am Yes Georgiana Joy MD Yes    pravastatin (PRAVACHOL) 40 MG tablet Take 1 tablet (40 mg) by mouth daily 10/9/2022 at am Yes Georgiana Joy MD Yes    rivaroxaban ANTICOAGULANT (XARELTO ANTICOAGULANT) 20 MG TABS tablet Take 1 tablet (20 mg) by mouth daily (with dinner) Past week on hold Yes Georgiana Joy MD Yes    ASPIRIN NOT PRESCRIBED (INTENTIONAL) Please choose reason not prescribed, below  Patient not taking: Reported on 10/5/2022   Georgiana Joy MD     blood glucose (NO BRAND SPECIFIED) lancets standard Use to test blood sugar 2 times daily or as directed.   Georgiana Joy MD     blood glucose (NO BRAND SPECIFIED) test strip Use to test blood sugar 2 times daily or as directed.   Georgiana oJy MD     blood glucose monitoring (NO BRAND SPECIFIED) meter device kit Use to test blood sugar 2 times daily or as directed.   Georgiana Joy MD     insulin pen needle (32G X 6 MM) 32G X 6 MM miscellaneous Use as directed.   Deanna Motta APRN CNP         The information provided in this note is only as accurate as the sources available at the time of update(s)

## 2022-10-10 NOTE — UTILIZATION REVIEW
Admission Status; Secondary Review Determination    Under the authority of the Utilization Management Committee, the utilization review process indicated a secondary review on the above patient. The review outcome is based on review of the medical records, discussions with staff, and applying clinical experience noted on the date of the review.    (x) Inpatient Status Appropriate - This patient's medical care is consistent with medical management for inpatient care and reasonable inpatient medical practice.    RATIONALE FOR DETERMINATION: 77-year-old female with history of persistent atrial fibrillation, stroke in 2015 on anticoagulation.  She ran out of her anticoagulation and then was scheduled for an endoscopy procedure so was off this when she developed sudden onset of nausea, vomiting and vertigo with imaging revealing an acute ischemic right cerebellar stroke.  Patient also had atrial fibrillation with rapid ventricular response.  Patient also has history of type 2 diabetes and chronic esophageal dysphagia which was why the EGD was scheduled for 10/10/2022.  On hospital day 2 patient has ongoing left pronator drift, is nonambulatory requiring assist of 2 with lift secondary to the acute stroke deficits as well as shortness of breath and wheezes noted.  In addition to the acute stroke, patient has acute hypoxemic respiratory failure secondary to CHF exacerbation along with leukocytosis and lactic acidosis.  Patient given an empiric dose of antibiotic while initiating intravenous Lasix.  The severity of patient's acute stroke changes complicated by atrial fibrillation with rapid ventricular response and acute heart failure will require inpatient management.    At the time of admission with the information available to the attending physician more than 2 nights Hospital complex care was anticipated, based on patient risk of adverse outcome if treated as outpatient and complex care required. Inpatient  admission is appropriate based on the Medicare guidelines.    This document was produced using voice recognition software    The information on this document is developed by the utilization review team in order for the business office to ensure compliance. This only denotes the appropriateness of proper admission status and does not reflect the quality of care rendered.    The definitions of Inpatient Status and Observation Status used in making the determination above are those provided in the CMS Coverage Manual, Chapter 1 and Chapter 6, section 70.4.    Sincerely,    Joaquin Wills MD  Utilization Review  Physician Advisor  [facility].

## 2022-10-10 NOTE — PROVIDER NOTIFICATION
MD Notification    Notified Person: MD    Notified Person Name: Dr. Limon     Notification Date/Time: 10/10 0040    Notification Interaction: Amcom Page    Purpose of Notification: HR continues to be Afib -150s after PO medications. /102    Orders Received: 1L bolus infused and instructed to update after.     Comments: After 1 L bolus HR averaging 120-130s. MD notified with follow up x2.

## 2022-10-10 NOTE — SIGNIFICANT EVENT
Sepsis Evaluation Progress Note    I was called to see Hima Griffiths due to abnormal vital signs triggering the Sepsis SIRS screening alert. She is not known to have an infection.     Physical Exam   Vital Signs:  Temp: 97.4  F (36.3  C) Temp src: Axillary BP: (!) 171/111     Resp: 18 SpO2: 93 % O2 Device: None (Room air)       Please refer to my H and P.  Data   Lactic Acid   Date Value Ref Range Status   10/09/2022 3.8 (H) 0.7 - 2.0 mmol/L Final   06/21/2022 1.5 0.7 - 2.0 mmol/L Final       Assessment & Plan   NO EVIDENCE OF SEPSIS at this time.  Vital sign, physical exam, and lab findings are due to stroke.    Disposition: The patient will remain on the current unit. We will continue to monitor this patient closely..  Samuel Limon MD    Sepsis Criteria   Sepsis: 2+ SIRS criteria due to infection  Severe Sepsis: Sepsis AND 1+ new sign of acute organ dysfunction (Note: lactate >2 or acute encephalopathy each qualify as organ dysfunction)  Septic Shock: Sepsis AND hypotension despite volume resuscitation with 30 ml/kg crystalloid or lactate >=4  Note: HYPOTENSION is defined as 2 BP readings measured 3 hrs apart that have a SBP <90, MAP <65, or decrease >40 mmHg, occurring 6 hrs before or after t-zero

## 2022-10-10 NOTE — CONSULTS
[facility]    Stroke Consult Note    Reason for Consult:  Stroke    Chief Complaint:   vertigo and nausea and vomiting    HPI  Hima Griffiths is a 77 year old female with pertinent past medical history of CHF, CKD stage 3, DMT2, HLD, HTN, atrial fibrillation on Xarelto, this was stopped a week ago as she ran out as well as had EGD planned for tomorrow due to dysphagia. Uses a walker at baseline.     She presented to [facility] ED 10/9/22 with vertigo and nausea and vomiting. MRI showed a R cerebellar (PICA territory) ischemic infarct. MRA with R V4 occlusion and likely chronic R P2 occlusion. She was restarted on Xarelto and transferred to [facility]. This AM recommended holding Xarelto at least until tomorrow. Today she had increased confusion. A STAT CTH was done and was stable. She was in Afib with RVR and increased hypoxia, CXR was performed and there were concerns of CHF exacerbation. She was given one dose of  mg today. Plan to repeat CTH tomorrow and if stable will likely restart DOAC.     Stroke Evaluation Summarized    MRI/Head CT CTH 10/11/22: pending  CTH 10/10/22: stable  MRI: Acute R inferior cerebellar ischemic infarct, no evidence of hemorrhage, focal rounded appearance of R amygdala 8 mm, small mass v anatomic variation   Intracranial Vasculature MRA brain: occlusion R V4 (and P2 but appears chronic)   Cervical Vasculature MRA neck: diminished flow distal V3/4     Echocardiogram TTE: pending   EKG/Telemetry Atrial fibrillation with PVCs, non specific ST abnormality   Other Testing Not Applicable      LDL  10/10/2022: 55 mg/dL   A1C  9/21/2022: 7.2 %   Troponin 10/10/2022: 19 ng/L       Impression  Acute R inferior cerebellar ischemic infarct due to R V4 and P2 suspected thromboembolic occlusion in setting of Atrial fibrillation while Xarelto held    Focal rounded appearance of R amygdala 8 mm, small mass v anatomic variation    Encephalopathy possibly contributed from  "above as well as CHF exacerbation and Afib with RVR    Recommendations  -Discussed with vascular neurology attending, Dr. Hyatt  -Neuro checks and vitals every 4 hours  - goal SBP <180 while inpatient  -long term outpatient blood pressure goal <130/80, recommend home monitoring twice daily in AM and PM, keep log and bring to f/u with PCP   -hold Xarelto at least until tomorrow afternoon, will give  mg x 1 today, repeat CTH tomorrow morning to ensure stable before restarting DOAC  -LDL 55, continue PTA pravastatin 40 mg daily, follow-up with PCP for titration to goal LDL 40-70, <40 increases risk of Intracranial hemorrhage  -Blood glucose monitoring, Hgb A1c 7.2%, needs tighter control of diabetes (goal <7% for secondary stroke prevention), follow-up with PCP  -telemetry  -PT/OT/SPT  -Smoking screen: former smoker  -Sleep Apnea screen: monitor for sleep related hypoxia, if doesn't resolve with treatment of CHF exacerbation consider referral to sleep medicine to rule out sleep apnea  -Euthermia, euglycemia, eunatremia   -Stroke Education  -Stroke Class per [facility]    Patient Follow-up    -f/u with PCP in 1-2 weeks  -f/u with neurology team 6-8 weeks (ordered)  -if ongoing visual symptoms can consider referral to neuro-ophthalmology    Thank you for this consult. We will continue to follow.     April Carmichael PA-C  Vascular Neurology  To page me or covering stroke neurology team member, click here: [facility]   Choose \"On Call\" tab at top, then search dropdown box for \"Neurology Adult\", select location, press Enter, then look for stroke/neuro ICU/telestroke.    _____________________________________________________    Clinically Significant Risk Factors Present on Admission              # Coagulation Defect: home medication list includes an anticoagulant medication        Past Medical History   Past Medical History:   Diagnosis Date     Benign hypertension      Benign paroxysmal positional " vertigo      Colitis 11/28/2016    Presumed infectious 2009.      Diabetes mellitus (H)      Family history of atrial fibrillation     Mother     Osteoarthritis of right knee, unspecified osteoarthritis type 08/02/2019     Persistent atrial fibrillation (H) 11/2015     Stroke (H) 10/2015    Left MCA     Past Surgical History   Past Surgical History:   Procedure Laterality Date     AS LOOP RECORDER IMPLANT  11/9/15     CATARACT IOL, RT/LT  8/13/14     Medications   Home Meds  Prior to Admission medications    Medication Sig Start Date End Date Taking? Authorizing Provider   acetaminophen (TYLENOL) 325 MG tablet Take 2 tablets (650 mg) by mouth every 6 hours as needed for mild pain or other (and adjunct with moderate or severe pain or per patient request) 6/24/22  Yes Stella Stevenson PA-C   clotrimazole (LOTRIMIN) 1 % external cream Apply topically 2 times daily as needed (yeast rash) 9/21/22  Yes Georgiana Joy MD   clotrimazole (LOTRIMIN) 1 % external cream Apply topically 2 times daily Under breasts and in groin areas 9/21/22  Yes Georgiana Joy MD   diltiazem (CARDIZEM) 30 MG tablet Take 1 tablet (30 mg) by mouth 2 times daily 9/21/22  Yes Georgiana Joy MD   dulaglutide (TRULICITY) 1.5 MG/0.5ML pen Inject 1.5 mg Subcutaneous every 7 days 9/21/22  Yes Georgiana Joy MD   glipiZIDE (GLUCOTROL XL) 10 MG 24 hr tablet Take 1 tablet (10 mg) by mouth daily (with breakfast) 9/22/22  Yes Georgiana Joy MD   lisinopril (ZESTRIL) 5 MG tablet Take 1 tablet (5 mg) by mouth daily 9/21/22  Yes Georgiana Joy MD   melatonin 5 MG tablet Take 1 tablet (5 mg) by mouth nightly as needed for sleep 7/14/22  Yes Ai Tesfaye Mai, MD   metoprolol tartrate (LOPRESSOR) 100 MG tablet Take 1 tablet (100 mg) by mouth 2 times daily 9/21/22  Yes Georgiana Joy MD   pravastatin (PRAVACHOL) 40 MG tablet Take 1 tablet (40 mg) by mouth daily 9/21/22  Yes Georgiana Joy MD   rivaroxaban ANTICOAGULANT  (XARELTO ANTICOAGULANT) 20 MG TABS tablet Take 1 tablet (20 mg) by mouth daily (with dinner) 10/5/22  Yes Georgiana Joy MD   ASPIRIN NOT PRESCRIBED (INTENTIONAL) Please choose reason not prescribed, below  Patient not taking: Reported on 10/5/2022    Georgiana Joy MD   blood glucose (NO BRAND SPECIFIED) lancets standard Use to test blood sugar 2 times daily or as directed. 7/14/20   Georgiana Joy MD   blood glucose (NO BRAND SPECIFIED) test strip Use to test blood sugar 2 times daily or as directed. 7/21/20   Georgiana Joy MD   blood glucose monitoring (NO BRAND SPECIFIED) meter device kit Use to test blood sugar 2 times daily or as directed. 7/14/20   Georgiana Joy MD   insulin pen needle (32G X 6 MM) 32G X 6 MM miscellaneous Use as directed. 7/7/22   Deanna Motta APRN CNP       Scheduled Meds    sodium chloride 0.9%  1,000 mL Intravenous Once     cefTRIAXone  2 g Intravenous Q24H     diltiazem  30 mg Oral BID     [START ON 10/11/2022] furosemide  40 mg Intravenous Once     insulin aspart  1-6 Units Subcutaneous Q4H     metoprolol tartrate  100 mg Oral BID     sodium chloride (PF)  3 mL Intracatheter Q8H       Infusion Meds    - MEDICATION INSTRUCTIONS -       - MEDICATION INSTRUCTIONS -         PRN Meds  acetaminophen **OR** acetaminophen, glucose **OR** dextrose **OR** glucagon, labetalol **OR** hydrALAZINE, lidocaine 4%, lidocaine (buffered or not buffered), LORazepam, - MEDICATION INSTRUCTIONS -, - MEDICATION INSTRUCTIONS -, melatonin, ondansetron **OR** ondansetron, polyethylene glycol, prochlorperazine **OR** prochlorperazine **OR** prochlorperazine, sodium chloride (PF)    Allergies   Allergies   Allergen Reactions     Alkylamines Other (See Comments)     Comment: Weakness tingling arms and legs, Description:      Sudafed [Pseudoephedrine]      Legs and arms get weak     Family History   Family History   Problem Relation Age of Onset     Hypertension Mother      Arrhythmia  Mother      Hypertension Father      Coronary Artery Disease Father      Social History   Social History     Tobacco Use     Smoking status: Former     Types: Cigarettes     Start date: 10/3/1963     Quit date: 10/3/2006     Years since quittin.0     Smokeless tobacco: Never     Tobacco comments:     30 years on and off less than a pack a day   Vaping Use     Vaping Use: Never used   Substance Use Topics     Alcohol use: Yes     Comment: once a monthly       Review of Systems   Unable to obtain comprehensive ROS due to AMS       PHYSICAL EXAMINATION   Temp:  [96.3  F (35.7  C)-97.9  F (36.6  C)] 96.8  F (36  C)  Pulse:  [] 112  Resp:  [18-30] 20  BP: (117-178)/() 149/93  SpO2:  [9 %-98 %] 9 %    General Exam  General:  patient lying in bed with labored breathing  HEENT:  normocephalic/atraumatic  Pulmonary:  no respiratory distress    Neuro Exam  Mental Status: alert and oriented to self, follows some simple commands, speech, significant confusion, unclear on level of possible aphasia given level of confusion, does not provide much verbal response   Cranial Nerves: appears to have L visual field cut,PERRL, EOMI with normal smooth pursuit, facial movements symmetric, hearing not formally tested but intact to conversation, mild dysarthria, tongue protrusion midline  Motor: no drift to b/l upper extremities, no effort against gravity to RLE (since prior stroke per family), some effort against gravity to LLE  Reflexes:  toes down-going  Sensory: grimaces/withdrawals to noxious all extremities, appears to have L visual/spatial neglect, no tactile neglect  Coordination:  No obvious ataxia out of proportion to weakness LE, upper extremities difficult to assess as difficulty following commands  Station/Gait:  deferred to PT    Dysphagia Screen  Per SPT, history of dysphagia    Stroke Scales    NIHSS  1a. Level of Consciousness 1-->Not alert, but arousable by minor stimulation to obey, answer, or respond    1b. LOC Questions 2-->Answers neither question correctly   1c. LOC Commands 1-->Performs one task correctly   2.   Best Gaze (S) 1-->Partial gaze palsy, gaze is abnormal in one or both eyes, but forced deviation or total gaze paresis is not present (R gaze preference)   3.   Visual (S) 1-->Partial hemianopia (possible L visual field cut)   4.   Facial Palsy 0-->Normal symmetrical movements   5a. Motor Arm, Left 0-->No drift, limb holds 90 (or 45) degrees for full 10 secs   5b. Motor Arm, Right 0-->No drift, limb holds 90 (or 45) degrees for full 10 secs   6a. Motor Leg, Left 2-->Some effort against gravity, leg falls to bed by 5 secs, but has some effort against gravity   6b. Motor Leg, right (S) 3-->No effort against gravity, leg falls to bed immediately (since prior stroke)   7.   Limb Ataxia 0-->Absent   8.   Sensory (S) 0-->Normal, no sensory loss (withdrawals from noxious)   9.   Best Language (S) 2-->Severe aphasia, all communication is through fragmentary expression, great need for inference, questioning, and guessing by the listener. Range of information that can be exchanged is limited, listener carries burden of. . . (see row details) (unclear if encephalopathy)   10. Dysarthria 1-->Mild-to-moderate dysarthria, patient slurs at least some words and, at worst, can be understood with some difficulty   11. Extinction and Inattention  (S) 2-->Profound kari-inattention/extinction more than 1 modality (L visual and spatial)   Total 16 (10/10/22 1623)       Modified Hamilton Score (Pre-morbid)    -       Imaging  I personally reviewed all imaging; relevant findings per HPI.    Labs Data   CBC  Recent Labs   Lab 10/10/22  0823 10/09/22  1525   WBC 13.1* 14.0*   RBC 3.88 3.98   HGB 13.4 13.5   HCT 41.3 41.5    256     Basic Metabolic Panel   Recent Labs   Lab 10/10/22  1116 10/10/22  0835 10/10/22  0823 10/09/22  2241 10/09/22  1525   NA  --   --  137  --  138   POTASSIUM  --   --  3.9  --  4.2   CHLORIDE   --   --  106  --  102   CO2  --   --  26  --  22   BUN  --   --  12  --  11.7   CR  --   --  0.70  --  0.74   * 115* 135*   < > 289*   EZEKIEL  --   --  8.7  --  9.3    < > = values in this interval not displayed.     Liver Panel  No results for input(s): PROTTOTAL, ALBUMIN, BILITOTAL, ALKPHOS, AST, ALT, BILIDIRECT in the last 168 hours.  INR    Recent Labs   Lab Test 10/09/22  1525   INR 0.97      Lipid Profile    Recent Labs   Lab Test 10/10/22  0823 03/16/22  1127 01/20/21  1545   CHOL 132 129 140   HDL 53 51 54   LDL 55 54 59   TRIG 120 119 133     A1C    Recent Labs   Lab Test 09/21/22  1628 06/16/22  1415 03/16/22  1127   A1C 7.2* 8.3* 7.5*     Troponin    Recent Labs   Lab 10/10/22  0823   TROPONINIS 19          Stroke Consult Data Data   This was a non-emergent, non-telestroke consult.  I have personally spent a total of 70 minutes providing care today, time spent in reviewing medical records and reviewing tests, examining the patient and obtaining history, coordination of care, and discussion with the patient and/or family regarding diagnostic results, prognosis, symptom management, risks and benefits of management options, and development of plan of care. Greater than 50% was spent in counseling and coordination of care.

## 2022-10-10 NOTE — ED NOTES
DATE:  10/9/2022   TIME OF RECEIPT FROM LAB:  2019  LAB TEST:  lactic  LAB VALUE:  3.8  RESULTS GIVEN WITH READ-BACK TO (PROVIDER):  [facility]  TIME LAB VALUE REPORTED TO PROVIDER:   2021

## 2022-10-10 NOTE — PLAN OF CARE
Reason for Admission: acute ischemic stroke    Cognitive/Mentation: A/Ox 4  Neuros/CMS: left pronator drift   VS: stable.   Tele: a.fib.  GI: BS present, passing flatus, last BM 10/9/22. incontinent. Per pt report  : Incontinent purwick in place  Pulmonary: wheezes noted.  Pain: no pain reported pt just agitated.     Drains/Lines: piv patent and intact  Skin: pt had wound consult for buttock blister  Activity: Assist x two with lift.  Diet: soft and bite sized with thin liquids. Takes pills whole with pudding.     Therapies recs: pending  Discharge: pending    Aggression Stoplight Tool: green

## 2022-10-10 NOTE — PROGRESS NOTES
10/10/22 1003   Appointment Info   Signing Clinician's Name / Credentials (SLP) Saadia Monge MS CCC SLP   General Information   Onset of Illness/Injury or Date of Surgery 10/09/22   Referring Physician Dr. Ruiz   Patient/Family Therapy Goal Statement (SLP) Patient did not state.   Pertinent History of Current Problem Hima Griffiths is a markedly pleasant 77 year old woman with past medical history that is most notable for persistent atrial fibrillation and prior stroke, on rivaroxaban, as well as hypertension, hyperlipidemia, Type 2 Diabetes mellitus, and CKD Stage 3, among others; who presents with sudden onset vertigo with nausea and vomiting and is found to have suspected acute ischemic right cerebellar stroke.   General Observations Chronic esophageal dysphagia was scheduled for an OP EGD today thath has been canceled.   Type of Evaluation   Type of Evaluation Swallow Evaluation   Oral Motor   Oral Musculature anomalies present   Structural Abnormalities none present   Mucosal Quality dry   Dentition (Oral Motor)   Dentition (Oral Motor) dental appliance/dentures   Dental Appliance/Denture (Oral Motor) upper and lower   Facial Symmetry (Oral Motor)   Facial Symmetry (Oral Motor) WNL   Lip Function (Oral Motor)   Lip Range of Motion (Oral Motor) protrusion impairment;retraction impairment   Protrusion, Lip Range of Motion left side;minimal impairment   Retraction, Lip Range of Motion left side;minimal impairment   Tongue Function (Oral Motor)   Tongue Strength (Oral Motor) strength decreased;left side   Tongue Coordination/Speed (Oral Motor) reduced rate   Tongue ROM (Oral Motor) elevation is impaired;protrusion is impaired;lateralization is impaired   Protrusion, Tongue ROM Impairment (Oral Motor) left side;minimal impairment   Lateralization, Tongue ROM Impairment (Oral Motor) left side;minimal impairment   Elevation, Tongue ROM Impairment (Oral Motor) bilateral;moderate impairment   Jaw Function (Oral  Motor)   Jaw Function (Oral Motor) WNL   Cough/Swallow/Gag Reflex (Oral Motor)   Soft Palate/Velum (Oral Motor) WNL   Volitional Throat Clear/Cough (Oral Motor) impaired;reduced strength   Volitional Swallow (Oral Motor) mildly delayed   Vocal Quality/Secretion Management (Oral Motor)   Vocal Quality (Oral Motor) WFL   General Swallowing Observations   Past History of Dysphagia Patient had a video swallow study on 10/8/15 with penetration of thin liquids chin tuck improved swallow function and eliminated penetration at the time.   Respiratory Support (General Swallowing Observations) none   Current Diet/Method of Nutritional Intake (General Swallowing Observations, NIS) NPO   Swallowing Evaluation Clinical swallow evaluation   Clinical Swallow Evaluation   Feeding Assistance minimal assistance required   Clinical Swallow Evaluation Textures Trialed thin liquids;pureed;solid foods   Clinical Swallow Eval: Thin Liquid Texture Trial   Mode of Presentation, Thin Liquids cup;spoon;self-fed;fed by clinician   Volume of Liquid or Food Presented 4 oz of water   Oral Phase of Swallow premature pharyngeal entry   Pharyngeal Phase of Swallow impaired;repeated swallows   Diagnostic Statement No vocal changes or other Sx of aspiration.   Clinical Swallow Evaluation: Puree Solid Texture Trial   Mode of Presentation, Puree spoon;fed by clinician   Volume of Puree Presented 2 oz of pudding   Oral Phase, Puree residue in oral cavity   Oral Residue, Puree soft palate   Pharyngeal Phase, Puree impaired;repeated swallows   Diagnostic Statement No Sx of aspiration.   Clinical Swallow Evaluation: Solid Food Texture Trial   Mode of Presentation self-fed   Volume Presented 1/2 tucker cracker   Oral Phase delayed AP movement;residue in oral cavity;premature pharyngeal entry   Oral Residue soft palate;mid posterior tongue   Pharyngeal Phase impaired;reduction in laryngeal movement;repeated swallows   Diagnostic Statement Prolonged  mastication with decreased AP movement and mild oral residue.   Esophageal Phase of Swallow   Patient reports or presents with symptoms of esophageal dysphagia Yes   Esophageal comments Patient was scheduled for an EGD today canceled due to stroke.   Swallowing Recommendations   Diet Consistency Recommendations soft & bite-sized (level 6);thin liquids (level 0)   Supervision Level for Intake close supervision needed   Mode of Delivery Recommendations bolus size, small;food moistened;no straws;slow rate of intake   Postural Recommendations none   Swallowing Maneuver Recommendations alternate food and liquid intake;extra swallow   Monitoring/Assistance Required (Eating/Swallowing) check mouth frequently for oral residue/pocketing;stop eating activities when fatigue is present;monitor for cough or change in vocal quality with intake   Recommended Feeding/Eating Techniques (Swallow Eval) maintain upright sitting position for eating;maintain upright posture during/after eating for 30 minutes;minimize distractions during oral intake;provide 6 smaller meals throughout day;set-up and prepare tray;provide assist with feeding   Medication Administration Recommendations, Swallowing (SLP) Whole as tolerated in water or puree.   Instrumental Assessment Recommendations VFSS (videofluoroscopic swallowing study)   Comment, Swallowing Recommendations Pending diet tolerance maybe indicated.   General Therapy Interventions   Planned Therapy Interventions Dysphagia Treatment   Dysphagia treatment Modified diet education;Instruction of safe swallow strategies   Clinical Impression   Criteria for Skilled Therapeutic Interventions Met (SLP Eval) Yes, treatment indicated   SLP Diagnosis Mild to moderate oral and pharyngeal dysphagia   Risks & Benefits of therapy have been explained evaluation/treatment results reviewed;care plan/treatment goals reviewed;risks/benefits reviewed;current/potential barriers reviewed;participants voiced  agreement with care plan;participants included;patient   Clinical Impression Comments Patient presents with mild to moderate oral and pharyngeal dysphagia with known history of esophageal dysphagia. Oral motor function was impaired for protrusion, elevation and lateralization with decreased strength and ROM. She demonstrated premature entry of thin liquids without overt Sx of aspiration via the cup with single small sips. Mastication was prolonged for a solid with mild to moderate oral residue that cleared with a liquid rinse. No overt Sx of aspiration but suspect some pharyngeal retention.     Recommend: 1 IDDSI level 6 and thin liquids. 2. Upright and remain upright 30 minutes after meals, no straws, small bites/sips and alternate liquids/solids. Hold diet if overt Sx of aspiration present.     SLP Total Evaluation Time   Eval: oral/pharyngeal swallow function, clinical swallow Minutes (61550) 18   SLP Goals   Therapy Frequency (SLP Eval) 5 times/wk   SLP Predicted Duration/Target Date for Goal Attainment 10/24/22   SLP Goals Swallow   SLP: Safely tolerate diet without signs/symptoms of aspiration Regular diet;Thin liquids;With use of swallow precautions;With assistance/supervision   Swallowing Dysfunction &/or Oral Function for Feeding   Treatment of Swallowing Dysfunction &/or Oral Function for Feeding Minutes (91476) 10   Symptoms Noted During/After Treatment Fatigue   Treatment Detail/Skilled Intervention Patient with mild to moderate oral and pharyngeal dysphagia. Soft and bite size diet and thin liquids.   SLP Discharge Planning   SLP Plan Meal f/u level 6 with thins.   SLP Discharge Recommendation Acute [facility]hab Center-Motivated patient will benefit from intensive, interdisciplinary therapy.  Anticipate will be able to tolerate 3 hours of therapy per day;Transitional Care Facility   SLP Rationale for DC Rec Patient previoualy living independently and swallow function and communication are below her baseline.    SLP Brief overview of current status  Mild to moderate dysphagia. Soft and bite size diet and thin liquids with swallow and GERDS precautions.   Total Session Time   Total Session Time (sum of timed and untimed services) 28

## 2022-10-10 NOTE — PLAN OF CARE
Reason for Admission: R cerebellar CVA    Cognitive/Mentation: A/Ox 4, forgetful.   Neuros/CMS: Intact ex dizziness/nausea&vomiting. Mild WFD at times.   VS: Stable on RA, permissive HTN.   Tele: Afib -130s. Improving.   GI: BS active, passing flatus, last BM 10/9.  : Voiding adequate amt. Incontinent. Purewick in place.   Pulmonary: LS clear.  Pain: denies.     Drains/Lines: L arm PIV. NS infusing @ 100/hr  Skin: Buttocks reddened and bruised with small blister area. Cleansed and mepilex applied. Reddened/excoriated in abdominal folds/breast.   Activity: Not OOB yet. Dizziness and nausea increased with movement  Diet: Clear liquid diet, Mod carb. Takes pills whole.     Therapies recs: Pending workup  Discharge: Pending    Aggression Stoplight Tool: green    End of shift summary: Patient admitted yesterday evening. Nausea improved but continues to come and go with any movement. Tolerating clear liquids at this time. PO metoprolol and cardizem admin for elevated HR as well as 1L bolus. HR decreased from 130-150s to 120-130s. Provider aware. AM Metoprolol and cardizem  dose admin early. Continue to monitor.

## 2022-10-10 NOTE — H&P
[facility]    History and Physical  Hospitalist       Date of Admission:  10/9/2022  Date of Service (when I saw the patient): 10/09/22    ASSESSMENT  Hima Griffiths is a markedly pleasant 77 year old woman with past medical history that is most notable for persistent atrial fibrillation and prior stroke, on rivaroxaban, as well as hypertension, hyperlipidemia, Type 2 Diabetes mellitus, and CKD Stage 3, among others; who presents with sudden onset vertigo with nausea and vomiting and is found to have suspected acute ischemic right cerebellar stroke.    PLAN    Suspected acute ischemic right cerebellar stroke: Of note, Ms. Griffiths suffered a left MCA stroke in 2015, and was found at that time to have atrial fibrillation. Rivaroxaban was started at that time. She has been off that medication now for over a week. She presents for sudden vertigo with nausea and vomiting today. She has concomitant leukocytosis. MRI/MRA of Head and Neck showed a right inferior cerebellar infarct, as well as diminished flow through several vertebral artery segments. Acute cardioembolic stroke is suspected, vs large vessel occlusion. The case was dicussed with the Stroke Service and resumption of her DOAC was recommended and given in the ED prior to transfer.    -- Inpatient. NPO. Neurology consulted. Q 4 neuro checks. Telemetry, enzymes, TTE with bubble study, lipids, A1c and hepatic panel ordered. Rivaroxaban continued at Neurology discretion. SP, PT, OT consulted. IV anti-hypertensives as needed as per protocol.    Atrial fibrillation with RVR: Seen on arrival here. Heart rates are currently sustaining 130-150 now, in the setting of having vomited up her home Metoprolol 100 mg PO BID and Diltiazem 30 mg PO BID today, as well as concomitant suspected hypovolemia.    -- Once her swallow study is passed this evening, will plan carefully to resume his home oral rate controlling medications, monitoring blood  pressure closely in an effort to avoid IV medications that might precipitously drop blood pressure, while we aim for permissive hypertension    CKD Stage 3: Monitor closely while hospitalized, avoiding nephrotoxins as able. Repeat Creatinine in AM.    Recent Labs   Lab Test 10/09/22  1525 09/21/22  1628 06/29/22  0513   CR 0.74 0.70 1.03     Type 2 Diabetes mellitus: Recent Labs   Lab Test 09/21/22  1628   A1C 7.2*   . On Trulicity, Glipizide as an outpatient.    -- ISS insulin while hospitalized. Hold oral anti-diabetic medications. Resume at discharge.    Hypertension: Resume home Lisinopril 5 mg when verified and as able clinically.    Dyslipidemia: Resume home Pravastatin 40 mg when verified and pending statin adjustment if recommended by Neurology.    Chronic severe bilateral lower extremity lymphedema: Noted. Elevate legs.    Pressure injury of left buttock, stage 1, chronic, present on admission.    -- WOC consulted    Rule Out COVID-19 infection  This patient was evaluated during a global COVID-19 pandemic, which necessitated consideration that the patient might be at risk for infection with the SARS-CoV-2 virus that causes COVID-19. Applicable protocols for evaluation were followed during the patient's care.   -negative COVID-19 PCR test result  -no current indication for precautions    Chief Complaint   Dizziness    History is obtained from the patient, her daughter at the bedside, and the ED physician whom I have spoken with    History of Present Illness   Hima Griffiths is a markedly pleasant 77 year old woman who presents with sudden onset dizziness, characterized as severe vertigo, that began this afternoon while walking to the bathroom, and caused her to stop and put her hands on the wall to keep from falling. It has been constant since onset, and associated with nausea and multiple episodes of vomiting today and tonight. It is greatly exacerbated by any movement of her head or body, though it is  significantly improved when she will lays still. She says she has had prior episodes of intermittent vertigo in the past that were not as severe as this. She adds that she ran out of her rivaroxaban over a week ago. There were significant delays in getting it filled, and then, since she has an outpatient EGD coming up to assess for ongoing chronic dysphagia, it was recommended to hold the rivaroxaban 48 hours beforehand, so she has not had it in some time. She otherwise denies any focal weakness of extremities, or speech changes, or vision changes, or any loss of sensation currently (her daughter adds that she did express some left arm weakness in the ED that seems better now; and her daughter also notes that after her first stroke in 2015, she has chronic residual weakness of the right lower extremity); and she denies any chest pain, dyspnea, palpitations, or any recent fever, chills, sweats, diarrhea, dysuria, or any other acute complaints. She presented to the [facility] ED for further evaluation.    In the ED, T 96.3, /96, pulse 74, sats 93%.    CBC and BMP were notable for WBC 14.0, Glucose 289, otherwise were within the normal reference range. EKG showed atrial fibrillation, rate 91, without ST segment changes. INR was 0.97. COVID was negative.    The case was discussed with the Stroke Service and she was given Zofran, Meclizine, and Rivaroxaban and transferred here.    Recent Results (from the past 24 hour(s))   MR Brain w/o & w Contrast    Narrative    EXAM: MR BRAIN W/O and W CONTRAST, MRA BRAIN ([place]) W/O CONTRAST, MRA NECK (CAROTIDS) W/O and W CONTRAST  LOCATION: [facility]  DATE/TIME: 10/9/2022 5:03 PM    INDICATION: vertigo  COMPARISON: CT head 06/16/2022  CONTRAST: 10 mL Gadavist  TECHNIQUE:   1) Routine multiplanar multisequence head MRI without and with intravenous contrast.  2) 3D time-of-flight head MRA without intravenous contrast.  3) Neck MRA without and  with IV contrast. Stenosis measurements made according to NASCET criteria unless otherwise specified.    FINDINGS:  HEAD MRI:  INTRACRANIAL CONTENTS: Patchy areas of subtle ADC restriction without corresponding changes on FLAIR or T2 images involving the right inferior cerebellum, suggestive of acute infarct. No acute hemorrhage, or extra-axial fluid collections. Confluent   nonspecific T2/FLAIR hyperintensities within the cerebral white matter and oli most consistent with advanced chronic microvascular ischemic change. Old infarct and gliosis in the left inferior frontal lobe. Slight asymmetry and rounded appearance of the   right amygdala measuring 8 x 8 mm. Advanced generalized cerebral atrophy. No hydrocephalus. Normal position of the cerebellar tonsils. No pathologic contrast enhancement.    SELLA: No abnormality accounting for technique.    OSSEOUS STRUCTURES/SOFT TISSUES: Normal marrow signal. The major intracranial vascular flow voids are maintained.     ORBITS: No abnormality accounting for technique.     SINUSES/MASTOIDS: No paranasal sinus mucosal disease. No middle ear or mastoid effusion.     HEAD MRA:   ANTERIOR CIRCULATION: Moderate stenosis right cavernous ICA. No occlusion, aneurysm, or high flow vascular malformation. Developmentally absent right A1 anterior cerebral artery segment.    POSTERIOR CIRCULATION: Occlusion versus tight stenosis of the right vertebral artery intradural V4 segment. Posterior cerebral artery is occluded beyond the proximal P2 segment. No stenosis/occlusion, aneurysm, or high flow vascular malformation.   Balanced vertebral arteries supply a normal basilar artery.     NECK MRA:   RIGHT CAROTID: No measurable stenosis or dissection.    LEFT CAROTID: No measurable stenosis or dissection.    VERTEBRAL ARTERIES: Diminished flow in the distal V3 and V4 segments right vertebral artery. T1 fat-sat images are limited by motion, therefore difficult to exclude dissection in the  distal right vertebral artery.    AORTIC ARCH: Classic aortic arch anatomy with no significant stenosis at the origin of the great vessels.      Impression    IMPRESSION:  HEAD MRI:   1.  Approximately 3 x 2.5 cm area of ADC restriction in the right inferior cerebellum without corresponding suggestive of acute infarct.  2.  No evidence of hemorrhage. Advanced white matter disease in bilateral cerebral hemispheres with volume loss.  3.  Focal rounded appearance in the right amygdala measuring 8 mm, may represent a small mass versus anatomic variation.     HEAD MRA:   1.  Occlusion versus near occlusive stenosis right vertebral artery V4 segment.  2.  Occlusion right posterior cerebral artery P2 segment and distally without reconstitution. Presumably chronic since there is no evidence of right posterior cerebral artery territory infarct.    NECK MRA:  1.  Diminished flow in the distal V3 and V4 segments right vertebral artery.  2.  No measurable stenosis or dissection in the carotid arteries.    As discussed with Dr. Kaufman at 1727 CDT.   MRA Neck (Carotids) wo & w Contrast    Narrative    EXAM: MR BRAIN W/O and W CONTRAST, MRA BRAIN ([place]) W/O CONTRAST, MRA NECK (CAROTIDS) W/O and W CONTRAST  LOCATION: [facility]  DATE/TIME: 10/9/2022 5:03 PM    INDICATION: vertigo  COMPARISON: CT head 06/16/2022  CONTRAST: 10 mL Gadavist  TECHNIQUE:   1) Routine multiplanar multisequence head MRI without and with intravenous contrast.  2) 3D time-of-flight head MRA without intravenous contrast.  3) Neck MRA without and with IV contrast. Stenosis measurements made according to NASCET criteria unless otherwise specified.    FINDINGS:  HEAD MRI:  INTRACRANIAL CONTENTS: Patchy areas of subtle ADC restriction without corresponding changes on FLAIR or T2 images involving the right inferior cerebellum, suggestive of acute infarct. No acute hemorrhage, or extra-axial fluid collections. Confluent    nonspecific T2/FLAIR hyperintensities within the cerebral white matter and oli most consistent with advanced chronic microvascular ischemic change. Old infarct and gliosis in the left inferior frontal lobe. Slight asymmetry and rounded appearance of the   right amygdala measuring 8 x 8 mm. Advanced generalized cerebral atrophy. No hydrocephalus. Normal position of the cerebellar tonsils. No pathologic contrast enhancement.    SELLA: No abnormality accounting for technique.    OSSEOUS STRUCTURES/SOFT TISSUES: Normal marrow signal. The major intracranial vascular flow voids are maintained.     ORBITS: No abnormality accounting for technique.     SINUSES/MASTOIDS: No paranasal sinus mucosal disease. No middle ear or mastoid effusion.     HEAD MRA:   ANTERIOR CIRCULATION: Moderate stenosis right cavernous ICA. No occlusion, aneurysm, or high flow vascular malformation. Developmentally absent right A1 anterior cerebral artery segment.    POSTERIOR CIRCULATION: Occlusion versus tight stenosis of the right vertebral artery intradural V4 segment. Posterior cerebral artery is occluded beyond the proximal P2 segment. No stenosis/occlusion, aneurysm, or high flow vascular malformation.   Balanced vertebral arteries supply a normal basilar artery.     NECK MRA:   RIGHT CAROTID: No measurable stenosis or dissection.    LEFT CAROTID: No measurable stenosis or dissection.    VERTEBRAL ARTERIES: Diminished flow in the distal V3 and V4 segments right vertebral artery. T1 fat-sat images are limited by motion, therefore difficult to exclude dissection in the distal right vertebral artery.    AORTIC ARCH: Classic aortic arch anatomy with no significant stenosis at the origin of the great vessels.      Impression    IMPRESSION:  HEAD MRI:   1.  Approximately 3 x 2.5 cm area of ADC restriction in the right inferior cerebellum without corresponding suggestive of acute infarct.  2.  No evidence of hemorrhage. Advanced white matter  disease in bilateral cerebral hemispheres with volume loss.  3.  Focal rounded appearance in the right amygdala measuring 8 mm, may represent a small mass versus anatomic variation.     HEAD MRA:   1.  Occlusion versus near occlusive stenosis right vertebral artery V4 segment.  2.  Occlusion right posterior cerebral artery P2 segment and distally without reconstitution. Presumably chronic since there is no evidence of right posterior cerebral artery territory infarct.    NECK MRA:  1.  Diminished flow in the distal V3 and V4 segments right vertebral artery.  2.  No measurable stenosis or dissection in the carotid arteries.    As discussed with Dr. Kaufman at 1727 CDT.   MRA Brain ([place]) wo Contrast    Narrative    EXAM: MR BRAIN W/O and W CONTRAST, MRA BRAIN ([place]) W/O CONTRAST, MRA NECK (CAROTIDS) W/O and W CONTRAST  LOCATION: [facility]  DATE/TIME: 10/9/2022 5:03 PM    INDICATION: vertigo  COMPARISON: CT head 06/16/2022  CONTRAST: 10 mL Gadavist  TECHNIQUE:   1) Routine multiplanar multisequence head MRI without and with intravenous contrast.  2) 3D time-of-flight head MRA without intravenous contrast.  3) Neck MRA without and with IV contrast. Stenosis measurements made according to NASCET criteria unless otherwise specified.    FINDINGS:  HEAD MRI:  INTRACRANIAL CONTENTS: Patchy areas of subtle ADC restriction without corresponding changes on FLAIR or T2 images involving the right inferior cerebellum, suggestive of acute infarct. No acute hemorrhage, or extra-axial fluid collections. Confluent   nonspecific T2/FLAIR hyperintensities within the cerebral white matter and oli most consistent with advanced chronic microvascular ischemic change. Old infarct and gliosis in the left inferior frontal lobe. Slight asymmetry and rounded appearance of the   right amygdala measuring 8 x 8 mm. Advanced generalized cerebral atrophy. No hydrocephalus. Normal position of the  cerebellar tonsils. No pathologic contrast enhancement.    SELLA: No abnormality accounting for technique.    OSSEOUS STRUCTURES/SOFT TISSUES: Normal marrow signal. The major intracranial vascular flow voids are maintained.     ORBITS: No abnormality accounting for technique.     SINUSES/MASTOIDS: No paranasal sinus mucosal disease. No middle ear or mastoid effusion.     HEAD MRA:   ANTERIOR CIRCULATION: Moderate stenosis right cavernous ICA. No occlusion, aneurysm, or high flow vascular malformation. Developmentally absent right A1 anterior cerebral artery segment.    POSTERIOR CIRCULATION: Occlusion versus tight stenosis of the right vertebral artery intradural V4 segment. Posterior cerebral artery is occluded beyond the proximal P2 segment. No stenosis/occlusion, aneurysm, or high flow vascular malformation.   Balanced vertebral arteries supply a normal basilar artery.     NECK MRA:   RIGHT CAROTID: No measurable stenosis or dissection.    LEFT CAROTID: No measurable stenosis or dissection.    VERTEBRAL ARTERIES: Diminished flow in the distal V3 and V4 segments right vertebral artery. T1 fat-sat images are limited by motion, therefore difficult to exclude dissection in the distal right vertebral artery.    AORTIC ARCH: Classic aortic arch anatomy with no significant stenosis at the origin of the great vessels.      Impression    IMPRESSION:  HEAD MRI:   1.  Approximately 3 x 2.5 cm area of ADC restriction in the right inferior cerebellum without corresponding suggestive of acute infarct.  2.  No evidence of hemorrhage. Advanced white matter disease in bilateral cerebral hemispheres with volume loss.  3.  Focal rounded appearance in the right amygdala measuring 8 mm, may represent a small mass versus anatomic variation.     HEAD MRA:   1.  Occlusion versus near occlusive stenosis right vertebral artery V4 segment.  2.  Occlusion right posterior cerebral artery P2 segment and distally without reconstitution.  Presumably chronic since there is no evidence of right posterior cerebral artery territory infarct.    NECK MRA:  1.  Diminished flow in the distal V3 and V4 segments right vertebral artery.  2.  No measurable stenosis or dissection in the carotid arteries.    As discussed with Dr. Kaufman at 1727 CDT.       PHYSICAL EXAM  Blood pressure (!) 171/111, temperature 97.4  F (36.3  C), temperature source Axillary, resp. rate 18, SpO2 93 %, not currently breastfeeding.  Constitutional: Alert and oriented to person, place and time; no apparent distress  HEENT: normocephalic dry mucus membranes  Respiratory: lungs clear to auscultation bilaterally  Cardiovascular: rapid irregular S1 S2  GI: abdomen soft non tender non distended bowel sounds positive  Lymph/Hematologic: pale, no cervical lymphadenopathy  Skin: no rash, good turgor  Musculoskeletal: no clubbing, cyanosis or edema  Neurologic: extra-ocular muscles intact; moves all four extremities; RLE 4/5 strength proximally, 5/5 in all muscle groups of LLE  Psychiatric: appropriate affect, insight and judgment     DVT Prophylaxis: DOAC  Code Status: Full Code    Disposition: Expected discharge in 2-3 days    Samuel Limon MD, MD    Past Medical History    I have reviewed this patient's medical history and updated it with pertinent information if needed.   Past Medical History:   Diagnosis Date     Benign hypertension      Benign paroxysmal positional vertigo      Colitis 11/28/2016    Presumed infectious 2009.      Diabetes mellitus (H)      Family history of atrial fibrillation     Mother     Osteoarthritis of right knee, unspecified osteoarthritis type 08/02/2019     Persistent atrial fibrillation (H) 11/2015     Stroke (H) 10/2015    Left MCA       Past Surgical History   I have reviewed this patient's surgical history and updated it with pertinent information if needed.  Past Surgical History:   Procedure Laterality Date     AS LOOP RECORDER IMPLANT  11/9/15      CATARACT IOL, RT/LT  8/13/14       Prior to Admission Medications   Prior to Admission Medications   Prescriptions Last Dose Informant Patient Reported? Taking?   ASPIRIN NOT PRESCRIBED (INTENTIONAL)   Yes No   Sig: Please choose reason not prescribed, below   Patient not taking: Reported on 10/5/2022   acetaminophen (TYLENOL) 325 MG tablet   No No   Sig: Take 2 tablets (650 mg) by mouth every 6 hours as needed for mild pain or other (and adjunct with moderate or severe pain or per patient request)   blood glucose (NO BRAND SPECIFIED) lancets standard   No No   Sig: Use to test blood sugar 2 times daily or as directed.   blood glucose (NO BRAND SPECIFIED) test strip   No No   Sig: Use to test blood sugar 2 times daily or as directed.   blood glucose monitoring (NO BRAND SPECIFIED) meter device kit   No No   Sig: Use to test blood sugar 2 times daily or as directed.   clotrimazole (LOTRIMIN) 1 % external cream   No No   Sig: Apply topically 2 times daily as needed (yeast rash)   clotrimazole (LOTRIMIN) 1 % external cream   No No   Sig: Apply topically 2 times daily Under breasts and in groin areas   diltiazem (CARDIZEM) 30 MG tablet   No No   Sig: Take 1 tablet (30 mg) by mouth 2 times daily   dulaglutide (TRULICITY) 1.5 MG/0.5ML pen   No No   Sig: Inject 1.5 mg Subcutaneous every 7 days   glipiZIDE (GLUCOTROL XL) 10 MG 24 hr tablet   No No   Sig: Take 1 tablet (10 mg) by mouth daily (with breakfast)   insulin pen needle (32G X 6 MM) 32G X 6 MM miscellaneous   No No   Sig: Use as directed.   lisinopril (ZESTRIL) 5 MG tablet   No No   Sig: Take 1 tablet (5 mg) by mouth daily   melatonin 5 MG tablet   Yes No   Sig: Take 1 tablet (5 mg) by mouth At Bedtime   metoprolol tartrate (LOPRESSOR) 100 MG tablet   No No   Sig: Take 1 tablet (100 mg) by mouth 2 times daily   pravastatin (PRAVACHOL) 40 MG tablet   No No   Sig: Take 1 tablet (40 mg) by mouth daily   rivaroxaban ANTICOAGULANT (XARELTO ANTICOAGULANT) 20 MG TABS  tablet   No No   Sig: Take 1 tablet (20 mg) by mouth daily (with dinner)      Facility-Administered Medications: None     Allergies   Allergies   Allergen Reactions     Alkylamines Other (See Comments)     Comment: Weakness tingling arms and legs, Description:      Sudafed [Pseudoephedrine]      Legs and arms get weak       Social History   I have reviewed this patient's social history and updated it with pertinent information if needed. Hima Griffiths  reports that she quit smoking about 16 years ago. She started smoking about 59 years ago. She has never used smokeless tobacco. She reports current alcohol use.    Family History   Family history assessed and, except as above, is non-contributory.    Family History   Problem Relation Age of Onset     Hypertension Mother      Arrhythmia Mother      Hypertension Father      Coronary Artery Disease Father        Review of Systems   The 10 point Review of Systems is negative other than noted in the HPI or here.     Primary Care Physician   Georgiana Joy    Data   Labs Ordered and Resulted from Time of ED Arrival to Time of ED Departure - No data to display    Data reviewed today:  I personally reviewed the EKG tracing showing atrial fibrillation.

## 2022-10-10 NOTE — PROGRESS NOTES
[facility]    Hospitalist Progress Note    Interval History   - Increasing dyspnea and respiratory rate this morning. Family reports patient has been more confused, and as a result has not gotten out of bed or worked really well with PT. Patient is A&O to self only, she denies any SOB or cough or pain, however she is clearly dyspneic.  - Start diuresis for CHF, discussed with nurse    Assessment & Plan   Summary: Hima Griffiths is a 77 year old female with PMH atrial fibrillation, hx CVA, HTN, DM2, who was admitted on 10/9/2022 with vertigo and found to have an acute right cerebellar stroke.    Acute ischemic right cerebellar stroke  Hx MCA stroke in 2015, also dx with atrial fibrillation start on rivaroxaban. She has been off that medication now for over a week. Admitted with sudden vertigo with nausea and vomiting today. She has concomitant leukocytosis. MRI/MRA of Head and Neck showed a right inferior cerebellar infarct, as well as diminished flow through several vertebral artery segments. MRI confirms acute right inferior cerebellar stroke.  - Appreciate Neurology consult  - Xarelto restarted  - TTE pending  - A1c 7.2% on 9/21/22  - PT/OT/SLP  - Neuro checks    Congestive heart failure exacerbation  Acute hypoxic respiratory failure due to above  Leukocytosis  Lactic acidosis  Patient received IVF in ED due to suspected hypovolemia. Noted increasing dyspnea 10/10, lactate elevated. On exam elevated JVP, bilateral lung crackles and nonpitting LE edema. CXR shows vascular congestion. Increasing O2 needs starting this afternoon. Overall all consistent with CHF exacerbation. Echo in 2016 cannot be reviewed by me but referenced in notes in the chart as having normal ejection fraction. Family reports patient has not been diagnosed with CHF previously.  - Lasix 60mg IV one time, if response, then repeat  - TTE pending for stroke above  - Tele, daily weights, I&O  - Check UA to rule out  infection, otherwise no clear infectious cause noted  - Ceftriaxone empirically x1 dose while awaiting response to Lasix and urine sample     Acute metabolic encephalopathy due to CHF and stroke  - Manage supportively and treat issues above    Atrial fibrillation with RVR  Heart rates 130s+ on admission, rate controlled mostly < 110 on 10/10. Was given IVF on admission, however now appears volume overloaded.  - Continue PTA diltiazem  - Mag > 2, K >4    CKD Stage 3: Monitor closely while hospitalized, avoiding nephrotoxins as able.     DM2: A1c 7.2% on 9/2022 On Trulicity, Glipizide as an outpatient.  - ISS insulin while hospitalized. Hold oral anti-diabetic medications. Resume at discharge.     Hypertension: PTA lisinopril     Dyslipidemia: PTA pravstation    Chronic severe bilateral lower extremity lymphedema: Noted. Elevate legs.     Pressure injury of left buttock, stage 1, chronic, present on admission.    -- WOC consulted     DVT Prophylaxis: Xarelto  Code Status: Full Code  PT/OT: ordered  Diet: Combination Diet Soft and Bite Sized Diet (level 6); Thin Liquids (level 0) (No straws)  Room Service      Disposition: Expected discharge 2-3 days to TCU    - I spent 40 minutes, with greater than 50% spent in counseling and coordination of care with the patient and family and nurse.    Kuldip Ruiz MD, MD  Text Page  (7am to 6pm)  -Data reviewed today: I reviewed all new labs and imaging results over the last 24 hours.    Physical Exam   Temp: (!) 96.6  F (35.9  C) Temp src: Axillary BP: (!) 151/110 Pulse: 116   Resp: 30 SpO2: (!) 88 % O2 Device: Nasal cannula Oxygen Delivery: 3 LPM  There were no vitals filed for this visit.  Vital Signs with Ranges  Temp:  [96.3  F (35.7  C)-97.9  F (36.6  C)] 96.6  F (35.9  C)  Pulse:  [] 116  Resp:  [18-30] 30  BP: (117-178)/() 151/110  SpO2:  [86 %-98 %] 88 %  I/O last 3 completed shifts:  In: -   Out: 400 [Urine:400]  O2 requirements:  none    Constitutional: Elderly female appears tired, weak, somewhat ill  HEENT: Eyes nonicteric  Cardiovascular: Tachcyardic and irregular, elevated JVP ~3cm  Respiratory: Bilateral crackles no wheezing, appears dyspneic  Vascular: Nonpitting LE edema mostly in the calf region  GI: Normoactive bowel sounds, nontender  Skin/Integumen: No rashes  Neuro/Psych: Tired, weak, confused A&O to self    Medications     - MEDICATION INSTRUCTIONS -       - MEDICATION INSTRUCTIONS -         sodium chloride 0.9%  1,000 mL Intravenous Once     diltiazem  30 mg Oral BID     furosemide  60 mg Intravenous Once     insulin aspart  1-6 Units Subcutaneous Q4H     metoprolol tartrate  100 mg Oral BID     sodium chloride (PF)  3 mL Intracatheter Q8H       Data   Recent Labs   Lab 10/10/22  1116 10/10/22  0835 10/10/22  0823 10/09/22  2241 10/09/22  1525   WBC  --   --  13.1*  --  14.0*   HGB  --   --  13.4  --  13.5   MCV  --   --  106*  --  104*   PLT  --   --  205  --  256   INR  --   --   --   --  0.97   NA  --   --  137  --  138   POTASSIUM  --   --  3.9  --  4.2   CHLORIDE  --   --  106  --  102   CO2  --   --  26  --  22   BUN  --   --  12  --  11.7   CR  --   --  0.70  --  0.74   ANIONGAP  --   --  5  --  14   EZEKIEL  --   --  8.7  --  9.3   * 115* 135*   < > 289*    < > = values in this interval not displayed.       Imaging:   Recent Results (from the past 24 hour(s))   MR Brain w/o & w Contrast    Narrative    EXAM: MR BRAIN W/O and W CONTRAST, MRA BRAIN ([place]) W/O CONTRAST, MRA NECK (CAROTIDS) W/O and W CONTRAST  LOCATION: [facility]  DATE/TIME: 10/9/2022 5:03 PM    INDICATION: vertigo  COMPARISON: CT head 06/16/2022  CONTRAST: 10 mL Gadavist  TECHNIQUE:   1) Routine multiplanar multisequence head MRI without and with intravenous contrast.  2) 3D time-of-flight head MRA without intravenous contrast.  3) Neck MRA without and with IV contrast. Stenosis measurements made according to NASCET  criteria unless otherwise specified.    FINDINGS:  HEAD MRI:  INTRACRANIAL CONTENTS: Patchy areas of subtle ADC restriction without corresponding changes on FLAIR or T2 images involving the right inferior cerebellum, suggestive of acute infarct. No acute hemorrhage, or extra-axial fluid collections. Confluent   nonspecific T2/FLAIR hyperintensities within the cerebral white matter and oli most consistent with advanced chronic microvascular ischemic change. Old infarct and gliosis in the left inferior frontal lobe. Slight asymmetry and rounded appearance of the   right amygdala measuring 8 x 8 mm. Advanced generalized cerebral atrophy. No hydrocephalus. Normal position of the cerebellar tonsils. No pathologic contrast enhancement.    SELLA: No abnormality accounting for technique.    OSSEOUS STRUCTURES/SOFT TISSUES: Normal marrow signal. The major intracranial vascular flow voids are maintained.     ORBITS: No abnormality accounting for technique.     SINUSES/MASTOIDS: No paranasal sinus mucosal disease. No middle ear or mastoid effusion.     HEAD MRA:   ANTERIOR CIRCULATION: Moderate stenosis right cavernous ICA. No occlusion, aneurysm, or high flow vascular malformation. Developmentally absent right A1 anterior cerebral artery segment.    POSTERIOR CIRCULATION: Occlusion versus tight stenosis of the right vertebral artery intradural V4 segment. Posterior cerebral artery is occluded beyond the proximal P2 segment. No stenosis/occlusion, aneurysm, or high flow vascular malformation.   Balanced vertebral arteries supply a normal basilar artery.     NECK MRA:   RIGHT CAROTID: No measurable stenosis or dissection.    LEFT CAROTID: No measurable stenosis or dissection.    VERTEBRAL ARTERIES: Diminished flow in the distal V3 and V4 segments right vertebral artery. T1 fat-sat images are limited by motion, therefore difficult to exclude dissection in the distal right vertebral artery.    AORTIC ARCH: Classic aortic arch  anatomy with no significant stenosis at the origin of the great vessels.      Impression    IMPRESSION:  HEAD MRI:   1.  Approximately 3 x 2.5 cm area of ADC restriction in the right inferior cerebellum without corresponding suggestive of acute infarct.  2.  No evidence of hemorrhage. Advanced white matter disease in bilateral cerebral hemispheres with volume loss.  3.  Focal rounded appearance in the right amygdala measuring 8 mm, may represent a small mass versus anatomic variation.     HEAD MRA:   1.  Occlusion versus near occlusive stenosis right vertebral artery V4 segment.  2.  Occlusion right posterior cerebral artery P2 segment and distally without reconstitution. Presumably chronic since there is no evidence of right posterior cerebral artery territory infarct.    NECK MRA:  1.  Diminished flow in the distal V3 and V4 segments right vertebral artery.  2.  No measurable stenosis or dissection in the carotid arteries.    As discussed with Dr. Kaufman at 1727 CDT.   MRA Neck (Carotids) wo & w Contrast    Narrative    EXAM: MR BRAIN W/O and W CONTRAST, MRA BRAIN ([place]) W/O CONTRAST, MRA NECK (CAROTIDS) W/O and W CONTRAST  LOCATION: [facility]  DATE/TIME: 10/9/2022 5:03 PM    INDICATION: vertigo  COMPARISON: CT head 06/16/2022  CONTRAST: 10 mL Gadavist  TECHNIQUE:   1) Routine multiplanar multisequence head MRI without and with intravenous contrast.  2) 3D time-of-flight head MRA without intravenous contrast.  3) Neck MRA without and with IV contrast. Stenosis measurements made according to NASCET criteria unless otherwise specified.    FINDINGS:  HEAD MRI:  INTRACRANIAL CONTENTS: Patchy areas of subtle ADC restriction without corresponding changes on FLAIR or T2 images involving the right inferior cerebellum, suggestive of acute infarct. No acute hemorrhage, or extra-axial fluid collections. Confluent   nonspecific T2/FLAIR hyperintensities within the cerebral white matter  and oli most consistent with advanced chronic microvascular ischemic change. Old infarct and gliosis in the left inferior frontal lobe. Slight asymmetry and rounded appearance of the   right amygdala measuring 8 x 8 mm. Advanced generalized cerebral atrophy. No hydrocephalus. Normal position of the cerebellar tonsils. No pathologic contrast enhancement.    SELLA: No abnormality accounting for technique.    OSSEOUS STRUCTURES/SOFT TISSUES: Normal marrow signal. The major intracranial vascular flow voids are maintained.     ORBITS: No abnormality accounting for technique.     SINUSES/MASTOIDS: No paranasal sinus mucosal disease. No middle ear or mastoid effusion.     HEAD MRA:   ANTERIOR CIRCULATION: Moderate stenosis right cavernous ICA. No occlusion, aneurysm, or high flow vascular malformation. Developmentally absent right A1 anterior cerebral artery segment.    POSTERIOR CIRCULATION: Occlusion versus tight stenosis of the right vertebral artery intradural V4 segment. Posterior cerebral artery is occluded beyond the proximal P2 segment. No stenosis/occlusion, aneurysm, or high flow vascular malformation.   Balanced vertebral arteries supply a normal basilar artery.     NECK MRA:   RIGHT CAROTID: No measurable stenosis or dissection.    LEFT CAROTID: No measurable stenosis or dissection.    VERTEBRAL ARTERIES: Diminished flow in the distal V3 and V4 segments right vertebral artery. T1 fat-sat images are limited by motion, therefore difficult to exclude dissection in the distal right vertebral artery.    AORTIC ARCH: Classic aortic arch anatomy with no significant stenosis at the origin of the great vessels.      Impression    IMPRESSION:  HEAD MRI:   1.  Approximately 3 x 2.5 cm area of ADC restriction in the right inferior cerebellum without corresponding suggestive of acute infarct.  2.  No evidence of hemorrhage. Advanced white matter disease in bilateral cerebral hemispheres with volume loss.  3.  Focal rounded  appearance in the right amygdala measuring 8 mm, may represent a small mass versus anatomic variation.     HEAD MRA:   1.  Occlusion versus near occlusive stenosis right vertebral artery V4 segment.  2.  Occlusion right posterior cerebral artery P2 segment and distally without reconstitution. Presumably chronic since there is no evidence of right posterior cerebral artery territory infarct.    NECK MRA:  1.  Diminished flow in the distal V3 and V4 segments right vertebral artery.  2.  No measurable stenosis or dissection in the carotid arteries.    As discussed with Dr. Kaufman at 1727 CDT.   MRA Brain ([place]) wo Contrast    Narrative    EXAM: MR BRAIN W/O and W CONTRAST, MRA BRAIN ([place]) W/O CONTRAST, MRA NECK (CAROTIDS) W/O and W CONTRAST  LOCATION: [facility]  DATE/TIME: 10/9/2022 5:03 PM    INDICATION: vertigo  COMPARISON: CT head 06/16/2022  CONTRAST: 10 mL Gadavist  TECHNIQUE:   1) Routine multiplanar multisequence head MRI without and with intravenous contrast.  2) 3D time-of-flight head MRA without intravenous contrast.  3) Neck MRA without and with IV contrast. Stenosis measurements made according to NASCET criteria unless otherwise specified.    FINDINGS:  HEAD MRI:  INTRACRANIAL CONTENTS: Patchy areas of subtle ADC restriction without corresponding changes on FLAIR or T2 images involving the right inferior cerebellum, suggestive of acute infarct. No acute hemorrhage, or extra-axial fluid collections. Confluent   nonspecific T2/FLAIR hyperintensities within the cerebral white matter and oli most consistent with advanced chronic microvascular ischemic change. Old infarct and gliosis in the left inferior frontal lobe. Slight asymmetry and rounded appearance of the   right amygdala measuring 8 x 8 mm. Advanced generalized cerebral atrophy. No hydrocephalus. Normal position of the cerebellar tonsils. No pathologic contrast enhancement.    SELLA: No abnormality  accounting for technique.    OSSEOUS STRUCTURES/SOFT TISSUES: Normal marrow signal. The major intracranial vascular flow voids are maintained.     ORBITS: No abnormality accounting for technique.     SINUSES/MASTOIDS: No paranasal sinus mucosal disease. No middle ear or mastoid effusion.     HEAD MRA:   ANTERIOR CIRCULATION: Moderate stenosis right cavernous ICA. No occlusion, aneurysm, or high flow vascular malformation. Developmentally absent right A1 anterior cerebral artery segment.    POSTERIOR CIRCULATION: Occlusion versus tight stenosis of the right vertebral artery intradural V4 segment. Posterior cerebral artery is occluded beyond the proximal P2 segment. No stenosis/occlusion, aneurysm, or high flow vascular malformation.   Balanced vertebral arteries supply a normal basilar artery.     NECK MRA:   RIGHT CAROTID: No measurable stenosis or dissection.    LEFT CAROTID: No measurable stenosis or dissection.    VERTEBRAL ARTERIES: Diminished flow in the distal V3 and V4 segments right vertebral artery. T1 fat-sat images are limited by motion, therefore difficult to exclude dissection in the distal right vertebral artery.    AORTIC ARCH: Classic aortic arch anatomy with no significant stenosis at the origin of the great vessels.      Impression    IMPRESSION:  HEAD MRI:   1.  Approximately 3 x 2.5 cm area of ADC restriction in the right inferior cerebellum without corresponding suggestive of acute infarct.  2.  No evidence of hemorrhage. Advanced white matter disease in bilateral cerebral hemispheres with volume loss.  3.  Focal rounded appearance in the right amygdala measuring 8 mm, may represent a small mass versus anatomic variation.     HEAD MRA:   1.  Occlusion versus near occlusive stenosis right vertebral artery V4 segment.  2.  Occlusion right posterior cerebral artery P2 segment and distally without reconstitution. Presumably chronic since there is no evidence of right posterior cerebral artery  territory infarct.    NECK MRA:  1.  Diminished flow in the distal V3 and V4 segments right vertebral artery.  2.  No measurable stenosis or dissection in the carotid arteries.    As discussed with Dr. Kaufman at 1727 CDT.   CT Head w/o Contrast    Narrative    CT SCAN OF THE HEAD WITHOUT CONTRAST   10/10/2022 1:49 PM     HISTORY: Neurological change, decreased mentation.    TECHNIQUE: Axial images of the head and coronal reformations without  IV contrast material. Radiation dose for this scan was reduced using  automated exposure control, adjustment of the mA and/or kV according  to patient size, or iterative reconstruction technique.    COMPARISON: Head MRI 10/9/2022, head CT 6/16/2022      Impression    IMPRESSION: Acute right cerebellar infarcts. No evidence of hemorrhage  or hydrocephalus. Old left cerebellar infarcts. Multiple old left  cerebral hemisphere infarcts. Volume loss and white matter  hypoattenuation likely represent chronic small vessel ischemic change.  No acute osseous abnormality.    DANIKA DE JESUS MD         SYSTEM ID:  [id number]   XR Chest Port 1 View    Narrative    XR CHEST PORT 1 VIEW  10/10/2022 2:05 PM       INDICATION: hypoxia  COMPARISON: 6/16/2022       Impression    IMPRESSION: Cardiomegaly. Increased pulmonary vascular congestion. No  focal infiltrate or definite pleural effusion.    JOSIANE EVANS MD         SYSTEM ID:  [id number]

## 2022-10-11 NOTE — CONSULTS
"[facility] Nurse Inpatient Assessment     Consulted for: Sacrum    Patient History (according to provider note(s):      Hima Griffiths is a markedly pleasant 77 year old woman with past medical history that is most notable for persistent atrial fibrillation and prior stroke, on rivaroxaban, as well as hypertension, hyperlipidemia, Type 2 Diabetes mellitus, and CKD Stage 3, among others; who presents with sudden onset vertigo with nausea and vomiting and is found to have suspected acute ischemic right cerebellar stroke.    Areas Assessed:      Areas visualized during today's visit: Sacrum/coccyx    Skin Injury Location: BL buttock    Last photo: 10/10/2022        Skin injury due to: Friction  Skin history and plan of care:   Pt daughter at bedside and reports she has been having open areas on and off since June this year. Pt sits mostly in a recliner at home.   Affected area:      Skin assessment: Blanchable erythema to BL buttock and loose epidermal tissue to Right buttock     Measurements (length x width x depth, in cm) Left buttock 1 x 2.5 x 0cm and Right buttock 1.2 x 2 x 0cm      Color: pink and purple     Temperature  normal      Drainage: none .      Color: none      Odor: none  Pain: denies , none  Pain interventions prior to dressing change: N/A  Treatment goal: Heal  and Protection  STATUS: initial assessment  Supplies ordered: at bedside           Treatment Plan:     BL buttock wound(s): Every 3 days   1. Clean wound with saline or MicroKlenz Spray, pat dry  2. Wipe / \"clean\" the surrounding periwound tissue with skin prep (Cavilon No Sting Skin Prep #[id number]) and allow to dry. This will help protect periwound and help dressing adherence  3. Press a Mepilex  Sacral Dressing (PS#[id number])    to the area, making sure to conform nicely to skin curvatures.   4. Time and date dressing change  NOTE  -Reposition pt side to side ban when in bed, every 2 hours-get the pt way over on " side to completely offload pressure. This will benefit skin and respiratory function   -Keep heels elevated and floating on pillows at all times. Try using at least 2 pillows under each calf.  -When up to the chair pt needs to fully offload every 2 hours and use a chair cushion if needed       Orders: Written    RECOMMEND PRIMARY TEAM ORDER: None, at this time  Education provided: importance of repositioning, plan of care and Off-loading pressure  Discussed plan of care with: Patient, Family and Nurse  WOC nurse follow-up plan: weekly  Notify WOC if wound(s) deteriorate.  Nursing to notify the Provider(s) and re-consult the WOC Nurse if new skin concern.    DATA:     Current support surface: Standard  Atmos Air mattress  Containment of urine/stool: Incontinence Protocol  BMI: There is no height or weight on file to calculate BMI.   Active diet order: Orders Placed This Encounter      Combination Diet Soft and Bite Sized Diet (level 6); Thin Liquids (level 0) (No straws)     Output: I/O last 3 completed shifts:  In: -   Out: 400 [Urine:400]     Labs: Recent Labs   Lab 10/10/22  0823 10/09/22  1525   HGB 13.4 13.5   INR  --  0.97   WBC 13.1* 14.0*     Pressure injury risk assessment:   Sensory Perception: 4-->no impairment  Moisture: 3-->occasionally moist  Activity: 2-->chairfast  Mobility: 3-->slightly limited  Nutrition: 2-->probably inadequate  Friction and Shear: 2-->potential problem  Dominic Score: 16    Ramez Sloan RN CWOCN  Dept. Pager: [phone]  Dept. Office Number: [phone]

## 2022-10-11 NOTE — PROVIDER NOTIFICATION
"0500     Page to Dr Caio Barrera    \"    CT results available. Results as follows:                                                                    1.  Evolving right cerebellar infarcts without definite hemorrhage.     Any new orders.     Kassandra MATTHEWS RN [phone]\"    0502    Response:    Dr Dotson: Reports CT changes insignificant. UTI likely contributing factor to increased agitation and confusion. Directs writer to have day shift providers address possible UTI.       "

## 2022-10-11 NOTE — PROVIDER NOTIFICATION
"Paged Obdulia Chirinos, \"Pt has LUE drift. not noted in prior charting from nursing or neuro. family at bedside said they thought they noticed it yesterday. Please advise. Pt has been sleepy today and continues to be dizzy/nauseous and can't get OOB d/t symptoms--improves when laying down\"    Obdulia evaluated pt at bedside promptly. Stat Head CT ordered.   "

## 2022-10-11 NOTE — PROGRESS NOTES
Stroke Progress Note    Interval EventsIncreased confusion/agitation overnight, opted to have routine CT completed STAT, no change. Suspect changes are secondary to delirium vs medication effect vs UTI. Plan to restart Xarelto today.    Around 1345 was notified by RN concern of new LUE drift. Returned to examined patient; she does have more difficulty with LUE movement compared to left. However she does grimace with LUE movement and endorse pain, so difficult to assess new central weakness vs secondary to pain/effort. Daughter in room and does not feel this is significant change from baseline. Remainder of neurological examination stable, with no new focal deficits identified. Repeat head CT confirmed no interval change/new findings.     HPI Summary  Hima Griffiths is a 77 year old female with pertinent past medical history of CHF, CKD stage 3, DMT2, HLD, HTN, atrial fibrillation on Xarelto, this was stopped a week ago as she ran out/plan for upcoming EGD. She presented to [facility] ED 10/9/22 with vertigo, nausea and vomiting. MRI showed a R cerebellar (PICA territory) ischemic infarct. MRA with R V4 occlusion and likely chronic R P2 occlusion. Had episode of increased confusion, but repeat head CT stable.      Stroke Evaluation Summarized     MRI/Head CT [facility] 10/11/22 PM: stable  [facility] 10/11/22: stable  [facility] 10/10/22: stable  MRI: Acute R inferior cerebellar ischemic infarct, no evidence of hemorrhage, focal rounded appearance of R amygdala 8 mm, small mass v anatomic variation   Intracranial Vasculature MRA brain: occlusion R V4 (and P2 but appears chronic)   Cervical Vasculature MRA neck: diminished flow distal V3/4      Echocardiogram TTE: LVEF 45%, global hypokinesis, atria of normal size bilaterally, bubble study negative    EKG/Telemetry Atrial fibrillation with PVCs, non specific ST abnormality   Other Testing Not Applicable       LDL  10/10/2022: 55 mg/dL   A1C   "9/21/2022: 7.2 %   Troponin 10/10/2022: 19 ng/L     Impression   1. Acute R inferior cerebellar ischemic infarct due to R V4 and P2 suspected thromboembolic occlusion in setting of Atrial fibrillation while Xarelto held     2. Focal rounded appearance of R amygdala 8 mm, small mass v anatomic variation     3. Encephalopathy possibly contributed from above as well as CHF exacerbation, delirium and Afib with RVR    4. Possible LUE drift newly noted 10/11; also has LUE shoulder pain so ddx includes new intracranial cause of weakness vs pseudo-weakness caused by pain/reduced effort      Plan  -Neuro checks and vitals every 4 hours  - goal SBP <180 while inpatient  -long term outpatient blood pressure goal <130/80  -discontinue ASA and restart Xarelto 20mg nightly for secondary stroke prevention (ordered)  -LDL 55, continue PTA pravastatin 40 mg daily, follow-up with PCP for titration to goal LDL 40-70  -Blood glucose monitoring, Hgb A1c 7.2%, needs tighter control of diabetes (goal <7% for secondary stroke prevention), follow-up with PCP  -telemetry  -PT/OT/SPT  -Sleep Apnea screen: monitor for sleep related hypoxia, if doesn't resolve with treatment of CHF exacerbation consider referral to sleep medicine to rule out sleep apnea  -Euthermia, euglycemia, eunatremia   -defer management of delerium, UTI and centrally acting medications top primary team  -Stroke Education  -Stroke Class per [facility]    Patient Follow-up    -f/u with PCP in 1-2 weeks  -f/u with neurology team 6-8 weeks (ordered)  -if ongoing visual symptoms can consider referral to neuro-ophthalmology    No further stroke evaluation is recommended, so we will sign off. Please contact us with any additional questions.    Obdulia SANTANA, CNP  Vascular Neurology  To page me or covering stroke neurology team member, click here: [facility]   Choose \"On Call\" tab at top, then search dropdown box for \"Neurology Adult\", select location, press " Enter, then look for stroke/neuro ICU/telestroke.  ______________________________________________________    Clinically Significant Risk Factors Present on Admission                 Medications   Scheduled Meds    sodium chloride 0.9%  1,000 mL Intravenous Once     diltiazem  30 mg Oral BID     furosemide  40 mg Intravenous Once     insulin aspart  1-6 Units Subcutaneous Q4H     metoprolol tartrate  100 mg Oral BID     sodium chloride (PF)  3 mL Intracatheter Q8H       Infusion Meds    - MEDICATION INSTRUCTIONS -       - MEDICATION INSTRUCTIONS -         PRN Meds  acetaminophen **OR** acetaminophen, glucose **OR** dextrose **OR** glucagon, labetalol **OR** hydrALAZINE, lidocaine 4%, lidocaine (buffered or not buffered), LORazepam, - MEDICATION INSTRUCTIONS -, - MEDICATION INSTRUCTIONS -, melatonin, ondansetron **OR** ondansetron, polyethylene glycol, prochlorperazine **OR** prochlorperazine **OR** prochlorperazine, QUEtiapine, sodium chloride (PF)       PHYSICAL EXAMINATION  Temp:  [96.6  F (35.9  C)-98  F (36.7  C)] 97.4  F (36.3  C)  Pulse:  [] 86  Resp:  [18-30] 20  BP: (144-176)/() 162/94  SpO2:  [9 %-99 %] 97 %      General Exam  General:  patient lying in bed without any acute distress    HEENT:  normocephalic/atraumatic  Pulmonary:  no respiratory distress    Neuro Exam  Mental Status: Lethargic but arouses to quickly to verbal stimulation; unable to state age or month but can select correct season with multiple choice; follows simple commands (ie open eyes, wiggle fingers/toes), limited verbal output, repeats only partial sentence   Cranial Nerves: pupils equal, no EOM restriction or gaze preference on casual exam, face symmetric, hearing appears intact to conversation, mild dysarthria  Motor: able to wiggle toes/fingers and move all limbs spontaneously, with RLE>LLE weakness but able to move both against gravity  Sensory: appears able to sense light touch in all  extremities  Coordination:deferred   Station/Gait:  deferred    Imaging  I personally reviewed all imaging; relevant findings per HPI.     Lab Results Data   CBC  Recent Labs   Lab 10/10/22  0823 10/09/22  1525   WBC 13.1* 14.0*   RBC 3.88 3.98   HGB 13.4 13.5   HCT 41.3 41.5    256     Basic Metabolic Panel    Recent Labs   Lab 10/11/22  0501 10/11/22  0020 10/10/22  2221 10/10/22  0835 10/10/22  0823 10/09/22  2241 10/09/22  1525   NA  --   --   --   --  137  --  138   POTASSIUM  --   --   --   --  3.9  --  4.2   CHLORIDE  --   --   --   --  106  --  102   CO2  --   --   --   --  26  --  22   BUN  --   --   --   --  12  --  11.7   CR  --   --   --   --  0.70  --  0.74   * 206* 270*   < > 135*   < > 289*   EZEKIEL  --   --   --   --  8.7  --  9.3    < > = values in this interval not displayed.     Liver Panel  No results for input(s): PROTTOTAL, ALBUMIN, BILITOTAL, ALKPHOS, AST, ALT, BILIDIRECT in the last 168 hours.  INR    Recent Labs   Lab Test 10/09/22  1525   INR 0.97      Lipid Profile    Recent Labs   Lab Test 10/10/22  0823 03/16/22  1127 01/20/21  1545   CHOL 132 129 140   HDL 53 51 54   LDL 55 54 59   TRIG 120 119 133     A1C    Recent Labs   Lab Test 09/21/22  1628 06/16/22  1415 03/16/22  1127   A1C 7.2* 8.3* 7.5*     Troponin    Recent Labs   Lab 10/10/22  0823   TROPONINIS 19   Billing: I have personally spent a total of 50 minutes providing care today, time spent in reviewing medical records and reviewing tests, examining the patient and obtaining history, coordination of care, and discussion with the patient and/or family regarding diagnostic results, prognosis, symptom management, risks and benefits of management options, and development of plan of care. Greater than 50% was spent in counseling and coordination of care.

## 2022-10-11 NOTE — PROVIDER NOTIFICATION
"Paged Dr. Ruiz, \"I documented it. Pt is having BLE pain from below the knee. Pt flinches to touch/palpation & reports severe pain. Legs are swollen. Has history of R knee pain with injections but this is different from that per daughter. I can't see any localized redness. Will give Tylenol but unsure any further intervention needed. Unable to wear PCDS.   \"  "

## 2022-10-11 NOTE — PLAN OF CARE
Goal Outcome Evaluation:      Plan of Care Reviewed With: patient    Overall Patient Progress: no changeOverall Patient Progress: no change       Reason for Admission: CVA, UTI, CHF exacerbation    Cognitive/Mentation: Lethargic throughout the day but arouses easily to voice. Agitated at the beginning of the shift d/t BP being taken. More pleasant throughout the day. Oriented to self beginning of shift. Now Ox3.    Neuros/CMS: Intact ex LUE pronator drift, word finding--but minimal SLP today. Probable L neglect & L field cut. Difficult to assess d/t not following commands at times. Generalized weakness. Dizzy and nausea throughout the day--more pronounced with movement or sitting on edge of bed.   VS: HTN & requiring O2. NC weaned down from 2L to 1L. Room air sating 90%.  Tele: a.fib CVR with PVC.  GI: BS hypoactive in upper quadrant, MIGDALIA flatus, last BM 10/9/22. Continent.  : Purewick.   Pulmonary: LS clear.  Pain: denies.     Drains/Lines: PIV x2  Skin: rash/redness on abdomen fold & under breast--micanzole powder given.   Activity: Assist x 2 with lift.  Diet: soft & bite sized with mildly thicken liquids. Takes pills crushed. No straws. Poor appetite. Encouraging small frequent meals.      Therapies recs: awaiting PT/OT recs. SLP recs ARU  Discharge: pending workup    Aggression Stoplight Tool: yellow    End of shift summary: head CT d/t neuro changes see prior note. Echo done. Attempted to sit in chair with therapy, but was too dizzy/nauseous to tolerate. PRN zofran given x1.

## 2022-10-11 NOTE — PROGRESS NOTES
[facility]    Hospitalist Progress Note    Interval History   - Breathing more comfortably this morning  - Patient increasing agitation overnight, notably confused and paranoid this morning. Recognizes her daughter at bedside. Discussed with family that this is delirium, underlying etiologies are being treated, delirium will improve with time.    Assessment & Plan   Summary: Hima Griffiths is a 77 year old female with PMH atrial fibrillation, hx CVA, HTN, DM2, who was admitted on 10/9/2022 with vertigo and found to have an acute right cerebellar stroke.    Acute ischemic right cerebellar stroke  Hx MCA stroke in 2015, also dx with atrial fibrillation start on rivaroxaban. She has been off that medication now for over a week. Admitted with sudden vertigo with nausea and vomiting today. She has concomitant leukocytosis. MRI/MRA of Head and Neck showed a right inferior cerebellar infarct, as well as diminished flow through several vertebral artery segments. MRI confirms acute right inferior cerebellar stroke.  - Appreciate Neurology consult  - Xarelto restarted  - TTE pending  - A1c 7.2% on 9/21/22  - PT/OT/SLP  - Neuro checks    Acute metabolic encephalopathy due to CHF and stroke and possibly infection  Patient with increasing confusion on 10/10, likely due to recent stroke complicated by developing CHF and possible UTI. CT scan early AM of 10/11 without acute pathology, no bleeding. Remains confused and agitated on 10/11. This is most likely hospital delirium, manage supportively, and expect this to improve with time.  - Delirium precautions  - Seroquel PRN, discussed with nurse to avoid this during the day  - 1:1 if necessary, defer ot nurse  - Treat underlying CHF, possible infection, and stroke    Acute congestive heart failure exacerbation  Acute hypoxic respiratory failure due to above  Patient received IVF in ED due to suspected hypovolemia. Noted increasing dyspnea 10/10, lactate  elevated. On exam elevated JVP, bilateral lung crackles and nonpitting LE edema. CXR shows vascular congestion. Increasing O2 needs starting this afternoon. NT pro BNP elevated to 5k. Overall clinically consistent with CHF exacerbation. Echo in 2016 cannot be reviewed by me but referenced in notes in the chart as having normal ejection fraction. Family reports patient has not been diagnosed with CHF previously.  - Repeat Lasix 40mg IV today  - TTE pending for stroke above  - Tele, daily weights, I&O  - Wean off O2    Possible urinary tract infection  Leukocytosis, resolved  Lactic acidosis, resolved  Suspect leukocytosis and lactic acidosis more related to CHF than infection. UA appears dirty.  - Started Ceftriaxone empirically on 10/10, continue  - Pending urine cultures    Atrial fibrillation with RVR, improved 10/11  Heart rates 130s+ on admission, rate controlled < 110 on 10/10.  - Continue PTA diltiazem  - Mag > 2, K >4  - Monitor for RVR, continue tele today    CKD Stage 3: Monitor closely while hospitalized, avoiding nephrotoxins as able.     DM2: A1c 7.2% on 9/2022 On Trulicity, Glipizide as an outpatient.  - Switched to mealtime sliding scale insulin + carb counting     Hypertension: PTA lisinopril held for now, diuresing as above     Dyslipidemia: PTA pravstation    Chronic severe bilateral lower extremity lymphedema: Noted. Elevate legs.     Pressure injury of left buttock, stage 1, chronic, present on admission.  - WOC consulted     DVT Prophylaxis: Xarelto  Code Status: Full Code  PT/OT: ordered  Diet: Combination Diet Soft and Bite Sized Diet (level 6); Thin Liquids (level 0) (No straws)  Room Service      Disposition: Expected discharge 2-3 days to TCU    - I spent 40 minutes, with greater than 50% spent in counseling and coordination of care with the patient and family and nurse.    Kuldip Ruiz MD, MD  Text Page  (7am to 6pm)  -Data reviewed today: I reviewed all new labs and imaging results  over the last 24 hours.    Physical Exam   Temp: 97.5  F (36.4  C) Temp src: Axillary BP: 119/88 Pulse: 86   Resp: 20 SpO2: 95 % O2 Device: Nasal cannula Oxygen Delivery: 2 LPM  There were no vitals filed for this visit.  Vital Signs with Ranges  Temp:  [96.6  F (35.9  C)-98.4  F (36.9  C)] 97.5  F (36.4  C)  Pulse:  [] 86  Resp:  [20-30] 20  BP: (119-176)/() 119/88  SpO2:  [9 %-99 %] 95 %  I/O last 3 completed shifts:  In: -   Out: 1850 [Urine:1850]  O2 requirements: none    Constitutional: Elderly female appears tired, weak, somewhat ill  HEENT: Eyes nonicteric  Cardiovascular: Irregular, RR, no m/r/g  Respiratory: Bilateral crackles no wheezing, improved today, less dyspneic appearing today  Vascular: Nonpitting LE edema mostly in the calf region  GI: Normoactive bowel sounds, nontender  Skin/Integumen: No rashes  Neuro/Psych: Tired, weak, confused and paranoid, A&O to self    Medications     - MEDICATION INSTRUCTIONS -       - MEDICATION INSTRUCTIONS -         sodium chloride 0.9%  1,000 mL Intravenous Once     diltiazem  30 mg Oral BID     furosemide  40 mg Intravenous Once     insulin aspart  1-6 Units Subcutaneous Q4H     metoprolol tartrate  100 mg Oral BID     rivaroxaban ANTICOAGULANT  20 mg Oral Daily with supper     sodium chloride (PF)  3 mL Intracatheter Q8H       Data   Recent Labs   Lab 10/11/22  0823 10/11/22  0501 10/11/22  0020 10/10/22  2221 10/10/22  0835 10/10/22  0823 10/09/22  2241 10/09/22  1525   WBC 10.6  --   --   --   --  13.1*  --  14.0*   HGB 13.2  --   --   --   --  13.4  --  13.5   *  --   --   --   --  106*  --  104*     --   --   --   --  205  --  256   INR  --   --   --   --   --   --   --  0.97   NA  --   --   --   --   --  137  --  138   POTASSIUM  --   --   --   --   --  3.9  --  4.2   CHLORIDE  --   --   --   --   --  106  --  102   CO2  --   --   --   --   --  26  --  22   BUN  --   --   --   --   --  12  --  11.7   CR  --   --   --   --   --  0.70   --  0.74   ANIONGAP  --   --   --   --   --  5  --  14   EZEKIEL  --   --   --   --   --  8.7  --  9.3   GLC  --  197* 206* 270*   < > 135*   < > 289*    < > = values in this interval not displayed.       Imaging:   Recent Results (from the past 24 hour(s))   CT Head w/o Contrast    Narrative    CT SCAN OF THE HEAD WITHOUT CONTRAST   10/10/2022 1:49 PM     HISTORY: Neurological change, decreased mentation.    TECHNIQUE: Axial images of the head and coronal reformations without  IV contrast material. Radiation dose for this scan was reduced using  automated exposure control, adjustment of the mA and/or kV according  to patient size, or iterative reconstruction technique.    COMPARISON: Head MRI 10/9/2022, head CT 6/16/2022      Impression    IMPRESSION: Acute right cerebellar infarcts. No evidence of hemorrhage  or hydrocephalus. Old left cerebellar infarcts. Multiple old left  cerebral hemisphere infarcts. Volume loss and white matter  hypoattenuation likely represent chronic small vessel ischemic change.  No acute osseous abnormality.    DANIKA DE JESUS MD         SYSTEM ID:  [id number]   XR Chest Port 1 View    Narrative    XR CHEST PORT 1 VIEW  10/10/2022 2:05 PM       INDICATION: hypoxia  COMPARISON: 6/16/2022       Impression    IMPRESSION: Cardiomegaly. Increased pulmonary vascular congestion. No  focal infiltrate or definite pleural effusion.    JOSIANE EVANS MD         SYSTEM ID:  [id number]   CT Head w/o Contrast    Narrative    EXAM: CT HEAD W/O CONTRAST  LOCATION: [facility]  DATE/TIME: 10/11/2022 2:28 AM    INDICATION: eval for any hemorrhagic conversion prior to restarting DOAC  COMPARISON: CT head 10/10/2022  TECHNIQUE: Routine CT Head without IV contrast. Multiplanar reformats. Dose reduction techniques were used.    FINDINGS:  Evolving right cerebellar infarcts without evidence of space-occupying hemorrhage. Mild to moderate volume loss and presumed chronic small vessel ischemia  are stable. Small chronic cortical infarct in the left frontal operculum noted. Remainder   unchanged.      Impression    IMPRESSION:  1.  Evolving right cerebellar infarcts without definite hemorrhage.    2.  Remainder unchanged.

## 2022-10-11 NOTE — PROVIDER NOTIFICATION
"Paged Dr. Ruiz, \"Can pt have some antifungal powder ordered? She has rash under breast & abdominal folds. Also FYI pt went down for repeat head CT for some potential neuro changes.\"  "

## 2022-10-11 NOTE — PROGRESS NOTES
Attempted AM PO meds. Pt agitated when awake and didn't want to take. Attempted to retry pt was tired & couldn't keep eyes open to safely swallow. Attempted a 3rd time, pt now nauseous and IV zofran given. Will reassess post zofran.

## 2022-10-11 NOTE — PROGRESS NOTES
"   10/11/22 1208   Appointment Info   Signing Clinician's Name / Credentials (OT) Zehra Myers, TONYAR?L   Living Environment   People in Home alone   Current Living Arrangements independent living facility  (sr apt)   Home Accessibility no concerns   Transportation Anticipated family or friend will provide   Living Environment Comments Pt was doing well until a stroke, independent with mobility with FWW, dressing, toileting with FWW   Self-Care   Usual Activity Tolerance moderate   Current Activity Tolerance poor   Equipment Currently Used at Home walker, rolling   Fall history within last six months yes   Number of times patient has fallen within last six months 2   Instrumental Activities of Daily Living (IADL)   IADL Comments has A with cleaning. pt's family does A with some IADl\"s as well   General Information   Onset of Illness/Injury or Date of Surgery 10/09/22   Referring Physician Samuel Limon MD   Patient/Family Therapy Goal Statement (OT) per daughter likely dc to TCU   Additional Occupational Profile Info/Pertinent History of Current Problem Hima Griffiths is a 77 year old female with PMH atrial fibrillation, hx CVA, HTN, DM2, who was admitted on 10/9/2022 with vertigo and found to have an acute right cerebellar stroke. Acute metabolic encephalopathy due to CHF and stroke and possibly infection   Existing Precautions/Restrictions fall;oxygen therapy device and L/min  (O2 NC 1 L, catheter)   Cognitive Status Examination   Orientation Status person   Affect/Mental Status (Cognitive) low arousal/lethargic   Follows Commands over 90% accuracy;delayed response/completion;increased processing time needed   Cognitive Status Comments limited verbalization, will shake head yes.no but inconsistent.   Visual Perception   Visual Impairment/Limitations   (difficulty tracking, reports increased nausea and dizziness with head and body movements. cont to monitor and assess as able.)   Sensory "   Sensory Quick Adds   (denbies numbe)   Pain Assessment   Patient Currently in Pain   (B LE pain with mobility, pts dtr reports this is new)   Range of Motion Comprehensive   Comment, General Range of Motion B UE AAROM appear WFL, limited AROM due to weakness   Strength Comprehensive (MMT)   Comment, General Manual Muscle Testing (MMT) Assessment decreased strength in L UE vs R UE noted. not formally assessed   Coordination   Upper Extremity Coordination Left UE impaired;Right UE impaired  (cont to assess. increased incoordination in L UE vs R UE)   Bed Mobility   Scooting/Bridging [place] (Bed Mobility) dependent (less than 25% patient effort);2 person assist   Supine-Sit [place] (Bed Mobility) maximum assist (25% patient effort);2 person assist   Sit-Supine [place] (Bed Mobility) maximum assist (25% patient effort);2 person assist   Transfer Skill: Bed to Chair/Chair to Bed   Bed-Chair [place] (Transfers) unable to assess   Sit-Stand Transfer   Sit-Stand [place] (Transfers) maximum assist (25% patient effort);2 person assist   Balance   Sitting Balance: Static maximum assist   Sitting Balance: Dynamic dependent   Lower Body Dressing Assessment/Training   [place] Level (Lower Body Dressing) dependent (less than 25% patient effort)   Grooming Assessment/Training   [place] Level (Grooming) moderate assist (50% patient effort)   Eating/Self Feeding   [place] Level (Feeding) dependent (less than 25% patient effort)   Clinical Impression   Criteria for Skilled Therapeutic Interventions Met (OT) Yes, treatment indicated   OT Diagnosis impaired independence with ADL's and functional mobility   OT Problem List-Impairments impacting ADL problems related to;activity tolerance impaired;balance;cognition;range of motion (ROM);pain;strength;postural control   Assessment of Occupational Performance 5 or more Performance Deficits   Identified Performance Deficits impaired independence with  dressing, toilet and tub/shower transfers, etc   Planned Therapy Interventions (OT) ADL retraining;cognition;neuromuscular re-education;transfer training;visual perception;home program guidelines;progressive activity/exercise;risk factor education;motor coordination training;ROM   Clinical Decision Making Complexity (OT) high complexity   Risk & Benefits of therapy have been explained evaluation/treatment results reviewed;care plan/treatment goals reviewed;risks/benefits reviewed;current/potential barriers reviewed;participants voiced agreement with care plan;participants included;patient;daughter   OT Total Evaluation Time   OT Eval, High Complexity Minutes (75872) 15   OT Goals   Therapy Frequency (OT) 5 times/wk   OT Predicted Duration/Target Date for Goal Attainment 10/17/22   OT Goals Hygiene/Grooming;Upper Body Dressing;Lower Body Dressing;Toilet Transfer/Toileting;Cognition;OT Goal 1   OT: Hygiene/Grooming supervision/stand-by assist  (using B UE's, seated at EOB or chair)   OT: Upper Body Dressing Minimal assist   OT: Lower Body Dressing Moderate assist   OT: Toilet Transfer/Toileting Minimal assist;toilet transfer;cleaning and garment management;using adaptive equipment   OT: Cognitive Patient/caregiver will verbalize understanding of cognitive assessment results/recommendations as needed for safe discharge planning   OT: Goal 1 pt to sit at EOB with SBA and no LOB, including decreased nausea and dizziness for 10 mins in preparation for ADL's   Therapeutic Activities   Therapeutic Activity Minutes (70658) 24   Symptoms noted during/after treatment fatigue;dizziness;increased pain  (nausea, B LE pain with mobiliy)   Treatment Detail/Skilled Intervention OT: pt agreed to try to sit up at EOB, sit <> supine with MAX A x 2, pt reports pain in B LE's with mobility and A. pt able to sit at EOB with MOD/MAX A and wanes to R to L lateral lean with cues and A pt able to sit at EOB for a few minutes with CGA,  completed sit <> stand with MOD.MAX A x2 with cues for malagon dplacmenet and FWW use. pt able to stand with mod/max A approx 30 sec with cues for positioning of B LE's and some marching in place wiht MIN/MOD A for balance and safety with FWW, pt needing to sit down with cues and mod/MAX A x2. pt report dizziness and nausea and declined use of dandre steady after placement. pt wanting to lay back down sit to supine wiht max to dependent x 2 and dependent x2 to scoot toward EOB. pt's BP and O2 sats appeared WFL, O2 sats initially approx 87-88% but quickly rebounded with rest and O2 1 L NC. nursing notified regarding attempt to stand and transfer to chair and symptoms of nausea and dizziness. pt lethargic when up eyes open and increased alertness, oriented to self only and following commands over 90% of the time, slow to respond and repetition needed at times.   OT Discharge Planning   OT Discharge Recommendation (DC Rec) Transitional Care Facility   OT Rationale for DC Rec Pt limited due to impaired balance, activity tolerance, strength, cognition ie orientation, STM, etc and will require cont'd therapy at TCU to increase ADL and functional mobility independence and safety to PLOF or least restrictive environment.   OT Brief overview of current status supine <> sit max A x 2, sit <> stand x1 with mod/max Ai5muor FWW, stood approx 30 sec with FWW with MOD/MAX Ax2 with FWW. pt nauseated and dizzy and declined further mobility or ADL's. UE grooming and self feeding mod/max A   Total Session Time   Timed Code Treatment Minutes 24   Total Session Time (sum of timed and untimed services) 39

## 2022-10-11 NOTE — PLAN OF CARE
Goal Outcome Evaluation:  Alert, orientation can change, forgetful, no deficits, Ativan x 1 for anxiety, agitated at times, with PRN Seroquel ordered,  BP elevated, with parameters, on 3 L 02, on Lasix, with good output, on soft bite size diet, with poor appetite, incontinent, no BM this shift, purewick in use, Q2 Turn and repo as much, with wound care order for wound from L buttocks, , denies pain, UC pending, continue to monitor.

## 2022-10-11 NOTE — PROGRESS NOTES
Paged about increased agitation earlier in evening - responded to melatonin.    Nursing woke her up for neuro assessment - change in ability to answer AO questions - has had fluctuating course of alertness/awareness c/w delirium.     Reviewed prior MRI - low stroke burden (R cerebellar), not near 4th ventricle.     No other signs of ICP (reviewed with nursing) or focal neurological deficit/decline.     Additional chart review - pt received seroquel/lorazepam for agitation. UA with severe pyruria, +LE, bacteria - suspicious for UTI not currently on ABx. All of which is contributory to toxo-metabolic encephalopathy.     Out of abundance of caution, converted planned NCCT in AM to STAT NCCT now and will follow-up.    Caio Barrera MD PGY5 Vascular Fellow

## 2022-10-11 NOTE — PLAN OF CARE
Goal Outcome Evaluation:    Reason for Admission: Pt here with AIS    Cognitive/Mentation:Alert and Orientec X 1, confused, increased agitation overnight. Neuro fellow paged (See Provider Notifications). Agitation increased when taking VS. Eventually calms and rests well with eyes closed.   Neuros/CMS: Intact ex pt non-command following. Unable to assess overnight.   VS: VSS ex for HTN within parameters of < .     Tele: A-fib  GI: BS audible.    :Incontinent. Purewick in place and patent. 300 ml output of cloudy urine. Will hand off to day shift.   Pulmonary: LS clear and diminished.   Pain: No non-verbal signs of pain.    Drains/Lines: L PIV (SL)   Skin: Intact ex wound on Left buttock. WOC orders placed.   Activity: Assist x 2 with lift.   Diet: soft and bite sized 6, thin. No straws.   Aspiration and Fall Precautions.   Plan: Echo.   Discharge: Pending workup.     Aggression Stoplight Tool: Yellow d/t confusion/ agitation. Magnesium and Potassium ordered this AM per RN managed orders.     End of shift summary: Pt noted to possibly have UTI causing increased agitation. Provider instructed Writer to address matter in the morning with day providers.

## 2022-10-11 NOTE — PHARMACY-ADMISSION MEDICATION HISTORY
Pharmacy Consult to evaluate for medication related stroke core measures    Hima Griffiths, 77 year old female admitted for acute ischemic stroke on 10/9/2022.    Thrombolytic was not given because of Clinical contraindications    VTE Prophylaxis SCDs /PCDs placed on 10/9/2022, as appropriate prior to end of hospital day 2.    Antithrombotic: aspirin started on 10/10/2022, as appropriate by end of hospital day 2. Continue antithrombotic therapy on discharge to meet quality measures, unless contraindicated.    Anticoagulation if history of A-fib/flutter: Patient on rivaroxaban (Xarelto); continue anticoagulation on discharge to meet quality measures, unless contraindicated.    LDL Cholesterol Calculated   Date Value Ref Range Status   10/10/2022 55 <=100 mg/dL Final   01/20/2021 59 <100 mg/dL Final     Comment:     Desirable:       <100 mg/dl       Patient's home statin, Pravachol (pravastatin) restarted; continue statin on discharge to meet quality measures, unless contraindicated.     Recommendations: None at this time    Thank you for the consult.    Gladis Maguire, BrentonD 10/11/2022 10:01 AM

## 2022-10-11 NOTE — PROVIDER NOTIFICATION
"Neuro fellow Dr. Caio Barrera paged. At 0130    \"719 CG    Pt increased agitation. Decreased level of consciousness, Was Orientated X 4, now oriented to place only. Not following commands. CAM scale = 3/4.     Please advise.     [phone]\"    0140:     Response:     Dr. Barrera suspects Delirium. Ordered CT head stat.               "

## 2022-10-12 NOTE — PLAN OF CARE
Goal Outcome Evaluation:    Reason for Admission: Pt here with AIS    Cognitive/Mentation: A&Ox1-2, at one point unsure of own , confused and restless.   Neuros/CMS: Intact ex pt unable to follow some commands. Left field cut, LUE drift. Generalized weakness.   VS: HTN, tachy (110s), otherwise VSS    Tele: A-fib CVR/RVR. At 1845 stretch of HR 130s-160s, on-call MD paged.   GI: BS audible. No BM.   : Incontinent - Purewick in place.   Pulmonary: LS clear. 96-98% on RA  Pain: Low back pain - Tylenol given, which was slightly effective. Pt continues to c/o BLE sensitivity/pain, states feels like pins.  Drains/Lines: R PIV (SL)   Skin: Intact ex redness under breasts and abdominal folds. Micanzole powder applied. Mepilex applied to non-blanchable reddened area on buttocks. Small Skin tear at old PIV site on left wrist, mepilex applied per policy.  Activity: A2 with lift. Repositioning Q2H and PRN.   Diet: Pureed diet (lvl 4), mildly thick liquids (lvl 2). No straws. Meds crushed with pudding.  BG: AC/HS - 221, 246, 187    Therapies: pt not alert enough today to work with OT, said they would try tomorrow AM instead.   Discharge: Recommending TCU    End of shift summary: Pt extremely restless today, pulling off gown and trying to get out of bed. PRN ativan given x1, which seemed to cause increased restless and confusion. Some hallucinations noted, seeing dogs. PRN seroquel given, slightly effective. Stroke education class completed, pt's daughter was present as pt was too lethargic and confused to participate.        April Maher RN

## 2022-10-12 NOTE — CONSULTS
Stroke Education Note    The following information has been reviewed with the family:    1. Warning signs of stroke    2. Calling 911 if having warning signs of stroke    3. All modifiable risk factors: hypertension, CAD, atrial fib, diabetes, hypercholesterolemia, smoking, substance abuse, diet, physical inactivity, obesity, sleep apnea.    4. Patient's risk factors for stroke which include: hypertension, CHF, atrial fib, diabetes, diet, physical inactivity    5. Follow-up plan for after discharge    6. Discharge medications which include: Xarelto, diltiazem, metoprolol, pravastatin, lisinopril    In addition, the above information was given to the family in writing as a part of the [facility] Stroke Class Handout.    Learner's response to risk factors / lifestyle modification education: Reasons to change     Argelia Clay RN

## 2022-10-12 NOTE — CONSULTS
[facility]    Cardiology Consultation     Date of Admission:  10/9/2022    Assessment & Plan     This is a 77 year old female with PMH significant for MCA stroke in 2015, AF on xarelto with one week off medication PTA, admitted with right inferior cerebellar infarct as well as diminished flow through vertebral arteries. Xarelto was restarted. Course complicated by onset of acute delerium thought to be congestive heart failure and concomitant medical issues. Echocardiogram reviewed from this admission and demonstrated decreased LVEF 45% in setting of tachycardia however, prior echocardiogram in 2016 reportedly normal LVEF. Patient was started on lasix and cardiology consulted for management. NTproBNP elevated to over 5000 and CXR with pulmonary congestion.     1. HFrEF, new diagnosis acute. Has chronic long standing lymphedema.  -differential includes tachy mediated cardiomyopathy, hypertensive heart disease, ischemic.   -lasix 20 IV given today, monitor I's/Os, daily weights. Would change to standing IV 20 BID and goal net negative 1-2 liters.  -echocardiogram imaging personally reviewed by me and the EF is down to 45%, there is global hypokinesis, RV function is normal and there was no shunt to suggest ASD or PFO. The ascending aorta was mildly dilated.   -goal directed medical therapy: on beta blocker, diuresis with lasix IV, start low dose ACEI when able (BP parameters can be guided by neurology team).  -Compression wraps unlikely to tolerate   -discussed with daughter in detail and likely a conservative approach, less invasive. Defer coronary angiogram or other advanced measures.    2. Atrial fibrillation:   -monitor on telemetry  -continue xarelto in setting of recent stroke presumed cardioembolic   -likely doesn't need BAN as Bubble study negative and management of stroke wouldn't change in presence of intracardiac thrombus but if neurology needs further work up we are happy to  accommodate, daughter agreeable   -diltiazem is contraindicated in heart failure exacerbation and reduced EF patients (with the exception in some tachy mediated cardiomyopathies that are not decompensating acutely). Would discontinue for now. Continue metoprolol 100 mg bid.   -consider adding digoxin if additional rate control needed but I suspect heart rates to improve with resolution of heart failure decompensation      3. HTN: on metoprolol, stop diltiazem.  -permissive HTN, when able to please start ACEI. Future will consider spironolactone.    High complexity     Zabrina Doran MD    Primary Care Physician   Georgiana Joy    Reason for Consult     Stroke, AF, CHF    History of Present Illness       This is a 77 year old female with PMH significant for MCA stroke in 2015, AF on xarelto with one week off medication PTA, admitted with right inferior cerebellar infarct as well as diminished flow through vertebral arteries. Xarelto was restarted. Course complicated by onset of acute delerium thought to be congestive heart failure and concomitant medical issues. Echocardiogram reviewed from this admission and demonstrated decreased LVEF 45% in setting of tachycardia however, prior echocardiogram in 2016 reportedly normal LVEF. Patient was started on lasix and cardiology consulted for management. NTproBNP elevated to over 5000 and CXR with pulmonary congestion.     Daughter reports she has painful swollen legs for years and never had a significant cardiac work up. She cannot tolerate compression. She denied chest pain or palpitations. History limited and all information gathered from records and daughter who is by bedside.      Past Medical History   Past Medical History:   Diagnosis Date     Benign hypertension      Benign paroxysmal positional vertigo      Colitis 11/28/2016    Presumed infectious 2009.      Diabetes mellitus (H)      Family history of atrial fibrillation     Mother     Osteoarthritis of  right knee, unspecified osteoarthritis type 08/02/2019     Persistent atrial fibrillation (H) 11/2015     Stroke (H) 10/2015    Left MCA         Past Surgical History   Past Surgical History:   Procedure Laterality Date     AS LOOP RECORDER IMPLANT  11/9/15     CATARACT IOL, RT/LT  8/13/14         Prior to Admission Medications   Prior to Admission Medications   Prescriptions Last Dose Informant Patient Reported? Taking?   ASPIRIN NOT PRESCRIBED (INTENTIONAL)   Yes No   Sig: Please choose reason not prescribed, below   Patient not taking: Reported on 10/5/2022   acetaminophen (TYLENOL) 325 MG tablet Unknown  No Yes   Sig: Take 2 tablets (650 mg) by mouth every 6 hours as needed for mild pain or other (and adjunct with moderate or severe pain or per patient request)   blood glucose (NO BRAND SPECIFIED) lancets standard   No No   Sig: Use to test blood sugar 2 times daily or as directed.   blood glucose (NO BRAND SPECIFIED) test strip   No No   Sig: Use to test blood sugar 2 times daily or as directed.   blood glucose monitoring (NO BRAND SPECIFIED) meter device kit   No No   Sig: Use to test blood sugar 2 times daily or as directed.   clotrimazole (LOTRIMIN) 1 % external cream Unknown  No Yes   Sig: Apply topically 2 times daily as needed (yeast rash)   clotrimazole (LOTRIMIN) 1 % external cream 10/8/2022  No Yes   Sig: Apply topically 2 times daily Under breasts and in groin areas   diltiazem (CARDIZEM) 30 MG tablet 10/9/2022 at am  No Yes   Sig: Take 1 tablet (30 mg) by mouth 2 times daily   dulaglutide (TRULICITY) 1.5 MG/0.5ML pen 10/9/2022  No Yes   Sig: Inject 1.5 mg Subcutaneous every 7 days   glipiZIDE (GLUCOTROL XL) 10 MG 24 hr tablet 10/9/2022 at am  No Yes   Sig: Take 1 tablet (10 mg) by mouth daily (with breakfast)   insulin pen needle (32G X 6 MM) 32G X 6 MM miscellaneous   No No   Sig: Use as directed.   lisinopril (ZESTRIL) 5 MG tablet 10/9/2022?  No Yes   Sig: Take 1 tablet (5 mg) by mouth daily    melatonin 5 MG tablet Unknown  Yes Yes   Sig: Take 1 tablet (5 mg) by mouth nightly as needed for sleep   metoprolol tartrate (LOPRESSOR) 100 MG tablet 10/9/2022 at am  No Yes   Sig: Take 1 tablet (100 mg) by mouth 2 times daily   pravastatin (PRAVACHOL) 40 MG tablet 10/9/2022 at am  No Yes   Sig: Take 1 tablet (40 mg) by mouth daily   rivaroxaban ANTICOAGULANT (XARELTO ANTICOAGULANT) 20 MG TABS tablet 10/9/2022 at in ER  No Yes   Sig: Take 1 tablet (20 mg) by mouth daily (with dinner)      Facility-Administered Medications: None     Current Facility-Administered Medications   Medication Dose Route Frequency     sodium chloride 0.9%  1,000 mL Intravenous Once     cefTRIAXone  1 g Intravenous Q24H     diltiazem  30 mg Oral BID     insulin aspart   Subcutaneous TID w/meals     insulin aspart  1-7 Units Subcutaneous TID AC     insulin aspart  1-5 Units Subcutaneous At Bedtime     metoprolol tartrate  100 mg Oral BID     miconazole   Topical BID     pravastatin  40 mg Oral Daily     rivaroxaban ANTICOAGULANT  20 mg Oral Daily with supper     sodium chloride (PF)  3 mL Intracatheter Q8H     Current Facility-Administered Medications   Medication Last Rate     - MEDICATION INSTRUCTIONS -       - MEDICATION INSTRUCTIONS -       Allergies   Allergies   Allergen Reactions     Alkylamines Other (See Comments)     Comment: Weakness tingling arms and legs, Description:      Sudafed [Pseudoephedrine]      Legs and arms get weak       Social History    reports that she quit smoking about 16 years ago. She started smoking about 59 years ago. She has never used smokeless tobacco. She reports current alcohol use.      Family History   I have reviewed this patient's family history and updated it with pertinent information if needed.  Family History   Problem Relation Age of Onset     Hypertension Mother      Arrhythmia Mother      Hypertension Father      Coronary Artery Disease Father        Review of Systems   A comprehensive  "review of system was performed and is negative other than that noted in the HPI or here.     Physical Exam   Vital Signs with Ranges  Temp:  [97.1  F (36.2  C)-97.7  F (36.5  C)] 97.1  F (36.2  C)  Pulse:  [] 112  Resp:  [16] 16  BP: (105-159)/(75-95) 159/87  SpO2:  [96 %-100 %] 96 %  Wt Readings from Last 4 Encounters:   10/11/22 96 kg (211 lb 10.3 oz)   09/21/22 104.7 kg (230 lb 12.8 oz)   08/18/22 103.7 kg (228 lb 9.6 oz)   07/14/22 101.6 kg (224 lb)     I/O last 3 completed shifts:  In: 300 [P.O.:300]  Out: 900 [Urine:900]      Vitals: BP (!) 159/87 (BP Location: Right arm)   Pulse 112   Temp 97.1  F (36.2  C) (Axillary)   Resp 16   Ht 1.676 m (5' 6\")   Wt 96 kg (211 lb 10.3 oz)   LMP  (LMP Unknown)   SpO2 96%   BMI 34.16 kg/m      Physical Exam:   General - Alert and oriented to time place and person in no acute distress  Eyes - No scleral icterus  HEENT - Neck supple, moist mucous membranes  Cardiovascular - tachycardiac, irr irr no mrg  Extremities - There is 4+ edema  Respiratory - Crackles bilaterally   Skin - No pallor or cyanosis  Gastrointestinal - Non tender and non distended without rebound or guarding  Psych - Appropriate affect   Neurological - No gross motor neurological focal deficits      Recent Labs   Lab 10/12/22  1200 10/12/22  0813 10/12/22  0802 10/12/22  0216 10/12/22  0113 10/11/22  2116 10/11/22  2044 10/11/22  1258 10/11/22  0823 10/10/22  0835 10/10/22  0823 10/09/22  2241 10/09/22  1525   WBC  --  8.9  --   --   --   --   --   --  10.6  --  13.1*  --  14.0*   HGB  --  14.5  --   --   --   --   --   --  13.2  --  13.4  --  13.5   MCV  --  104*  --   --   --   --   --   --  102*  --  106*  --  104*   PLT  --  209  --   --   --   --   --   --  185  --  205  --  256   INR  --   --   --   --   --   --   --   --   --   --   --   --  0.97   NA  --  138  --   --   --   --   --   --  135  --  137  --  138   POTASSIUM  --  4.3  4.3  --   --  3.1*  --  2.9*  --  3.4  --  3.9   < > " 4.2   CHLORIDE  --  103  --   --   --   --   --   --  102  --  106  --  102   CO2  --  28  --   --   --   --   --   --  27  --  26  --  22   BUN  --  17  --   --   --   --   --   --  16  --  12  --  11.7   CR  --  0.75  --   --   --   --   --   --  0.80  --  0.70  --  0.74   GFRESTIMATED  --  81  --   --   --   --   --   --  75  --  87  --  82   ANIONGAP  --  7  --   --   --   --   --   --  6  --  5  --  14   EZEKIEL  --  9.6  --   --   --   --   --   --  8.9  --  8.7  --  9.3   * 219* 221*   < >  --    < >  --    < > 171*   < > 135*   < > 289*    < > = values in this interval not displayed.     Recent Labs   Lab Test 10/10/22  0823 03/16/22  1127   CHOL 132 129   HDL 53 51   LDL 55 54   TRIG 120 119     Recent Labs   Lab 10/12/22  0813 10/11/22  0823 10/10/22  0823   WBC 8.9 10.6 13.1*   HGB 14.5 13.2 13.4   HCT 44.9 39.6 41.3   * 102* 106*    185 205       Recent Labs   Lab 10/10/22  0823   NTBNPI 5,061*       Recent Labs   Lab 10/12/22  0813 10/11/22  0823 10/10/22  0823    185 205       Recent Labs   Lab 10/10/22  1613   COLOR Straw   APPEARANCE Slightly Cloudy*   URINEGLC Negative   URINEBILI Negative   URINEKETONE Negative   SG 1.006   UBLD Small*   URINEPH 6.5   PROTEIN Negative   NITRITE Negative   LEUKEST Large*   RBCU 12*   WBCU >182*       Imaging:  Recent Results (from the past 48 hour(s))   CT Head w/o Contrast    Narrative    CT SCAN OF THE HEAD WITHOUT CONTRAST   10/10/2022 1:49 PM     HISTORY: Neurological change, decreased mentation.    TECHNIQUE: Axial images of the head and coronal reformations without  IV contrast material. Radiation dose for this scan was reduced using  automated exposure control, adjustment of the mA and/or kV according  to patient size, or iterative reconstruction technique.    COMPARISON: Head MRI 10/9/2022, head CT 6/16/2022      Impression    IMPRESSION: Acute right cerebellar infarcts. No evidence of hemorrhage  or hydrocephalus. Old left cerebellar  infarcts. Multiple old left  cerebral hemisphere infarcts. Volume loss and white matter  hypoattenuation likely represent chronic small vessel ischemic change.  No acute osseous abnormality.    DANIKA DE JESUS MD         SYSTEM ID:  [id number]   XR Chest Port 1 View    Narrative    XR CHEST PORT 1 VIEW  10/10/2022 2:05 PM       INDICATION: hypoxia  COMPARISON: 2022       Impression    IMPRESSION: Cardiomegaly. Increased pulmonary vascular congestion. No  focal infiltrate or definite pleural effusion.    JOSIANE EVANS MD         SYSTEM ID:  [id number]   CT Head w/o Contrast    Narrative    EXAM: CT HEAD W/O CONTRAST  LOCATION: [facility]  DATE/TIME: 10/11/2022 2:28 AM    INDICATION: eval for any hemorrhagic conversion prior to restarting DOAC  COMPARISON: CT head 10/10/2022  TECHNIQUE: Routine CT Head without IV contrast. Multiplanar reformats. Dose reduction techniques were used.    FINDINGS:  Evolving right cerebellar infarcts without evidence of space-occupying hemorrhage. Mild to moderate volume loss and presumed chronic small vessel ischemia are stable. Small chronic cortical infarct in the left frontal operculum noted. Remainder   unchanged.      Impression    IMPRESSION:  1.  Evolving right cerebellar infarcts without definite hemorrhage.    2.  Remainder unchanged.   Echocardiogram Complete w Bubble Study - For age < 60 yrs   Result Value    LVEF  45%    Narrative    [id number]  [id number]  [id number]  [id number]^JOHNNIE^MARIA ELENA^MADDY     [facility]  Echocardiography Laboratory  [address] [place] [address]     Name: HARVINDER SCHMITT  MRN: [id number]  : 1945  Study Date: 10/11/2022 10:38 AM  Age: 77 yrs  Gender: Female  Patient Location: [facility]  Reason For Study: Cerebrovascular Incident  Ordering Physician: MARIA ELENA BLAIR  Referring Physician: ELKIN SWAN     BSA: 2.1 m2  Height: 64 in  Weight: 230 lb  HR: 116  BP: 119/88  mmHg  ______________________________________________________________________________  Procedure  Complete Portable Bubble Echo Adult. Optison (NDC #[id number]) given  intravenously.  ______________________________________________________________________________  Interpretation Summary     Technically challenging study due to patient body habitus, even with the use  of contrast imaging.     Left ventricular systolic function is mild to moderately reduced. The visual  ejection fraction is estimated at 45%.  With rapid atrial fibrillation, the visual approximation of left ventricular  systolic function may be falsely decreased.  Regional wall motion abnormalities cannot be excluded due to limited  visualization, however hypokinesis appears global.  Right ventricular global function is normal.  No clear evidence of interatrial shunt on limited assessment with color  Doppler. A contrast injection (Bubble Study) was performed that was negative  for flow across the interatrial septum.  There is mild-moderate global hypokinesia of the left ventricle.  The ascending aorta is Mildly dilated. Max diameter of the visualized portion  3.9 cm.     There are no prior studies available for comparison.  ______________________________________________________________________________  Left Ventricle  The left ventricle is normal in size. There is normal left ventricular wall  thickness. Left ventricular systolic function is mild to moderately reduced.  The visual ejection fraction is estimated at 45%. With rapid atrial  fibrillation, the visual approximation of left ventricular systolic function  may be falsely decreased. Left ventricular diastolic function is not  assessable. There is mild-moderate global hypokinesia of the left ventricle.  Regional wall motion abnormalities cannot be excluded due to limited  visualization.     Right Ventricle  The right ventricle is normal size. The right ventricular systolic function is  normal.      Atria  Normal left atrial size. Right atrial size is normal. No clear evidence of  interatrial shunt on limited assessment with color Doppler. A contrast  injection (Bubble Study) was performed that was negative for flow across the  interatrial septum. A Valsalva maneuver was inadequate.     Mitral Valve  There is mild (1+) mitral regurgitation.     Tricuspid Valve  There is trace tricuspid regurgitation. Right ventricular systolic pressure  could not be approximated due to inadequate tricuspid regurgitation.     Aortic Valve  The aortic valve is trileaflet with aortic valve sclerosis.     Pulmonic Valve  The pulmonic valve is not well seen, but is grossly normal.     Vessels  The aortic root is normal size. The ascending aorta is Mildly dilated. Max  diameter of the visualized portion 3.9 cm. The inferior vena cava was normal  in size with preserved respiratory variability.     ______________________________________________________________________________  MMode/2D Measurements & Calculations  IVSd: 1.4 cm  LVIDd: 4.4 cm  LVIDs: 3.7 cm  LVPWd: 1.1 cm  FS: 15.8 %  LV mass(C)d: 203.7 grams  LV mass(C)dI: 98.1 grams/m2  Ao root diam: 3.0 cm  LA dimension: 4.3 cm  asc Aorta Diam: 3.9 cm     LA/Ao: 1.4  LA Volume (BP): 91.0 ml  LA Volume Index (BP): 43.8 ml/m2  RWT: 0.51     Doppler Measurements & Calculations  MV E max serge: 107.4 cm/sec  MV dec time: 0.14 sec  PA acc time: 0.11 sec  E/E' av.7     Lateral E/e': 11.7  Medial E/e': 17.8     ______________________________________________________________________________  Report approved by: Fabiola Cosme 10/11/2022 03:22 PM         CT Head w/o Contrast    Narrative    CT OF THE HEAD WITHOUT CONTRAST 10/11/2022 2:51 PM     COMPARISON: Head CT 10/11/2022 at 0221 hours.    HISTORY: Question left upper extremity drift, assess for hemorrhage,  new infarct or other acute intracranial change.    TECHNIQUE: 5 mm thick axial CT images of the head were acquired  without IV  "contrast material.    FINDINGS: Evolving subacute ischemic infarct in the right cerebellar  hemisphere again noted. No definite new infarcts.    There is moderate diffuse cerebral volume loss. There are extensive  confluent areas of decreased density in the cerebral white matter  bilaterally that are consistent with sequela of chronic small vessel  ischemic disease. The ventricles and basal cisterns are within normal  limits in configuration given the degree of cerebral volume loss.   There is no midline shift. There are no extra-axial fluid collections.      No intracranial hemorrhage or mass.    The visualized paranasal sinuses are well-aerated. There is no  mastoiditis. There are no fractures of the visualized bones.       Impression    IMPRESSION:   1. Evolving subacute ischemic infarct in the right cerebellar  hemisphere again noted.  2. No definite new infarcts.  3. Diffuse cerebral volume loss and cerebral white matter changes  consistent with chronic small vessel ischemic disease.       Radiation dose for this scan was reduced using automated exposure  control, adjustment of the mA and/or kV according to patient size, or  iterative reconstruction technique.    TRISTAN COSTA MD         SYSTEM ID:  [id number]       Echo:  No results found for this or any previous visit (from the past 4320 hour(s)).    Clinically Significant Risk Factors Present on Admission            # DMII: A1C = N/A within past 3 months  # Obesity: Estimated body mass index is 34.16 kg/m  as calculated from the following:    Height as of this encounter: 1.676 m (5' 6\").    Weight as of this encounter: 96 kg (211 lb 10.3 oz).      Cardiovascular: Cardiac Arrhythmia: Atrial fibrillation: Persistent    Fluid & Electrolyte Disorders: Fluid overload, unspecified    Neurology: Encephalopathy: Metabolic encephalopathy    Pulmonology: Pulmonary Heart Disease (Pulmonary hypertension or Cor pulmonale): Pulmonary hypertension, unspecified    Systemic: " Chronic Fatigue and Other Debilities: Other reduced mobility

## 2022-10-12 NOTE — PLAN OF CARE
Goal Outcome Evaluation:      Overall Patient Progress: Improved    Reason for Admission: Pt here with AIS    Cognitive/Mentation:Alert and Oriented X 3, D/O to time, confused. Neuros/CMS: Intact ex pt following commands.  Left field cut, LUE drift. Generalized weakness.   VS: VSS ex for HTN within parameters of < .     Tele: A-fib CVR/RVR  GI: BS audible.    :Incontinent X 1 Purewick in place.  Pulmonary: LS clear.  Pain: BLE pain. Tylenol given.  Drains/Lines: R PIV (SL)   Skin: Intact ex redness in under breasts and abdominal folds. Micanzole powder applied. Mepilex applied to non-blanchable reddened area on right buttock. Small Skin tear at recently removed PIV site. WOC consult placed.   Activity: Assist x 2 with lift. T/R q2hrs  Diet: Soft and bite sized 6, mildly thick.  No straws.   Aspiration and Fall Precautions.   Therapies: Awaiting OT/PT recommendations. SLP recommends ARU  Discharge: Pending workup.     Aggression Stoplight Tool: Yellow    End of shift summary: Pt restless.   Seroquel given.  Potassium replaced. Redraw at 0815.

## 2022-10-12 NOTE — PROVIDER NOTIFICATION
MD Notification    Notified Person: MD    Notified Person Name: Manuel     Notification Date/Time: 10/12/22, 1836    Notification Interaction: [employer] paged    Purpose of Notification: Pt in Afib RVR, sustaining in 130's-150's. Known Afib. Cards note says to avoid Dilt and would recommend Digoxin if needed, so need the PRN Dilt order changed if possible    Orders Received: Daily Dig order    Comments:

## 2022-10-12 NOTE — PROGRESS NOTES
[facility]    Hospitalist Progress Note    Interval History   - Very confused yesterday morning, improved yesterday afternoon, confused again in the evening. A&O to self and place this morning, not date, following commands, doing swallow trial. Certainly doing better this morning that yesterday  - Updated daughter Velvet over the phone    Assessment & Plan   Summary: Hima Griffiths is a 77 year old female with PMH atrial fibrillation, hx CVA, HTN, DM2, who was admitted on 10/9/2022 with vertigo and found to have an acute right cerebellar stroke. Hospitalization complicated by CHF exacerbation and afib with RVR.    Acute ischemic right cerebellar stroke  Hx MCA stroke in 2015, also dx with atrial fibrillation start on rivaroxaban. She was off rivaroxaban for over a week PTA. Admitted with sudden vertigo with nausea and vomiting today. She has concomitant leukocytosis. MRI/MRA of Head and Neck showed a right inferior cerebellar infarct, as well as diminished flow through several vertebral artery segments. MRI confirms acute right inferior cerebellar stroke.  - Appreciate Neurology consult  - Xarelto restarted  - A1c 7.2% on 9/21/22  - PT/OT/SLP--TCU discharge  - Neuro checks    Acute metabolic encephalopathy due to CHF and stroke and possibly infection  Patient with increasing confusion on 10/10, likely due to recent stroke complicated by developing CHF and possible UTI. CT scan early AM of 10/11 without acute pathology, no bleeding. Remains confused and agitated on 10/11. This is most likely hospital delirium, manage supportively, and expect this to improve with time. Noted waxing and waning status 10/11. Overall improved 10/12 so far.  - Delirium precautions  - Seroquel PRN, discussed with nurse to avoid this during the day  - Treat underlying CHF, possible infection, and stroke    New acute congestive heart failure exacerbation  Decreased ejection fraction  Acute hypoxic respiratory  failure due to above, resolved  Patient received IVF in ED due to suspected hypovolemia. Noted increasing dyspnea 10/10, lactate elevated. On exam elevated JVP, bilateral lung crackles and nonpitting LE edema. CXR shows vascular congestion. Increasing O2 needs starting this afternoon. NT pro BNP elevated to 5k. Overall clinically consistent with CHF exacerbation. Echo in 2016 cannot be reviewed by me but referenced in notes in the chart as having normal ejection fraction. Family reports patient has not been diagnosed with CHF previously.   Improved very much with diuresis, off O2 in afternoon of 10/11. TTE 10/11 shows decreased EF 45% could be tachycardia related however.  - Repeat Lasix 20mg IV today  - Cardiology consult for further recommenations  - Tele, daily weights, I&O  - Having some BLE discomfort suspect due to diuresis, no acute swelling noted also already back on anticoagulation    Possible urinary tract infection  Leukocytosis, resolved  Lactic acidosis, resolved  Suspect leukocytosis and lactic acidosis more related to CHF than infection. UA appears dirty.  - Started Ceftriaxone empirically on 10/10, continue  - Pending urine cultures    Atrial fibrillation with RVR, improved 10/11  Heart rates 130s+ on admission, rate controlled < 110 on 10/10.  - Continue PTA diltiazem  - Diltiazem 30mg q6h PRN for persistent HR > 120  - Mag > 2, K >4  - Continue tele today    CKD Stage 3: Monitor closely while hospitalized, avoiding nephrotoxins as able.     DM2: A1c 7.2% on 9/2022 On Trulicity, Glipizide as an outpatient.  - Switched to mealtime sliding scale insulin + carb counting    Hypertension: PTA lisinopril held for now, diuresing as above     Dyslipidemia: PTA pravstation    Chronic severe bilateral lower extremity lymphedema: Noted. Elevate legs.     Pressure injury of left buttock, stage 1, chronic, present on admission.  - WOC consulted     DVT Prophylaxis: Xarelto  Code Status: Full Code  PT/OT:  ordered  Diet: Room Service  Combination Diet Moderate Consistent Carb (60 g CHO per Meal) Diet; Pureed Diet (level 4); Mildly Thick (level 2) (by spoon)      Disposition: Expected discharge 1-2 days to TCU    - I spent 40 minutes, with greater than 50% spent in counseling and coordination of care with the patient and family and nurse.    Kuldip Ruiz MD, MD  Text Page  (7am to 6pm)  -Data reviewed today: I reviewed all new labs and imaging results over the last 24 hours.    Physical Exam   Temp: 97.6  F (36.4  C) Temp src: Oral BP: (!) 111/95 Pulse: 68   Resp: 16 SpO2: 98 % O2 Device: None (Room air) Oxygen Delivery: 1 LPM  Vitals:    10/11/22 1525   Weight: 96 kg (211 lb 10.3 oz)     Vital Signs with Ranges  Temp:  [97.5  F (36.4  C)-97.7  F (36.5  C)] 97.6  F (36.4  C)  Pulse:  [57-85] 68  Resp:  [16] 16  BP: (105-155)/(75-95) 111/95  SpO2:  [96 %-100 %] 98 %  I/O last 3 completed shifts:  In: 300 [P.O.:300]  Out: 900 [Urine:900]  O2 requirements: none    Constitutional: Elderly female appears frail, in NAD this morning  HEENT: Eyes nonicteric  Cardiovascular: Irregular, RR, no m/r/g  Respiratory: Mild basilar crackles no wheezing, overall improved today  Vascular: Nonpitting LE edema mostly in the calf region  GI: Normoactive bowel sounds, nontender  Skin/Integumen: No rashes  Neuro/Psych:A&O to self and place, follows commands, moves extremities    Medications     - MEDICATION INSTRUCTIONS -       - MEDICATION INSTRUCTIONS -         sodium chloride 0.9%  1,000 mL Intravenous Once     cefTRIAXone  1 g Intravenous Q24H     diltiazem  30 mg Oral BID     furosemide  20 mg Intravenous Once     insulin aspart   Subcutaneous TID w/meals     insulin aspart  1-7 Units Subcutaneous TID AC     insulin aspart  1-5 Units Subcutaneous At Bedtime     metoprolol tartrate  100 mg Oral BID     miconazole   Topical BID     pravastatin  40 mg Oral Daily     rivaroxaban ANTICOAGULANT  20 mg Oral Daily with supper     sodium  chloride (PF)  3 mL Intracatheter Q8H       Data   Recent Labs   Lab 10/12/22  0813 10/12/22  0802 10/12/22  0216 10/12/22  0113 10/11/22  2116 10/11/22  2044 10/11/22  1258 10/11/22  0823 10/10/22  0835 10/10/22  0823 10/09/22  2241 10/09/22  1525   WBC 8.9  --   --   --   --   --   --  10.6  --  13.1*  --  14.0*   HGB 14.5  --   --   --   --   --   --  13.2  --  13.4  --  13.5   *  --   --   --   --   --   --  102*  --  106*  --  104*     --   --   --   --   --   --  185  --  205  --  256   INR  --   --   --   --   --   --   --   --   --   --   --  0.97   NA  --   --   --   --   --   --   --  135  --  137  --  138   POTASSIUM  --   --   --  3.1*  --  2.9*  --  3.4  --  3.9   < > 4.2   CHLORIDE  --   --   --   --   --   --   --  102  --  106  --  102   CO2  --   --   --   --   --   --   --  27  --  26  --  22   BUN  --   --   --   --   --   --   --  16  --  12  --  11.7   CR  --   --   --   --   --   --   --  0.80  --  0.70  --  0.74   ANIONGAP  --   --   --   --   --   --   --  6  --  5  --  14   EZEKIEL  --   --   --   --   --   --   --  8.9  --  8.7  --  9.3   GLC  --  221* 265*  --  218*  --    < > 171*   < > 135*   < > 289*    < > = values in this interval not displayed.       Imaging:   Recent Results (from the past 24 hour(s))   Echocardiogram Complete w Bubble Study - For age < 60 yrs   Result Value    LVEF  45%    [facility]    [id number]  [id number]  [id number]^JOHNNIE^MARIA ELENA^MADDY     [facility]  Echocardiography Laboratory  [address] [place] [address]     Name: HARVINDER SCHMITT  MRN: [id number]  : 1945  Study Date: 10/11/2022 10:38 AM  Age: 77 yrs  Gender: Female  Patient Location: [facility]  Reason For Study: Cerebrovascular Incident  Ordering Physician: MARIA ELENA BLAIR  Referring Physician: ELKIN SWAN     BSA: 2.1 m2  Height: 64 in  Weight: 230 lb  HR: 116  BP: 119/88  mmHg  ______________________________________________________________________________  Procedure  Complete Portable Bubble Echo Adult. Optison (NDC #0494-0370) given  intravenously.  ______________________________________________________________________________  Interpretation Summary     Technically challenging study due to patient body habitus, even with the use  of contrast imaging.     Left ventricular systolic function is mild to moderately reduced. The visual  ejection fraction is estimated at 45%.  With rapid atrial fibrillation, the visual approximation of left ventricular  systolic function may be falsely decreased.  Regional wall motion abnormalities cannot be excluded due to limited  visualization, however hypokinesis appears global.  Right ventricular global function is normal.  No clear evidence of interatrial shunt on limited assessment with color  Doppler. A contrast injection (Bubble Study) was performed that was negative  for flow across the interatrial septum.  There is mild-moderate global hypokinesia of the left ventricle.  The ascending aorta is Mildly dilated. Max diameter of the visualized portion  3.9 cm.     There are no prior studies available for comparison.  ______________________________________________________________________________  Left Ventricle  The left ventricle is normal in size. There is normal left ventricular wall  thickness. Left ventricular systolic function is mild to moderately reduced.  The visual ejection fraction is estimated at 45%. With rapid atrial  fibrillation, the visual approximation of left ventricular systolic function  may be falsely decreased. Left ventricular diastolic function is not  assessable. There is mild-moderate global hypokinesia of the left ventricle.  Regional wall motion abnormalities cannot be excluded due to limited  visualization.     Right Ventricle  The right ventricle is normal size. The right ventricular systolic function is  normal.      Atria  Normal left atrial size. Right atrial size is normal. No clear evidence of  interatrial shunt on limited assessment with color Doppler. A contrast  injection (Bubble Study) was performed that was negative for flow across the  interatrial septum. A Valsalva maneuver was inadequate.     Mitral Valve  There is mild (1+) mitral regurgitation.     Tricuspid Valve  There is trace tricuspid regurgitation. Right ventricular systolic pressure  could not be approximated due to inadequate tricuspid regurgitation.     Aortic Valve  The aortic valve is trileaflet with aortic valve sclerosis.     Pulmonic Valve  The pulmonic valve is not well seen, but is grossly normal.     Vessels  The aortic root is normal size. The ascending aorta is Mildly dilated. Max  diameter of the visualized portion 3.9 cm. The inferior vena cava was normal  in size with preserved respiratory variability.     ______________________________________________________________________________  MMode/2D Measurements & Calculations  IVSd: 1.4 cm  LVIDd: 4.4 cm  LVIDs: 3.7 cm  LVPWd: 1.1 cm  FS: 15.8 %  LV mass(C)d: 203.7 grams  LV mass(C)dI: 98.1 grams/m2  Ao root diam: 3.0 cm  LA dimension: 4.3 cm  asc Aorta Diam: 3.9 cm     LA/Ao: 1.4  LA Volume (BP): 91.0 ml  LA Volume Index (BP): 43.8 ml/m2  RWT: 0.51     Doppler Measurements & Calculations  MV E max serge: 107.4 cm/sec  MV dec time: 0.14 sec  PA acc time: 0.11 sec  E/E' av.7     Lateral E/e': 11.7  Medial E/e': 17.8     ______________________________________________________________________________  Report approved by: Fabiola Cosme 10/11/2022 03:22 PM         CT Head w/o Contrast    Narrative    CT OF THE HEAD WITHOUT CONTRAST 10/11/2022 2:51 PM     COMPARISON: Head CT 10/11/2022 at 0221 hours.    HISTORY: Question left upper extremity drift, assess for hemorrhage,  new infarct or other acute intracranial change.    TECHNIQUE: 5 mm thick axial CT images of the head were acquired  without IV  contrast material.    FINDINGS: Evolving subacute ischemic infarct in the right cerebellar  hemisphere again noted. No definite new infarcts.    There is moderate diffuse cerebral volume loss. There are extensive  confluent areas of decreased density in the cerebral white matter  bilaterally that are consistent with sequela of chronic small vessel  ischemic disease. The ventricles and basal cisterns are within normal  limits in configuration given the degree of cerebral volume loss.   There is no midline shift. There are no extra-axial fluid collections.      No intracranial hemorrhage or mass.    The visualized paranasal sinuses are well-aerated. There is no  mastoiditis. There are no fractures of the visualized bones.       Impression    IMPRESSION:   1. Evolving subacute ischemic infarct in the right cerebellar  hemisphere again noted.  2. No definite new infarcts.  3. Diffuse cerebral volume loss and cerebral white matter changes  consistent with chronic small vessel ischemic disease.       Radiation dose for this scan was reduced using automated exposure  control, adjustment of the mA and/or kV according to patient size, or  iterative reconstruction technique.    TRISTAN COSTA MD         SYSTEM ID:  [id number]

## 2022-10-12 NOTE — CONSULTS
WOC Note    S: Consulted entered by bedside RN for Right Buttock and Small skin tear on Left anterior forearm    B: WOC already following for bilateral buttock. Orders entered.    A: See WO note 10/10.    R: Follow treatment plan for buttock in orders. And follow the skin tear management policy in policy tech.    Chio Babcock [facility]   Dept. Pager: [phone]  Dept. Office Number: [phone]

## 2022-10-13 NOTE — PROVIDER NOTIFICATION
MD Notification    Notified Person: MD     Notified Person Name: Dr. Ruiz     Notification Date/Time: 5:58 PM     Notification Interaction: Web based paging    Purpose of Notification: Patient cannot take med's due to lethargy     Orders Received: PO med's held. Ordering IV Metoprolol    Comments: Dr. Ruiz called with orders. Holding PO meds and prescribing IV Metoprolol

## 2022-10-13 NOTE — PLAN OF CARE
Goal Outcome Evaluation:      Plan of Care Reviewed With: patient, daughter Ernesto supportive at bedside.        Right ischemic stroke. Neuros/CMS - lethargic, appears to be sleeping well between cares; responds to writers to voice briefly/eyes open briefly; able to say her first & last name with birth month this am, answers yes if asked  who she was; responds to simple yes/no questions; not able to follow neuro commands; drifts off to sleep; glasses at bedside/not able to assess field cut. Hands spontaneous movements/grasps weaker/unable to assess drifts due to lethargy. Moderate leg weakness/sensitivity to touch/discomfort in right knee chronic; toe wiggles noted with testing.  Tachycardic 100s to 130s and 140s intermittent (providers aware, no new orders given).  Tele monitored. Pureed diet with mildly thick liquids set up/attempted intake & oral cares/patient not interested in either/no oral intake provided due to lethargy.  Bedrest with 2 assist/ceiling lift utilized/turned & repositioned side to side positioning; patient wiggles herself down in bed/needing frequent reinforcing with positioning. Incontinent of urine/no bm today. Discomfort noted in legs bilaterally with positioning/chronic right knee pain - tylenol suppository provided. Pt scoring yellow on the Aggression Stop Light Tool due to confusion. Anticipate TCU vs. LTC at discharge.  Daughter at bedside/supportive. Mepilex reapplied to coccyx/purplish red unblanchable areas on bilateral butt cheeks.

## 2022-10-13 NOTE — PROGRESS NOTES
[facility]    Hospitalist Progress Note    Interval History   - Mental status continues to wax and wane but overall more confused and more agitated today, not participating in some nursing cares and did not take oral meds. Unable to work with PT.  - Updated daughter at bedside    Assessment & Plan   Summary: Hima Griffiths is a 77 year old female with PMH atrial fibrillation, hx CVA, HTN, DM2, who was admitted on 10/9/2022 with vertigo and found to have an acute right cerebellar stroke. Hospitalization complicated by CHF exacerbation and afib with RVR.    Acute ischemic right cerebellar stroke  Hx MCA stroke in 2015, also dx with atrial fibrillation start on rivaroxaban. She was off rivaroxaban for over a week PTA. Admitted with sudden vertigo with nausea and vomiting today. She has concomitant leukocytosis. MRI/MRA of Head and Neck showed a right inferior cerebellar infarct, as well as diminished flow through several vertebral artery segments. MRI confirms acute right inferior cerebellar stroke.  - Appreciate Neurology consult  - Xarelto restarted  - A1c 7.2% on 9/21/22  - PT/OT/SLP--TCU discharge  - Neuro checks    Acute metabolic encephalopathy  Patient with increasing confusion on 10/10, likely due to recent stroke complicated by developing CHF and possible UTI. CT scan early AM of 10/11 without acute pathology, no bleeding. Remains confused and agitated on 10/11. This is most likely hospital delirium, manage supportively, and expect this to improve with time. Noted waxing and waning status 10/11. Slightly improved in AM of 10/12, but worse in the afternoon and evening, and continues to be confused on 10/13.  - Delirium precautions  - Seroquel PRN, discussed with nurse to avoid this during the day  - Treat underlying CHF, infection, and stroke    New acute systolic congestive heart failure exacerbation  Acute hypoxic respiratory failure due to above, resolved  Patient received IVF in  ED due to suspected hypovolemia. Noted increasing dyspnea 10/10, lactate elevated. On exam elevated JVP, bilateral lung crackles and nonpitting LE edema. CXR shows vascular congestion. Increasing O2 needs starting this afternoon. NT pro BNP elevated to 5k. Overall clinically consistent with CHF exacerbation. Echo in 2016 cannot be reviewed by me but referenced in notes in the chart as having normal ejection fraction. Family reports patient has not been diagnosed with CHF previously.   Improved very much with diuresis, off O2 in afternoon of 10/11. TTE 10/11 shows decreased EF 45% could be tachycardia related however.  - Appreciate Cardiology consult   - Lasix IV   - Digoxin   - Metoprolol  - Tele, daily weights, I&O    UTI due to Enterobacter  Leukocytosis, resolved  Lactic acidosis, resolved  Suspect leukocytosis and lactic acidosis more related to CHF than infection. UA appears dirty. Urine growing Enterobacter that is resistant to Ceftriaxone which was given since 10/10--switch to levofloxacin on 10/13.  - Start IV levofloxacin    Atrial fibrillation with RVR, improved 10/11  Heart rates 130s+ on admission, rate controlled < 110 on 10/10. Remains somewhat variable HR up to 120s on 10/13.  - appreciate Cards consult above   - Lasix, digoxin  - Mag > 2, K >4  - Continue tele today    CKD Stage 3: Monitor closely while hospitalized, avoiding nephrotoxins as able.     DM2: A1c 7.2% on 9/2022 On Trulicity, Glipizide as an outpatient.  - Continue mealtime sliding scale insulin + carb counting  - Start lantus 10 units q24h    Hypertension  Managed as above     Dyslipidemia: PTA pravstation    Chronic severe bilateral lower extremity edema: Elevate legs     Pressure injury of left buttock, stage 1, chronic, present on admission.  - WOC consulted     DVT Prophylaxis: Xarelto  Code Status: Full Code  PT/OT: ordered  Diet: Room Service  Combination Diet Moderate Consistent Carb (60 g CHO per Meal) Diet; Pureed Diet (level  4); Mildly Thick (level 2) (by spoon)  Snacks/Supplements Adult: Other; ok for supplements - do not count as CHO allowance; With Meals      Disposition: Expected discharge to TCU pending improvement in mental status, participating with therapies    Kuldip Ruiz MD, MD  Text Page  (7am to 6pm)  -Data reviewed today: I reviewed all new labs and imaging results over the last 24 hours.    Physical Exam   Temp: 97.3  F (36.3  C) Temp src: Axillary BP: (!) 126/93 Pulse: 98   Resp: 14 SpO2: 99 % O2 Device: None (Room air)    Vitals:    10/11/22 1525   Weight: 96 kg (211 lb 10.3 oz)     Vital Signs with Ranges  Temp:  [97  F (36.1  C)-97.6  F (36.4  C)] 97.3  F (36.3  C)  Pulse:  [] 98  Resp:  [14-16] 14  BP: (105-153)/(67-99) 126/93  SpO2:  [94 %-99 %] 99 %  I/O last 3 completed shifts:  In: 120 [P.O.:120]  Out: 1200 [Urine:1200]  O2 requirements: none    Constitutional: Elderly female appears frail, appears more confused currently  HEENT: Eyes nonicteric  Cardiovascular: Irregular, tachycardic, no m/r/g  Respiratory: Mild basilar crackles no wheezing  Vascular: Nonpitting LE edema mostly in the calf region  GI: Normoactive bowel sounds, nontender  Skin/Integumen: No rashes  Neuro/Psych: Not alert or oriented this afternoon, mildly agitated, moves all extremities    Medications     - MEDICATION INSTRUCTIONS -       - MEDICATION INSTRUCTIONS -         sodium chloride 0.9%  1,000 mL Intravenous Once     digoxin  62.5 mcg Oral Daily     furosemide  20 mg Intravenous Q12H     insulin aspart   Subcutaneous TID w/meals     insulin aspart  1-7 Units Subcutaneous TID AC     insulin aspart  1-5 Units Subcutaneous At Bedtime     insulin glargine  10 Units Subcutaneous Q24H     levofloxacin  500 mg Intravenous Q24H     metoprolol tartrate  100 mg Oral BID     miconazole   Topical BID     pravastatin  40 mg Oral Daily     rivaroxaban ANTICOAGULANT  20 mg Oral Daily with supper     sodium chloride (PF)  3 mL Intracatheter  Q8       Data   Recent Labs   Lab 10/13/22  0857 10/13/22  0759 10/13/22  0444 10/12/22  1200 10/12/22  0813 10/12/22  0216 10/12/22  0113 10/11/22  1258 10/11/22  0823 10/09/22  2241 10/09/22  1525   WBC 12.0*  --   --   --  8.9  --   --   --  10.6   < > 14.0*   HGB 15.5  --   --   --  14.5  --   --   --  13.2   < > 13.5   *  --   --   --  104*  --   --   --  102*   < > 104*     --   --   --  209  --   --   --  185   < > 256   INR  --   --   --   --   --   --   --   --   --   --  0.97     --   --   --  138  --   --   --  135   < > 138   POTASSIUM 3.9  --   --   --  4.3  4.3  --  3.1*   < > 3.4   < > 4.2   CHLORIDE 104  --   --   --  103  --   --   --  102   < > 102   CO2 25  --   --   --  28  --   --   --  27   < > 22   BUN 21  --   --   --  17  --   --   --  16   < > 11.7   CR 0.80  --   --   --  0.75  --   --   --  0.80   < > 0.74   ANIONGAP 11  --   --   --  7  --   --   --  6   < > 14   EZEKIEL 10.4*  --   --   --  9.6  --   --   --  8.9   < > 9.3   * 244* 238*   < > 219*   < >  --    < > 171*   < > 289*    < > = values in this interval not displayed.       Imaging:   No results found for this or any previous visit (from the past 24 hour(s)).

## 2022-10-13 NOTE — PROVIDER NOTIFICATION
Paged Hospitalist re; prns.  patient resting between turns, no prns nedded today. daughter at bedside discusion on seroquel & ativan, felt her mom did not tolerate well - worse with them on board yesterday, please advise, ativan and seroquel still active prn med.  Will await advisement.

## 2022-10-13 NOTE — PROVIDER NOTIFICATION
Paged Hospitalist re: update.  heart rate bouncing around, no prn or new orders placed by cardiology yet that i see, currently 100, then 120, then 126, now 136, back to 113. Will await advisement.

## 2022-10-13 NOTE — PROGRESS NOTES
[facility]    Cardiology Progress Note     Assessment & Plan       This is a 77 year old female with PMH significant for MCA stroke in 2015, AF on xarelto with one week off medication PTA, admitted with right inferior cerebellar infarct as well as diminished flow through vertebral arteries. Xarelto was restarted. Course complicated by onset of acute delerium thought to be congestive heart failure and concomitant medical issues. Echocardiogram reviewed from this admission and demonstrated decreased LVEF 45% in setting of tachycardia however, prior echocardiogram in 2016 reportedly normal LVEF. Patient was started on lasix and cardiology consulted for management. NTproBNP elevated to over 5000 and CXR with pulmonary congestion.      1. HFrEF, new diagnosis acute. Has chronic long standing lymphedema.  -differential includes tachy mediated cardiomyopathy, hypertensive heart disease, ischemic.   -lasix 20 IV given today, monitor I's/Os, daily weights. Would change to standing IV 20 BID and goal net negative 1-2 liters.  -echocardiogram imaging personally reviewed by me and the EF is down to 45%, there is global hypokinesis, RV function is normal and there was no shunt to suggest ASD or PFO. The ascending aorta was mildly dilated.   -goal directed medical therapy: on beta blocker, diuresis with lasix IV, start low dose ACEI when able (BP parameters can be guided by neurology team).   -Compression wraps unlikely to tolerate due to pain  -discussed with daughter in detail and likely a conservative approach, less invasive. Defer coronary angiogram or other advanced measures.     2. Atrial fibrillation:   -monitor on telemetry  -continue xarelto in setting of recent stroke presumed cardioembolic   -likely doesn't need BAN as Bubble study negative and management of stroke wouldn't change in presence of intracardiac thrombus but if neurology needs further work up we are happy to accommodate, daughter  agreeable   -diltiazem is contraindicated in heart failure exacerbation and reduced EF patients (with the exception in some tachy mediated cardiomyopathies that are not decompensating acutely). Would discontinue for now. Continue metoprolol 100 mg bid.   -digoxin added, improved heart rates however still elevated and not tolerating po. Can add 5 mg IV prn metoprolol.      3. HTN: on metoprolol, stop diltiazem.  -permissive HTN, when able to please start ACEI. Future will consider spironolactone.     High complexity. Inability to tolerate PO. Could consider palliative care if progressive and unable to give oral medications.        Zabrina Doran MD  Text Page  (Monday - Friday, 8 am- 5 pm)    Interval History     Still in rapid AFIB. Daughter by bedside, Patient unable to tolerate PO due to delerium so metoprolol, dig and xarelto not given.     Physical Exam   Temp: 97.3  F (36.3  C) Temp src: Axillary BP: 137/78 Pulse: 98   Resp: 14 SpO2: 95 % O2 Device: None (Room air)    Vitals:    10/11/22 1525   Weight: 96 kg (211 lb 10.3 oz)     Vital Signs with Ranges  Temp:  [97  F (36.1  C)-97.6  F (36.4  C)] 97.3  F (36.3  C)  Pulse:  [] 98  Resp:  [14-16] 14  BP: (105-159)/(67-99) 137/78  SpO2:  [94 %-98 %] 95 %  I/O last 3 completed shifts:  In: 120 [P.O.:120]  Out: 1200 [Urine:1200]  Patient Active Problem List   Diagnosis     Advanced directives, counseling/discussion     Persistent atrial fibrillation (H)     Benign paroxysmal positional vertigo     H/O ischemic left MCA stroke     Type 2 diabetes mellitus with complication, without long-term current use of insulin (H)     Benign hypertension     Facial droop     Hyperlipidemia LDL goal <100     BMI > 40.0 (H)     Osteoarthritis of right knee, unspecified osteoarthritis type     CKD (chronic kidney disease) stage 3, GFR 30-59 ml/min (H)     Weakness     Closed head injury, initial encounter     Fall, initial encounter     Urinary tract infection in elderly  patient     Hyperglycemia     Pain     Anticoagulated     Acute ischemic stroke (H)     Stroke (H)       Constitutional: Awake, not responding.  Respiratory: Clear to auscultation bilaterally, no crackles or wheezing  Cardiovascular: Regular rate and rhythm, normal S1 and S2, and no murmur noted  GI: Normal bowel sounds, soft, non-distended, non-tender  Skin/Integumen: No rashes, no cyanosis, 1+ edema  Other:      Medications     - MEDICATION INSTRUCTIONS -       - MEDICATION INSTRUCTIONS -         sodium chloride 0.9%  1,000 mL Intravenous Once     digoxin  62.5 mcg Oral Daily     insulin aspart   Subcutaneous TID w/meals     insulin aspart  1-7 Units Subcutaneous TID AC     insulin aspart  1-5 Units Subcutaneous At Bedtime     levofloxacin  500 mg Oral Daily     metoprolol tartrate  100 mg Oral BID     miconazole   Topical BID     pravastatin  40 mg Oral Daily     rivaroxaban ANTICOAGULANT  20 mg Oral Daily with supper     sodium chloride (PF)  3 mL Intracatheter Q8H       Data   Results for orders placed or performed during the hospital encounter of 10/09/22 (from the past 24 hour(s))   Glucose by meter   Result Value Ref Range    GLUCOSE BY METER POCT 187 (H) 70 - 99 mg/dL   Glucose by meter   Result Value Ref Range    GLUCOSE BY METER POCT 298 (H) 70 - 99 mg/dL   Glucose by meter   Result Value Ref Range    GLUCOSE BY METER POCT 238 (H) 70 - 99 mg/dL   Glucose by meter   Result Value Ref Range    GLUCOSE BY METER POCT 244 (H) 70 - 99 mg/dL   Basic metabolic panel   Result Value Ref Range    Sodium 140 133 - 144 mmol/L    Potassium 3.9 3.4 - 5.3 mmol/L    Chloride 104 94 - 109 mmol/L    Carbon Dioxide (CO2) 25 20 - 32 mmol/L    Anion Gap 11 3 - 14 mmol/L    Urea Nitrogen 21 7 - 30 mg/dL    Creatinine 0.80 0.52 - 1.04 mg/dL    Calcium 10.4 (H) 8.5 - 10.1 mg/dL    Glucose 230 (H) 70 - 99 mg/dL    GFR Estimate 75 >60 mL/min/1.73m2   CBC with platelets   Result Value Ref Range    WBC Count 12.0 (H) 4.0 - 11.0  10e3/uL    RBC Count 4.52 3.80 - 5.20 10e6/uL    Hemoglobin 15.5 11.7 - 15.7 g/dL    Hematocrit 46.0 35.0 - 47.0 %     (H) 78 - 100 fL    MCH 34.3 (H) 26.5 - 33.0 pg    MCHC 33.7 31.5 - 36.5 g/dL    RDW 12.2 10.0 - 15.0 %    Platelet Count 208 150 - 450 10e3/uL   Magnesium   Result Value Ref Range    Magnesium 2.3 1.6 - 2.3 mg/dL   Glucose by meter   Result Value Ref Range    GLUCOSE BY METER POCT 214 (H) 70 - 99 mg/dL

## 2022-10-13 NOTE — PROVIDER NOTIFICATION
Paged Hospitalist re: update.  fyi, unable to give oral meds, patient not awake enough, responds, but declining oral cares also. holding levofloxacin, metoprolol, digoxin, pravastatin at this time. Will await advisement and hold oral intake until safe to do so. Speech at bedside agreed with plan.

## 2022-10-13 NOTE — PLAN OF CARE
Lymphedema Services - PT    Pt is not appropriate for skilled lymphedema therapy at this time. Pt with minimal edema in calf regions B, no edema B feet, painful B UEs/LEs to touch, pt in delirious state not following commands, and in afib w/RVR.  Not likely to tolerate compression wraps, and not safe to attempt at this time. Will defer to medical team for management, please re-order if need changes.  Plan was discussed with RN and Hospitalist.  Will D/C current Lymphedema orders. Thank you.    10/13/2022 by Yolette Huynh, PT, DPT

## 2022-10-14 NOTE — CODE/RAPID RESPONSE
[facility]  House [facility] RRT Note  10/13/2022   Time called: 2010  RRT called for: Afib RVR   Code status: Full Code    Assessment & Plan   I was paged to the bedside to evaluate Ms. Hima Griffiths for an acute episode of irregular tachycardia. Patient is a 77-year-old female with a PMH significant for atrial fibrillation with RVR, CKD 3, DM 2, HTN, dyslipidemia, chronic BLE lymphedema who presented to [facility] on 10/09/2022 with dizziness. She was found to have an acute ischemic right cerebellar stroke. Xarelto has since been restarted. Patient was found to have new CHF with EF of 45% and started on lasix, cardiology following.    Upon my arrival the patient was supine in bed and alert. Patient answered yes/no questions appropriately but was otherwise confused and aloof. Patient was warm and dry with BLE 3+ edema and sensitive to touch. Patient denied chest pain, denied headache, denied generalized pain. Patient's lung sounds were clear, normal S1 and S2. Abdomen was soft and nontender. I was then called to another RRT.    I returned to assess effectiveness of IV metoprolol. Patient's HR was in the 90s, BP 130s/90. Patient's lactic acidosis likely due to CHF and RVR, has been intermittently elevated this stay. Currently afebrile, positive UTI currently on levofloxacin.    Daughter was at bedside and I discussed the plan with her. I answered questions and provided support.     Diagnosis:  -- Atrial fibrillation with rapid ventricular response  Patient was too lethargic to take her oral medications today (digoxin and metoprolol). HR 140s and irregular. Rate controlled with IV metoprolol given during RRT.   -- Mixed metabolic and toxic encephalopathy  Versus hospital delirium  UTI treated with levofloxacin. DM 2, CKD 3, and newly diagnosed CHF along with prolonged hospitalization.    Plan:  -- 5mg IV metoprolol Q6h PRN  -- STAT EKG  -- Lactic acidosis 2.3, repeat level in 2 hours  -- 1g Magnesium  Sulfate  -- BMP, CBC, BNP  -- Patient has had very poor intake with lethargy, started on lasix for CHF, try very gentle fluid resuscitation with 250mL bolus over an hour    At the conclusion of this RRT patient was hemodynamically stable and will remain on current unit    ADDENDUM 2350    Patient's HR again tachycardic in the 130s but too early for PRN metoprolol. Patient sleeping, no other vital signs changes.    Plan:  - 5mg IV metoprolol once    Interval History     Ms. Hima Griffiths is a 77 year old female who was admitted on 10/9/2022 for cerebellar stroke. Patient has been increasingly confused and lethargic since 10/10. Head CT on 10/11 negative for acute changes. Patient is being managed conservatively for hospital delirium versus encephalopathy.    Her history is significant for:  Past Medical History:   Diagnosis Date     Benign hypertension      Benign paroxysmal positional vertigo      Colitis 11/28/2016    Presumed infectious 2009.      Diabetes mellitus (H)      Family history of atrial fibrillation     Mother     Osteoarthritis of right knee, unspecified osteoarthritis type 08/02/2019     Persistent atrial fibrillation (H) 11/2015     Stroke (H) 10/2015    Left MCA     Past Surgical History:   Procedure Laterality Date     AS LOOP RECORDER IMPLANT  11/9/15     CATARACT IOL, RT/LT  8/13/14       Review of Systems   The 10 point Review of Systems is negative other than noted in the HPI or here.     Allergies   Allergies   Allergen Reactions     Alkylamines Other (See Comments)     Comment: Weakness tingling arms and legs, Description:      Sudafed [Pseudoephedrine]      Legs and arms get weak       Physical Exam   Physical Exam  Constitutional:       Appearance: She is obese. She is ill-appearing.      Comments: Lethargic   HENT:      Head: Normocephalic.      Nose: Nose normal.      Mouth/Throat:      Mouth: Mucous membranes are moist.   Eyes:      Pupils: Pupils are equal, round, and reactive to  light.   Cardiovascular:      Rate and Rhythm: Tachycardia present. Rhythm irregular.      Pulses: Normal pulses.   Pulmonary:      Effort: Pulmonary effort is normal.      Breath sounds: Normal breath sounds.   Abdominal:      General: Abdomen is flat.      Palpations: Abdomen is soft.      Tenderness: There is no abdominal tenderness. There is no guarding.   Musculoskeletal:         General: Tenderness present.      Cervical back: Normal range of motion.      Right lower leg: Edema present.      Left lower leg: Edema present.   Skin:     General: Skin is warm and dry.      Capillary Refill: Capillary refill takes 2 to 3 seconds.      Coloration: Skin is pale.   Neurological:      Mental Status: She is disoriented.         Vital Signs with Ranges:  Temp:  [96.2  F (35.7  C)-97.3  F (36.3  C)] 96.2  F (35.7  C)  Pulse:  [] 98  Resp:  [14-16] 16  BP: (105-137)/() 133/101  SpO2:  [93 %-99 %] 93 %  I/O last 3 completed shifts:  In: 120 [P.O.:120]  Out: 1200 [Urine:1200]    Data   Results for orders placed or performed during the hospital encounter of 10/09/22 (from the past 24 hour(s))   Glucose by meter   Result Value Ref Range    GLUCOSE BY METER POCT 238 (H) 70 - 99 mg/dL   Glucose by meter   Result Value Ref Range    GLUCOSE BY METER POCT 244 (H) 70 - 99 mg/dL   Basic metabolic panel   Result Value Ref Range    Sodium 140 133 - 144 mmol/L    Potassium 3.9 3.4 - 5.3 mmol/L    Chloride 104 94 - 109 mmol/L    Carbon Dioxide (CO2) 25 20 - 32 mmol/L    Anion Gap 11 3 - 14 mmol/L    Urea Nitrogen 21 7 - 30 mg/dL    Creatinine 0.80 0.52 - 1.04 mg/dL    Calcium 10.4 (H) 8.5 - 10.1 mg/dL    Glucose 230 (H) 70 - 99 mg/dL    GFR Estimate 75 >60 mL/min/1.73m2   CBC with platelets   Result Value Ref Range    WBC Count 12.0 (H) 4.0 - 11.0 10e3/uL    RBC Count 4.52 3.80 - 5.20 10e6/uL    Hemoglobin 15.5 11.7 - 15.7 g/dL    Hematocrit 46.0 35.0 - 47.0 %     (H) 78 - 100 fL    MCH 34.3 (H) 26.5 - 33.0 pg    MCHC  33.7 31.5 - 36.5 g/dL    RDW 12.2 10.0 - 15.0 %    Platelet Count 208 150 - 450 10e3/uL   Magnesium   Result Value Ref Range    Magnesium 2.3 1.6 - 2.3 mg/dL   Glucose by meter   Result Value Ref Range    GLUCOSE BY METER POCT 214 (H) 70 - 99 mg/dL   Glucose by meter   Result Value Ref Range    GLUCOSE BY METER POCT 201 (H) 70 - 99 mg/dL   Lactic Acid STAT   Result Value Ref Range    Lactic Acid 2.3 (H) 0.7 - 2.0 mmol/L   Basic metabolic panel   Result Value Ref Range    Sodium 138 133 - 144 mmol/L    Potassium 3.8 3.4 - 5.3 mmol/L    Chloride 103 94 - 109 mmol/L    Carbon Dioxide (CO2) 30 20 - 32 mmol/L    Anion Gap 5 3 - 14 mmol/L    Urea Nitrogen 25 7 - 30 mg/dL    Creatinine 0.91 0.52 - 1.04 mg/dL    Calcium 9.4 8.5 - 10.1 mg/dL    Glucose 211 (H) 70 - 99 mg/dL    GFR Estimate 64 >60 mL/min/1.73m2   CBC with platelets   Result Value Ref Range    WBC Count 11.4 (H) 4.0 - 11.0 10e3/uL    RBC Count 4.32 3.80 - 5.20 10e6/uL    Hemoglobin 14.7 11.7 - 15.7 g/dL    Hematocrit 44.8 35.0 - 47.0 %     (H) 78 - 100 fL    MCH 34.0 (H) 26.5 - 33.0 pg    MCHC 32.8 31.5 - 36.5 g/dL    RDW 12.1 10.0 - 15.0 %    Platelet Count 253 150 - 450 10e3/uL   Lactic acid whole blood   Result Value Ref Range    Lactic Acid 2.3 (H) 0.7 - 2.0 mmol/L     COVID-19 PCR Results    COVID-19 PCR Results 6/16/22 6/23/22 6/24/22 6/27/22 6/29/22 6/30/22 7/5/22 10/9/22   SARS CoV2 PCR Negative Negative Negative Negative Negative Negative Negative Negative      Comments are available for some flowsheets but are not being displayed.         COVID-19 Antibody Results, Testing for Immunity    COVID-19 Antibody Results, Testing for Immunity   No data to display.           EKG:  Interpreted by ESTHER Juares CNP  Time reviewed: 2038  Symptoms at time of EKG: RVR   Rhythm: atrial fibrillation - rapid  Rate: Tachycardia  Axis: Normal  Ectopy: none  Conduction: normal  ST Segments/ T Waves: No acute ischemic changes  Q Waves: none  Comparison to  prior: Unchanged  Clinical Impression: atrial fibrillation (chronic)    Time Spent on this Encounter   I spent 45 minutes of critical care time on the unit/floor managing the care of Hima Griffiths. Upon evaluation, this patient had a high probability of imminent or life-threatening deterioration due to atrial fibrillation with rapid ventricular response, which required my direct attention, intervention, and personal management. 100% of my time was spent at the bedside counseling the patient and/or coordinating care regarding services listed in this note.    ESTHER Juares, CNP  Hospitalist - House [facility]  Text me on the [facility] sh[facility] for a textback  Text page with web based paging for a callback

## 2022-10-14 NOTE — PROVIDER NOTIFICATION
MD Notification    Notified Person: MD    Notified Person Name: White    Notification Date/Time: 10/14  1114    Notification Interaction: web based paging    Purpose of Notification: IV nurse unable to get PIV with doppler.     Orders Received: awaiting response

## 2022-10-14 NOTE — PLAN OF CARE
Goal Outcome Evaluation:    Reason for Admission: Ischemic Stroke    Cognitive/Mentation: A/Ox 3, D/O to time (or slow to respond). Lethargy continues. Opens eyes spontaneously, whispers, follows simple commands such as answering questions.   Neuros/CMS: Intact ex not following neuro commands. Writer able to assess spontaneous movement. RUE 3/5, LUE 2/5, BLE 2/5.   VS: Stable on RA  Tele: A-fib, RVR  GI: BS +, No BM from 3 to 7:30 PM.   Incontinent.  : Incontinent.  Pulmonary: LS diminished  Pain: Denies. Does grimace when legs and arms are touched.     Drains/Lines: PIV R   Skin: Friction wound on buttocks, Bilateral. Redness under breasts and abdominal folds.   Activity: Assist x 2 with lift  Diet: Pureed diet with mildly thick liquids.  Therapies recs: pending  Discharge: pending    Aggression Stoplight Tool: Yellow

## 2022-10-14 NOTE — PLAN OF CARE
Goal Outcome Evaluation:    Progress: Not progressing      Lethargic, resting with eyes closed, and at times open, between cares. Responds with simple with yes/no answers. Able to state full name, the President of [place] and knows place. D/O to time and situation. Not following Neuro commands. Able to assess spontaneous movements and hand  only. All extremities move spontaneously. LUE 2-3/5 RUE 3/5. R hand  moderate. L hand  weak.   Pain: Denies pain. Does express pain if Caregiver touch bilateral lower extremities. Writer and staff, used other techniques to move Writer.   Tele: A-fib RVR. Tachycardic fluctuating from 100 up to 150. Tele monitored. Metropolol given per orders.   LS - diminished, unlabored.   Pureed diet with mildly thick liquids. Consumed some mildly thick liq overnight, but little d/t lethargy. Oral cares declined.   Assist of 2 with lift. T/R q2h with boosts.   Skin: Mepilex on sacrum, Incontinent of Urine.  No BM overnight. Scoring Yellow on the Aggression Stop light Tool d/t confusion. Therapies rec TCU vs LTC

## 2022-10-14 NOTE — PROGRESS NOTES
[facility]    Cardiology Progress Note     Assessment & Plan     This is a 77 year old female with PMH significant for MCA stroke in 2015, AF on xarelto with one week off medication PTA, admitted with right inferior cerebellar infarct as well as diminished flow through vertebral arteries. Xarelto was restarted. Course complicated by onset of acute delerium thought to be congestive heart failure and concomitant medical issues. Echocardiogram reviewed from this admission and demonstrated decreased LVEF 45% in setting of tachycardia however, prior echocardiogram in 2016 reportedly normal LVEF. Patient was started on lasix and cardiology consulted for management. NTproBNP elevated to over 5000 and CXR with pulmonary congestion.      1. HFrEF, new diagnosis acute. Has chronic long standing lymphedema.  -differential includes tachy mediated cardiomyopathy, hypertensive heart disease, ischemic.   -lasix 20 IV bid, difficult however as she is not taking oral intake  -echocardiogram imaging personally reviewed by me and the EF is down to 45%, there is global hypokinesis, RV function is normal and there was no shunt to suggest ASD or PFO. The ascending aorta was mildly dilated.   -goal directed medical therapy: not tolerating po so IV beta blocker for now, diuresis.    -Compression wraps unlikely to tolerate due to pain  -discussed with daughter in detail and likely a conservative approach, less invasive. Deferred coronary angiogram or other advanced measures.     2. Atrial fibrillation with RVR: multifactorial - congestive heart failure, agitation   -monitor on telemetry  -if cannot tolerate oral xarelto consider switching to heparin   -no BAN indicated  -diltiazem cant be given in acute decompensated HFrEF, continue metoprolol IV and when able transition to oral depending on encephalopathy improvement   -oral digoxin added, if she develops RVR again then can load with IV digoxin      3. HTN: oral  meds when able - on metoprolol, stop diltiazem.  -permissive HTN, when able to please start ACEI. Future will consider spironolactone    4. Encephalopathy:  Could consider palliative care if progressive     High complexity.     Zabrina Doran MD  Text Page  (Monday - Friday, 8 am- 5 pm)    Interval History     Chart reviewed. Patient had RRT overnight due to tachycardia in the 140s. This was in the setting of not receiving oral metoprolol or digoxin.     Physical Exam   Temp: (!) 96.1  F (35.6  C) Temp src: Axillary BP: 119/87 Pulse: 56   Resp: 16 SpO2: 100 % O2 Device: None (Room air)    Vitals:    10/11/22 1525   Weight: 96 kg (211 lb 10.3 oz)     Vital Signs with Ranges  Temp:  [96.1  F (35.6  C)-97.5  F (36.4  C)] 96.1  F (35.6  C)  Pulse:  [] 56  Resp:  [16-20] 16  BP: (101-159)/() 119/87  SpO2:  [93 %-100 %] 100 %  I/O last 3 completed shifts:  In: 350 [I.V.:100; IV Piggyback:250]  Out: 0   Patient Active Problem List   Diagnosis     Advanced directives, counseling/discussion     Persistent atrial fibrillation (H)     Benign paroxysmal positional vertigo     H/O ischemic left MCA stroke     Type 2 diabetes mellitus with complication, without long-term current use of insulin (H)     Benign hypertension     Facial droop     Hyperlipidemia LDL goal <100     BMI > 40.0 (H)     Osteoarthritis of right knee, unspecified osteoarthritis type     CKD (chronic kidney disease) stage 3, GFR 30-59 ml/min (H)     Weakness     Closed head injury, initial encounter     Fall, initial encounter     Urinary tract infection in elderly patient     Hyperglycemia     Pain     Anticoagulated     Acute ischemic stroke (H)     Stroke (H)       Constitutional: Awake, alert, cooperative, no apparent distress  Respiratory: Clear to auscultation bilaterally, no crackles or wheezing  Cardiovascular: Tachycardiac, no mrg.  GI: Normal bowel sounds, soft, non-distended, non-tender  Skin/Integumen: No rashes, no cyanosis, 3+  edema  Other:      Medications     - MEDICATION INSTRUCTIONS -       - MEDICATION INSTRUCTIONS -         digoxin  62.5 mcg Oral Daily     furosemide  20 mg Intravenous Q12H     insulin aspart   Subcutaneous TID w/meals     insulin aspart  1-7 Units Subcutaneous TID AC     insulin aspart  1-5 Units Subcutaneous At Bedtime     insulin glargine  15 Units Subcutaneous QAM AC     levofloxacin  500 mg Intravenous Q24H     metoprolol  5 mg Intravenous Q6H     [Held by provider] metoprolol tartrate  100 mg Oral BID     miconazole   Topical BID     pravastatin  40 mg Oral Daily     rivaroxaban ANTICOAGULANT  20 mg Oral Daily with supper     sodium chloride (PF)  3 mL Intracatheter Q8H       Data   Results for orders placed or performed during the hospital encounter of 10/09/22 (from the past 24 hour(s))   Glucose by meter   Result Value Ref Range    GLUCOSE BY METER POCT 201 (H) 70 - 99 mg/dL   Lactic Acid STAT   Result Value Ref Range    Lactic Acid 2.3 (H) 0.7 - 2.0 mmol/L   Basic metabolic panel   Result Value Ref Range    Sodium 138 133 - 144 mmol/L    Potassium 3.8 3.4 - 5.3 mmol/L    Chloride 103 94 - 109 mmol/L    Carbon Dioxide (CO2) 30 20 - 32 mmol/L    Anion Gap 5 3 - 14 mmol/L    Urea Nitrogen 25 7 - 30 mg/dL    Creatinine 0.91 0.52 - 1.04 mg/dL    Calcium 9.4 8.5 - 10.1 mg/dL    Glucose 211 (H) 70 - 99 mg/dL    GFR Estimate 64 >60 mL/min/1.73m2   EKG 12-lead, tracing only   Result Value Ref Range    Systolic Blood Pressure  mmHg    Diastolic Blood Pressure  mmHg    Ventricular Rate 110 BPM    Atrial Rate 38 BPM    AK Interval  ms    QRS Duration 86 ms     ms    QTc 503 ms    P Axis  degrees    R AXIS 204 degrees    T Axis 107 degrees    Interpretation ECG       Atrial fibrillation with rapid ventricular response with premature ventricular or aberrantly conducted complexes  Right superior axis deviation  Nonspecific ST and T wave abnormality  Abnormal ECG  No previous ECGs available     CBC with platelets    Result Value Ref Range    WBC Count 11.4 (H) 4.0 - 11.0 10e3/uL    RBC Count 4.32 3.80 - 5.20 10e6/uL    Hemoglobin 14.7 11.7 - 15.7 g/dL    Hematocrit 44.8 35.0 - 47.0 %     (H) 78 - 100 fL    MCH 34.0 (H) 26.5 - 33.0 pg    MCHC 32.8 31.5 - 36.5 g/dL    RDW 12.1 10.0 - 15.0 %    Platelet Count 253 150 - 450 10e3/uL   Lactic acid whole blood   Result Value Ref Range    Lactic Acid 2.3 (H) 0.7 - 2.0 mmol/L   Glucose by meter   Result Value Ref Range    GLUCOSE BY METER POCT 206 (H) 70 - 99 mg/dL   Glucose by meter   Result Value Ref Range    GLUCOSE BY METER POCT 229 (H) 70 - 99 mg/dL   Glucose by meter   Result Value Ref Range    GLUCOSE BY METER POCT 227 (H) 70 - 99 mg/dL   Nt probnp inpatient   Result Value Ref Range    N terminal Pro BNP Inpatient 1,078 0 - 1,800 pg/mL   Potassium   Result Value Ref Range    Potassium 4.0 3.4 - 5.3 mmol/L   Magnesium   Result Value Ref Range    Magnesium 2.6 (H) 1.6 - 2.3 mg/dL   Glucose by meter   Result Value Ref Range    GLUCOSE BY METER POCT 235 (H) 70 - 99 mg/dL

## 2022-10-14 NOTE — PLAN OF CARE
Goal Outcome Evaluation: Ongoing, not progressing       Plan of Care Reviewed With: patient, child           Reason for Admission: Ischemic stroke     Cognitive/Mentation: MIGDALIA   Neuros/CMS: MIGDALIA patient not following commands. History of L field cut, L drift, and BLE pain (retracts to stimuli)  VS: BP was high, treated with IV met, HR running 110s, Temp running low   Tele: A fib   GI: BS normoactive   : Incontinent, pure wick   Pulmonary: LS clear  Pain: lower extremities     Drains/Lines: R IV   Skin: Scattered bruising, cool. Redness on coccyx   Activity: Assist x 2 with lift  Diet: pureed with thickened liquids. Takes pills tbd    Therapies recs: pending   Discharge: pending    Aggression Stoplight Tool: yellow    End of shift summary: Patient very confused and withdrawn. Did not follow many commands today besides slightly following finger and squeezing with right hand. She had sepsis protocol ordered and lactic was was 2.3- lactated ringers and magnesium given. PO meds held due to choking susceptibility karan.

## 2022-10-14 NOTE — PLAN OF CARE
A&O x self. Inconsistently following commands and difficult to assess neuro. Weakness in all extremities. Given metoprolol IV for uncontrolled afib. Turn/repo done. Incontinent bladder. Little appetite, MD paged to consider Nutrition consult- awaiting response. Alarms on. Discharge pending.

## 2022-10-14 NOTE — CONSULTS
Care Management Initial Consult    General Information  Assessment completed with: Patient, Children, daughter Velvet  Type of CM/SW Visit: Initial Assessment    Primary Care Provider verified and updated as needed: Yes   Readmission within the last 30 days:        Reason for Consult: discharge planning  Advance Care Planning: Advance Care Planning Reviewed: other (see comments) (no acp documents on file)          Communication Assessment  Patient's communication style: spoken language (English or Bilingual)    Hearing Difficulty or Deaf: yes   Wear Glasses or Blind: yes    Cognitive  Cognitive/Neuro/Behavioral: .WDL except  Level of Consciousness: lethargic  Arousal Level: arouses to voice, opens eyes spontaneously  Orientation: disoriented to, time, situation  Mood/Behavior: withdrawn  Best Language:  (not speaking)  Speech: whispers    Living Environment:   People in home: alone     Current living Arrangements: independent living facility      Able to return to prior arrangements: other (see comments) (awaiting continued care)       Family/Social Support:  Care provided by: self, homecare agency, child(adal)  Provides care for: no one  Marital Status:   Children          Description of Support System: Supportive, Involved    Support Assessment: Adequate family and caregiver support, Adequate social supports    Current Resources:   Patient receiving home care services:       Community Resources:    Equipment currently used at home: walker, rolling  Supplies currently used at home:      Employment/Financial:  Employment Status: retired        Financial Concerns: No concerns identified   Referral to Financial Worker: No       Lifestyle & Psychosocial Needs:  Social Determinants of Health     Tobacco Use: Medium Risk     Smoking Tobacco Use: Former     Smokeless Tobacco Use: Never     Passive Exposure: Not on file   Alcohol Use: Not At Risk     Frequency of Alcohol Consumption: Monthly or less     Average  "Number of Drinks: 1 or 2     Frequency of Binge Drinking: Never   Financial Resource Strain: Low Risk      Difficulty of Paying Living Expenses: Not hard at all   Food Insecurity: No Food Insecurity     Worried About Running Out of Food in the Last Year: Never true     Ran Out of Food in the Last Year: Never true   Transportation Needs: No Transportation Needs     Lack of Transportation (Medical): No     Lack of Transportation (Non-Medical): No   Physical Activity: Inactive     Days of Exercise per Week: 0 days     Minutes of Exercise per Session: 0 min   Stress: No Stress Concern Present     Feeling of Stress : Not at all   Social Connections: Socially Isolated     Frequency of Communication with Friends and Family: More than three times a week     Frequency of Social Gatherings with Friends and Family: More than three times a week     Attends Buddhism Services: Never     Active Member of Clubs or Organizations: No     Attends Club or Organization Meetings: Not on file     Marital Status:    Intimate Partner Violence: Not on file   Depression: Not at risk     PHQ-2 Score: 0   Housing Stability: Low Risk      Unable to Pay for Housing in the Last Year: No     Number of Places Lived in the Last Year: 1     Unstable Housing in the Last Year: No       Functional Status:  Prior to admission patient needed assistance:              Mental Health Status:          Chemical Dependency Status:                Values/Beliefs:  Spiritual, Cultural Beliefs, Buddhism Practices, Values that affect care: no               Additional Information:  Per care management consult for discharge planning. Patient was admitted on 10/09/22 with sudden onset vertigo with nausea and vomiting and is found to have suspected acute ischemic right cerebellar stroke. Writer reviewed chart and met with patient and andrew Verdin at bedside. Velvet reports patient lives in an independent living facility called \"[facility]\" and has been " there for 6 years. When patient most recently discharged from [facility] she was sent home with home care. Prior to this, patient was independent with most ADLs, uses a rww, and family was available to assist with other ADLs.   Writer reviewed that although patient is not medically ready for discharge, at this time therapy is recommending TCU. Daughter Velvet noted she anticipated that being the recommendation but at this time she does not feel comfortable with patient leaving in her current state as she is significantly below baseline and is not medically ready - writer agreed. Writer provided daughter with a list of TCU near [place] as she and her sister live in that area should that be the route. Daughter voiced concerns with patient's previous stay at [facility] and would not want a referral sent there. Writer empathized with their experience and educated that writer will be there to support in the discharge planning process that both she and patient are comfortable with.     Will continue to follow.    Jazzmine Villanueva, SARA, SW

## 2022-10-14 NOTE — PROGRESS NOTES
[facility]    Hospitalist Progress Note    Assessment & Plan   Hima Griffiths is a 77 year old female with PMHx of afib on chronic anticoagulation with DOAC, hypertension, DM II and hx of CVA  who was admitted on 10/9/2022 with symptoms of vertigo and was found to have had an acute right cerebellar CVA. Hospital stay has been complicated by metabolic encephalopathy, afib with RVR and CHF exacerbation.      Acute ischemic right cerebellar stroke  * Hx MCA stroke in 2015, also dx with atrial fibrillation started on DOAC.   * Had been off DOAC for over a week PTA.   * Admitted with sudden vertigo with nausea and vomiting that started the day of admission. Labs notable for leukocytosis. MRI/MRA head/neck showed a right inferior cerebellar infarct, as well as diminished flow through several vertebral artery segments. MRI confirmed acute right inferior cerebellar stroke.  * Stroke neurology team following.   * Xarelto resumed this stay at home dose of 20mg daily  * PT/OT recommend TCU stay at discharge  * On modified diet per SLP with pureed diet (level 4) and mildly thick liquids (level 2) by spoon.      Acute metabolic encephalopathy, multifactorial  * During stay, noted increasing confusion on 10/10, likely due to recent stroke complicated by developing CHF and possible UTI.   * Head CT 10/11 AM showed no acute pathology, no bleeding.   * Remained confused and agitated on 10/11.  Clinical picture of delirium   -- mentation continues waxing/waning  -- reorient frequently  -- has prns available (Seroquel, Zyprexa and IV Haldol)  -- cont treatment of conditions as below     New acute systolic congestive heart failure exacerbation  Acute hypoxic respiratory failure dt CHF: Resolved  Hypertension  Dyslipidemia  Chronic severe bilateral LE edema  * Initially given IVFs in ED for suspected hypovolemia.   * Noted to have increased dyspnea on 10/10 with elevated lactate. Exam consistent with volume  overload (elevated JVP, bibasilar crackles and LE edema). ProBNP 5k. CXR showed vascular congestion. Started diuresis with IV Lasix  * Echo this stay showed EF 45%.   * Cardiology consutled this stay. In agreement with use of bb and IV diuresis.   -- cont diuresis with Lasix 20mg IV BID  -- cont metoprolol -- changed from po to sched IV formulation on 10/14 dt difficulties with po intake  -- add low dose ACEI when able  -- conts on statin    Atrial fibrillation with RVR  * Intermittent RVR this stay. Had been on sched oral metoprolol.   * Cardiology notes dilt contraindicated in patient with reduced EF. Recommended to uptitrate metoprolol to 100mg BID. Also started on digoxin.  -- cont digoxin 62.5mcg daily  -- cont metoprolol -- 100mg BID on hold and placed on sched IV Lopressor as above  -- conts on chronic anticoagulation with DOAC    DM II  * A1c 7.2 in 9/2022. Manages with Trulicity and glipizide.   * Oral meds held on admission and placed on basal/bolus insulin  -- cont Lantus but increase from 10U to 15U daily, mealtime Novolog per carb ratio and med dose sliding scale insulin    Recent Labs   Lab 10/14/22  0735 10/14/22  0304 10/13/22  2250 10/13/22  2011 10/13/22  1714 10/13/22  1224   * 229* 206* 211* 201* 214*       Stage III CKD  * Baseline Cr is around 0.7. Renal function stable with diuresis this stay.      UTI dt Enterobacter  Lactic acidosis: Improved  * Suspect leukocytosis (WBC 14) and lactic acidosis (lactate 3.4) more related to CHF than infection. UA was abnl. UC grew >100k enterobacter. Initially placed on ceftriaxone on 10/10 but changed to levofloxacin on 10/13 after enterobacter resistances noted.   * Recurrent lactate elevation during RRT on 10/13 PM attributed to RVR.  -- cont levofloxacin (d#2)     Pressure injury of left buttock, stage 1, chronic, present on admission.  -- WOC consulted     FEN: no IVFs, lytes stable, modified diet per SLP as above  DVT Prophylaxis: Xarelto  Code  Status: Full Code    Disposition: Will need TCU at discharge, likely 2-3d still, pending improvement of encephalopathy.    Daughter at bedside and updated.    Milena Marley, DO    Interval History   Overnight events noted. RRT called for rapid afib. Improved after IV lopressor. Seen this morning. Alert but minimally participatory with conversation. Oriented to self/location. Reported pain in LEs earlier today. No specific complaints. Some concerns with ability to take po today. Family going to try to encourage her.      -Data reviewed today: I reviewed all new labs and imaging results over the last 24 hours. I personally reviewed no images or EKG's today.    Physical Exam   Temp: (P) 97.5  F (36.4  C) Temp src: (P) Axillary BP: 119/87 Pulse: (P) 51   Resp: (P) 16 SpO2: (P) 94 % O2 Device: (P) None (Room air)    Vitals:    10/11/22 1525   Weight: 96 kg (211 lb 10.3 oz)     Vital Signs with Ranges  Temp:  [96.2  F (35.7  C)-97.5  F (36.4  C)] (P) 97.5  F (36.4  C)  Pulse:  [] (P) 51  Resp:  [14-20] (P) 16  BP: (101-159)/() 119/87  SpO2:  [93 %-99 %] (P) 94 %  I/O last 3 completed shifts:  In: 350 [I.V.:100; IV Piggyback:250]  Out: 0     Constitutional: Resting comfortably, oriented to self/location but cannot tell me the year, alert but minimally participatory during my visit, NAD  Respiratory: generally CTA thru anterior fields, no wheeze, no increased work of breathing  Cardiovascular: HR irregular and tachycardic, no MGR, no LE edema  GI: S, NT, ND, +BS  Skin/Integumen: warm/dry  Other:      Medications     - MEDICATION INSTRUCTIONS -       - MEDICATION INSTRUCTIONS -         sodium chloride 0.9%  1,000 mL Intravenous Once     digoxin  62.5 mcg Oral Daily     furosemide  20 mg Intravenous Q12H     insulin aspart   Subcutaneous TID w/meals     insulin aspart  1-7 Units Subcutaneous TID AC     insulin aspart  1-5 Units Subcutaneous At Bedtime     insulin glargine  10 Units Subcutaneous Q24H      levofloxacin  500 mg Intravenous Q24H     metoprolol  5 mg Intravenous Q6H     [Held by provider] metoprolol tartrate  100 mg Oral BID     miconazole   Topical BID     pravastatin  40 mg Oral Daily     rivaroxaban ANTICOAGULANT  20 mg Oral Daily with supper     sodium chloride (PF)  3 mL Intracatheter Q8H       Data   Recent Labs   Lab 10/14/22  0825 10/14/22  0735 10/14/22  0304 10/13/22  2250 10/13/22  2124 10/13/22  2011 10/13/22  1224 10/13/22  0857 10/12/22  1200 10/12/22  0813 10/09/22  2241 10/09/22  1525   WBC  --   --   --   --  11.4*  --   --  12.0*  --  8.9   < > 14.0*   HGB  --   --   --   --  14.7  --   --  15.5  --  14.5   < > 13.5   MCV  --   --   --   --  104*  --   --  102*  --  104*   < > 104*   PLT  --   --   --   --  253  --   --  208  --  209   < > 256   INR  --   --   --   --   --   --   --   --   --   --   --  0.97   NA  --   --   --   --   --  138  --  140  --  138   < > 138   POTASSIUM 4.0  --   --   --   --  3.8  --  3.9  --  4.3  4.3   < > 4.2   CHLORIDE  --   --   --   --   --  103  --  104  --  103   < > 102   CO2  --   --   --   --   --  30  --  25  --  28   < > 22   BUN  --   --   --   --   --  25  --  21  --  17   < > 11.7   CR  --   --   --   --   --  0.91  --  0.80  --  0.75   < > 0.74   ANIONGAP  --   --   --   --   --  5  --  11  --  7   < > 14   EZEKIEL  --   --   --   --   --  9.4  --  10.4*  --  9.6   < > 9.3   GLC  --  227* 229* 206*  --  211*   < > 230*   < > 219*   < > 289*    < > = values in this interval not displayed.       No results found for this or any previous visit (from the past 24 hour(s)).

## 2022-10-14 NOTE — PROGRESS NOTES
Sepsis Evaluation Progress Note    I was called to see Hima Griffiths due to abnormal vital signs triggering the Sepsis SIRS screening alert. She is known to have an infection.     Physical Exam   Vital Signs:  Temp: (!) 96.2  F (35.7  C) Temp src: Axillary BP: (!) 142/98 Pulse: 96   Resp: 20 SpO2: 97 % O2 Device: None (Room air)       General: in no acute distress  Mental Status: altered level of consciousness based on lethargy and disorientation.     Remainder of physical exam is significant for BLE edema, pallor.    Data   Lactic Acid   Date Value Ref Range Status   10/13/2022 2.3 (H) 0.7 - 2.0 mmol/L Final   10/11/2022 1.7 0.7 - 2.0 mmol/L Final       Assessment & Plan   NO EVIDENCE OF SEPSIS at this time.  Vital sign, physical exam, and lab findings are due to CHF exacerbations.    Disposition: The patient will remain on the current unit. We will continue to monitor this patient closely..  ESTHER Fernandez CNP    Sepsis Criteria   Sepsis: 2+ SIRS criteria due to infection  Severe Sepsis: Sepsis AND 1+ new sign of acute organ dysfunction (Note: lactate >2 or acute encephalopathy each qualify as organ dysfunction)  Septic Shock: Sepsis AND hypotension despite volume resuscitation with 30 ml/kg crystalloid or lactate >=4  Note: HYPOTENSION is defined as 2 BP readings measured 3 hrs apart that have a SBP <90, MAP <65, or decrease >40 mmHg, occurring 6 hrs before or after t-zero

## 2022-10-14 NOTE — CONSULTS
"CLINICAL NUTRITION SERVICES  -  ASSESSMENT NOTE      Future/Additional Recommendations:     Nutrition supplements prn  Continue with Room Service with Assist    If pt not able to increase po intake in the next few days, would consider alternate means of nutrition (if pt/family in agreement and consistent with GOC)       Malnutrition:   % Weight Loss:  Difficult to assess - question accuracy of most recent wt, dtr states pt's usual wt is low 220s  % Intake:  </= 50% for >/= 5 days (severe malnutrition)  Subcutaneous Fat Loss:  None observed  Muscle Loss:  Temporal region - mild depletion  Fluid Retention:  Pt with chronic lymphedema    Malnutrition Diagnosis: At risk for Moderate malnutrition  In Context of:  Acute illness or injury          REASON FOR ASSESSMENT  Hima Griffiths is a 77 year old female seen by Registered Dietitian for Provider Order - \"assess nutritional status\"      NUTRITION HISTORY  Chart reviewed  Was in usual state of health until morning of admit  Pt admitted with sudden onset vertigo with nausea and vomiting and is found to have acute ischemic right cerebellar stroke    Spoke with dtr this afternoon  Pt lives at  apt - gets 1 meal/day in the dining room  Notes that her mom has had trouble with choking on some food items (lettuce, steak, bread)  \"She doesn't eat very healthy - loves sweets\"  Pt likes soups, pasta, pudding - softer foods  Drinks juice, water, coffee, diet soda      10/10: SLP ---> Patient presents with mild to moderate oral and pharyngeal dysphagia with known history of esophageal dysphagia        CURRENT NUTRITION ORDERS  Diet Order:     (10/11) Moderate CHO, Pureed, Mildly Thick Liquids - per SLP  Room Service with Assist    Pt is being seen daily for meal ordering  Note that pt has been too lethargic to eat anything of significance     Dtr states that pt has only taken a few bites of pudding so far today -\"she is just too tired\"  Pt didn't take any po yesterday  Dtr " "acknowledges that pt may need a FT if she doesn't start taking more po - \"she will hate it and probably pull it out\"        NUTRITION FOCUSED PHYSICAL ASSESSMENT FOR DIAGNOSING MALNUTRITION)  Yes (brief visual - pt a bit restless, taking gown off and trying to pull over her like a blanket)              Observed:    Muscle wasting (refer to documentation in Malnutrition section)    Obtained from Chart/Interdisciplinary Team:  10/14: WOCN   Site: BL buttock   Skin injury due to: Friction   Healed 10/14/22    ANTHROPOMETRICS  Height: 5' 6\"  Weight: 211 lbs 10.27 oz  Body mass index is 34.16 kg/m .  Weight Status:  Obesity Grade I BMI 30-34.9  IBW: 59.1 kg  Weight History:   Records reflect that wt is down ~20# in the past 3 weeks ??  Dtr does not feel the wt of 211# from (10/11) is accurate - states that pt's usual wt is in the low 220s - \"230# sounds high to me as well\"    Net I/O: -3,580 mL    Wt Readings from Last 10 Encounters:   10/11/22 96 kg (211 lb 10.3 oz)   09/21/22 104.7 kg (230 lb 12.8 oz)   08/18/22 103.7 kg (228 lb 9.6 oz)   07/14/22 101.6 kg (224 lb)   07/06/22 101.2 kg (223 lb 3.2 oz)   07/06/22 101.2 kg (223 lb 3.2 oz)   06/28/22 101.6 kg (224 lb)   06/27/22 101.6 kg (224 lb)   06/16/22 103.4 kg (227 lb 14.4 oz)   03/16/22 107 kg (235 lb 14.4 oz)         LABS  10/14: K 4.0    MEDICATIONS  Lasix      ASSESSED NUTRITION NEEDS PER APPROVED PRACTICE GUIDELINES:    Dosing Weight 59.1 kg (IBW)  Estimated Energy Needs: 6329-0629 kcals (25-30 Kcal/Kg)  Justification: obese  Estimated Protein Needs: 70-90 grams protein (1.2-1.5 g pro/Kg)  Justification: maintenance, CKD  Estimated Fluid Needs: 1803-9361  mL (1 mL/Kcal)  Justification: maintenance    MALNUTRITION:  % Weight Loss:  Difficult to assess - question accuracy of most recent wt, dtr states pt's usual wt is low 220s  % Intake:  </= 50% for >/= 5 days (severe malnutrition)  Subcutaneous Fat Loss:  None observed  Muscle Loss:  Temporal region - mild " depletion  Fluid Retention:  Pt with chronic lymphedema    Malnutrition Diagnosis: At risk for Moderate malnutrition  In Context of:  Acute illness or injury    NUTRITION DIAGNOSIS:  Inadequate oral intake related to increased lethargy as evidenced by pt with minimal po intake since admit      NUTRITION INTERVENTIONS  Recommendations / Nutrition Prescription  Moderate CHO, Pureed, Mildly Thick Liquids  Nutrition supplements prn  Continue with Room Service with Assist    If pt not able to increase po intake in the next few days, would consider alternate means of nutrition (if pt/family in agreement and consistent with GOC)  .      Implementation  Nutrition education --> Reviewed diet order, supplements and possible need for EN with pt's dtr  .      Nutrition Goals  Pt to have an increase in alertness and po intake  .      MONITORING AND EVALUATION:  Progress towards goals will be monitored and evaluated per protocol and Practice Guidelines

## 2022-10-14 NOTE — PROVIDER NOTIFICATION
Pt was unable to receive her PO medication today due to lethargy. HR irregular and up into the 160s. On-call hospitalist paged for PRN orders, however RRT called during that time due to elevated HR/on-going lethargy and sepsis screen firing.       Critical Lactic of 2.3 received during RRT, notified alec Coulter.

## 2022-10-14 NOTE — PROGRESS NOTES
Cross Cover Note    Held Oral Metoprolol 100 mg BID. Will trial metoprolol 5 mg q 6 hours with hold parameters.    Caridad Olson PA-C

## 2022-10-14 NOTE — PROGRESS NOTES
"[facility]  WO Nurse Inpatient Assessment     Consulted for: Sacrum        Areas Assessed:      Areas visualized during today's visit: Sacrum/coccyx    Skin Injury Location: BL buttock    Last photo: 10/14/2022    Skin injury due to: Friction  Healed 10/14/22            Treatment Plan:     BL buttock wound(s): Every 3 days   1. Clean wound with saline or MicroKlenz Spray, pat dry  2. Wipe / \"clean\" the surrounding periwound tissue with skin prep (Cavilon No Sting Skin Prep #[id number]) and allow to dry. This will help protect periwound and help dressing adherence  3. Press a Mepilex  Sacral Dressing (PS#[id number])    to the area, making sure to conform nicely to skin curvatures.   4. Time and date dressing change  NOTE  -Reposition pt side to side ban when in bed, every 2 hours-get the pt way over on side to completely offload pressure. This will benefit skin and respiratory function   -Keep heels elevated and floating on pillows at all times. Try using at least 2 pillows under each calf.  -When up to the chair pt needs to fully offload every 2 hours and use a chair cushion if needed       Orders: Reviewed, continue for prevention     RECOMMEND PRIMARY TEAM ORDER: None, at this time  Education provided: importance of repositioning, plan of care and Off-loading pressure  Discussed plan of care with: Patient, Family and Nurse  WOC nurse follow-up plan: signing off  Notify WOC if wound(s) deteriorate.  Nursing to notify the Provider(s) and re-consult the WOC Nurse if new skin concern.    DATA:     Current support surface: Standard  Atmos Air mattress  Containment of urine/stool: Incontinence Protocol  BMI: Body mass index is 34.16 kg/m .   Active diet order: Orders Placed This Encounter      Combination Diet Moderate Consistent Carb (60 g CHO per Meal) Diet; Pureed Diet (level 4); Mildly Thick (level 2) (by spoon)     Output: I/O last 3 completed shifts:  In: 350 [I.V.:100; IV Piggyback:250]  Out: 0 "      Labs:   Recent Labs   Lab 10/13/22  2124 10/10/22  0823 10/09/22  1525   HGB 14.7   < > 13.5   INR  --   --  0.97   WBC 11.4*   < > 14.0*    < > = values in this interval not displayed.     Pressure injury risk assessment:   Sensory Perception: 3-->slightly limited  Moisture: 3-->occasionally moist  Activity: 1-->bedfast  Mobility: 2-->very limited  Nutrition: 2-->probably inadequate  Friction and Shear: 2-->potential problem  Dominic Score: 13    [facility]   Dept. Pager: [phone]  Dept. Office Number: [phone]

## 2022-10-15 NOTE — CODE/RAPID RESPONSE
Sepsis/RRT Evaluation Progress Note    RRT activated 7:07 AM by flying jammiead RN after staff repeatedly having difficulty obtaining blood pressures, concern for 15-22 seconds apneic spells and slow capillary refill/signs of poor peripheral perfusion and transient left breast pain    I was called to see Hima Griffiths due to abnormal vital signs triggering the Sepsis SIRS screening alert. She is known to have an infection.     Subjective patient's an unreliable historian, she is disoriented to year, repeatedly answering 1924.  She cannot state her age.    Objective  Physical Exam   Vital Signs:  Temp: (!) 96.7  F (35.9  C) Temp src: Axillary BP: 91/74 Pulse: 50   Resp: 14 SpO2: 99 % O2 Device: None (Room air)         Constitutional: vs 131/79, heart rate 121  General: Chronically ill adult pt lying in bed without acute distress  Neuro: +follows command to stick out tongue, face symmetric, tongue midline, speech fluent disoriented to place and time  Eyes for  Head, ENT & mouth: NC/AT, dry oral mucosa  Neck: supple  CV S1S2 irregularly irregular  resp: CTAB upper and lower lobes my initial evaluation I did note that when she is sleeping she does appear to have apneic spells.  Not noted when she was more awake  gi:normoactive bowel sounds, soft, nontender, nondisteded  Ext: no edema  Skin: no rashes on exposed skin, cyanotic fingertips right more than left also noted on left toes  Lymph: Deferred  Musculoskeletal no bony joint deformities    Data   Lactic Acid   Date Value Ref Range Status   10/15/2022 2.7 (H) 0.7 - 2.0 mmol/L Final   10/15/2022 2.4 (H) 0.7 - 2.0 mmol/L Final       Assessment & Plan   NO EVIDENCE OF SEPSIS at this time.  Vital sign, physical exam, and lab findings are due to Hypovolemia.     Poor peripheral perfusion with cyanotic fingers and hypovolemic appearing on clinical exam with dry oral mucosa  differential diagnosis: Patient already on fluoroquinolone treatment for cystitis, just had TTE  showing LVEF 45% so low suspicion for cardiogenic shock    -LR bolus 500mL  -hold lasix  -stat abg--> within normal limit, LA--> 2.7, ionized calcium 4.2, CBC--> hemoglobin 16 concerning for hemoconcentration  -recheck LA at 0930  -IV infiltrated, difficult start VAT rec midline which I ok'd  -follow level of consciousness, UO,peripheral pulses    Disposition: The patient will remain on the current unit. We will continue to monitor this patient closely.     Total time 29 minutes from 711 until 718 and then 759 until 820    Georgiana Cuevas CNP  Hospitalist - House [facility] 7am-6pm  Text Page   Pager [phone]

## 2022-10-15 NOTE — PROCEDURES
[facility]    Single Lumen Midline Placement    Date/Time: 10/15/2022 10:01 AM  Performed by: Cade Hernandez RN  Authorized by: Georgiana Cuevas APRN CNP   Indications: vascular access      UNIVERSAL PROTOCOL   Site Marked: Yes  Prior Images Obtained and Reviewed:  Yes  Required items: Required blood products, implants, devices and special equipment available    Patient identity confirmed:  Verbally with patient, arm band and hospital-assigned identification number  NA - No sedation, light sedation, or local anesthesia  Confirmation Checklist:  Patient's identity using two indicators, relevant allergies, procedure was appropriate and matched the consent or emergent situation and correct equipment/implants were available  Time out: Immediately prior to the procedure a time out was called    Universal Protocol: the Joint Commission Universal Protocol was followed    Preparation: Patient was prepped and draped in usual sterile fashion       ANESTHESIA    Anesthesia: See MAR for details  Local Anesthetic:  Lidocaine 1% without epinephrine  Anesthetic Total (mL):  2      SEDATION    Patient Sedated: No        Skin prep agent: skin prep agent completely dried prior to procedure  Sterile barriers: maximum sterile barriers were used: cap, mask, sterile gown, sterile gloves, and large sterile sheet  Hand hygiene: hand hygiene performed prior to central venous catheter insertion  Type of line used: Midline  Catheter type: single lumen  Lumen type: non-valved  Catheter size: 4 Fr  Brand: Bard  Lot number: [id number]  Placement method: venipuncture, MST and ultrasound  Number of attempts: 1  Difficulty threading catheter: no  Successful placement: yes  Orientation: right    Location: basilic vein  Site rationale: no vein found on the left upper arm with US  Arm circumference: adults 10 cm  Extremity circumference: 36  Visible catheter length: 2  Internal length: 10 cm  Total catheter length:  12  Dressing and securement: chlorhexidine patch applied, statlock, sterile dressing applied and transparent dressing  Post procedure assessment: blood return through all ports  PROCEDURE   Patient Tolerance:  Patient tolerated the procedure well with no immediate complicationsDescribe Procedure: Nursing note  Pt a/o x 2, disoriented to time and situation. Pt daughter in law at the bedside. The writer explained to pt the risks/benefits of the procedure. Found 2.5 mm right basilic vein and was successful on one attempt with good blood return.

## 2022-10-15 NOTE — PROGRESS NOTES
[facility]    Hospitalist Progress Note    Assessment & Plan   Hima Griffiths is a 77 year old female with PMHx of afib on chronic anticoagulation with DOAC, hypertension, DM II and hx of CVA  who was admitted on 10/9/2022 with symptoms of vertigo and was found to have had an acute right cerebellar CVA. Hospital stay has been complicated by metabolic encephalopathy, afib with RVR and CHF exacerbation.      Acute ischemic right cerebellar stroke  * Hx MCA stroke in 2015, also dx with atrial fibrillation started on DOAC.   * Had been off DOAC for over a week PTA.   * Admitted with sudden vertigo with nausea and vomiting that started the day of admission. Labs notable for leukocytosis. MRI/MRA head/neck showed a right inferior cerebellar infarct, as well as diminished flow through several vertebral artery segments. MRI confirmed acute right inferior cerebellar stroke.  * Xarelto resumed this stay at home dose of 20mg daily per stroke team.  * PT/OT recommend TCU stay at discharge  * On modified diet per SLP with pureed diet (level 4) and mildly thick liquids (level 2) by spoon.   * Follow up with general neurology in clinic in 8 wks.     Acute metabolic encephalopathy, multifactorial: Improved  * During stay, noted increasing confusion on 10/10, likely due to recent stroke complicated by developing CHF and possible UTI.   * Head CT 10/11 AM showed no acute pathology, no bleeding.   * Remained confused and agitated on 10/11.  Clinical picture of delirium   -- mentation continues waxing/waning -- was more alert and interactive on 10/15 AM  -- reorient frequently  -- has prns available (Seroquel, Zyprexa and IV Haldol)  -- cont treatment of conditions as below     New acute systolic congestive heart failure exacerbation  Acute hypoxic respiratory failure dt CHF: Resolved  Hypertension  Dyslipidemia  Chronic severe bilateral LE edema  * Initially given IVFs in ED for suspected hypovolemia.   *  Noted to have increased dyspnea on 10/10 with elevated lactate. Exam consistent with volume overload (elevated JVP, bibasilar crackles and LE edema). ProBNP 5k. CXR showed vascular congestion. Started diuresis with IV Lasix  * Echo this stay showed EF 45%.   * Cardiology consutled this stay. In agreement with use of bb and IV diuresis.   * Initially tolerated diuresis with Lasix 20mg IV BID but condition declined and was unable to take po, appeared dry with upward trend in Cr. Required fluid boluses during RRTs on 10/13 PM and 10/15 AM.  -- IV Lasix on hold as of 10/15  -- cont metoprolol -- changed from po to sched IV formulation on 10/14 dt difficulties with po intake -- change back to po if steadily alert and tolerating po intake  -- add low dose ACEI when able and if tolerated  -- conts on statin    Atrial fibrillation with RVR  * Intermittent RVR this stay. Had been on sched oral metoprolol.   * Cardiology notes dilt contraindicated in patient with reduced EF. Recommended to uptitrate metoprolol to 100mg BID. Also started on digoxin.  * Given difficulties with po intake this stay, was given IV digoxin (250mcg x1) on 10/15  -- cont digoxin 62.5mcg daily  -- cont metoprolol -- holding previous dose of 100mg po BID and using sched IV Lopressor (5mg IV q6h)  -- conts on chronic anticoagulation with DOAC     Intermittent lactic acidosis  * Variable lactates noted this stay, suspect multifactorial, dt acute illness, CVA, RVR and UTI. Managing those conditions as noted.   -- given IVFs during RRTs on 10/13, 10/15    UTI dt Enterobacter  * Suspect leukocytosis (WBC 14) and lactic acidosis (lactate 3.4) more related to CHF than infection. UA was abnl. UC grew >100k enterobacter. Initially placed on ceftriaxone on 10/10 but changed to levofloxacin on 10/13 after enterobacter resistances noted.   * Recurrent lactate elevation during RRT on 10/13 PM attributed to RVR.  -- cont levofloxacin (d#3)    Stage III CKD  *  Baseline Cr is around 0.7. Renal function had been stable with diuresis this stay.   -- Cr now trending up as of 10/13 in setting of diuresis and decreased po intake  -- given fluid boluses on 10/13, 10/15  -- holding Lasix as of 10/15     DM II  * A1c 7.2 in 9/2022. Manages with Trulicity and glipizide.   * Oral meds held on admission and placed on basal/bolus insulin  -- cont Lantus 15U daily, mealtime Novolog per carb ratio and med dose sliding scale insulin    Recent Labs   Lab 10/15/22  0829 10/15/22  0321 10/14/22  2115 10/14/22  1619 10/14/22  1137 10/14/22  0735   * 242* 192* 212* 235* 227*        Pressure injury of left buttock, stage 1, chronic, present on admission.  -- WOC consulted     FEN: no IVFs, lytes stable, modified diet per SLP as above  DVT Prophylaxis: Xarelto  Code Status: Full Code    Disposition: Will need TCU at discharge, likely 2-3d still, pending improvement of encephalopathy, stable VS.    Daughter at bedside and updated.    Milena Marley, DO    Interval History    Noted difficulties with taking po. SLP following, may need feeding tube for alternate nutrition/meds. RRT called this AM for issues with BPs/HRs. Appeared dry and IVFs ordered. I saw patient his morning. She was much more alert and interactive today! Was able to take some pills in pudding per bedside RN. Oriented to self and location but could not tell me the year. No specific complaints. No cp/sob/cough, abd pain/n/v presently.     -Data reviewed today: I reviewed all new labs and imaging results over the last 24 hours. I personally reviewed no images or EKG's today.    Physical Exam   Temp: 97.3  F (36.3  C) Temp src: Axillary BP: 120/83 Pulse: 110   Resp: 20 SpO2: 96 % O2 Device: None (Room air)    Vitals:    10/11/22 1525   Weight: 96 kg (211 lb 10.3 oz)     Vital Signs with Ranges  Temp:  [96.1  F (35.6  C)-97.4  F (36.3  C)] 97.3  F (36.3  C)  Pulse:  [] 110  Resp:  [14-20] 20  BP:  ()/() 120/83  SpO2:  [95 %-100 %] 96 %  I/O last 3 completed shifts:  In: -   Out: 350 [Urine:350]    Constitutional: Resting comfortably, more alert and interactive today, answering questions, oriented to self/location but could not tell me the year, NAD  Respiratory: CTA thru anterior fields, no wheeze, no increased work of breathing  Cardiovascular: HR irregular, no MGR, no LE edema  GI: S, NT, ND, +BS  Skin/Integumen: warm/dry, midline in RUE  Other:      Medications     - MEDICATION INSTRUCTIONS -       - MEDICATION INSTRUCTIONS -         digoxin  62.5 mcg Oral Daily     [Held by provider] furosemide  20 mg Intravenous Q12H     insulin aspart   Subcutaneous TID w/meals     insulin aspart  1-7 Units Subcutaneous TID AC     insulin aspart  1-5 Units Subcutaneous At Bedtime     insulin glargine  15 Units Subcutaneous QAM AC     levofloxacin  500 mg Intravenous Q24H     metoprolol  5 mg Intravenous Q6H     [Held by provider] metoprolol tartrate  100 mg Oral BID     miconazole   Topical BID     pravastatin  40 mg Oral Daily     rivaroxaban ANTICOAGULANT  20 mg Oral Daily with supper     sodium chloride (PF)  3 mL Intracatheter Q8H     sodium chloride (PF)  3 mL Intracatheter Q8H     sodium chloride (PF)  3 mL Intracatheter Q8H       Data   Recent Labs   Lab 10/15/22  0829 10/15/22  0737 10/15/22  0321 10/14/22  2115 10/14/22  1137 10/14/22  0825 10/13/22  2250 10/13/22  2124 10/13/22  2011 10/13/22  1224 10/13/22  0857 10/09/22  2241 10/09/22  1525   WBC  --  11.6*  --   --   --   --   --  11.4*  --   --  12.0*   < > 14.0*   HGB  --  16.1*  --   --   --   --   --  14.7  --   --  15.5   < > 13.5   MCV  --  104*  --   --   --   --   --  104*  --   --  102*   < > 104*   PLT  --  250  --   --   --   --   --  253  --   --  208   < > 256   INR  --   --   --   --   --   --   --   --   --   --   --   --  0.97   NA  --   --  144  --   --   --   --   --  138  --  140   < > 138   POTASSIUM  --   --  4.2  --   --   4.0  --   --  3.8  --  3.9   < > 4.2   CHLORIDE  --   --  104  --   --   --   --   --  103  --  104   < > 102   CO2  --   --  29  --   --   --   --   --  30  --  25   < > 22   BUN  --   --  34*  --   --   --   --   --  25  --  21   < > 11.7   CR  --   --  1.03  --   --   --   --   --  0.91  --  0.80   < > 0.74   ANIONGAP  --   --  11  --   --   --   --   --  5  --  11   < > 14   EZEKIEL  --   --  10.2*  --   --   --   --   --  9.4  --  10.4*   < > 9.3   *  --  242* 192*   < >  --    < >  --  211*   < > 230*   < > 289*    < > = values in this interval not displayed.       No results found for this or any previous visit (from the past 24 hour(s)).

## 2022-10-15 NOTE — PROGRESS NOTES
Inpatient Cardiology Consultation Progress Note:    Hima Griffiths MRN#: [id number]   YOB: 1945 Age: 77 year old     Date of Admission: 10/9/2022  Consult indication: Newly reduced EF and Atrial Fib with RVR          Assessment and Plan:     #Atrial fibrillation with rapid ventricular rate  #Acute systolic heart failure with midrange EF (45%)  #Acute metabolic encephalopathy  #Acute ischemic right cerebellar stroke  #UTI  #Stage III CKD   #DMT2    Patient is a pleasant 77 years old female who presented to the hospital with vertigo and was found to have acute right cerebellar stroke.    Her hospitalization was complicated by acute encephalopathy, atrial fibrillation with rapid ventricular rate and newly diagnosed congestive heart failure with mid range EF.    She underwent transthoracic echocardiography which showed an ejection fraction of 45%, global hypokinesis was noted.  She had significantly elevated BNP and chest x-ray showed evidence of congestive heart failure.  Troponin was negative.  The etiology of the heart failure is likely tacky mediated, however ischemic evaluation should be pursued once patient has recovered from stroke.  This can be done in the form of nuclear stress testing as an outpatient.    Plan for now should be focused on obtaining adequate rate control and optimizing heart failure treatment    Plan  -Given her hypotension earlier today and dry mucous membranes, I agree with holding diuresis.  She appears to be adequately diuresed and she is net negative close to 4 L with poor p.o. intake.  -Heart rate remains poorly controlled with rates in the 130s, she has been receiving IV metoprolol with good response.  Initiate Lopressor 25 mg twice daily, can be uptitrated as tolerated  -Further GDMT pending clinical status improvement and stabilization of blood pressure  -Ischemic evaluation as an outpatient  -Continue anticoagulation with Xarelto  -Continue renally dosed  digoxin    Thank you for allowing our team to participate in the care of Hima Griffiths.  Please do not hesitate to page me with any questions or concerns.     Tod Torres MD on 10/15/2022 at 12:49 PM  [facility]  October 15, 2022    Voice recognition software utilized.          Interval Events:     Awake, less interactive, opens her eyes.  Does not respond to my questions appropriately.         Medications reviewed:     Current medications:  Current Facility-Administered Medications Ordered in Epic   Medication Dose Route Frequency Last Rate Last Admin     acetaminophen (TYLENOL) tablet 650 mg  650 mg Oral Q4H PRN   650 mg at 10/12/22 2053    Or     acetaminophen (TYLENOL) Suppository 650 mg  650 mg Rectal Q4H PRN   650 mg at 10/14/22 1435     benztropine (COGENTIN) tablet 1 mg  1 mg Oral TID PRN         glucose gel 15-30 g  15-30 g Oral Q15 Min PRN        Or     dextrose 50 % injection 25-50 mL  25-50 mL Intravenous Q15 Min PRN        Or     glucagon injection 1 mg  1 mg Subcutaneous Q15 Min PRN         digoxin (LANOXIN) half-tab 62.5 mcg  62.5 mcg Oral Daily   62.5 mcg at 10/15/22 0945     [Held by provider] furosemide (LASIX) injection 20 mg  20 mg Intravenous Q12H   20 mg at 10/14/22 2116     haloperidol lactate (HALDOL) injection 2 mg  2 mg Intravenous Q6H PRN         labetalol (NORMODYNE/TRANDATE) injection 10-20 mg  10-20 mg Intravenous Q10 Min PRN        Or     hydrALAZINE (APRESOLINE) injection 10-20 mg  10-20 mg Intravenous Q1H PRN         insulin aspart (NovoLOG) injection (RAPID ACTING)   Subcutaneous TID w/meals   2 Units at 10/12/22 1804     insulin aspart (NovoLOG) injection (RAPID ACTING)  1-7 Units Subcutaneous TID AC   2 Units at 10/15/22 1229     insulin aspart (NovoLOG) injection (RAPID ACTING)  1-5 Units Subcutaneous At Bedtime   1 Units at 10/13/22 2257     insulin glargine (LANTUS PEN) injection 15 Units  15 Units Subcutaneous QAM AC   15 Units at 10/15/22 0938      levofloxacin (LEVAQUIN) infusion 500 mg  500 mg Intravenous Q24H 100 mL/hr at 10/15/22 1224 500 mg at 10/15/22 1224     lidocaine (LMX4) cream   Topical Q1H PRN         lidocaine 1 % 0.1-1 mL  0.1-1 mL Other Q1H PRN         medication instruction - No oral meds if patient didn't pass dysphagia screen   Does not apply Continuous PRN         Medication Instructions - Avoid dextrose in IV solutions.   Intravenous Continuous PRN         melatonin tablet 3 mg  3 mg Oral At Bedtime PRN   3 mg at 10/12/22 2053     metoprolol (LOPRESSOR) injection 5 mg  5 mg Intravenous Q6H   5 mg at 10/15/22 0944     [Held by provider] metoprolol tartrate (LOPRESSOR) tablet 100 mg  100 mg Oral BID   100 mg at 10/12/22 1939     miconazole (MICATIN) 2 % powder   Topical BID   Given at 10/15/22 0943     OLANZapine zydis (zyPREXA) ODT tab 5 mg  5 mg Oral At Bedtime PRN         ondansetron (ZOFRAN) injection 4 mg  4 mg Intravenous Q6H PRN   4 mg at 10/11/22 1128    Or     ondansetron (ZOFRAN ODT) ODT tab 4 mg  4 mg Oral Q6H PRN         polyethylene glycol (MIRALAX) Packet 17 g  17 g Oral BID PRN         pravastatin (PRAVACHOL) tablet 40 mg  40 mg Oral Daily   40 mg at 10/15/22 0945     prochlorperazine (COMPAZINE) injection 5 mg  5 mg Intravenous Q6H PRN   5 mg at 10/10/22 0635    Or     prochlorperazine (COMPAZINE) tablet 5 mg  5 mg Oral Q6H PRN        Or     prochlorperazine (COMPAZINE) suppository 12.5 mg  12.5 mg Rectal Q12H PRN         QUEtiapine (SEROquel) half-tab 12.5 mg  12.5 mg Oral Q6H PRN   12.5 mg at 10/12/22 1518     rivaroxaban ANTICOAGULANT (XARELTO) tablet 20 mg  20 mg Oral Daily with supper   20 mg at 10/14/22 1636     sodium chloride (PF) 0.9% PF flush 10 mL  10 mL Intracatheter Q8H   10 mL at 10/15/22 1006     sodium chloride (PF) 0.9% PF flush 10-20 mL  10-20 mL Intracatheter q1 min prn   20 mL at 10/15/22 1006     sodium chloride (PF) 0.9% PF flush 3 mL  3 mL Intracatheter Q8H   3 mL at 10/15/22 0605     sodium chloride  (PF) 0.9% PF flush 3 mL  3 mL Intracatheter q1 min prn   3 mL at 10/11/22 1717     No current [facility]-ordered outpatient medications on file.             Physical Exam:   Vital signs were reviewed:  Temperatures:  Current - Temp: 97.3  F (36.3  C); Max - Temp  Av.9  F (36.1  C)  Min: 96.3  F (35.7  C)  Max: 97.4  F (36.3  C)  Respiration range: Resp  Av.5  Min: 14  Max: 20  Pulse range: Pulse  Av.6  Min: 50  Max: 153  Blood pressure range: Systolic (24hrs), Av , Min:71 , Max:161   ; Diastolic (24hrs), Av, Min:50, Max:103    Pulse oximetry range: SpO2  Av.1 %  Min: 95 %  Max: 100 %    Intake/Output Summary (Last 24 hours) at 10/15/2022 1246  Last data filed at 10/15/2022 0800  Gross per 24 hour   Intake 125 ml   Output 350 ml   Net -225 ml     211 lbs 10.27 oz  Body mass index is 34.16 kg/m .   Body surface area is 2.11 meters squared.  General: Opens eyes, somnolent  Neck: JVP none elevated, supple  Heart: Tachycardic with irregular rate and rhythm, Normal S1, S2.   Abdomen: soft, none distended, +BS  Extremities: warm, no peripheral edema          Selected laboratory tests:   Laboratory test results personally reviewed:   [facility]  Recent Labs   Lab 10/15/22  1229 10/15/22  0829 10/15/22  0321 10/14/22  2115 10/14/22  1137 10/14/22  0825 10/13/22  2250 10/13/22  2011 10/13/22  1224 10/13/22  0857 10/12/22  1200 10/12/22  0813   NA  --   --  144  --   --   --   --  138  --  140  --  138   POTASSIUM  --   --  4.2  --   --  4.0  --  3.8  --  3.9  --  4.3  4.3   CHLORIDE  --   --  104  --   --   --   --  103  --  104  --  103   CO2  --   --  29  --   --   --   --  30  --  25  --  28   ANIONGAP  --   --  11  --   --   --   --  5  --  11  --  7   * 269* 242* 192*   < >  --    < > 211*   < > 230*   < > 219*   BUN  --   --  34*  --   --   --   --  25  --  21  --  17   CR  --   --  1.03  --   --   --   --  0.91  --  0.80  --  0.75   GFRESTIMATED  --   --  55*  --   --   --   --  64  --  75  --   81   EZEKIEL  --   --  10.2*  --   --   --   --  9.4  --  10.4*  --  9.6   MAG  --   --  2.5*  --   --  2.6*  --   --   --  2.3  --  2.3    < > = values in this interval not displayed.     CBC  Recent Labs   Lab 10/15/22  0737 10/13/22  2124 10/13/22  0857 10/12/22  0813   WBC 11.6* 11.4* 12.0* 8.9   RBC 4.70 4.32 4.52 4.31   HGB 16.1* 14.7 15.5 14.5   HCT 49.0* 44.8 46.0 44.9   * 104* 102* 104*   MCH 34.3* 34.0* 34.3* 33.6*   MCHC 32.9 32.8 33.7 32.3   RDW 12.3 12.1 12.2 11.8    253 208 209     INR  Recent Labs   Lab 10/09/22  1525   INR 0.97     No results found for: TROPI, TROPONIN, TROPR, TROPN  Recent Labs   Lab Test 10/10/22  0823 03/16/22  1127   CHOL 132 129   HDL 53 51   LDL 55 54   TRIG 120 119     Lab Results   Component Value Date    A1C 7.2 09/21/2022    A1C 8.3 06/16/2022    A1C 7.5 03/16/2022    A1C 7.7 09/01/2021    A1C 8.5 01/20/2021    A1C 8.5 07/10/2020    A1C 7.6 12/18/2019    A1C 7.6 05/17/2019    A1C 8.9 01/02/2019     TSH   Date Value Ref Range Status   06/16/2022 1.74 0.40 - 4.00 mU/L Final   05/17/2019 1.49 0.40 - 4.00 mU/L Final     TTE 10/9/2022  Technically challenging study due to patient body habitus, even with the use  of contrast imaging.     Left ventricular systolic function is mild to moderately reduced. The visual  ejection fraction is estimated at 45%.  With rapid atrial fibrillation, the visual approximation of left ventricular  systolic function may be falsely decreased.  Regional wall motion abnormalities cannot be excluded due to limited  visualization, however hypokinesis appears global.  Right ventricular global function is normal.  No clear evidence of interatrial shunt on limited assessment with color  Doppler. A contrast injection (Bubble Study) was performed that was negative  for flow across the interatrial septum.  There is mild-moderate global hypokinesia of the left ventricle.  The ascending aorta is Mildly dilated. Max diameter of the visualized  portion  3.9 cm.     There are no prior studies available for comparison

## 2022-10-15 NOTE — PLAN OF CARE
Reason for Admission: Ischemic stroke   Cognitive/Mentation: A/Ox 2-3, forgetful and slow to respond  Neuros/CMS: Stable with generalized weakness. RUE 3/5, LUE 2/5, BLE 2/5. Pt difficult to assess, doesn't follow all commands.   VS: VSS on RA, tachycardic throughout shift.   Tele: Afib RVR  GI: BS audible, + flatus, no BM this shift. Incontinent.  : Purewick in place. Incontinent.  Pulmonary: LS diminished   Pain: Denies pain, but grimaces when moving extremities.   Drains/Lines: PIV SL  Skin: Intact, with scattered bruising. L friction wound on buttocks. T/R.   Activity: Assist x 2 with lift device.  Diet: Pureed diet with mildly thick liquids. Takes pills crushed, one at a time slowly with pudding and encouragement.   Therapies recs: Pending   Discharge: Pending  Aggression Stoplight Tool: Green  End of shift summary: Pt HR was consistently 120-160 range throughout shift. Scheduled Metoprolol and once time dose of Digoxin given. Very difficult to get accurate BP reading throughout shift with both automatic and manual.

## 2022-10-15 NOTE — PROVIDER NOTIFICATION
MD Notification    Notified Person: MD    Notified Person Name: Dr. Obdulia Marley    Notification Date/Time: 10/15/22 at 1020    Notification Interaction: amcom,    Purpose of Notification: Lactic came back 2.7.    Orders Received:    Comments:

## 2022-10-15 NOTE — SIGNIFICANT EVENT
Significant Event Note    Time of event: 2:11 AM October 15, 2022    Description of event:  Patient with afib rvr and heart rates in the 140s. Previous hr rates were recorded as being in the 50s, but the patient's bedside nurse reports that the machine was no reading the heart rate correctly so she was likely tachycardic for a greater period of time than what is reflected in the vital signs. Iv digoxin ordered once. Can administer further doses as needed.        Tacos Mulligan MD

## 2022-10-16 NOTE — PROGRESS NOTES
Patient is A/O to self and family, vss, a-febrile, denies pain, left side and bilateral lower extremity weakness, tele a-fib with CVR, up with lift to the chair, turn and repo Q 2 hrs, admitted with ischemic stroke, pt on DD4 with mild thick liquids, assist with meals, appetite poor, mepilex on buttocks CDI, PT/SLP following.

## 2022-10-16 NOTE — PROGRESS NOTES
Inpatient Cardiology Consultation Progress Note:    Hima Griffiths MRN#: [id number]   YOB: 1945 Age: 77 year old     Date of Admission: 10/9/2022  Consult indication: Newly reduced EF and Atrial Fib with RVR          Assessment and Plan:     #Atrial fibrillation with rapid ventricular rate  #Acute systolic heart failure with midrange EF (45%)  #Acute metabolic encephalopathy  #Acute ischemic right cerebellar stroke  #UTI  #Stage III CKD   #DMT2  #Episode of hypotension 10/15 AM with rapid response now resolved     Patient is a pleasant 77 years old female who presented to the hospital with vertigo and was found to have acute right cerebellar stroke.    Her hospitalization was complicated by acute encephalopathy, atrial fibrillation with rapid ventricular rate and newly diagnosed congestive heart failure with mid range EF.    She underwent transthoracic echocardiography which showed an ejection fraction of 45%, global hypokinesis was noted.  She had significantly elevated BNP and chest x-ray showed evidence of congestive heart failure.  Troponin was negative.  The etiology of the heart failure is likely tacky mediated, however ischemic evaluation should be pursued once patient has recovered from stroke.  This can be done in the form of nuclear stress testing as an outpatient.    Plan for now should be focused on obtaining adequate rate control and optimizing heart failure treatment    Plan  -Heart rate remains poorly controlled, increase Lopressor to 50 mg BID, anticipate we can get her back to home dose of 100 mg BID over next few days.   -Utilize IV Metoprolol PRN for sustained heart rate > 120.  -Significantly hypertensive over the last 24 hours, resume home Lisinopril 5 mg daily, check kidney function in the AM   -Ischemic evaluation as an outpatient  -Continue anticoagulation with Xarelto  -Discontinue digoxin, does not seem to be helping     Thank you for allowing our team to participate in  the care of Hima Griffiths.  Please do not hesitate to page me with any questions or concerns.     Tod Torres MD on 10/15/2022 at 12:49 PM  [facility]  October 15, 2022    Voice recognition software utilized.          Interval Events:     Awake, less interactive, opens her eyes.  Does not respond to my questions appropriately.         Medications reviewed:     Current medications:  Current Facility-Administered Medications Ordered in Epic   Medication Dose Route Frequency Last Rate Last Admin     acetaminophen (TYLENOL) tablet 650 mg  650 mg Oral Q4H PRN   650 mg at 10/15/22 2004    Or     acetaminophen (TYLENOL) Suppository 650 mg  650 mg Rectal Q4H PRN   650 mg at 10/14/22 1435     benztropine (COGENTIN) tablet 1 mg  1 mg Oral TID PRN         glucose gel 15-30 g  15-30 g Oral Q15 Min PRN        Or     dextrose 50 % injection 25-50 mL  25-50 mL Intravenous Q15 Min PRN        Or     glucagon injection 1 mg  1 mg Subcutaneous Q15 Min PRN         digoxin (LANOXIN) half-tab 62.5 mcg  62.5 mcg Oral Daily   62.5 mcg at 10/15/22 0945     [Held by provider] furosemide (LASIX) injection 20 mg  20 mg Intravenous Q12H   20 mg at 10/14/22 2116     haloperidol lactate (HALDOL) injection 2 mg  2 mg Intravenous Q6H PRN         labetalol (NORMODYNE/TRANDATE) injection 10-20 mg  10-20 mg Intravenous Q10 Min PRN        Or     hydrALAZINE (APRESOLINE) injection 10-20 mg  10-20 mg Intravenous Q1H PRN         insulin aspart (NovoLOG) injection (RAPID ACTING)   Subcutaneous TID w/meals   2 Units at 10/12/22 1804     insulin aspart (NovoLOG) injection (RAPID ACTING)  1-7 Units Subcutaneous TID AC   1 Units at 10/15/22 1642     insulin aspart (NovoLOG) injection (RAPID ACTING)  1-5 Units Subcutaneous At Bedtime   1 Units at 10/15/22 2228     insulin glargine (LANTUS PEN) injection 15 Units  15 Units Subcutaneous QAM AC   15 Units at 10/15/22 0938     levofloxacin (LEVAQUIN) infusion 500 mg  500 mg Intravenous  Q24H 100 mL/hr at 10/15/22 1224 500 mg at 10/15/22 1224     lidocaine (LMX4) cream   Topical Q1H PRN         lidocaine 1 % 0.1-1 mL  0.1-1 mL Other Q1H PRN         medication instruction - No oral meds if patient didn't pass dysphagia screen   Does not apply Continuous PRN         Medication Instructions - Avoid dextrose in IV solutions.   Intravenous Continuous PRN         melatonin tablet 3 mg  3 mg Oral At Bedtime PRN   3 mg at 10/12/22 2053     metoprolol (LOPRESSOR) injection 5 mg  5 mg Intravenous Q5 Min PRN   5 mg at 10/16/22 0509     metoprolol tartrate (LOPRESSOR) tablet 50 mg  50 mg Oral BID         miconazole (MICATIN) 2 % powder   Topical BID   Given at 10/16/22 0829     OLANZapine zydis (zyPREXA) ODT tab 5 mg  5 mg Oral At Bedtime PRN         ondansetron (ZOFRAN) injection 4 mg  4 mg Intravenous Q6H PRN   4 mg at 10/11/22 1128    Or     ondansetron (ZOFRAN ODT) ODT tab 4 mg  4 mg Oral Q6H PRN         polyethylene glycol (MIRALAX) Packet 17 g  17 g Oral BID PRN         pravastatin (PRAVACHOL) tablet 40 mg  40 mg Oral Daily   40 mg at 10/15/22 0945     prochlorperazine (COMPAZINE) injection 5 mg  5 mg Intravenous Q6H PRN   5 mg at 10/10/22 0635    Or     prochlorperazine (COMPAZINE) tablet 5 mg  5 mg Oral Q6H PRN        Or     prochlorperazine (COMPAZINE) suppository 12.5 mg  12.5 mg Rectal Q12H PRN         QUEtiapine (SEROquel) half-tab 12.5 mg  12.5 mg Oral Q6H PRN   12.5 mg at 10/12/22 1518     rivaroxaban ANTICOAGULANT (XARELTO) tablet 20 mg  20 mg Oral Daily with supper   20 mg at 10/14/22 1636     sodium chloride (PF) 0.9% PF flush 10 mL  10 mL Intracatheter Q8H   10 mL at 10/16/22 0218     sodium chloride (PF) 0.9% PF flush 10-20 mL  10-20 mL Intracatheter q1 min prn   20 mL at 10/15/22 1006     sodium chloride (PF) 0.9% PF flush 3 mL  3 mL Intracatheter Q8H   3 mL at 10/16/22 0509     sodium chloride (PF) 0.9% PF flush 3 mL  3 mL Intracatheter q1 min prn   3 mL at 10/11/22 1717     No current  [facility]-ordered outpatient medications on file.             Physical Exam:   Vital signs were reviewed:  Temperatures:  Current - Temp: 97.3  F (36.3  C); Max - Temp  Av.9  F (36.1  C)  Min: 96.3  F (35.7  C)  Max: 97.4  F (36.3  C)  Respiration range: Resp  Av.5  Min: 14  Max: 20  Pulse range: Pulse  Av.6  Min: 50  Max: 153  Blood pressure range: Systolic (24hrs), Av , Min:71 , Max:161   ; Diastolic (24hrs), Av, Min:50, Max:103    Pulse oximetry range: SpO2  Av.1 %  Min: 95 %  Max: 100 %    Intake/Output Summary (Last 24 hours) at 10/15/2022 1246  Last data filed at 10/15/2022 0800  Gross per 24 hour   Intake 125 ml   Output 350 ml   Net -225 ml     211 lbs 10.27 oz  Body mass index is 34.16 kg/m .   Body surface area is 2.11 meters squared.  General: Opens eyes, somnolent  Neck: JVP none elevated, supple  Heart:  irregular rate and rhythm, Normal S1, S2.   Abdomen: soft, none distended, +BS  Extremities: warm, no peripheral edema          Selected laboratory tests:   Laboratory test results personally reviewed:   [facility]  Recent Labs   Lab 10/16/22  0821 10/16/22  0624 10/16/22  0241 10/15/22  2207 10/15/22  1612 10/15/22  0829 10/15/22  0321 10/14/22  1137 10/14/22  0825 10/13/22  2250 10/13/22  2011 10/13/22  1224 10/13/22  0857 10/12/22  1200 10/12/22  0813   NA  --   --   --   --   --   --  144  --   --   --  138  --  140  --  138   POTASSIUM  --  3.6  --   --   --   --  4.2  --  4.0  --  3.8  --  3.9  --  4.3  4.3   CHLORIDE  --   --   --   --   --   --  104  --   --   --  103  --  104  --  103   CO2  --   --   --   --   --   --  29  --   --   --  30  --  25  --  28   ANIONGAP  --   --   --   --   --   --  11  --   --   --  5  --  11  --  7   *  --  204* 222* 169*   < > 242*   < >  --    < > 211*   < > 230*   < > 219*   BUN  --   --   --   --   --   --  34*  --   --   --  25  --  21  --  17   CR  --   --   --   --   --   --  1.03  --   --   --  0.91  --  0.80  --  0.75    GFRESTIMATED  --   --   --   --   --   --  55*  --   --   --  64  --  75  --  81   EZEKIEL  --   --   --   --   --   --  10.2*  --   --   --  9.4  --  10.4*  --  9.6   MAG  --  2.4*  --   --   --   --  2.5*  --  2.6*  --   --   --  2.3  --  2.3    < > = values in this interval not displayed.     CBC  Recent Labs   Lab 10/15/22  0737 10/13/22  2124 10/13/22  0857 10/12/22  0813   WBC 11.6* 11.4* 12.0* 8.9   RBC 4.70 4.32 4.52 4.31   HGB 16.1* 14.7 15.5 14.5   HCT 49.0* 44.8 46.0 44.9   * 104* 102* 104*   MCH 34.3* 34.0* 34.3* 33.6*   MCHC 32.9 32.8 33.7 32.3   RDW 12.3 12.1 12.2 11.8    253 208 209     INR  Recent Labs   Lab 10/09/22  1525   INR 0.97     No results found for: TROPI, TROPONIN, TROPR, TROPN  Recent Labs   Lab Test 10/10/22  0823 03/16/22  1127   CHOL 132 129   HDL 53 51   LDL 55 54   TRIG 120 119     Lab Results   Component Value Date    A1C 7.2 09/21/2022    A1C 8.3 06/16/2022    A1C 7.5 03/16/2022    A1C 7.7 09/01/2021    A1C 8.5 01/20/2021    A1C 8.5 07/10/2020    A1C 7.6 12/18/2019    A1C 7.6 05/17/2019    A1C 8.9 01/02/2019     TSH   Date Value Ref Range Status   06/16/2022 1.74 0.40 - 4.00 mU/L Final   05/17/2019 1.49 0.40 - 4.00 mU/L Final     TTE 10/9/2022  Technically challenging study due to patient body habitus, even with the use  of contrast imaging.     Left ventricular systolic function is mild to moderately reduced. The visual  ejection fraction is estimated at 45%.  With rapid atrial fibrillation, the visual approximation of left ventricular  systolic function may be falsely decreased.  Regional wall motion abnormalities cannot be excluded due to limited  visualization, however hypokinesis appears global.  Right ventricular global function is normal.  No clear evidence of interatrial shunt on limited assessment with color  Doppler. A contrast injection (Bubble Study) was performed that was negative  for flow across the interatrial septum.  There is mild-moderate global  hypokinesia of the left ventricle.  The ascending aorta is Mildly dilated. Max diameter of the visualized portion  3.9 cm.     There are no prior studies available for comparison

## 2022-10-16 NOTE — PROGRESS NOTES
[facility]    Hospitalist Progress Note    Assessment & Plan   Hima Griffiths is a 77 year old female with PMHx of afib on chronic anticoagulation with DOAC, hypertension, DM II and hx of CVA  who was admitted on 10/9/2022 with symptoms of vertigo and was found to have had an acute right cerebellar CVA. Hospital stay has been complicated by metabolic encephalopathy, afib with RVR and CHF exacerbation.      Acute ischemic right cerebellar stroke  * Hx MCA stroke in 2015, also dx with atrial fibrillation started on DOAC.   * Had been off DOAC for over a week PTA.   * Admitted with sudden vertigo with nausea and vomiting that started the day of admission. Labs notable for leukocytosis. MRI/MRA head/neck showed a right inferior cerebellar infarct, as well as diminished flow through several vertebral artery segments. MRI confirmed acute right inferior cerebellar stroke.  * Xarelto resumed this stay at home dose of 20mg daily per stroke team.  * PT/OT recommend TCU stay at discharge  * On modified diet per SLP with pureed diet (level 4) and mildly thick liquids (level 2) by spoon.   * Follow up with general neurology in clinic in 8 wks.     Acute metabolic encephalopathy, multifactorial: Improved  * During stay, noted increasing confusion on 10/10, likely due to recent stroke complicated by developing CHF and possible UTI.   * Head CT 10/11 AM showed no acute pathology, no bleeding.   * Remained confused and agitated on 10/11.  Clinical picture of delirium   -- mentation continues waxing/waning -- was more alert and interactive on 10/15 AM  -- reorient frequently  -- has prns available (Seroquel, Zyprexa and IV Haldol)  -- cont treatment of conditions as below     New acute systolic congestive heart failure exacerbation  Acute hypoxic respiratory failure dt CHF: Resolved  Hypertension  Dyslipidemia  Chronic severe bilateral LE edema  * Initially given IVFs in ED for suspected hypovolemia.   *  Noted to have increased dyspnea on 10/10 with elevated lactate. Exam consistent with volume overload (elevated JVP, bibasilar crackles and LE edema). ProBNP 5k. CXR showed vascular congestion. Started diuresis with IV Lasix  * Echo this stay showed EF 45%.   * Cardiology consutled this stay. In agreement with use of bb and IV diuresis.   * Initially tolerated diuresis with Lasix 20mg IV BID but condition declined and was unable to take po, appeared dry with upward trend in Cr. Required fluid boluses during RRTs on 10/13 PM and 10/15 AM.  -- IV Lasix on hold as of 10/15  -- conts on bb as below  -- add low dose ACEI when able and if tolerated  -- conts on statin    Atrial fibrillation with RVR  * Intermittent RVR this stay. Had been on sched oral metoprolol.   * Cardiology notes dilt contraindicated in patient with reduced EF. Recommended to uptitrate metoprolol to 100mg BID. Also started on digoxin.  * Given difficulties with po intake this stay, was birefly maintained on sched IV Lopressor (10/14-10/15) and given IV digoxin (250mcg x1) on 10/15  * Ultimately resumed on oral metoprolol on 10/15 per cards.  -- cont digoxin 62.5mcg daily  -- cont oral metoprolol, increased to 50mg BID on 10/16 per cards  -- has prn IV Lopressor also available  -- cont telemetry  -- cont anticoagulation with DOAC     Intermittent lactic acidosis  * Variable lactates noted this stay, suspect multifactorial, dt acute illness, CVA, RVR and UTI. Managing those conditions as noted.   -- given IVFs during RRTs on 10/13, 10/15    UTI dt Enterobacter  * Suspect leukocytosis (WBC 14) and lactic acidosis (lactate 3.4) more related to CHF than infection. UA was abnl. UC grew >100k enterobacter. Initially placed on ceftriaxone on 10/10 but changed to levofloxacin on 10/13 after enterobacter resistances noted.   * Recurrent lactate elevation during RRT on 10/13 PM attributed to RVR.  -- cont levofloxacin (d#4/5)    Stage III CKD  * Baseline Cr is  around 0.7. Renal function had been stable with diuresis this stay.   -- Cr now trending up as of 10/13 in setting of diuresis and decreased po intake  -- given fluid boluses on 10/13, 10/15  -- holding Lasix as of 10/15     DM II  * A1c 7.2 in 9/2022. Manages with Trulicity and glipizide.   * Oral meds held on admission and placed on basal/bolus insulin  -- cont Lantus 15U daily, mealtime Novolog per carb ratio and med dose sliding scale insulin    Recent Labs   Lab 10/16/22  0821 10/16/22  0241 10/15/22  2207 10/15/22  1612 10/15/22  1229 10/15/22  0829   * 204* 222* 169* 190* 269*        Pressure injury of left buttock, stage 1, chronic, present on admission.  -- WOC consulted     FEN: no IVFs, lytes stable, modified diet per SLP as above  DVT Prophylaxis: Xarelto  Code Status: Full Code    [place] [facility] [place]. Anticipate discharge to TCU in 2-3d, pending improvement of encephalopathy, stable VS.     Son at bedside and updated, questions answered.    Milean Marley, DO    Interval History    Seen this morning. Son at bedside. Was alert and able to take po this morning. A little more fatigued when I saw her. A&Ox3 (needed some help with year) but with +situational confusion, though not to the degree she had been previously on 10/14. HRs improving. No cp/sob/cough, abd pain/n/v.     -Data reviewed today: I reviewed all new labs and imaging results over the last 24 hours. I personally reviewed no images or EKG's today.    Physical Exam   Temp: 97  F (36.1  C) Temp src: Axillary BP: (!) 140/79 Pulse: 99   Resp: 18 SpO2: 90 % O2 Device: None (Room air)    Vitals:    10/11/22 1525   Weight: 96 kg (211 lb 10.3 oz)     Vital Signs with Ranges  Temp:  [96.8  F (36  C)-97.3  F (36.3  C)] 97  F (36.1  C)  Pulse:  [] 99  Resp:  [16-20] 18  BP: (108-162)/(74-97) 140/79  SpO2:  [90 %-96 %] 90 %  I/O last 3 completed shifts:  In: 125 [IV Piggyback:125]  Out: 650  [Urine:650]    Constitutional: Resting comfortably, tired but oriented x3 (needed some help with the year) but with some mild situational confusion, NAD  Respiratory: CTA thru anterior fields, no wheeze, no increased work of breathing  Cardiovascular: HR irregular, no MGR, no LE edema  GI: S, NT, ND, +BS  Skin/Integumen: warm/dry, midline in RUE  Other:      Medications     - MEDICATION INSTRUCTIONS -       - MEDICATION INSTRUCTIONS -         [Held by provider] furosemide  20 mg Intravenous Q12H     insulin aspart   Subcutaneous TID w/meals     insulin aspart  1-7 Units Subcutaneous TID AC     insulin aspart  1-5 Units Subcutaneous At Bedtime     insulin glargine  15 Units Subcutaneous QAM AC     levofloxacin  500 mg Intravenous Q24H     lisinopril  5 mg Oral Daily     metoprolol tartrate  50 mg Oral BID     miconazole   Topical BID     potassium chloride  20 mEq Oral or Feeding Tube Once     pravastatin  40 mg Oral Daily     rivaroxaban ANTICOAGULANT  20 mg Oral Daily with supper     sodium chloride (PF)  10 mL Intracatheter Q8H     sodium chloride (PF)  3 mL Intracatheter Q8H       Data   Recent Labs   Lab 10/16/22  0821 10/16/22  0624 10/16/22  0241 10/15/22  2207 10/15/22  0829 10/15/22  0737 10/15/22  0321 10/14/22  1137 10/14/22  0825 10/13/22  2250 10/13/22  2124 10/13/22  2011 10/13/22  1224 10/13/22  0857 10/09/22  2241 10/09/22  1525   WBC  --   --   --   --   --  11.6*  --   --   --   --  11.4*  --   --  12.0*   < > 14.0*   HGB  --   --   --   --   --  16.1*  --   --   --   --  14.7  --   --  15.5   < > 13.5   MCV  --   --   --   --   --  104*  --   --   --   --  104*  --   --  102*   < > 104*   PLT  --   --   --   --   --  250  --   --   --   --  253  --   --  208   < > 256   INR  --   --   --   --   --   --   --   --   --   --   --   --   --   --   --  0.97   NA  --   --   --   --   --   --  144  --   --   --   --  138  --  140   < > 138   POTASSIUM  --  3.6  --   --   --   --  4.2  --  4.0  --   --   3.8  --  3.9   < > 4.2   CHLORIDE  --   --   --   --   --   --  104  --   --   --   --  103  --  104   < > 102   CO2  --   --   --   --   --   --  29  --   --   --   --  30  --  25   < > 22   BUN  --   --   --   --   --   --  34*  --   --   --   --  25  --  21   < > 11.7   CR  --   --   --   --   --   --  1.03  --   --   --   --  0.91  --  0.80   < > 0.74   ANIONGAP  --   --   --   --   --   --  11  --   --   --   --  5  --  11   < > 14   EZEKIEL  --   --   --   --   --   --  10.2*  --   --   --   --  9.4  --  10.4*   < > 9.3   *  --  204* 222*   < >  --  242*   < >  --    < >  --  211*   < > 230*   < > 289*    < > = values in this interval not displayed.       No results found for this or any previous visit (from the past 24 hour(s)).

## 2022-10-16 NOTE — PLAN OF CARE
Goal Outcome Evaluation:      Plan of Care Reviewed With: patient, child      Reason for Admission: R cerebellar stroke, hospitalization also included encephalopathy, heart failure exacerbation and rapid afib.    Cognitive/Mentation: A/Ox 2, knows self/family, knew correct year and birthday today. More awake today and answering yes/no questions  Neuros/CMS: Intact ex L weakness, L facial droops, L neglect, unable to track, BLE weakness/edema.   VS: Stable aside from intermittent tachycardia  Tele: Afib RVR/CVR, IV metoprolol given as needed  GI: BS active, incontinent - no BM this shift  : Incontinent, purewick in place, low output.   Pulmonary: LS clear/diminished  Pain: Denies    Drains/Lines: R Midline   Skin: Cool/Dry, foam mepilex on coccyx.  Activity: Assist x2/Lift  Diet: Pureed/thickened. Pills crushed in applesauce    Therapies recs: tcu  Discharge: pending    Aggression Stoplight Tool: Yellow for confusion    End of shift summary: Pt stable this shift. Metoprolol given x1 for tachycardia. Discharge pending. Family updated. Switched to a pulsate mattress.

## 2022-10-16 NOTE — PLAN OF CARE
Reason for Admission: Ischemic stroke   Cognitive/Mentation: A/Ox 2-3, forgetful and slow to respond  Neuros/CMS: Stable with generalized weakness. RUE 3/5, LUE 2/5, BLE 2/5. Poor profusion in hands and feet. Pt difficult to assess, inconsistent with following commands overnight.   VS: VSS on RA, tachycardic throughout shift.   Tele: Afib RVR  GI: BS audible, + flatus, no BM this shift. Incontinent.  : Purewick in place. Incontinent.  Pulmonary: LS diminished   Pain: Denies pain, but grimaces when moving extremities.   Drains/Lines: Midline cath SL   Skin: Intact, with scattered bruising. L friction wound on buttocks. Excoriation on her belly folds and under her breasts- skin barrier applied. T/R.   Activity: Assist x 2 with lift device.  Diet: Pureed diet with mildly thick liquids. Takes pills crushed, one at a time slowly with pudding and encouragement.   Therapies recs: Pending   Discharge: Pending  Aggression Stoplight Tool: Green  End of shift summary: PRN IV Metoprolol given once for HR sustaining above 120. BP best taken on L lower forearm/wrist.

## 2022-10-17 NOTE — PROGRESS NOTES
Reason for Admission: R cerebellar stroke   Cognitive/Mentation: A/Ox 2-3, forgetful and slow to respond  Neuros/CMS: Stable with generalized weakness. L sided weakness. BLE weak/edema. Slight L droop, L neglect, unable to track. Poor profusion in hands and feet. Pt difficult to assess, inconsistent with following commands overnight.   VS: VSS on RA, intermittent tachycardia   Tele: Afib CVR  GI: BS audible, + flatus, no BM this shift. Incontinent.  : Purewick in place. Incontinent.  Pulmonary: LS diminished   Pain: Denies pain, but grimaces when moving extremities.   Drains/Lines: Midline cath SL   Skin: Intact, with scattered bruising. L friction wound on buttocks. Excoriation on her belly folds and under her breasts- skin barrier applied. T/R.   Activity: Assist x 2 with lift device.  Diet: Pureed diet with mildly thick liquids. Takes pills crushed, one at a time slowly with pudding and encouragement.   Therapies recs: TCU  Discharge: Pending  Aggression Stoplight Tool: Green  End of shift summary: PRN IV Metoprolol given once for HR sustaining above 120. BP best taken on L lower forearm/wrist.

## 2022-10-17 NOTE — PROGRESS NOTES
CLINICAL NUTRITION SERVICES - REASSESSMENT NOTE      Future/Additional Recommendations:     Continue with Room Service with Assist for meal ordering  Continue to send thickened supplement at meals    If pt does not continue to improve po intake, may need to consider alternate means of nutrition       Malnutrition: (10/14)  % Weight Loss:  Difficult to assess - question accuracy of most recent wt, dtr states pt's usual wt is low 220s  % Intake:  </= 50% for >/= 5 days (severe malnutrition)  Subcutaneous Fat Loss:  None observed  Muscle Loss:  Temporal region - mild depletion  Fluid Retention:  Pt with chronic lymphedema     Malnutrition Diagnosis: At risk for Moderate malnutrition  In Context of:  Acute illness or injury       EVALUATION OF PROGRESS TOWARD GOALS   Diet:    Moderate CHO, Pureed, Mildly Thick Liquids  Room Service with Assist    Intake/Tolerance:    Chart reviewed  Pt receiving cares  Note meals being sent to pt TID (a few items + supplement)  Family helps with feeding pt  Flowsheets reflect meal intake has been 25-50% past few days      NEW FINDINGS:   Note pt has been more awake    Previous Goals (10/14):   Pt to have an increase in alertness and po intake  Evaluation: Met, however, still taking small amounts    Previous Nutrition Diagnosis (10/14):   Inadequate oral intake related to increased lethargy as evidenced by pt with minimal po intake since admit  Evaluation: No change, modified below        CURRENT NUTRITION DIAGNOSIS  Inadequate oral intake related to pt still with some lethargy and dysphagia as evidenced by pt taking 50% of small meals at best    INTERVENTIONS  Recommendations / Nutrition Prescription  Moderate CHO, Pureed, Mildly Thick Liquids    Continue with Room Service with Assist for meal ordering  Continue to send thickened supplement at meals    If pt does not continue to improve po intake, may need to consider alternate means of nutrition      Goals  Pt to consume >50% meals  TID      MONITORING AND EVALUATION:  Progress towards goals will be monitored and evaluated per protocol and Practice Guidelines

## 2022-10-17 NOTE — PROGRESS NOTES
[facility]  Hospitalist Progress Note   10/17/2022          Assessment and Plan:       Hima Griffiths is a 77 year old female with afib on chronic anticoagulation with DOAC, hypertension, DM II and hx of CVA admitted on 10/9/2022 with symptoms of vertigo. Found to have had an acute right cerebellar CVA. Hospital stay has been complicated by metabolic encephalopathy, afib with RVR and CHF exacerbation.      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  * Hx MCA stroke in 2015, also dx with atrial fibrillation started on DOAC. Had been off DOAC for over a week PTA.   * Admitted with sudden vertigo with nausea and vomiting that started the day of admission. Labs notable for leukocytosis. MRI/MRA head/neck showed a right inferior cerebellar infarct, as well as diminished flow through several vertebral artery segments. MRI confirmed acute right inferior cerebellar stroke.  * Xarelto resumed this stay at home dose of 20mg daily per stroke team.  Continue pravastatin 40 mg oral daily.  Continue neurochecks  * PT/OT recommend TCU stay at discharge  * On modified diet per SLP with pureed diet  Stroke neurology signed off [10/11], follow-up in general neurology clinic in 8 weeks.    Acute metabolic encephalopathy, multifactorial: Improved  * During stay, noted increasing confusion on 10/10, likely due to recent stroke complicated by developing CHF and possible UTI.   * Head CT 10/11 AM showed no acute pathology, no bleeding.   * Remained confused and agitated on 10/11.  Clinical picture of delirium   -- mentation continues waxing/waning --awake this morning.   -- reorient frequently  -- has prns available (Seroquel, Zyprexa and IV Haldol)  -- cont treatment of conditions as below  -Recommend neurocognitive assessment as outpatient.     New acute systolic congestive heart failure exacerbation  Acute hypoxic respiratory failure dt CHF: Resolved  Hypertension  Dyslipidemia  Chronic severe  bilateral LE edema  * Initially given IVFs in ED for suspected hypovolemia.   * Noted to have increased dyspnea on 10/10 with elevated lactate. Exam consistent with volume overload (elevated JVP, bibasilar crackles and LE edema). ProBNP 5k. CXR showed vascular congestion. Started diuresis with IV Lasix  * Echo this stay showed EF 45%.   * Cardiology following, appreciate comanagement  * Initially tolerated diuresis with Lasix 20mg IV BID but condition declined and was unable to take po, appeared dry with upward trend in Cr. Required fluid boluses during RRTs on 10/13 PM and 10/15 AM.  -- IV Lasix on hold as of 10/15  --Increase metoprolol 200 mg twice daily [10/17].   Added lisinopril 2.5 mg oral daily [10/17]  Continue pravastatin 40 mg oral daily.  Telemetry monitoring.  Strict input output monitoring, daily weights.  Fluid restriction to 1500 mL/day.     Atrial fibrillation with RVR  * Intermittent RVR this stay. Had been on sched oral metoprolol.   * Cardiology notes dilt contraindicated in patient with reduced EF.  Appreciate comanagement  * Given difficulties with po intake this stay, was birefly maintained on sched IV Lopressor (10/14-10/15) and given IV digoxin (250mcg x1) on 10/15.  -- Continue on oral metoprolol [10/15], dose increased 10/17.   Digoxin discontinued 10/16.  cont anticoagulation with DOAC   -- has prn IV Lopressor also available  -- cont telemetry    Intermittent lactic acidosis  * Variable lactates noted this stay, suspect multifactorial, dt acute illness, CVA, RVR and UTI. Managing those conditions as noted.   -- given IVFs during RRTs on 10/13, 10/15.  Recheck lactate 2.3 today.  Gentle hydration with 50 mL/h for 5 hours and will recheck lactate tonight.    Trend WBC count, fever curve.  Continue antibiotics as below    UTI dt Enterobacter  * Suspect leukocytosis (WBC 14) and lactic acidosis (lactate 3.4) more related to CHF than infection. UA was abnl. UC grew >100k enterobacter.  Initially placed on ceftriaxone on 10/10 but changed to levofloxacin on 10/13 after enterobacter resistances noted.   * Recurrent lactate elevation during RRT on 10/13 PM attributed to RVR.  -- cont levofloxacin for 7 days     Stage III CKD  * Baseline Cr is around 0.7. Renal function had been stable with diuresis this stay.   -- Cr now trending up as of 10/13 in setting of diuresis and decreased po intake  -- given fluid boluses on 10/13, 10/15  -- holding Lasix as of 10/15.  Gentle hydration 10/17.  Monitor in AM.     DM II  * A1c 7.2 in 9/2022. Manages with Trulicity and glipizide.   * Oral meds held on admission and placed on basal/bolus insulin  -- cont Lantus 15U daily, mealtime Novolog per carb ratio [switch to 1 unit per 12 g of carbohydrate] and med dose sliding scale insulin      Pressure injury of left buttock, stage 1, chronic, present on admission.  -- WOC consulted    Moderate malnutrition in the setting of acute illness.  Nutrition following    Physical deconditioning from medical illness, senile frailty.  Rehab team following -TCU versus long-term care center.  Care coordinator assistance with transition     FEN: no IVFs, lytes stable, modified diet per SLP as above  DVT Prophylaxis: Xarelto  Code Status: Full Code     [place] [facility] [place]16. Anticipate discharge to TCU in 2-3d, pending improvement of encephalopathy, stable VS.    Discussed with patient, bedside RN.  Attempted calling patient's daughter Velvet gupta.  More than 50% of time spent in direct patient care, care coordination, patient counseling, and formalizing plan of care.     Rajinder Flores MD        Interval History:        Patient seen this morning.  Awake, forgetful, slow to respond.    Received IV metoprolol earlier this morning for elevated heart rate.   On puréed diet.  Denies any pain.  Denies any headache or dizziness.  Per report assistance of 2 with lift for ambulation.  Telemetry A. fib, rate  controlled.         Physical Exam:        Physical Exam   Temp:  [94.4  F (34.7  C)-98  F (36.7  C)] 94.4  F (34.7  C)  Pulse:  [] 110  Resp:  [16-18] 17  BP: (107-140)/() 133/63  SpO2:  [90 %-96 %] 96 %    Intake/Output Summary (Last 24 hours) at 10/17/2022 0854  Last data filed at 10/17/2022 0402  Gross per 24 hour   Intake 200 ml   Output 450 ml   Net -250 ml       Admission Weight: 96 kg (211 lb 10.3 oz)  Current Weight: 96 kg (211 lb 10.3 oz)    PHYSICAL EXAM  GENERAL: Patient awake, alert and oriented x1.  Slow to respond.  HEENT: Oropharynx pink, moist.  HEART: irregular rate and rhythm. S1S2.  LUNGS: Bilateral decreased breath sounds.Respirations unlabored  ABDOMEN: Soft, no abdominal tenderness, bowel sounds heard   NEURO: Moving all extremities.  EXTREMITIES: 1+ pedal edema.  SKIN: Warm, dry. + bruising.  PSYCHIATRY Cooperative       Medications:          [Held by provider] furosemide  20 mg Intravenous Q12H     insulin aspart   Subcutaneous TID w/meals     insulin aspart  1-7 Units Subcutaneous TID AC     insulin aspart  1-5 Units Subcutaneous At Bedtime     insulin glargine  15 Units Subcutaneous QAM AC     levofloxacin  500 mg Intravenous Q24H     lisinopril  5 mg Oral Daily     metoprolol tartrate  50 mg Oral BID     miconazole   Topical BID     pravastatin  40 mg Oral Daily     rivaroxaban ANTICOAGULANT  20 mg Oral Daily with supper     sodium chloride (PF)  10 mL Intracatheter Q8H     sodium chloride (PF)  3 mL Intracatheter Q8H     acetaminophen **OR** acetaminophen, benztropine, glucose **OR** dextrose **OR** glucagon, haloperidol lactate, labetalol **OR** hydrALAZINE, lidocaine 4%, lidocaine (buffered or not buffered), - MEDICATION INSTRUCTIONS -, - MEDICATION INSTRUCTIONS -, melatonin, metoprolol, OLANZapine zydis, ondansetron **OR** ondansetron, polyethylene glycol, prochlorperazine **OR** prochlorperazine **OR** prochlorperazine, QUEtiapine, sodium chloride (PF), sodium chloride  (PF)         Data:      All new lab and imaging data was reviewed.

## 2022-10-17 NOTE — PLAN OF CARE
Pt here with ischemic stroke. A&O 1-2. Neuros stable with generalized weakness. Left sided weakness. Pt is difficult to assess, inconsistent with following commands and direction. VSS stable. Tele A Fib RVR. Pureed diet, mildly thick liquids. Takes pills crushed with applesauce. Assist of 2 with lift device. Pt has order for magnesium and potassium RN managed replacement. Potassium and magnesium WNL, order to draw tomorrow am. Denies pain. Midline intact and patent. Lactic acid was 2.4 in am. Updated MD. MD started pt on LR continuous 50ml/hr. Recheck of lactic acid scheduled for 7pm. Pt scoring green on the Aggression Stop Light Tool.

## 2022-10-17 NOTE — PROGRESS NOTES
MD Notification    Notified Person: MD    Notified Person Name:Rajinder Flores MD  Notification Date/Time: 1:35 PM 10/17/22    Notification Interaction: PingMD messaging    Purpose of Notification: FYI lactic acid is 2.3. When or would you like a redraw?     Orders Received: Awaiting on response

## 2022-10-17 NOTE — PROGRESS NOTES
[facility]  Cardiology Progress Note    Date of Service (when I saw the patient): 10/17/2022     Assessment & Plan   Hima Griffiths is a 77 year old female who was admitted on 10/9/2022.     1.  : Atrial Fibrillation w/ RVR   - Rate control sub- optimal on metoprolol. Digoxin discontinued 10/16.   2.  : Acute systolic heart failure   - BNP 5061 on discharge, down trending to 1078.   - Echocardiogram showed reduced EF 45%, mild to moderate global hypokinesis but limited visualization, normal RV function. Negative bubble study for flow acroxx the interatrial septum.   - Net neg 2.7 L since admission, ? Accuracy d/t no weight.   - Euvolemic upon exam.  - Diuretics on hold.   3.  : Acute ischemic right cerebellar stroke   - Brain MRA -  3 x 2.5 cm area of ADC restriction in the right inferior cerebellum without corresponding suggestive of acute infarct.   4. : Type 2 diabetes  5.  : CKD - creatinine 1.06, BUN 44    Plan   1. A- fib borderline rate controlled, increase metoprolol to 100mg BID, give extra dose of 50 mg now. Plan to transition to long acting succinate tomorrow. Continue Xarelto for anticoagulation. Keep mag > 2.0, Potassium > 4.0   2. EF 45%. Euvolemic upon exam. Diuretics on hold. Low dose ACE started today.   Continue GDMT uptitration as BP allows.   3. Outpatient ischemic evaluation.   4. Daily weights, strict I/O's    ESTHER Peña CNP  Text Page  (M-F, 7:30 am - 4:00 pm)    Interval History   Alert to person only. Family at bedside. A-fib sub optimally rate controlled. BB increased, plan to transition to long acting tomorrow. BP stable, low dose ACE started.     Physical Exam   Temp: (!) 94.4  F (34.7  C) Temp src: Axillary BP: 129/73 Pulse: 110   Resp: 17 SpO2: 94 % O2 Device: None (Room air)    Vitals:    10/11/22 1525   Weight: 96 kg (211 lb 10.3 oz)     Vital Signs with Ranges  Temp:  [94.4  F (34.7  C)-98  F (36.7  C)] 94.4  F (34.7  C)  Pulse:  []  110  Resp:  [16-17] 17  BP: (107-139)/() 129/73  SpO2:  [93 %-96 %] 94 %  I/O last 3 completed shifts:  In: 200 [P.O.:200]  Out: 450 [Urine:450]    GEN:  In general, this is a well nourished female.   HEENT:  Pupils equal, round. Sclerae nonicteric. Clear oropharynx. Mucous membranes moist.  NECK: . Difficult to assess JVD  C/V:  Irregular rate and rhythm, no murmur, rub or gallop.  RESP: Respirations are unlabored. No use of accessory muscles. Clear to auscultation bilaterally without wheezing, rales, or rhonchi.  GI: Abdomen non distended   EXTREM: No LE edema. No cyanosis or clubbing.  NEURO: Alert to self only.   PSYCH: Normal affect.  SKIN: Warm and dry. UE ecchymosis.       Medications     - MEDICATION INSTRUCTIONS -       - MEDICATION INSTRUCTIONS -         [Held by provider] furosemide  20 mg Intravenous Q12H     insulin aspart   Subcutaneous TID w/meals     insulin aspart  1-7 Units Subcutaneous TID AC     insulin aspart  1-5 Units Subcutaneous At Bedtime     insulin glargine  15 Units Subcutaneous QAM AC     levofloxacin  500 mg Intravenous Q24H     lisinopril  2.5 mg Oral Daily     metoprolol tartrate  100 mg Oral BID     metoprolol tartrate  50 mg Oral Once     miconazole   Topical BID     pravastatin  40 mg Oral Daily     rivaroxaban ANTICOAGULANT  20 mg Oral Daily with supper     senna-docusate  2 tablet Oral Daily at 10 am     sodium chloride (PF)  10 mL Intracatheter Q8H     sodium chloride (PF)  3 mL Intracatheter Q8H       Data   Reviewed         I spent > 20 minutes face-to-face and/or coordinating care. Over 50% of our time on the unit was spent counseling the patient and/or coordinating care     ESTHER Peña CNP 10/17/2022

## 2022-10-17 NOTE — PROGRESS NOTES
MD Notification    Notified Person: MD    Notified Person Name: Dr. Manuel RUTLEDGE    Notification Date/Time: 6:58 PM 10/17/22    Notification Interaction: [employer] web    Purpose of Notification: 719 CG. Pt has not had BM since 10/9. Senna 2 tabs administered at 12pm today. Ducolax suppository given at 430pm. No result. Pt restless and c/o back pain. Repeat lactic acid was 2.5. Please advise. [place] RODGERS *[id number]    Orders Received: Awaiting response  Addendum: Orders place. NOC nurse updated and aware.

## 2022-10-18 NOTE — PLAN OF CARE
Reason for Admission: R CVA    Cognitive/Mentation: A/Ox 2  Neuros/CMS: Intact ex gen weakness, confused, delirious, L weakness, slow to respond  VS: stable on RA.   Tele: A-fib RVR.  GI: BS hypoactive, + flatus, last BM 10/17. inContinent.  : inContinent.  Pulmonary: LS clear.  Pain: denies.     Drains/Lines: R midline - LR at 75 ml/hr  Skin: possible diaper rash to thighs, powder applied. Pulsate mattress.   Activity: Assist x 2 with lift.  Diet: pureed with mild thick liquids. Takes pills crushed in applesauce. Total feed    Therapies recs: pending  Discharge: pending    End of shift summary: pt slightly restless overnight - pulling at gown and tele patches. Frequent repositioning. Refused overnight BG checks. Unit lab draw from [facility] completed this am.

## 2022-10-18 NOTE — PLAN OF CARE
Physical Therapy    Received duplicate order for PT. Pt already being seen, see rehab flowsheets for in-depth session notes. Will complete the duplicate order.     Yolette Huynh, PT, DPT

## 2022-10-18 NOTE — PLAN OF CARE
Goal Outcome Evaluation:         Patient here with CVA. Lethargic, oriented to self. Left sided weakness, left neglect. Does not blink to threat with left eye. Tele afib with CVR this morning, later this afternoon a-fib with RVR. Digoxin IV ordered.  Poor oral intake. Family and staff attempted multiple times, patient took a few bites of oatmeal and ice cream, and a few teaspoons of apple juice. Needs total assistance with eating. Takes meds crushed in applesauce. Incontinent of bladder, no BM, bowel meds given. Patient had head CT this morning, neurology re-consulted. MRI ordered. Palliative care also consulted per family request.. Family is not interested in NG tube. Discharge pending plan of care.

## 2022-10-18 NOTE — PROGRESS NOTES
[facility]  Hospitalist Progress Note   10/18/2022          Assessment and Plan:       Hima Griffiths is a 77 year old female with afib on chronic anticoagulation with DOAC, hypertension, DM II and hx of CVA admitted on 10/9/2022 with symptoms of vertigo. Found to have had an acute right cerebellar CVA. Hospital stay has been complicated by metabolic encephalopathy, afib with RVR and CHF exacerbation.      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  * Hx MCA stroke in 2015, also dx with atrial fibrillation started on DOAC. Had been off DOAC for over a week PTA.   * Admitted with sudden vertigo with nausea and vomiting that started the day of admission. Labs notable for leukocytosis. MRI/MRA head/neck showed a right inferior cerebellar infarct, as well as diminished flow through several vertebral artery segments. MRI confirmed acute right inferior cerebellar stroke.  --Repeat CT head 10/18 with New/worsening infarcts involving the right posterior cerebral  artery distribution.Recent right cerebellar infarct, not appreciably changed.  Stroke neurology signed off [10/11], reconsulted stroke neurology today  * Xarelto resumed this stay at home dose of 20mg daily per stroke team.  Continue pravastatin 40 mg oral daily.  Continue neurochecks every 4 hours.  * PT/OT recommend TCU stay at discharge  * On modified diet per SLP with pureed diet    Acute metabolic encephalopathy, multifactorial  Moderate malnutrition in the setting of acute illness  Physical deconditioning from medical illness, senile frailty.  Pressure injury of left buttock, stage 1, chronic, present on admission.  Concern for cognitive impairment at baseline  * During stay, noted increasing confusion on 10/10, likely due to recent stroke complicated by developing CHF and possible UTI.   * Head CT 10/11 AM showed no acute pathology, no bleeding.  * Remained confused and agitated on 10/11.  Clinical picture of delirium    -- mentation continues waxing/waning during hospital stay.  Continues to be awake but barely able to take in orals, not able to answer to simple questions.  Discussed with patient's daughter, CT of the head stat ordered-as above new stroke.  At length discussion with daughter Velvet.-Consulted stroke neurology.  -- reorient frequently  -- has prns available (Seroquel, Zyprexa and IV Haldol)  -- cont treatment of conditions as below  Wound care team, nutrition following.  Nutrition following  Rehab team following    Goals of care discussion.  Patient full code at this time.  Did discuss with patient's daughter Velvet in detail regarding anticipated  long course, feeding tube for alternate means of nutrition in the interim.  Again discussed with patient's daughter this afternoon regarding CT scan results.  Will consult palliative team for further goals of care discussion.    Did discuss about feeding tube including PEG tube. Hold off tube feeding (family would like to hold off NG tube placement for today).      New acute systolic congestive heart failure exacerbation  Acute hypoxic respiratory failure dt CHF: Resolved  Hypertension  Dyslipidemia  Chronic severe bilateral LE edema  * Initially given IVFs in ED for suspected hypovolemia.   * Noted to have increased dyspnea on 10/10 with elevated lactate. Exam consistent with volume overload (elevated JVP, bibasilar crackles and LE edema). ProBNP 5k. CXR showed vascular congestion. Started diuresis with IV Lasix  * Echo this stay showed EF 45%.   * Cardiology following, appreciate comanagement  * Initially tolerated diuresis with Lasix 20mg IV BID but condition declined and was unable to take po, appeared dry with upward trend in Cr. Required fluid boluses during RRTs on 10/13 PM and 10/15 AM.  -- IV Lasix on hold as of 10/15  --Increased metoprolol to 100 mg twice daily [10/17].   Continue lisinopril 2.5 mg oral daily [10/17]  Continue pravastatin 40 mg oral  daily.  Telemetry monitoring.  Strict input output monitoring, daily weights.      Atrial fibrillation with RVR  * Intermittent RVR this stay. Had been on sched oral metoprolol.   * Cardiology notes dilt contraindicated in patient with reduced EF.  Appreciate comanagement  * Given difficulties with po intake this stay, was birefly maintained on sched IV Lopressor (10/14-10/15) and given IV digoxin (250mcg x1) on 10/15.  -- Continue on oral metoprolol [10/15], dose increased 10/17.   Digoxin discontinued 10/16.  cont anticoagulation with DOAC   -- has prn IV Lopressor also available  -- cont telemetry    Intermittent lactic acidosis  * Variable lactates noted this stay, suspect multifactorial, dt acute illness, CVA, RVR and UTI. Managing those conditions as noted.   -- given IVFs during RRTs on 10/13, 10/15.  Recheck lactate 2.3 today.  Gentle hydration with 50 mL/h for 5 hours and will recheck lactate tonight.    Trend WBC count, fever curve.  Continue antibiotics as below.  Feeding tube discussion as above.    UTI dt Enterobacter  * Suspect leukocytosis (WBC 14) and lactic acidosis (lactate 3.4) more related to CHF than infection. UA was abnl. UC grew >100k enterobacter. Initially placed on ceftriaxone on 10/10 but changed to levofloxacin on 10/13 after enterobacter resistances noted.   * Recurrent lactate elevation during RRT on 10/13 PM attributed to RVR.  -- cont levofloxacin for 7 days    Hyperchloremic hypernatremia likely from poor intake, iatrogenic .  Sodium 149, chloride 113.  Administering LR for lactic acidosis.  Poor oral intake per report.  Holding of NG placement today after discussing with patient's daughter.  Requested nursing staff encourage oral fluid intake [on thickened fluids ]  Monitor AM        Stage III CKD  * Baseline Cr is around 0.7. Renal function had been stable with diuresis this stay.   -- Cr now trending up as of 10/13 in setting of diuresis and decreased po intake  -- given fluid  boluses on 10/13, 10/15  -- holding Lasix as of 10/15.  Gentle hydration today.  Monitor in AM.     DM II  * A1c 7.2 in 9/2022. Manages with Trulicity and glipizide.   * Oral meds held on admission and placed on basal/bolus insulin.  Hold insulin Lantus.   Continue mealtime Novolog per carb ratio [ 1 unit per 12 g of carbohydrate] and med dose sliding scale insulin      FEN: no IVFs, lytes stable, modified diet per SLP as above  DVT Prophylaxis: Xarelto  Code Status: Full Code     Disposition: [facility] status dc'd 10/16. Anticipate discharge greater than 2 days pending improvement.    Discussed with patient, bedside RN, rehab team by the bedside.  Discussed with patient's daughter Velvet hearnOver the telephone.   Total Visit Time > 65 min.   Content of the Prolonged Time: Encephalopathy, acute stroke, nutritional support, goals of care discussion as detailed above  More than 80% of time spent in direct patient care, care coordination, patient counseling, and formalizing plan of care.     Rajinder Flores MD        Interval History:        Patient seen this morning.  Awake, forgetful, slow to respond.    Not able to answer simple questions.  Poor oral intake.  On puréed diet  Received increased dose of metoprolol, this morning heart rate 52.  Does not appear to be in pain.  Unable to obtain review of systems.  Per report assistance of 2 with lift for ambulation.  Telemetry A. fib, rate controlled.         Physical Exam:        Physical Exam   Temp:  [96.1  F (35.6  C)-97.9  F (36.6  C)] 97  F (36.1  C)  Pulse:  [] 52  Resp:  [16-18] 18  BP: ()/(69-91) 143/72  SpO2:  [94 %-97 %] 95 %    Intake/Output Summary (Last 24 hours) at 10/17/2022 0854  Last data filed at 10/17/2022 0402  Gross per 24 hour   Intake 200 ml   Output 450 ml   Net -250 ml       Admission Weight: 96 kg (211 lb 10.3 oz)  Current Weight: 96 kg (211 lb 10.3 oz)    PHYSICAL EXAM  GENERAL: Patient awake, slow to respond.  Barely able to answer  simple questions.  HEENT: Oropharynx pink, moist.  Extraocular muscle movements intact.  HEART: irregular rate and rhythm. S1S2.  LUNGS: Bilateral decreased breath sounds.Respirations unlabored  ABDOMEN: Soft, no abdominal tenderness, bowel sounds heard   NEURO: Moving all extremities.    EXTREMITIES: 1+ pedal edema.  SKIN: Warm, dry. + bruising.  PSYCHIATRY Cooperative       Medications:          [Held by provider] furosemide  20 mg Intravenous Q12H     insulin aspart   Subcutaneous TID w/meals     insulin aspart  1-7 Units Subcutaneous TID AC     insulin aspart  1-5 Units Subcutaneous At Bedtime     insulin glargine  15 Units Subcutaneous QAM AC     levofloxacin  500 mg Intravenous Q24H     lisinopril  2.5 mg Oral Daily     metoprolol succinate ER  100 mg Oral BID     miconazole   Topical BID     polyethylene glycol  17 g Oral BID     pravastatin  40 mg Oral Daily     rivaroxaban ANTICOAGULANT  20 mg Oral Daily with supper     senna-docusate  2 tablet Oral Daily at 10 am     sodium chloride (PF)  10 mL Intracatheter Q8H     sodium chloride (PF)  3 mL Intracatheter Q8H     acetaminophen **OR** acetaminophen, benztropine, bisacodyl, glucose **OR** dextrose **OR** glucagon, haloperidol lactate, labetalol **OR** hydrALAZINE, lidocaine 4%, lidocaine (buffered or not buffered), - MEDICATION INSTRUCTIONS -, - MEDICATION INSTRUCTIONS -, melatonin, methyl salicylate-menthol, metoprolol, OLANZapine zydis, ondansetron **OR** ondansetron, prochlorperazine **OR** prochlorperazine **OR** prochlorperazine, QUEtiapine, sodium chloride (PF), sodium chloride (PF)         Data:      All new lab and imaging data was reviewed.

## 2022-10-18 NOTE — PROGRESS NOTES
[facility]  Cardiology Progress Note    Date of Service (when I saw the patient): 10/18/2022     Assessment & Plan   Hima Griffiths is a 77 year old female who was admitted on 10/9/2022.     1.  : Atrial Fibrillation w/ RVR   - Rate borderline on higher dose Toprol 100mg BID.   - Xarelto for anticoagulation.   - Mag 2.5, K 4.0     2.  : Acute systolic heart failure   - BNP 5061 on admission, down trending to 1078.   - Echocardiogram showed reduced EF 45%, mild to moderate global hypokinesis but limited visualization, normal RV function. Negative bubble study for flow acroxx the interatrial septum.   - Diuresed  5 kg on IV furosemide. Wt 201 lb.   - Euvolemic upon exam.  - Continue Toprol.   - Low dose lisinopril started 10/17  - BMP today - Na 149, K 4.0, Creatinine 1.03, BUN 41    3.  : Acute ischemic right cerebellar stroke   - Brain MRA -  3 x 2.5 cm area of ADC restriction in the right inferior cerebellum without corresponding suggestive of acute infarct.   - Neurology following     4. : Type 2 diabetes  5.  : CKD -     Plan   1. Tele confirms A- fib, rate sub optimal. Toprol increased to 100 mg BID ( first dose today). Digoxin 125 mcg x 1 dose today. Continue Xarelto.   2. Euvolemic upon exam. Down 5kg since admission. Lasix on hold. Low dose ACE started yesterday, now hypotensive. Hold lisinopril . Continue GDMT uptitration as BP allows.   3. Daily weights, strict I/O's  4. Recommend Palliative consult.     ESTHER Peña CNP  Text Page  (M-F, 7:30 am - 4:00 pm)    Interval History   Patient sleeping soundly. Daughter at bedside, requesting Palliative consult. Daughter is a end of life .  A-fib sub optimally rate controlled. BB increased, 1x dose dig today. Hypotensive, hypernatremic,  hold ACE.     Physical Exam   Temp: (!) 96.3  F (35.7  C) Temp src: Axillary BP: 100/70 Pulse: 61   Resp: 18 SpO2: 90 % O2 Device: Nasal cannula Oxygen Delivery: 2 LPM  Vitals:     10/11/22 1525 10/17/22 1222   Weight: 96 kg (211 lb 10.3 oz) 91.2 kg (201 lb 1 oz)     Vital Signs with Ranges  Temp:  [96.1  F (35.6  C)-97.1  F (36.2  C)] 96.3  F (35.7  C)  Pulse:  [] 61  Resp:  [16-18] 18  BP: (100-143)/(70-88) 100/70  SpO2:  [90 %-95 %] 90 %  I/O last 3 completed shifts:  In: 50 [P.O.:50]  Out: -     GEN:  In general, this is a overweight female.   HEENT:  Not assessed   NECK: . Difficult to assess JVD  C/V:  Irregular rate and rhythm, no murmur, rub or gallop.  RESP: Respirations are unlabored. No use of accessory muscles. Clear to auscultation bilaterally without wheezing, rales, or rhonchi.  GI: Abdomen non distended   EXTREM: No LE edema. No cyanosis or clubbing.  NEURO: Patient sleeping   PSYCH: Not assessed   SKIN: Warm and dry. UE ecchymosis.       Medications     lactated ringers 75 mL/hr at 10/18/22 0947     - MEDICATION INSTRUCTIONS -       - MEDICATION INSTRUCTIONS -         digoxin  125 mcg Intravenous Once     [Held by provider] furosemide  20 mg Intravenous Q12H     insulin aspart   Subcutaneous TID w/meals     insulin aspart  1-7 Units Subcutaneous TID AC     insulin aspart  1-5 Units Subcutaneous At Bedtime     [Held by provider] insulin glargine  15 Units Subcutaneous QAM AC     levofloxacin  500 mg Intravenous Q24H     [Held by provider] lisinopril  2.5 mg Oral Daily     metoprolol succinate ER  100 mg Oral BID     miconazole   Topical BID     polyethylene glycol  17 g Oral BID     pravastatin  40 mg Oral Daily     rivaroxaban ANTICOAGULANT  20 mg Oral Daily with supper     senna-docusate  2 tablet Oral Daily at 10 am     sodium chloride (PF)  10 mL Intracatheter Q8H     sodium chloride (PF)  3 mL Intracatheter Q8H       Data   Reviewed         I spent > 20 minutes face-to-face and/or coordinating care. Over 50% of our time on the unit was spent counseling the patient and/or coordinating care     ESTHER Peña CNP 10/18/2022

## 2022-10-18 NOTE — CONSULTS
[facility]    Stroke Consult Note    Reason for Consult:  Abnormal imaging, evolution of R P1 ischemic infarct    Chief Complaint: No chief complaint on file.       HPI  Hima Griffiths is a 78 YO F w/vascular RFs: afib on xarelto, HF borderline EF (45%), NIDDM2, HLD on statin, CKD stage 3 with PMHx sig for medically sick at baseline using walker at baseline, who was admitted on 10/9 for acute onset vertiginous symptoms with N/V and found to have R PICA territory inferior cerebellar AIS in setting of xarelto held for 5 days for medical procedure.    Initially, R P1 occlusion felt to be chronic given MRI on 10/9 did not show PCA territory infarcts.    Shortly after MRI daughter noted decrease in peripheral vision.    NCCT ordered 10/11 due to worsening nighttime agitation - no clear new ischemic changes or ICH at this time.     Hospital course c/b ongoing and worsening encephalopathy, neurological deficits.     Xarelto resumed on 10/11 - previously bridged with ASA.     Medically, hospital course c/b UTI, CHF exacerbation/decompensated HF, afib with RVR and difficulty controlling tachycardia with some hypotension, and ongoing ischemic encephalopathy.     NCCT ordered on 10/18 due to ongoing and perhaps worsening encephalopathy - revealing new R PCA territory infarcts, especially in R occipital suggestive of ischemic infarct days old, new as of 10/11.       Stroke Evaluation Summarized    MRI/Head CT NCCT 10/18  IMPRESSION:   1. New/worsening infarcts involving the right posterior cerebral  artery distribution.  2. Recent right cerebellar infarct, not appreciably changed.  3. No evidence of hemorrhage or significant mass effect.  4. Chronic changes and multiple old infarcts.   Intracranial Vasculature Pending   Cervical Vasculature Pending     Echocardiogram TTE 10/11  Technically challenging study due to patient body habitus, even with the use  of contrast imaging.     Left  ventricular systolic function is mild to moderately reduced. The visual  ejection fraction is estimated at 45%.  With rapid atrial fibrillation, the visual approximation of left ventricular  systolic function may be falsely decreased.  Regional wall motion abnormalities cannot be excluded due to limited  visualization, however hypokinesis appears global.  Right ventricular global function is normal.  No clear evidence of interatrial shunt on limited assessment with color  Doppler. A contrast injection (Bubble Study) was performed that was negative  for flow across the interatrial septum.  There is mild-moderate global hypokinesia of the left ventricle.  The ascending aorta is Mildly dilated. Max diameter of the visualized portion  3.9 cm.   EKG/Telemetry Afib with RVR   Other Testing Not Applicable      LDL  10/10/2022: 55 mg/dL   A1C  9/21/2022: 7.2 %   Troponin No lab value available in past 48 hrs       Impression/Recommendations  # Embolic/ischemic encephalopathy with R thalamic infarct.   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  # R P1 occlusion with strokes referable to lesion. Exam suggestive of thalamic, midbrain involvement (decrease LUE, LLE sensation to LT, decreased alertness/increased somnolence/obtundationL hemiparesis) as well as clearly visualized and evolved subacute R occipital lobe ischemic infarct - new as of NCCT on 10/11. Additionally, on close review of MRA N fat sat sequences, there is R V2-3 irregularity with concern for R VA dissection. Given AC was held for 5 days prior to stroke and for a few days after discovery of cerebellar stroke for concerns of hemorrhagic conversion with initiation of AC - it is also possible occlusions/strokes are related to cardioembolism from known afib. Initial MRI may not have shown ischemic changes 2/2 maintenance of collateral flow (leptomeningial collateralization?). Other possibility is R P1 occlusion truly is chronic and hypoperfusion from tachycardia/hypotension  "2/2 afib with RVR led to compromised flow with subsequent ischemic infarcts. Exam c/w with R P1 ischemic infarcts involving midbrain, thalamus, occipital lobe (L hemiparesis, decreased alertness, L hypoesthesia, L homonymous hemianopsia).   - Had long discussion with daughter who is POA - given poor baseline health, pt would not want things like a PEG - however a NGT is reasonable to try to give pt some time to improve    - Warned daughter that typically patients with poor underlying health with thalamic infarcts, they take longer to improve since thalamus in involved in LEXIE network and lesions here can disrupt network and affect level of alertness (midbrain is also involved in network)  - Cont xarelto for now - new stroke is subacute and has been stable with no hemorrhagic conversion on xarelto  - Cont home statin  - Ordered cEEG overnight to r/o abnormal electrical activity/NCS as contributory given known structural lesions  - MRI brain, MRA H/N ordered for eval interval changes since last neuroaxis imaging - unlikely to  but may help with prognosis  - Can consider TTE to r/o LVT with borderline EF and CHF exacerbation - an LVT would change managemement  - Stroke Neurology will cont to follow - can help with goals of care discussions if needed     Patient Follow-up    - final recommendation pending work-up    Thank you for this consult. We will continue to follow.     The Stroke Staff is Dr. Jones.    Caio Barrera MD  Vascular Neurology Fellow  To page me or covering stroke neurology team member, click here: [facility]   Choose \"On Call\" tab at top, then search dropdown box for \"Neurology Adult\", select location, press Enter, then look for stroke/neuro ICU/telestroke.    _____________________________________________________    Clinically Significant Risk Factors Present on Admission                  Past Medical History   Past Medical History:   Diagnosis Date     Benign hypertension      Benign " paroxysmal positional vertigo      Colitis 11/28/2016    Presumed infectious 2009.      Diabetes mellitus (H)      Family history of atrial fibrillation     Mother     Osteoarthritis of right knee, unspecified osteoarthritis type 08/02/2019     Persistent atrial fibrillation (H) 11/2015     Stroke (H) 10/2015    Left MCA     Past Surgical History   Past Surgical History:   Procedure Laterality Date     AS LOOP RECORDER IMPLANT  11/9/15     CATARACT IOL, RT/LT  8/13/14     Medications   Home Meds  Prior to Admission medications    Medication Sig Start Date End Date Taking? Authorizing Provider   acetaminophen (TYLENOL) 325 MG tablet Take 2 tablets (650 mg) by mouth every 6 hours as needed for mild pain or other (and adjunct with moderate or severe pain or per patient request) 6/24/22  Yes Stella Stevenson PA-C   clotrimazole (LOTRIMIN) 1 % external cream Apply topically 2 times daily as needed (yeast rash) 9/21/22  Yes Georgiana Joy MD   clotrimazole (LOTRIMIN) 1 % external cream Apply topically 2 times daily Under breasts and in groin areas 9/21/22  Yes Georgiana Joy MD   diltiazem (CARDIZEM) 30 MG tablet Take 1 tablet (30 mg) by mouth 2 times daily 9/21/22  Yes Georgiana Joy MD   dulaglutide (TRULICITY) 1.5 MG/0.5ML pen Inject 1.5 mg Subcutaneous every 7 days 9/21/22  Yes Georgiana Joy MD   glipiZIDE (GLUCOTROL XL) 10 MG 24 hr tablet Take 1 tablet (10 mg) by mouth daily (with breakfast) 9/22/22  Yes Georgiana Joy MD   lisinopril (ZESTRIL) 5 MG tablet Take 1 tablet (5 mg) by mouth daily 9/21/22  Yes Georgiana Joy MD   melatonin 5 MG tablet Take 1 tablet (5 mg) by mouth nightly as needed for sleep 7/14/22  Yes Ai Tesfaye Mai, MD   metoprolol tartrate (LOPRESSOR) 100 MG tablet Take 1 tablet (100 mg) by mouth 2 times daily 9/21/22  Yes Georgiana Joy MD   pravastatin (PRAVACHOL) 40 MG tablet Take 1 tablet (40 mg) by mouth daily 9/21/22  Yes Georgiana Joy MD    rivaroxaban ANTICOAGULANT (XARELTO ANTICOAGULANT) 20 MG TABS tablet Take 1 tablet (20 mg) by mouth daily (with dinner) 10/5/22  Yes Georgiana Joy MD   ASPIRIN NOT PRESCRIBED (INTENTIONAL) Please choose reason not prescribed, below  Patient not taking: Reported on 10/5/2022    Georgiana Joy MD   blood glucose (NO BRAND SPECIFIED) lancets standard Use to test blood sugar 2 times daily or as directed. 7/14/20   Georgiana Joy MD   blood glucose (NO BRAND SPECIFIED) test strip Use to test blood sugar 2 times daily or as directed. 7/21/20   Georgiana Joy MD   blood glucose monitoring (NO BRAND SPECIFIED) meter device kit Use to test blood sugar 2 times daily or as directed. 7/14/20   Georgiana Joy MD   insulin pen needle (32G X 6 MM) 32G X 6 MM miscellaneous Use as directed. 7/7/22   Deanna Motta APRN CNP       Scheduled Meds    [Held by provider] furosemide  20 mg Intravenous Q12H     insulin aspart   Subcutaneous TID w/meals     insulin aspart  1-7 Units Subcutaneous TID AC     insulin aspart  1-5 Units Subcutaneous At Bedtime     [Held by provider] insulin glargine  15 Units Subcutaneous QAM AC     levofloxacin  500 mg Intravenous Q24H     lisinopril  2.5 mg Oral Daily     metoprolol succinate ER  100 mg Oral BID     miconazole   Topical BID     polyethylene glycol  17 g Oral BID     pravastatin  40 mg Oral Daily     rivaroxaban ANTICOAGULANT  20 mg Oral Daily with supper     senna-docusate  2 tablet Oral Daily at 10 am     sodium chloride (PF)  10 mL Intracatheter Q8H     sodium chloride (PF)  3 mL Intracatheter Q8H       Infusion Meds    lactated ringers 75 mL/hr at 10/18/22 0947     - MEDICATION INSTRUCTIONS -       - MEDICATION INSTRUCTIONS -         PRN Meds  acetaminophen **OR** acetaminophen, benztropine, bisacodyl, glucose **OR** dextrose **OR** glucagon, haloperidol lactate, labetalol **OR** hydrALAZINE, lidocaine 4%, lidocaine (buffered or not buffered), - MEDICATION  INSTRUCTIONS -, - MEDICATION INSTRUCTIONS -, melatonin, methyl salicylate-menthol, metoprolol, OLANZapine zydis, ondansetron **OR** ondansetron, prochlorperazine **OR** prochlorperazine **OR** prochlorperazine, QUEtiapine, sodium chloride (PF), sodium chloride (PF)    Allergies   Allergies   Allergen Reactions     Alkylamines Other (See Comments)     Comment: Weakness tingling arms and legs, Description:      Sudafed [Pseudoephedrine]      Legs and arms get weak     Family History   Family History   Problem Relation Age of Onset     Hypertension Mother      Arrhythmia Mother      Hypertension Father      Coronary Artery Disease Father      Social History   Social History     Tobacco Use     Smoking status: Former     Types: Cigarettes     Start date: 10/3/1963     Quit date: 10/3/2006     Years since quittin.0     Smokeless tobacco: Never     Tobacco comments:     30 years on and off less than a pack a day   Vaping Use     Vaping Use: Never used   Substance Use Topics     Alcohol use: Yes     Comment: once a monthly       Review of Systems   The 10 point Review of Systems is negative other than noted in the HPI or here.        PHYSICAL EXAMINATION   Temp:  [96.1  F (35.6  C)-97.1  F (36.2  C)] 96.3  F (35.7  C)  Pulse:  [] 61  Resp:  [16-18] 18  BP: (100-143)/(70-88) 100/70  SpO2:  [90 %-95 %] 90 %    Neuro    Exam c/b pt condition    MS: Somnolent but arouseable, cannot consistently follow simple commands, only made noises to nox stim  CN: L homonymous hemianopsia  Motor: LUE, LLE appear to have less spontaneous movements and appear to be weaker vs RUE, RLE  Sensory: Decrease sensation to LT in LUE, LLE    Dysphagia Screen  Per Nursing    Stroke Scales    NIHSS  1a. Level of Consciousness 1-->Not alert, but arousable by minor stimulation to obey, answer, or respond   1b. LOC Questions 2-->Answers neither question correctly   1c. LOC Commands 1-->Performs one task correctly   2.   Best Gaze 0-->Normal    3.   Visual 0-->No visual loss (Not following commands)   4.   Facial Palsy 0-->Normal symmetrical movements   5a. Motor Arm, Left 1-->Drift, limb holds 90 (or 45) degrees, but drifts down before full 10 seconds, does not hit bed or other support   5b. Motor Arm, Right 0-->No drift, limb holds 90 (or 45) degrees for full 10 secs   6a. Motor Leg, Left 2-->Some effort against gravity, leg falls to bed by 5 secs, but has some effort against gravity   6b. Motor Leg, right 1-->Drift, leg falls by the end of the 5-sec period but does not hit bed   7.   Limb Ataxia 0-->Absent   8.   Sensory 0-->Normal, no sensory loss   9.   Best Language 0-->No aphasia, normal (Not following commands, minimal speech)   10. Dysarthria 1-->Mild-to-moderate dysarthria, patient slurs at least some words and, at worst, can be understood with some difficulty   11. Extinction and Inattention  0-->No abnormality   Total 9 (10/12/22 1300)       Modified Santiago Score (Pre-morbid)  3 - Moderate disability.  Requires some help, but able to walk unassisted.     Imaging  I personally reviewed all imaging; relevant findings per HPI.    Labs Data   CBC  Recent Labs   Lab 10/18/22  0526 10/17/22  0544 10/15/22  0737   WBC 10.7 11.9* 11.6*   RBC 4.47 4.58 4.70   HGB 15.2 15.7 16.1*   HCT 48.5* 48.3* 49.0*    233 250     Basic Metabolic Panel   Recent Labs   Lab 10/18/22  0807 10/18/22  0526 10/17/22  2256 10/17/22  0912 10/17/22  0544 10/16/22  0821 10/16/22  0624 10/15/22  0829 10/15/22  0321   NA  --  149*  --   --  146*  --   --   --  144   POTASSIUM  --  4.0  --   --  3.9  --  3.6  --  4.2   CHLORIDE  --  113*  --   --  109  --   --   --  104   CO2  --  28  --   --  31  --   --   --  29   BUN  --  41*  --   --  44*  --   --   --  34*   CR  --  1.03  --   --  1.06*  --   --   --  1.03   GLC 72 182* 160*   < > 203*   < >  --    < > 242*   EZEKIEL  --  9.7  --   --  9.9  --   --   --  10.2*    < > = values in this interval not displayed.     Liver  Panel  Recent Labs   Lab 10/18/22  0526   PROTTOTAL 7.6   ALBUMIN 3.0*   BILITOTAL 0.7   ALKPHOS 87   AST 42   ALT 36     INR    Recent Labs   Lab Test 10/09/22  1525   INR 0.97      Lipid Profile    Recent Labs   Lab Test 10/10/22  0823 03/16/22  1127 01/20/21  1545   CHOL 132 129 140   HDL 53 51 54   LDL 55 54 59   TRIG 120 119 133     A1C    Recent Labs   Lab Test 09/21/22  1628 06/16/22  1415 03/16/22  1127   A1C 7.2* 8.3* 7.5*     Troponin  No results for input(s): CTROPT, TROPONINIS, TROPONINI, GHTROP in the last 168 hours.

## 2022-10-18 NOTE — PROGRESS NOTES
Care Management Follow Up    Length of Stay (days): 9    Expected Discharge Date: 10/20/2022     Concerns to be Addressed:       Patient plan of care discussed at interdisciplinary rounds: Yes    Anticipated Discharge Disposition: Transitional Care     Anticipated Discharge Services: Transportation Services  Anticipated Discharge DME:      Patient/family educated on Medicare website which has current facility and service quality ratings: yes  Education Provided on the Discharge Plan:    SW met with pt's daughter today outside of pt's room. She states that she had a conversation with hospitalist today regarding pt's lack of intake and tube feeding vs comfort care was discussed.  Pt does have a  ACD but not loading into pt chart. Family is reviewing today and having discussion about next steps.     Patient/Family in Agreement with the Plan: Family is discussing whether or not to pursue tube feeding.    Referrals Placed by CM/SW:    Private pay costs discussed:     Additional Information:  Pt's daughter, Velvet indicates that she is and end of life . She is planning on speaking to her siblings today and making a decision about pt's ongoing care.  SW follow up as needed.    LILIBETH Enamorado  [facility]  Care Transitions  [phone]      .

## 2022-10-19 NOTE — PROVIDER NOTIFICATION
Date paged: 10/18    Time paged: [phone]    Person paged: Holly (Stroke Neuro)    Paging system used: [employer]    Message: EEG in room tried to place monitor, daughter at this time is wanting to not do EEG, would like to speak with someone on Stroke Team. Patient also very confused and restless with monitoring, becomes agitated with more stimulation. Are you able to call or come see daughter? Thanks, Georgiana RODGERS *[phone]       Response: MD unable to come to bedside at this time, ok with cancelling EEG per family request. Will call daughter tonight or tomorrow to follow up.

## 2022-10-19 NOTE — PLAN OF CARE
Pt here with right cerebellar CVA with new right ICA aneurysm. A&Ox1. Generalized weakness noted, with more weakness noted in BLE r/t pain with movement. Stiff neck and pt exclaims pain when attempting to provide PROM. Slow to respond, not following all commands. Blinks to threat in right eye, not left. Incontinent B&B. Stool softners held for reported loose stool from yesterday. Poor appetite, declining to eat more than a couple bites of breakfast and lunch. Moderately thickened liquids; appears to be tolerating well. Tele A.fib w/ RVR, HR sustaining in 110s. Palliative met with family and patient; patient changed to comfort cares at approx. 1100. NeuroCVA & cardiology signed off. Family at bedside. Turned/repo'd PRN. Reporting neck pain. Warm packs and PRN Tylenol given. Redness to coccyx; mepilex c/d/I. Patient transferred to [facility] at approx. 4pm.

## 2022-10-19 NOTE — PROGRESS NOTES
Patient has been transitioned to comfort cares.   Cardiology therapies have been discontinued.   Cardiology will sign off.

## 2022-10-19 NOTE — PROVIDER NOTIFICATION
Date paged: 10/18    Time paged: [phone]    Person paged: Qaryouti    Paging system used: [employer] Mess[employer]roli[employer]    Message: Please call [facility] radiology at [phone] regarding MRI results. MD is Kayla. Thanks, Georgiana RODGERS *[phone]

## 2022-10-19 NOTE — PROGRESS NOTES
[facility]  Hospitalist Progress Note   10/19/2022          Assessment and Plan:       Hima Griffiths is a 77 year old female with afib on chronic anticoagulation with DOAC, hypertension, DM II and hx of CVA admitted on 10/9/2022 with symptoms of vertigo. Found to have had an acute right cerebellar CVA. Hospital stay has been complicated by metabolic encephalopathy, afib with RVR and CHF exacerbation.      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  * Hx MCA stroke in 2015, also dx with atrial fibrillation started on DOAC. Had been off DOAC for over a week PTA.   * Admitted with sudden vertigo with nausea and vomiting that started the day of admission. Labs notable for leukocytosis. MRI/MRA head/neck showed a right inferior cerebellar infarct, as well as diminished flow through several vertebral artery segments. MRI confirmed acute right inferior cerebellar stroke.  --Repeat CT head 10/18 with New/worsening infarcts involving the right posterior cerebral  artery distribution.Recent right cerebellar infarct, not appreciably changed.  Stroke neurology signed off [10/11], reconsulted stroke neurology today  * Xarelto resumed this stay at home dose of 20mg daily per stroke team.  Continue pravastatin 40 mg oral daily.  Continue neurochecks every 4 hours.  * PT/OT recommend TCU stay at discharge  * On modified diet per SLP with pureed diet    Acute metabolic encephalopathy, multifactorial  Moderate malnutrition in the setting of acute illness  Physical deconditioning from medical illness, senile frailty.  Pressure injury of left buttock, stage 1, chronic, present on admission.  Concern for cognitive impairment at baseline  * During stay, noted increasing confusion on 10/10, likely due to recent stroke complicated by developing CHF and possible UTI.   * Head CT 10/11 AM showed no acute pathology, no bleeding.  * Remained confused and agitated on 10/11.  Clinical picture of delirium    -- mentation continues waxing/waning during hospital stay.  Continues to be awake but barely able to take in orals, not able to answer to simple questions.  Discussed with patient's daughter, CT of the head stat ordered-as above new stroke.  At length discussion with daughter Velvet.-Consulted stroke neurology.  -- reorient frequently  -- has prns available (Seroquel, Zyprexa and IV Haldol)  -- cont treatment of conditions as below  Wound care team, nutrition following.  Nutrition following  Rehab team following    Goals of care discussion.  Patient full code at this time.  Did discuss with patient's daughter Velvet in detail regarding anticipated  long course, feeding tube for alternate means of nutrition in the interim.  Again discussed with patient's daughter this afternoon regarding CT scan results.  Will consult palliative team for further goals of care discussion.    Did discuss about feeding tube including PEG tube. Hold off tube feeding (family would like to hold off NG tube placement for today).      Patient's family has decided on palliative care, no feeding  tubes or lab checks      New acute systolic congestive heart failure exacerbation  Acute hypoxic respiratory failure dt CHF: Resolved  Hypertension  Dyslipidemia  Chronic severe bilateral LE edema  * Initially given IVFs in ED for suspected hypovolemia.   * Noted to have increased dyspnea on 10/10 with elevated lactate. Exam consistent with volume overload (elevated JVP, bibasilar crackles and LE edema). ProBNP 5k. CXR showed vascular congestion. Started diuresis with IV Lasix  * Echo this stay showed EF 45%.   * Cardiology following, appreciate comanagement  * Initially tolerated diuresis with Lasix 20mg IV BID but condition declined and was unable to take po, appeared dry with upward trend in Cr. Required fluid boluses during RRTs on 10/13 PM and 10/15 AM.  -- IV Lasix on hold as of 10/15  --Increased metoprolol to 100 mg twice daily [10/17].    Continue lisinopril 2.5 mg oral daily [10/17]  Continue pravastatin 40 mg oral daily.  Telemetry monitoring.  Strict input output monitoring, daily weights.      Atrial fibrillation with RVR  * Intermittent RVR this stay. Had been on sched oral metoprolol.   * Cardiology notes dilt contraindicated in patient with reduced EF.  Appreciate comanagement  * Given difficulties with po intake this stay, was birefly maintained on sched IV Lopressor (10/14-10/15) and given IV digoxin (250mcg x1) on 10/15.  -- Continue on oral metoprolol [10/15], dose increased 10/17.   Digoxin discontinued 10/16.  cont anticoagulation with DOAC   -- has prn IV Lopressor also available  -- cont telemetry    Intermittent lactic acidosis  * Variable lactates noted this stay, suspect multifactorial, dt acute illness, CVA, RVR and UTI. Managing those conditions as noted.   -- given IVFs during RRTs on 10/13, 10/15.  Recheck lactate 2.3 today.  Gentle hydration with 50 mL/h for 5 hours and will recheck lactate tonight.    Trend WBC count, fever curve.  Continue antibiotics as below.  Feeding tube discussion as above.    UTI dt Enterobacter  * Suspect leukocytosis (WBC 14) and lactic acidosis (lactate 3.4) more related to CHF than infection. UA was abnl. UC grew >100k enterobacter. Initially placed on ceftriaxone on 10/10 but changed to levofloxacin on 10/13 after enterobacter resistances noted.   * Recurrent lactate elevation during RRT on 10/13 PM attributed to RVR.  -- cont levofloxacin for 7 days    Hyperchloremic hypernatremia likely from poor intake, iatrogenic .  Sodium 149, chloride 113.  Administering LR for lactic acidosis.  Poor oral intake per report.  Holding of NG placement today after discussing with patient's daughter.  Requested nursing staff encourage oral fluid intake [on thickened fluids ]  Monitor AM     No further labs as she is palliative care     Stage III CKD  * Baseline Cr is around 0.7. Renal function had been stable  with diuresis this stay.   -- Cr now trending up as of 10/13 in setting of diuresis and decreased po intake  -- given fluid boluses on 10/13, 10/15  -- holding Lasix as of 10/15.  Gentle hydration today.  Monitor in AM.     DM II  * A1c 7.2 in 9/2022. Manages with Trulicity and glipizide.   * Oral meds held on admission and placed on basal/bolus insulin.  Hold insulin Lantus.   Continue mealtime Novolog per carb ratio [ 1 unit per 12 g of carbohydrate] and med dose sliding scale insulin      FEN: no IVFs, lytes stable, modified diet per SLP as above  DVT Prophylaxis: Xarelto  Code Status: Full Code     Disposition: [facility] status dc'd 10/16. Anticipate discharge greater than 2 days pending improvement.    Discussed with patient, bedside RN, rehab team by the bedside.  Discussed with patient's daughter Velvet hearnOver the telephone.   Total Visit Time > 65 min.   Content of the Prolonged Time: Encephalopathy, acute stroke, nutritional support, goals of care discussion as detailed above  More than 80% of time spent in direct patient care, care coordination, patient counseling, and formalizing plan of care.     Tamela Ho MD        Interval History:        Patient seen in her room.  Awake and able to answer simple questions.  On dysphagia diet.         Physical Exam:        Physical Exam   Temp:  [96.8  F (36  C)-97.5  F (36.4  C)] 96.8  F (36  C)  Pulse:  [] 120  Resp:  [11-18] 14  BP: ()/() 95/56  SpO2:  [94 %-99 %] 97 %    Intake/Output Summary (Last 24 hours) at 10/17/2022 0854  Last data filed at 10/17/2022 0402  Gross per 24 hour   Intake 200 ml   Output 450 ml   Net -250 ml       Admission Weight: 96 kg (211 lb 10.3 oz)  Current Weight: 96 kg (211 lb 10.3 oz)    PHYSICAL EXAM  GENERAL: Patient awake, slow to respond.  Barely able to answer simple questions.  HEENT: Oropharynx pink, moist.  Extraocular muscle movements intact.  HEART: irregular rate and rhythm. S1S2.  LUNGS: Bilateral  decreased breath sounds.Respirations unlabored  ABDOMEN: Soft, no abdominal tenderness, bowel sounds heard   NEURO: Moving all extremities.    EXTREMITIES: 1+ pedal edema.  SKIN: Warm, dry. + bruising.  PSYCHIATRY Cooperative       Medications:          miconazole   Topical BID     sodium chloride (PF)  10 mL Intracatheter Q8H     sodium chloride (PF)  3 mL Intracatheter Q8H     acetaminophen **OR** acetaminophen, atropine, benztropine, bisacodyl, glycopyrrolate, haloperidol lactate, lidocaine 4%, lidocaine (buffered or not buffered), LORazepam, LORazepam, methyl salicylate-menthol, morphine, morphine sulfate, naloxone, naloxone, OLANZapine zydis, ondansetron **OR** ondansetron, prochlorperazine **OR** prochlorperazine **OR** prochlorperazine, sodium chloride (PF), sodium chloride (PF)         Data:      All new lab and imaging data was reviewed.

## 2022-10-19 NOTE — PROGRESS NOTES
[facility]    Consult Note - [facility]    [facility] 24/7 Contact Number: [phone]    - Providers: Please contact [facility] with changes in orders or clinical plan of care   - Nursing: Please contact [facility] with significant changes in patient condition    Hospice will notify the care team (including the hospitalist) to confirm date of inpatient hospice (GIP) admission.    New Epic encounter will not be created until hospice completes admission.       Hospice Diagnosis: Acute ischemic right cerebellar stroke suspect cardioembolic    Indication for Inpatient Hospice: Acute ischemic right cerebellar stroke suspect cardioembolic    Goals for Hospital Discharge: Control of pain and anxiety, No suffering or pain at time of death    Plan of Care Discussed with the Following:    - Carolie's Family/Preferred Contact: Daughters Velvet and Debbi  - Hospice Provider: Coleen Raza MD    Summary of Visit (includes assessment, medications and any new orders):   GIP consult, patient is eligible.   MD recommends management of any pain and anxiety (Morphine or haldol PRN-to  be given for a respiratory rate > 20, or any S/S of pain or anxiety-grimacing, air picking, restlessness).       Linus Mcarthur RN. BSN  [facility]

## 2022-10-19 NOTE — PLAN OF CARE
Occupational Therapy Discharge Summary    Reason for therapy discharge:    Transitioned to comfort cares    Progress towards therapy goal(s). See goals on Care Plan in [facility] electronic health record for goal details.  Goals not met     Therapy recommendation(s):    No further therapy is recommended.

## 2022-10-19 NOTE — PLAN OF CARE
"Reason for Admission: R CVA     Cognitive/Mentation: A/Ox self  Neuros/CMS: Intact ex gen weakness, L weakness, slow to respond/confused, does not blind to threat with L eye  VS: stable on RA. SBP <160  Tele: A-fib RVR.  GI: BS hypoactive, + flatus, last BM 10/17. inContinent.  : inContinent.  Pulmonary: LS clear.  Pain: \"hurts all over\" - refused interventions. Frequent repositioning    Drains/Lines: R midline  Skin: blanchable redness - coccyx. Pulsate mattress.   Activity: Assist x 2 with lift.  Diet: pureed with mild thick liquids. Takes pills crushed in applesauce. Total feed     Therapies recs: pending  Discharge: pending     End of shift summary: stable overnight. Unit lab draws completed this am.     "

## 2022-10-19 NOTE — PROGRESS NOTES
Was paged to call radiology about new MRI results/Head MRA    MRI showing in addition to the known R PICA infarct. Also showing new R PCA ischemic infarcts.   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  Regarding BP goals, changed SBP parameters as she needs to run below SBP of 160 ( adjusted PRN labetalol and hydralazine orders) due to finding of aneurysm    EDIT   Aneurysm was there previously and small - keep same IV tx parameters of SBP cap > 180 only 2/2 on anticoagulation. If needed can titrate SBP with oral meds. Overall SBP < 140s -> Caio Barrera MD Stroke Fellow

## 2022-10-19 NOTE — PROGRESS NOTES
Reviewed MRI - new strokes as of 10/11 2/2 hemodynamic compromise of known R P1 occlusion (due to AC held for 5 days for surgical procedure) in setting of afib with RVR with hypotension relatively refractory to medical management.    Had long discussion with daughter in regards to new stroke burden and current neurological deficits, Velvet, who expressed given poor baseline health, hospital course c/b numerous medical/neurological complications with downtrending health, and pt's clear expressed wishes prior to hospitalization, that they did not want to pursue any further workup such as cEEG for encephalopathy.     Family met with Palliative team and pt transitioned to comfort measures subsequently today.    Stroke Neurology signing off - please page/call with questions/concerns.    Caio Barrera MD PGY 5 Stroke Fellow

## 2022-10-19 NOTE — PLAN OF CARE
Reason for Admission: R cerebellar CVA    Cognitive/Mentation: A/Ox self, arousing to voice  Neuros/CMS: Hard to assess as patient not following commands well. L eye continues to not blink to threat, L sided weakness, L neglect.  VS: Fluctuating, see summary. Tele: Afib RVR.  GI: BS +, + flatus, last BM 17th. Incontinent.  : Low urine output continues. Incontinent.  Pulmonary: LS diminished.  Pain: Pain with movement, particularly in legs, refused tylenol.     Drains: None  Skin: Scattered bruising, skin tear to L arm, sacral wound  Activity: Assist x 2 with lift.  Diet: Pureed with mildly thickened liquids. Takes pills crushed in pudding.     Aggression Stoplight Score: Yellow for confusion  Therapies recs: Pending  Discharge: Pending    End of shift summary: EEG cancelled per family request. MRI shows R ICA terminus aneurysm, Stroke neuro aware. RRT called for poor perfusion and Afib RVR in 120s-140s. IV metoprolol given. Hot pack applied to hands and feet to improve perfusion, O2 sat now more stable. BP fluctuating, new parameter of SBP<160.

## 2022-10-19 NOTE — PLAN OF CARE
Speech Language Therapy Discharge Summary    Reason for therapy discharge:    Patient/family request discontinuation of services.    Progress towards therapy goal(s). See goals on Care Plan in [facility] electronic health record for goal details.  Goals not met.  Barriers to achieving goals:   limited tolerance for therapy.    Therapy recommendation(s):    No further therapy is recommended. Patient is now comfort cares.

## 2022-10-19 NOTE — CONSULTS
[facility]  Palliative Care Consultation Note    Patient: Hima Griffiths  Date of Admission:  10/9/2022    Requesting Clinician / Team: Dr. Flores/Hospitalist   Reason for consult: Goals of care    Recommendations:    Transition to comfort measures only. Stop checking VS, labs, BG monitoring, insulin     Family elects DNR/DNI    High concentration morphine 5-10mg SL every 2hrs PRN pain, SOB or 1-2mg IV Q1hr PRN pain, SOB    Ativan 0.5-1mg SL every 3hrs PRN anxiety or 0.5-1mg IV Q1hr PRN anxiety    Haldol 0.5-1mg IV Q1hr PRN agitation or zyprexa 2.5-5mg ODT every 6hrs PRN agitation     Atropine, robinul PRN secretions     SW consult for hospice     GIP consult for consideration of inpatient hospice      These recommendations have been discussed with bedside RN Kelly, stroke neurology team, unit [facility], and text paged hospitalist.    ESTHER Elizabeth CNP  [facility]  Contact information available via [facility] Paging/Directory      Thank you for the opportunity to participate in the care of this patient and family. Our team: will continue to follow.     During regular M-F work hours ([phone]) -- if you are not sure who specifically to contact -- please contact us on [facility] Smart Web.     After regular work hours and on weekends/holidays, you can call our answering service at [phone].     Attestation:  Total time on the floor involved in the patient's care: 70 minutes  Total time spent in counseling/care coordination: >50% spent in counseling goals of care in setting of a.fib, acute ischemic stroke, encephalopathy, moderate malnutrition     Assessments:  Hima Griffiths is a 77 year old female with PMH significant for persistent atrial fibrillation and prior stroke on rivaroxaban, hypertension, hyperlipidemia, Type 2 Diabetes mellitus, and CKD Stage 3 who presents with sudden onset vertigo with nausea and vomiting, and is found to have an acute ischemic  "right cerebellar stroke, suspect cardioembolic. Her hospitalization has been complicated by encephalopathy and moderate malnutrition. Repeat CT head 10/18 demonstrates new/worsening infarcts involving the right posterior cerebral artery distribution.     Today, the patient was seen for:  Goals of care in setting of a.fib, acute ischemic stroke, encephalopathy, moderate malnutrition     Visited with Hima and daughter Debbi. Pt is alert, yet unable to communicate effectively/meaningfully. She seems a bit restless, reporting pain in her neck and feeling cold. She states \"help.\"    Visited with Debbi in private, along with sister Velvet via phone. Introduced myself and our services; assistance in pain and symptom management, emotional and spiritual support, and complex medical decision making. We reviewed the difference between hospice and palliative care.     Together we acknowledge Hima's sudden change in health with her stroke, and new stroke burden several days into her hospitalization. Family heard from neurology that her recovery potential will be limited and will take many months. They do not believe she would want these aggressive interventions, in particular, a PEG.     We thus acknowledge that this likely represents Hima's dying process. Family does not want anything to interfere with her dying process.    We thus discuss comfort measures and DNR/DNI status. We review the option to eat and drink for pleasure and comfort. Hima loves drinking pop. We also note stepping away from PT/OT. This is in alignment with what the family would like, and what they believe Hima would want for herself.     We review next steps with discussion with GIP hospice and/or SW for hospice planning. Did prepare family that if pt is clinical stable for transfer out of the hospital with hospice, we can work toward this. However, it is possible that Hima will continue to deteriorate here in the hospital, and thus her " dying process may take place here (note rising Na 150).     Prognosis, Goals, & Planning:      Functional Status just prior to hospitalization: 2 (Ambulatory and capable of all selfcare but unable to carry out any work activities; may need help with IADLs up and about > 50% of waking hours)      Prognosis, Goals, and/or Advance Care Planning were addressed today: Yes      Patient's decision making preferences: unable to assess          Patient has decision-making capacity today for complex decisions: No            I have concerns about the patient/family's health literacy today: No           Patient has a completed Health Care Directive: No.       Code status: No CPR / No Intubation    Coping, Meaning, & Spirituality:   Mood, coping, and/or meaning in the context of serious illness were addressed today: Yes    Social:     Key family / caregivers: Supportive adult Debbi connolly Michelle, Mark. Debbi is a counselor and Velvet is an end of life     History of Present Illness:  History gathered today from: family/loved ones, medical chart, medical team members, unit team members    Hima Griffiths is a 77 year old female with PMH significant for persistent atrial fibrillation and prior stroke on rivaroxaban, hypertension, hyperlipidemia, Type 2 Diabetes mellitus, and CKD Stage 3 who presents with sudden onset vertigo with nausea and vomiting, and is found to have an acute ischemic right cerebellar stroke, suspect cardioembolic. Her hospitalization has been complicated by encephalopathy and moderate malnutrition. Repeat CT head 10/18 demonstrates new/worsening infarcts involving the right posterior cerebral artery distribution.    Key Palliative Symptom Data:  We are not helping to manage these symptoms currently in this patient.    Patient is on opioids: bowels not assessed today.    ROS:  Comprehensive ROS is reviewed and is negative except as here & per HPI: N/A     Past Medical History:  Past Medical  History:   Diagnosis Date     Benign hypertension      Benign paroxysmal positional vertigo      Colitis 11/28/2016    Presumed infectious 2009.      Diabetes mellitus (H)      Family history of atrial fibrillation     Mother     Osteoarthritis of right knee, unspecified osteoarthritis type 08/02/2019     Persistent atrial fibrillation (H) 11/2015     Stroke (H) 10/2015    Left MCA        Past Surgical History:  Past Surgical History:   Procedure Laterality Date     AS LOOP RECORDER IMPLANT  11/9/15     CATARACT IOL, RT/LT  8/13/14         Family History:  Family History   Problem Relation Age of Onset     Hypertension Mother      Arrhythmia Mother      Hypertension Father      Coronary Artery Disease Father          Allergies:  Allergies   Allergen Reactions     Alkylamines Other (See Comments)     Comment: Weakness tingling arms and legs, Description:      Sudafed [Pseudoephedrine]      Legs and arms get weak        Medications:  I have reviewed this patient's medication profile and medications from this hospitalization.   Noted PRN meds are:  High concentration morphine 5-10mg SL every 2hrs PRN pain, SOB or 1-2mg IV Q1hr PRN pain, SOB  Ativan 0.5-1mg SL every 3hrs PRN anxiety or 0.5-1mg IV Q1hr PRN anxiety  Haldol 0.5-1mg IV Q1hr PRN agitation or zyprexa 2.5-5mg ODT every 6hrs PRN agitation   Atropine, robinul PRN secretions     Physical Exam:  Vital Signs: Temp: 96.8  F (36  C) Temp src: Axillary BP: 95/56 Pulse: 120   Resp: 14 SpO2: 97 % O2 Device: None (Room air) Oxygen Delivery: 2 LPM  Weight: 201 lbs .95 oz  CONSTITUTIONAL: Chronically ill woman seen resting in bed in mild distress, slightly restless. She is alert, yet unable to converse meaningfully. Family present  RESPIRATORY: NL respiratory effort on RA    Data reviewed:  Recent imaging reviewed, my comments on pertinents:   Results for orders placed or performed during the hospital encounter of 10/09/22   XR Chest Port 1 View    Impression     IMPRESSION: Cardiomegaly. Increased pulmonary vascular congestion. No  focal infiltrate or definite pleural effusion.    JOSIANE EVANS MD         SYSTEM ID:  [id number]   CT Head w/o Contrast    Impression    IMPRESSION: Acute right cerebellar infarcts. No evidence of hemorrhage  or hydrocephalus. Old left cerebellar infarcts. Multiple old left  cerebral hemisphere infarcts. Volume loss and white matter  hypoattenuation likely represent chronic small vessel ischemic change.  No acute osseous abnormality.    DANIKA DE JESUS MD         SYSTEM ID:  [id number]   CT Head w/o Contrast    Impression    IMPRESSION:  1.  Evolving right cerebellar infarcts without definite hemorrhage.    2.  Remainder unchanged.   CT Head w/o Contrast    Impression    IMPRESSION:   1. Evolving subacute ischemic infarct in the right cerebellar  hemisphere again noted.  2. No definite new infarcts.  3. Diffuse cerebral volume loss and cerebral white matter changes  consistent with chronic small vessel ischemic disease.       Radiation dose for this scan was reduced using automated exposure  control, adjustment of the mA and/or kV according to patient size, or  iterative reconstruction technique.    TRISTAN COSTA MD         SYSTEM ID:  [id number]   CT Head w/o Contrast    Impression    IMPRESSION:   1. New/worsening infarcts involving the right posterior cerebral  artery distribution.  2. Recent right cerebellar infarct, not appreciably changed.  3. No evidence of hemorrhage or significant mass effect.  4. Chronic changes and multiple old infarcts.    DANIKA DE JESUS MD         SYSTEM ID:  [id number]   MR Brain w/o Contrast    Impression    IMPRESSION:    HEAD MRI:  1. Acute ischemia involving the right posterior inferior cerebellar and right posterior cerebral artery territories without hemorrhagic conversion.     HEAD MRA:   1. Redemonstrated right posterior cerebral artery origin occlusion.    2. Right 3 mm ICA terminus aneurysm.    - - - - - - -  - - - - - - - - - - - - - - - - - - - - - - - - - - - - - - - - - - - - - - - - - - - - - - - - - - - - - - - - - - - - - - - - - - - - -   The results above were discussed with HARVINDER SCHMITT on 10/18/2022 8:33 PM by Dr. Keyon Garza.  - - - - - - - - - - - - - - - - - - - - - - - - - - - - - - - - - - - - - - - - - - - - - - - - - - - - - - - - - - - - - - - - - - - - - - - - - - - -     MRA Brain ([facility]) wo Contrast    Impression    IMPRESSION:    HEAD MRI:  1. Acute ischemia involving the right posterior inferior cerebellar and right posterior cerebral artery territories without hemorrhagic conversion.     HEAD MRA:   1. Redemonstrated right posterior cerebral artery origin occlusion.    2. Right 3 mm ICA terminus aneurysm.    - - - - - - - - - - - - - - - - - - - - - - - - - - - - - - - - - - - - - - - - - - - - - - - - - - - - - - - - - - - - - - - - - - - - - - - - - - - -   The results above were discussed with HARVINDER SCHMITT on 10/18/2022 8:33 PM by Dr. Keyon Garza.  - - - - - - - - - - - - - - - - - - - - - - - - - - - - - - - - - - - - - - - - - - - - - - - - - - - - - - - - - - - - - - - - - - - - - - - - - - - -     Echocardiogram Complete w Bubble Study - For age < 60 yrs   Result Value Ref Range    LVEF  45%        Recent lab data reviewed, my comments on pertinents:   Na 150  K 4  Creat 1.04  Lactic acid 2.2  WBC 9.8  Hgb 14.9  Plt 203  Albumin 2

## 2022-10-20 NOTE — PLAN OF CARE
Pt here with R sided strokes, now on comfort cares. Lethargic overnight, arouses with cares. No signs of pain. R midline saline locked. T/R as needed for comfort, purewick in place for incontinence. Family member at bedside throughout night.

## 2022-10-20 NOTE — CONSULTS
SW: Writer acknowledging hospice consult. Patient has been admitted into [facility] therefore there are no needs from unit sw at this time.     Will be available if needs arise.     SARA Singh, LGSW    [employer]

## 2022-10-20 NOTE — PROGRESS NOTES
[facility]    Progress Note - [facility]    ______________________________________________________________________    [facility] 24/7 Contact Number: [phone]    - Providers: Please contact [facility] with changes in orders or clinical plan of care   - Nursing: Please contact [facility] with significant changes in patient condition  ______________________________________________________________________        Plan of Care Discussed with the Following:   - Nurse: Magdalena   - Hospitalist/Rounding Provider: Dr. Aye Rabago's Family/Preferred Contact: Velvet   - Hospice Provider: Kip Bruner     Summary of Visit (includes assessment, medications and any new orders):     Writer consulted with hospice MD regarding symptoms of terminal agitation including air picking and reaching.       medication recommendations for symptom management include:     Haldol 1 mg IV every 6 hours for agitation     Morphine 2 mg IV every 4 hours scheduled   morphine  2-4 mg IV every hour as needed for pain /dyspnea        April Staley RN

## 2022-10-20 NOTE — PROGRESS NOTES
[facility]    Medicine Progress Note - Hospitalist Service    Date of Admission:  10/20/2022    Assessment & Plan   Hima Griffiths is a 77 year old female with afib on chronic anticoagulation with DOAC, hypertension, DM II and hx of CVA initially admitted on 10/9/2022 with symptoms of vertigo. Found to have had an acute right cerebellar CVA. Hospital stay has been complicated by metabolic encephalopathy, intermittent lactic acidosis, enterobacter UTI, recurrent stroke, afib with RVR and CHF exacerbation. The patient was transitioned to [facility] on 10/19/22.    Hospice status  Transitioned to Hospice 10/19/22. GIP status. Comfortable on exam on 10/20, receiving PRN morphine. Family at bedside.  - Hospice RN following  - Hospice meds ordered - adjust as needed   -Atropine ophthalmic every 4 hours   -Cogentin 1 mg 3 times daily as needed   -Robinul IV every 4 hours as needed   -Haldol 0.5-1 mg IV every hour as needed for agitation   -Ativan high concentration solution 0.5-1 mg every 3 hours as needed, or IV 0.5-1 mg IV every hour as needed for anxiety   -Morphine IV 1 to 2 mg every hour as needed   -Roxanol 5 to 10 mg every 2 hours as needed   -Zyprexa 2.5-5 mg ODT every 6 hours as needed for agitation  - No vital signs due to Hospice status  - No glucose checks  - Family support     Summary of hospitalization: 10/9/2022 - 10/19/2022  Patient was admitted to [facility] on 10/9/2022 with symptoms of vertigo she been off of her anticoagulation for over a week PTA.  She does have a history of MCA stroke in 2015.  She was found to have an acute right cerebellar CVA.  During her hospital stay she developed fluctuating metabolic encephalopathy and unfortunately recurrent strokes on 10/11 due to hemodynamic compromise of known occlusion.  Repeat head CT on 10/18 demonstrated new/worsening infarcts involving the right posterior cerebellar artery distribution.  Patient had difficult to control atrial  fibrillation with RVR while on metoprolol.  She required doses of digoxin due to hypotension.  Also suffered from acute systolic heart failure with elevated proBNP 5000 range.  Echo showed EF 45%, mild to moderate global hypokinesis but limited visualization, negative bubble study.  She was diuresed with IV furosemide.  Her acute heart failure exacerbation resolved with diuresis and medication adjustments.  She also had Enterobacter UTI as well as intermittent lactic acidosis, hyperchloremic hypernatremia due to poor oral intake.  Family was seen by therapy and recommended for altered diet due to encephalopathy and oropharyngeal dysphagia.  Family did not feel the patient want a feeding tube placed.  Ultimately they decided on hospice for the patient.  All medications not contributing to comfort were discontinued as well as vital signs.  The patient was reviewed by [facility] hospice and accepted on 10/20/2022.    Hospital Problem List:  Acute ischemic right cerebellar stroke suspect cardioembolic.   Acute metabolic encephalopathy, multifactorial  Moderate malnutrition in the setting of acute illness  Physical deconditioning from medical illness, senile frailty.  Pressure injury of left buttock, stage 1, chronic, present on admission.  Concern for cognitive impairment at baseline  New acute systolic congestive heart failure exacerbation  Acute hypoxic respiratory failure dt CHF: Resolved  Hypertension  Dyslipidemia  Chronic severe bilateral LE edema  Atrial fibrillation with RVR  Intermittent lactic acidosis  Enterobacter UTI  Hyperchloremic hypernatremia likely from poor intake, iatrogenic .  Stage III CKD  DM II    COVID-19 PCR Results 6/16/22 6/23/22 6/24/22 6/27/22 6/29/22 6/30/22 7/5/22 10/9/22 10/17/22   SARS CoV2 PCR Negative Negative Negative Negative Negative Negative Negative Negative Negative          Diet: Room Service  Snacks/Supplements Adult: Other; ok for supplements - do not count as CHO allowance; With  "Meals  Combination Diet Moderate Consistent Carb (60 g CHO per Meal) Diet; Pureed Diet (level 4); Mildly Thick (level 2) (by spoon)    DVT Prophylaxis: Hospice, not indicated  Hernandez Catheter: Not present  Central Lines: PRESENT       Cardiac Monitoring: None  Code Status: No CPR- Do NOT Intubate      Disposition Plan      Expected Discharge Date: 10/22/2022                The patient's care was discussed with the Attending Physician, Dr. Gonzalez and Patient's Family.    Saadia Cisneros PA-C  Hospitalist Service  [facility]  Securely message with the Vocera Web Console (learn more here)  Text page via [employer] Paging/Directory         Clinically Significant Risk Factors Present on Admission         # Hypernatremia: Highest Na = 150 mmol/L (Ref range: 136-145) in last 2 days, will monitor as appropriate   # Hypercalcemia: corrected calcium is >10.1, will monitor as appropriate     # Drug Induced Coagulation Defect: home medication list includes an anticoagulant medication    # Hypertension: home medication list includes antihypertensive(s)    # DMII: A1C = 7.2 % (Ref range: 0.0 - 5.6 %) within past 3 months    # Obesity: Estimated body mass index is 32.45 kg/m  as calculated from the following:    Height as of 10/11/22: 1.676 m (5' 6\").    Weight as of 10/17/22: 91.2 kg (201 lb 1 oz).           ______________________________________________________________________    Interval History   Seen with daughter at bedside. Patient is laying in bed, comfortable appearing. Breathing comfortably. No agitation. Family supportive. Discussed trialing Haldol, staying on top of comfort and any agitation. Reports \"reverse effect\" of ativan and seroquel in the past. Avoiding all unnecessary cares.     Data reviewed today: I reviewed all medications, new labs and imaging results over the last 24 hours. I personally reviewed no images or EKG's today. Hospice Status.    Physical Exam   Vital Signs:             "        Weight: 0 lbs 0 oz    Physical Exam  Nursing note reviewed.   Constitutional:       General: She is sleeping. She is not in acute distress.  HENT:      Head: Normocephalic.   Pulmonary:      Effort: Pulmonary effort is normal. No tachypnea, bradypnea, accessory muscle usage or respiratory distress.      Breath sounds: Normal breath sounds.   Neurological:      Mental Status: She is lethargic.       Data   Recent Labs   Lab 10/19/22  0740 10/19/22  0527 10/19/22  0234 10/18/22  0807 10/18/22  0526 10/17/22  0912 10/17/22  0544   WBC  --  9.8  --   --  10.7  --  11.9*   HGB  --  14.9  --   --  15.2  --  15.7   MCV  --  107*  --   --  109*  --  106*   PLT  --  203  --   --  226  --  233   NA  --  150*  --   --  149*  --  146*   POTASSIUM  --  4.0  --   --  4.0  --  3.9   CHLORIDE  --  115*  --   --  113*  --  109   CO2  --  31  --   --  28  --  31   BUN  --  39*  --   --  41*  --  44*   CR  --  1.04  --   --  1.03  --  1.06*   ANIONGAP  --  4  --   --  8  --  6   EZEKIEL  --  9.5  --   --  9.7  --  9.9   * 189* 98   < > 182*   < > 203*   ALBUMIN  --  3.0*  --   --  3.0*  --   --    PROTTOTAL  --  7.5  --   --  7.6  --   --    BILITOTAL  --  0.7  --   --  0.7  --   --    ALKPHOS  --  88  --   --  87  --   --    ALT  --  40  --   --  36  --   --    AST  --  44  --   --  42  --   --     < > = values in this interval not displayed.     No results found for this or any previous visit (from the past 24 hour(s)).  Medications       sodium chloride (PF)  10 mL Intracatheter Q8H     sodium chloride (PF)  3 mL Intracatheter Q8H

## 2022-10-20 NOTE — H&P
[facility]    History and Physical - Hospitalist Service       Date of Admission:  10/20/2022    Assessment & Plan   Hima Griffiths is a 77 year old female who is being transitioned to inpatient hospice on 10/20/2022. Please see the discharge summary from the same date for more details of her hospital course.    Hospice status  Transitioned to Hospice 10/19/22. GIP status. Comfortable on exam on 10/20, receiving PRN morphine. Family at bedside.  - Hospice RN following  - Hospice meds ordered - adjust as needed              -Atropine ophthalmic every 4 hours              -Cogentin 1 mg 3 times daily as needed              -Robinul IV every 4 hours as needed              -Haldol 0.5-1 mg IV every hour as needed for agitation              -Ativan high concentration solution 0.5-1 mg every 3 hours as needed, or IV 0.5-1 mg IV every hour as needed for anxiety              -Morphine IV 1 to 2 mg every hour as needed              -Roxanol 5 to 10 mg every 2 hours as needed              -Zyprexa 2.5-5 mg ODT every 6 hours as needed for agitation  - No vital signs due to Hospice status  - No glucose checks  - Family support    Hospital Problem List:  Acute ischemic right cerebellar stroke suspect cardioembolic.   Acute metabolic encephalopathy, multifactorial  Moderate malnutrition in the setting of acute illness  Physical deconditioning from medical illness, senile frailty.  Pressure injury of left buttock, stage 1, chronic, present on admission.  Concern for cognitive impairment at baseline  New acute systolic congestive heart failure exacerbation  Acute hypoxic respiratory failure dt CHF: Resolved  Hypertension  Dyslipidemia  Chronic severe bilateral LE edema  Atrial fibrillation with RVR  Intermittent lactic acidosis  Enterobacter UTI  Hyperchloremic hypernatremia likely from poor intake, iatrogenic .  Stage III CKD  DM II       Diet: Room Service  Snacks/Supplements Adult: Other; ok for  supplements - do not count as CHO allowance; With Meals  Combination Diet Moderate Consistent Carb (60 g CHO per Meal) Diet; Pureed Diet (level 4); Mildly Thick (level 2) (by spoon)    Hernandez Catheter: Not present  Central Lines: PRESENT       Code Status: No CPR- Do NOT Intubate    Expected Discharge: working on discharge with hospice    Saadia Cisneros PA-C  Hospitalist Service  [facility]  Securely message with the Vocera Web Console (learn more here)  Text page via [employer] Paging/Directory         ______________________________________________________________________    Chief Complaint   Transitioning to inpatient hospice    History of Present Illness   Hima Griffiths is a 77 year old female with PMH significant for afib on chronic anticoagulation with DOAC, hypertension, DM II and hx of CVA who is being transitioned to inpatient hospice.  Please see the discharge summary from the same date for more details; a brief summary of her current symptoms is included here.    Patient was admitted to [facility] on 10/9/2022 with symptoms of vertigo she been off of her anticoagulation for over a week PTA.  She does have a history of MCA stroke in 2015.  She was found to have an acute right cerebellar CVA.  During her hospital stay she developed fluctuating metabolic encephalopathy and unfortunately recurrent strokes on 10/11 due to hemodynamic compromise of known occlusion.  Repeat head CT on 10/18 demonstrated new/worsening infarcts involving the right posterior cerebellar artery distribution.  Patient had difficult to control atrial fibrillation with RVR while on metoprolol.  She required doses of digoxin due to hypotension.  Also suffered from acute systolic heart failure with elevated proBNP 5000 range.  Echo showed EF 45%, mild to moderate global hypokinesis but limited visualization, negative bubble study.  She was diuresed with IV furosemide.  Her acute heart failure exacerbation resolved  with diuresis and medication adjustments.  She also had Enterobacter UTI as well as intermittent lactic acidosis, hyperchloremic hypernatremia due to poor oral intake.  Family was seen by therapy and recommended for altered diet due to encephalopathy and oropharyngeal dysphagia.  Family did not feel the patient want a feeding tube placed.  Ultimately they decided on hospice for the patient.  All medications not contributing to comfort were discontinued as well as vital signs.  The patient was reviewed by [facility] hospice and accepted on 10/20/2022.    Review of Systems    Review of systems was not needed on this patient    Past Medical History    Please see the medical & surgical history outlined in the H&P from the previous hospital encounter    Social History   Please see the social history outlined in the H&P from the previous hospital encounter    Family History   Please see the family history outlined in the H&P from the previous hospital encounter    Prior to Admission Medications   Prior to Admission Medications   Prescriptions Last Dose Informant Patient Reported? Taking?   ASPIRIN NOT PRESCRIBED (INTENTIONAL)   Yes No   Sig: Please choose reason not prescribed, below   Patient not taking: Reported on 10/5/2022   acetaminophen (TYLENOL) 325 MG tablet   No Yes   Sig: Take 2 tablets (650 mg) by mouth every 6 hours as needed for mild pain or other (and adjunct with moderate or severe pain or per patient request)   blood glucose (NO BRAND SPECIFIED) lancets standard   No No   Sig: Use to test blood sugar 2 times daily or as directed.   blood glucose (NO BRAND SPECIFIED) test strip   No No   Sig: Use to test blood sugar 2 times daily or as directed.   blood glucose monitoring (NO BRAND SPECIFIED) meter device kit   No No   Sig: Use to test blood sugar 2 times daily or as directed.   clotrimazole (LOTRIMIN) 1 % external cream   No Yes   Sig: Apply topically 2 times daily as needed (yeast rash)   clotrimazole (LOTRIMIN)  1 % external cream   No Yes   Sig: Apply topically 2 times daily Under breasts and in groin areas   diltiazem (CARDIZEM) 30 MG tablet   No Yes   Sig: Take 1 tablet (30 mg) by mouth 2 times daily   dulaglutide (TRULICITY) 1.5 MG/0.5ML pen   No Yes   Sig: Inject 1.5 mg Subcutaneous every 7 days   glipiZIDE (GLUCOTROL XL) 10 MG 24 hr tablet   No Yes   Sig: Take 1 tablet (10 mg) by mouth daily (with breakfast)   insulin pen needle (32G X 6 MM) 32G X 6 MM miscellaneous   No No   Sig: Use as directed.   lisinopril (ZESTRIL) 5 MG tablet   No Yes   Sig: Take 1 tablet (5 mg) by mouth daily   melatonin 5 MG tablet   Yes No   Sig: Take 1 tablet (5 mg) by mouth nightly as needed for sleep   metoprolol tartrate (LOPRESSOR) 100 MG tablet   No Yes   Sig: Take 1 tablet (100 mg) by mouth 2 times daily   pravastatin (PRAVACHOL) 40 MG tablet   No Yes   Sig: Take 1 tablet (40 mg) by mouth daily   rivaroxaban ANTICOAGULANT (XARELTO ANTICOAGULANT) 20 MG TABS tablet   No Yes   Sig: Take 1 tablet (20 mg) by mouth daily (with dinner)      Facility-Administered Medications: None     Allergies   Allergies   Allergen Reactions     Alkylamines Other (See Comments)     Comment: Weakness tingling arms and legs, Description:      Sudafed [Pseudoephedrine]      Legs and arms get weak       Physical Exam   Vital Signs:                    Weight: 0 lbs 0 oz    Please see physical exam from my PROGRESS NOTE on the same date

## 2022-10-20 NOTE — DISCHARGE SUMMARY
[facility]  Hospitalist Discharge Summary      Date of Admission:  10/9/2022  Date of Discharge:  10/19/2022  Discharging Provider: Saadia Cisneros PA-C    Discharge Diagnoses   Active problems  Acute ischemic right cerebellar stroke suspect cardioembolic.   Acute metabolic encephalopathy, multifactorial  Moderate malnutrition in the setting of acute illness  Physical deconditioning from medical illness, senile frailty.  Pressure injury of left buttock, stage 1, chronic, present on admission.  New acute systolic congestive heart failure exacerbation, resolved  Acute hypoxic respiratory failure dt CHF, Resolved  Atrial fibrillation with RVR, improved  Intermittent lactic acidosis  Enterobacter UTI, treated  Hyperchloremic hypernatremia likely from poor intake, iatrogenic, worsening   Chronic severe bilateral LE edema    Chronic problems    Concern for cognitive impairment at baseline  Stage III CKD  Hypertension  Dyslipidemia  DM II    Follow-ups Needed After Discharge     Hima Griffiths is being readmitted to inpatient hospice    Discharge Disposition   Discharged to inpatient hospice  Condition at discharge: Guarded    Hospital Course   Hima Griffiths is a 77 year old female with PMH significant for afib on chronic anticoagulation with DOAC, hypertension, DM II and hx of CVA who was admitted on 10/9/2022 with vertigo. Found to have had an acute right cerebellar CVA. Hospital stay has been complicated by metabolic encephalopathy, intermittent lactic acidosis, enterobacter UTI, recurrent stroke, afib with RVR and CHF exacerbation.    She is being discharged from the current hospital encounter and will be readmitted to inpatient hospice.    Patient was admitted to [facility] on 10/9/2022 with symptoms of vertigo she been off of her anticoagulation for over a week PTA.  She does have a history of MCA stroke in 2015.  She was found to have an acute right cerebellar CVA.  During her  hospital stay she developed fluctuating metabolic encephalopathy and unfortunately recurrent strokes on 10/11 due to hemodynamic compromise of known occlusion.  Repeat head CT on 10/18 demonstrated new/worsening infarcts involving the right posterior cerebellar artery distribution.  Patient had difficult to control atrial fibrillation with RVR while on metoprolol.  She required doses of digoxin due to hypotension.  Also suffered from acute systolic heart failure with elevated proBNP 5000 range.  Echo showed EF 45%, mild to moderate global hypokinesis but limited visualization, negative bubble study.  She was diuresed with IV furosemide.  Her acute heart failure exacerbation resolved with diuresis and medication adjustments.  She also had Enterobacter UTI as well as intermittent lactic acidosis, hyperchloremic hypernatremia due to poor oral intake.  Family was seen by therapy and recommended for altered diet due to encephalopathy and oropharyngeal dysphagia. Family did not feel the patient want a feeding tube placed.  Ultimately they decided on hospice for the patient.  All medications not contributing to comfort were discontinued as well as vital signs.  The patient was reviewed by [facility] hospice and accepted on 10/20/2022.    Consultations This Hospital Stay   PATIENT [facility] IP CONSULT  NEUROLOGY IP STROKE CONSULT  SPEECH LANGUAGE PATH ADULT IP CONSULT  PHARMACY IP CONSULT  PHARMACY IP CONSULT  PHARMACY IP CONSULT  PHYSICAL THERAPY ADULT IP CONSULT  OCCUPATIONAL THERAPY ADULT IP CONSULT  REHAB ADMISSIONS LIAISON IP CONSULT  CARE MANAGEMENT / SOCIAL WORK IP CONSULT  WOUND OSTOMY CONTINENCE NURSE  IP CONSULT  VASCULAR ACCESS ADULT IP CONSULT  PHARMACY IP CONSULT  LYMPHEDEMA THERAPY IP CONSULT  WOUND OSTOMY CONTINENCE NURSE  IP CONSULT  CARDIOLOGY IP CONSULT  VASCULAR ACCESS ADULT IP CONSULT  VASCULAR ACCESS ADULT IP CONSULT  NUTRITION SERVICES ADULT IP CONSULT  VASCULAR ACCESS ADULT IP CONSULT  VASCULAR ACCESS  ADULT IP CONSULT  CARE MANAGEMENT / SOCIAL WORK IP CONSULT  PHYSICAL THERAPY ADULT IP CONSULT  OCCUPATIONAL THERAPY ADULT IP CONSULT  CARE MANAGEMENT / SOCIAL WORK IP CONSULT  PALLIATIVE CARE ADULT IP CONSULT  NEUROLOGY IP STROKE CONSULT  CARE MANAGEMENT / SOCIAL WORK IP CONSULT  [facility] INPATIENT HOSPICE ADULT CONSULT  SMOKING CESSATION PROGRAM IP CONSULT  CARE MANAGEMENT / SOCIAL WORK IP CONSULT  PHARMACY IP CONSULT    Code Status   No CPR- Do NOT Intubate    Time Spent on this Encounter   I, Saadia Cisneros PA-C, discharged this patient today but I did not personally see the patient today and will not be billing for the patient's discharge.       Saadia Cisneros PA-C  [facility] NEUROSCIENCE UNIT  [address] [place] [address]  Phone: [phone]  ______________________________________________________________________  Discharge Medications   Discharge Medication List as of 10/20/2022  6:34 AM      CONTINUE these medications which have NOT CHANGED    Details   acetaminophen (TYLENOL) 325 MG tablet Take 2 tablets (650 mg) by mouth every 6 hours as needed for mild pain or other (and adjunct with moderate or severe pain or per patient request), Disp-30 tablet, R-0, Transitional      !! clotrimazole (LOTRIMIN) 1 % external cream Apply topically 2 times daily as needed (yeast rash)Disp-113 g, [id number]      !! clotrimazole (LOTRIMIN) 1 % external cream Apply topically 2 times daily Under breasts and in groin areasDisp-113 g, [id number]      diltiazem (CARDIZEM) 30 MG tablet Take 1 tablet (30 mg) by mouth 2 times daily, Disp-180 tablet, R-0, E-Prescribe      dulaglutide (TRULICITY) 1.5 MG/0.5ML pen Inject 1.5 mg Subcutaneous every 7 days, Disp-8 mL, R-3, E-Prescribe      glipiZIDE (GLUCOTROL XL) 10 MG 24 hr tablet Take 1 tablet (10 mg) by mouth daily (with breakfast), Disp-30 tablet, R-1, E-Prescribe      lisinopril (ZESTRIL) 5 MG tablet Take 1 tablet (5 mg) by mouth daily,  Disp-90 tablet, R-0, E-Prescribe      metoprolol tartrate (LOPRESSOR) 100 MG tablet Take 1 tablet (100 mg) by mouth 2 times daily, Disp-180 tablet, R-3, E-Prescribe      pravastatin (PRAVACHOL) 40 MG tablet Take 1 tablet (40 mg) by mouth daily, Disp-90 tablet, R-0, E-Prescribe      rivaroxaban ANTICOAGULANT (XARELTO ANTICOAGULANT) 20 MG TABS tablet Take 1 tablet (20 mg) by mouth daily (with dinner), Disp-90 tablet, R-1, E-Prescribe      ASPIRIN NOT PRESCRIBED (INTENTIONAL) Reason ASA was Not Prescribed: Current anticoagulant therapy (warfarin/enoxaparin)Historical      blood glucose (NO BRAND SPECIFIED) lancets standard Use to test blood sugar 2 times daily or as directed.Disp-100 each,[id number]      blood glucose (NO BRAND SPECIFIED) test strip Use to test blood sugar 2 times daily or as directed., Disp-200 each,R-11, E-Prescribe      blood glucose monitoring (NO BRAND SPECIFIED) meter device kit Use to test blood sugar 2 times daily or as directed.Disp-1 kit,[id number]      insulin pen needle (32G X 6 MM) 32G X 6 MM miscellaneous Use as directed.Disp-100 each, [id number]      melatonin 5 MG tablet Take 1 tablet (5 mg) by mouth nightly as needed for sleep, Historical       !! - Potential duplicate medications found. Please discuss with provider.          Physical Exam   Vital Signs:           Resp: 14        Weight: 201 lbs .95 oz    Physical exam deferred given current goals of care       Primary Care Physician   Georgiana Joy    Discharge Orders      Stroke Hospital Follow Up    [facility] will call you to coordinate care as prescribed by your provider. If you don t hear from a representative within 2 business days, please call [phone].         Significant Results and Procedures   Results for orders placed or performed during the hospital encounter of 10/09/22   XR Chest Port 1 View    Narrative    XR CHEST PORT 1 VIEW  10/10/2022 2:05 PM       INDICATION: hypoxia  COMPARISON:  6/16/2022       Impression    IMPRESSION: Cardiomegaly. Increased pulmonary vascular congestion. No  focal infiltrate or definite pleural effusion.    JOSIANE EVANS MD         SYSTEM ID:  [id number]   CT Head w/o Contrast    Narrative    CT SCAN OF THE HEAD WITHOUT CONTRAST   10/10/2022 1:49 PM     HISTORY: Neurological change, decreased mentation.    TECHNIQUE: Axial images of the head and coronal reformations without  IV contrast material. Radiation dose for this scan was reduced using  automated exposure control, adjustment of the mA and/or kV according  to patient size, or iterative reconstruction technique.    COMPARISON: Head MRI 10/9/2022, head CT 6/16/2022      Impression    IMPRESSION: Acute right cerebellar infarcts. No evidence of hemorrhage  or hydrocephalus. Old left cerebellar infarcts. Multiple old left  cerebral hemisphere infarcts. Volume loss and white matter  hypoattenuation likely represent chronic small vessel ischemic change.  No acute osseous abnormality.    DANIKA DE JESUS MD         SYSTEM ID:  [id number]   CT Head w/o Contrast    Narrative    EXAM: CT HEAD W/O CONTRAST  LOCATION: [facility]  DATE/TIME: 10/11/2022 2:28 AM    INDICATION: eval for any hemorrhagic conversion prior to restarting DOAC  COMPARISON: CT head 10/10/2022  TECHNIQUE: Routine CT Head without IV contrast. Multiplanar reformats. Dose reduction techniques were used.    FINDINGS:  Evolving right cerebellar infarcts without evidence of space-occupying hemorrhage. Mild to moderate volume loss and presumed chronic small vessel ischemia are stable. Small chronic cortical infarct in the left frontal operculum noted. Remainder   unchanged.      Impression    IMPRESSION:  1.  Evolving right cerebellar infarcts without definite hemorrhage.    2.  Remainder unchanged.   CT Head w/o Contrast    Narrative    CT OF THE HEAD WITHOUT CONTRAST 10/11/2022 2:51 PM     COMPARISON: Head CT 10/11/2022 at 0221  hours.    HISTORY: Question left upper extremity drift, assess for hemorrhage,  new infarct or other acute intracranial change.    TECHNIQUE: 5 mm thick axial CT images of the head were acquired  without IV contrast material.    FINDINGS: Evolving subacute ischemic infarct in the right cerebellar  hemisphere again noted. No definite new infarcts.    There is moderate diffuse cerebral volume loss. There are extensive  confluent areas of decreased density in the cerebral white matter  bilaterally that are consistent with sequela of chronic small vessel  ischemic disease. The ventricles and basal cisterns are within normal  limits in configuration given the degree of cerebral volume loss.   There is no midline shift. There are no extra-axial fluid collections.      No intracranial hemorrhage or mass.    The visualized paranasal sinuses are well-aerated. There is no  mastoiditis. There are no fractures of the visualized bones.       Impression    IMPRESSION:   1. Evolving subacute ischemic infarct in the right cerebellar  hemisphere again noted.  2. No definite new infarcts.  3. Diffuse cerebral volume loss and cerebral white matter changes  consistent with chronic small vessel ischemic disease.       Radiation dose for this scan was reduced using automated exposure  control, adjustment of the mA and/or kV according to patient size, or  iterative reconstruction technique.    TRISTAN COSTA MD         SYSTEM ID:  [id number]   CT Head w/o Contrast    Narrative    CT SCAN OF THE HEAD WITHOUT CONTRAST   10/18/2022 11:12 AM     HISTORY: Stroke with worsening mental status.    TECHNIQUE:  Axial images of the head and coronal reformations without  IV contrast material. Radiation dose for this scan was reduced using  automated exposure control, adjustment of the mA and/or kV according  to patient size, or iterative reconstruction technique.    COMPARISON: Head CT 10/11/2022, head MRI 10/9/2022.    FINDINGS:  Recent right  cerebellar infarct, not appreciably changed.  Recent right posterior cerebral artery distribution corpus callosum  and right occipital lobe infarct, increased in volume compared to the  prior exam. Right thalamic lacunar infarcts are present which appear  slightly more conspicuous compared to the prior exam. No evidence of  hemorrhage or significant mass effect.    Moderate generalized parenchymal volume loss is present with patchy  areas of white matter hypoattenuation most likely representing chronic  small vessel ischemic change. Old left superior frontal gyrus infarct.  Old left superior parietal lobule infarct. Old left cerebellar  infarct. Old left frontal periventricular infarcts.    No acute osseous abnormality. Mild paranasal sinus mucosal thickening.  Bilateral lens replacements. Presumed cerumen within the bilateral  external auditory canals.      Impression    IMPRESSION:   1. New/worsening infarcts involving the right posterior cerebral  artery distribution.  2. Recent right cerebellar infarct, not appreciably changed.  3. No evidence of hemorrhage or significant mass effect.  4. Chronic changes and multiple old infarcts.    DANIKA DE JESUS MD         SYSTEM ID:  [id number]   MR Brain w/o Contrast    Narrative    EXAM: MR BRAIN W/O CONTRAST, MRA BRAIN ([place] [facility] [place]) W/O CONTRAST  LOCATION: [facility]  DATE/TIME: 10/18/2022 7:23 PM    INDICATION: Weakness. Dizziness.  COMPARISON: MRI brain/MRA head dated 10/9/2022  TECHNIQUE:  1) Routine multiplanar multisequence head MRI without intravenous contrast.  2) 3D time-of-flight head MRA without intravenous contrast.    FINDINGS:  HEAD MRI:  INTRACRANIAL CONTENTS: Restricted diffusion with associated T2/FLAIR signal hyperintensity involving the right posterior inferior cerebellar and right posterior cerebral artery territories suspicious for acute ischemia without hemorrhagic conversion. No   mass or extra-axial fluid collections.  Confluent nonspecific T2/FLAIR hyperintensities within the cerebral white matter most consistent with sequelae of advanced chronic microangiopathy. Moderate cerebral volume loss without hydrocephalus. Patent basal   cisterns. Normal position of the cerebellar tonsils.     SELLA: No abnormality accounting for technique.    OSSEOUS STRUCTURES/SOFT TISSUES: No aggressive osseous lesion involving the calvarium or visualized upper cervical spine. Maintained major intracranial vascular flow voids.    ORBITS: No significant orbital abnormality accounting for technique.     SINUSES/MASTOIDS: No paranasal sinus mucosal disease. No middle ear or mastoid effusion.     HEAD MRA:  ANTERIOR CIRCULATION: Right ICA terminus aneurysm measuring up to 3 mm. No stenosis/occlusion. Hypoplastic A1 segment of the right anterior cerebral artery. The anterior communicating artery is patent. The posterior communicating arteries are   hypoplastic/absent.     POSTERIOR CIRCULATION: Occlusion of the right posterior cerebral artery origin. No aneurysm or high flow vascular malformation. Balanced vertebrobasilar circulation. The basilar artery is unremarkable and gives rise to patent superior cerebellar and   posterior cerebral arteries.       Impression    IMPRESSION:    HEAD MRI:  1. Acute ischemia involving the right posterior inferior cerebellar and right posterior cerebral artery territories without hemorrhagic conversion.     HEAD MRA:   1. Redemonstrated right posterior cerebral artery origin occlusion.    2. Right 3 mm ICA terminus aneurysm.    - - - - - - - - - - - - - - - - - - - - - - - - - - - - - - - - - - - - - - - - - - - - - - - - - - - - - - - - - - - - - - - - - - - - - - - - - - - -   The results above were discussed with HARVINDER SCHMITT on 10/18/2022 8:33 PM by Dr. Keyon Garaz.  - - - - - - - - - - - - - - - - - - - - - - - - - - - - - - - - - - - - - - - - - - - - - - - - - - - - - - - - - - - - - - - - - - - - - - - -  - - - -     MRA Brain ([place] [facility] [place]) wo Contrast    Narrative    EXAM: MR BRAIN W/O CONTRAST, MRA BRAIN ([place] [facility] [place]) W/O CONTRAST  LOCATION: [facility]  DATE/TIME: 10/18/2022 7:23 PM    INDICATION: Weakness. Dizziness.  COMPARISON: MRI brain/MRA head dated 10/9/2022  TECHNIQUE:  1) Routine multiplanar multisequence head MRI without intravenous contrast.  2) 3D time-of-flight head MRA without intravenous contrast.    FINDINGS:  HEAD MRI:  INTRACRANIAL CONTENTS: Restricted diffusion with associated T2/FLAIR signal hyperintensity involving the right posterior inferior cerebellar and right posterior cerebral artery territories suspicious for acute ischemia without hemorrhagic conversion. No   mass or extra-axial fluid collections. Confluent nonspecific T2/FLAIR hyperintensities within the cerebral white matter most consistent with sequelae of advanced chronic microangiopathy. Moderate cerebral volume loss without hydrocephalus. Patent basal   cisterns. Normal position of the cerebellar tonsils.     SELLA: No abnormality accounting for technique.    OSSEOUS STRUCTURES/SOFT TISSUES: No aggressive osseous lesion involving the calvarium or visualized upper cervical spine. Maintained major intracranial vascular flow voids.    ORBITS: No significant orbital abnormality accounting for technique.     SINUSES/MASTOIDS: No paranasal sinus mucosal disease. No middle ear or mastoid effusion.     HEAD MRA:  ANTERIOR CIRCULATION: Right ICA terminus aneurysm measuring up to 3 mm. No stenosis/occlusion. Hypoplastic A1 segment of the right anterior cerebral artery. The anterior communicating artery is patent. The posterior communicating arteries are   hypoplastic/absent.     POSTERIOR CIRCULATION: Occlusion of the right posterior cerebral artery origin. No aneurysm or high flow vascular malformation. Balanced vertebrobasilar circulation. The basilar artery is unremarkable and gives rise to patent  superior cerebellar and   posterior cerebral arteries.       Impression    IMPRESSION:    HEAD MRI:  1. Acute ischemia involving the right posterior inferior cerebellar and right posterior cerebral artery territories without hemorrhagic conversion.     HEAD MRA:   1. Redemonstrated right posterior cerebral artery origin occlusion.    2. Right 3 mm ICA terminus aneurysm.    - - - - - - - - - - - - - - - - - - - - - - - - - - - - - - - - - - - - - - - - - - - - - - - - - - - - - - - - - - - - - - - - - - - - - - - - - - - -   The results above were discussed with HARVINDER SCHMITT on 10/18/2022 8:33 PM by Dr. Keyon Garza.  - - - - - - - - - - - - - - - - - - - - - - - - - - - - - - - - - - - - - - - - - - - - - - - - - - - - - - - - - - - - - - - - - - - - - - - - - - - -     Echocardiogram Complete w Bubble Study - For age < 60 yrs     Value    LVEF  45%    Narrative    [id number]  [id number]  [id number]  [id number]^JOHNNIE^MARIA ELENA^MADDY     [facility]  Echocardiography Laboratory  [address] [place] [address]     Name: HARVINDER SCHMITT  MRN: [id number]  : 1945  Study Date: 10/11/2022 10:38 AM  Age: 77 yrs  Gender: Female  Patient Location: [facility]  Reason For Study: Cerebrovascular Incident  Ordering Physician: MARIA ELENA BLAIR  Referring Physician: ELKIN SWAN     BSA: 2.1 m2  Height: 64 in  Weight: 230 lb  HR: 116  BP: 119/88 mmHg  ______________________________________________________________________________  Procedure  Complete Portable Bubble Echo Adult. [place] (NDC #3048-3470) given  intravenously.  ______________________________________________________________________________  Interpretation Summary     Technically challenging study due to patient body habitus, even with the use  of contrast imaging.     Left ventricular systolic function is mild to moderately reduced. The visual  ejection fraction is estimated at 45%.  With rapid atrial fibrillation, the visual  approximation of left ventricular  systolic function may be falsely decreased.  Regional wall motion abnormalities cannot be excluded due to limited  visualization, however hypokinesis appears global.  Right ventricular global function is normal.  No clear evidence of interatrial shunt on limited assessment with color  Doppler. A contrast injection (Bubble Study) was performed that was negative  for flow across the interatrial septum.  There is mild-moderate global hypokinesia of the left ventricle.  The ascending aorta is Mildly dilated. Max diameter of the visualized portion  3.9 cm.     There are no prior studies available for comparison.  ______________________________________________________________________________  Left Ventricle  The left ventricle is normal in size. There is normal left ventricular wall  thickness. Left ventricular systolic function is mild to moderately reduced.  The visual ejection fraction is estimated at 45%. With rapid atrial  fibrillation, the visual approximation of left ventricular systolic function  may be falsely decreased. Left ventricular diastolic function is not  assessable. There is mild-moderate global hypokinesia of the left ventricle.  Regional wall motion abnormalities cannot be excluded due to limited  visualization.     Right Ventricle  The right ventricle is normal size. The right ventricular systolic function is  normal.     Atria  Normal left atrial size. Right atrial size is normal. No clear evidence of  interatrial shunt on limited assessment with color Doppler. A contrast  injection (Bubble Study) was performed that was negative for flow across the  interatrial septum. A Valsalva maneuver was inadequate.     Mitral Valve  There is mild (1+) mitral regurgitation.     Tricuspid Valve  There is trace tricuspid regurgitation. Right ventricular systolic pressure  could not be approximated due to inadequate tricuspid regurgitation.     Aortic Valve  The aortic valve is  trileaflet with aortic valve sclerosis.     Pulmonic Valve  The pulmonic valve is not well seen, but is grossly normal.     Vessels  The aortic root is normal size. The ascending aorta is Mildly dilated. Max  diameter of the visualized portion 3.9 cm. The inferior vena cava was normal  in size with preserved respiratory variability.     ______________________________________________________________________________  MMode/2D Measurements & Calculations  IVSd: 1.4 cm  LVIDd: 4.4 cm  LVIDs: 3.7 cm  LVPWd: 1.1 cm  FS: 15.8 %  LV mass(C)d: 203.7 grams  LV mass(C)dI: 98.1 grams/m2  Ao root diam: 3.0 cm  LA dimension: 4.3 cm  asc Aorta Diam: 3.9 cm     LA/Ao: 1.4  LA Volume (BP): 91.0 ml  LA Volume Index (BP): 43.8 ml/m2  RWT: 0.51     Doppler Measurements & Calculations  MV E max serge: 107.4 cm/sec  MV dec time: 0.14 sec  PA acc time: 0.11 sec  E/E' av.7     Lateral E/e': 11.7  Medial E/e': 17.8     ______________________________________________________________________________  Report approved by: Fabiola Cosme 10/11/2022 03:22 PM               Allergies   Allergies   Allergen Reactions     Alkylamines Other (See Comments)     Comment: Weakness tingling arms and legs, Description:      Sudafed [Pseudoephedrine]      Legs and arms get weak

## 2022-10-20 NOTE — PLAN OF CARE
IP hospice care. Pt remains mute, does not follow commands. Turn/repo done. Given morphine and haldol PRN for agitation. Purewick in place and making urine. Family at bedside.

## 2022-10-20 NOTE — PROGRESS NOTES
Hospitalist cross cover    Notified by GIP RN that rounding provider unable to complete discharge/readmit orders.  Chart review indicates she is now comfort care.  Discussed with RN and patient is currently comfortable, all needed meds are currently available.  Discharge/readmit orders completed and notified floor RN.  Will defer discharge summary and GIP H&P to rounding provider, Dr. Ho.

## 2022-10-20 NOTE — PLAN OF CARE
[id number]  Comfort cares maintained, pt calm and alert. Repositioned as needed for comfort. Blanchable redness to coccyx, has pulsate mattress. A2 lift, t/r. PRN IV morphine 1mg given x1. Pt appears to be resting comfortably. Family member at bedside. R midline WDL.

## 2022-10-20 NOTE — PHARMACY-ADMISSION MEDICATION HISTORY
Pharmacy Medication History  Admission medication history interview status for the 10/20/2022  admission is complete. See EPIC admission navigator for prior to admission medications     Location of Interview: Outside patient room but on unit  Medication history sources: Patient admitted to [facility] 10/9-10/20; now transitioned to inpatient hospice, utilized PTA medrec completed by [facility] 10/9    Significant changes made to the medication list:    In the past week, patient estimated taking medication this percent of the time:     Additional medication history information:     Medication reconciliation completed by provider prior to medication history? No    Time spent in this activity: 5 minutes    Prior to Admission medications    Medication Sig Last Dose Taking? Auth Provider Long Term End Date   acetaminophen (TYLENOL) 325 MG tablet Take 2 tablets (650 mg) by mouth every 6 hours as needed for mild pain or other (and adjunct with moderate or severe pain or per patient request)  Yes Stella Stevenson PA-C     clotrimazole (LOTRIMIN) 1 % external cream Apply topically 2 times daily as needed (yeast rash)  Yes Georgiana Joy MD     clotrimazole (LOTRIMIN) 1 % external cream Apply topically 2 times daily Under breasts and in groin areas  Yes Georgiana Joy MD     diltiazem (CARDIZEM) 30 MG tablet Take 1 tablet (30 mg) by mouth 2 times daily  Yes Georgiana Joy MD Yes    dulaglutide (TRULICITY) 1.5 MG/0.5ML pen Inject 1.5 mg Subcutaneous every 7 days  Yes Georgiana Joy MD     glipiZIDE (GLUCOTROL XL) 10 MG 24 hr tablet Take 1 tablet (10 mg) by mouth daily (with breakfast)  Yes Georgiana Joy MD Yes    lisinopril (ZESTRIL) 5 MG tablet Take 1 tablet (5 mg) by mouth daily  Yes Georgiana Joy MD Yes    metoprolol tartrate (LOPRESSOR) 100 MG tablet Take 1 tablet (100 mg) by mouth 2 times daily  Yes Georgiana Joy MD Yes    pravastatin (PRAVACHOL) 40 MG tablet Take 1 tablet (40 mg) by mouth daily   Yes Georgiana Joy MD Yes    rivaroxaban ANTICOAGULANT (XARELTO ANTICOAGULANT) 20 MG TABS tablet Take 1 tablet (20 mg) by mouth daily (with dinner)  Yes Georgiana Joy MD Yes    ASPIRIN NOT PRESCRIBED (INTENTIONAL) Please choose reason not prescribed, below  Patient not taking: Reported on 10/5/2022   Georgiana Joy MD     blood glucose (NO BRAND SPECIFIED) lancets standard Use to test blood sugar 2 times daily or as directed.   Georgiana Joy MD     blood glucose (NO BRAND SPECIFIED) test strip Use to test blood sugar 2 times daily or as directed.   Georgiana Joy MD     blood glucose monitoring (NO BRAND SPECIFIED) meter device kit Use to test blood sugar 2 times daily or as directed.   Georgiana Joy MD     insulin pen needle (32G X 6 MM) 32G X 6 MM miscellaneous Use as directed.   Deanna Motta APRN CNP     melatonin 5 MG tablet Take 1 tablet (5 mg) by mouth nightly as needed for sleep   Ai Tesfaye Mai, MD         The information provided in this note is only as accurate as the sources available at the time of update(s)

## 2022-10-21 NOTE — PLAN OF CARE
Goal Outcome Evaluation: Inpatient hospice    Patient on hospice care. Presents withdrawn and non speaking. Been sleeping for most of shift. Heart rate still sounds tachycardic and irregular. Breathing is slowed with periods of apnea. Patients family at bedside.

## 2022-10-21 NOTE — PLAN OF CARE
Goal Outcome Evaluation:     Patient comatose. Apneic episodes observed. Restless at times; PRN morphine given w/ good effect. Oral cares completed. Spontaneous reflexes noted to RUE & RLE. +2 edema to BLE. Skin dusky, cyanotic. Turn & repo q2 hrs. Midline tp RUE; patent, no concerns noted. Family at bedside. Hospice following.

## 2022-10-21 NOTE — CONSULTS
[facility]    Progress Note - [facility]    ______________________________________________________________________    [facility] 24/7 Contact Number: [phone]    - Providers: Please contact [facility] with changes in orders or clinical plan of care   - Nursing: Please contact [facility] with significant changes in patient condition  ______________________________________________________________________        Plan of Care Discussed with the Following:   - Nurse: Jailyn  - Hospitalist/Rounding Provider: Dr. Lito Rabago's Family/Preferred Contact: Velvet  - Hospice Provider: Dr. Kip Bruner  Summary of Visit (includes assessment, medications and any new orders):   Pt continues to meet criteria for GIP status based on below.    New medication recommendations per Dr. Kip Bruner for non-verbal signs of pain/discomfort/agitation:    MS 20mg q4H scheduled SL  MS 10-20mg q1H PRN for pain/SOB    Family is in agreement with medication changes.  Emotional and grief support provided.    Please call cell listed below with any questions       ALESHIA Rojas, RN  Clinical Nurse Liaison I [facility]  Cell: [phone]  [facility]  Office: [phone]  Email [email or web address]

## 2022-10-21 NOTE — PROGRESS NOTES
[facility]    Hospitalist Progress Note    Assessment & Plan   Hima Griffiths is a 77 year old female who is being transitioned to inpatient hospice on 10/20/2022. Please see the discharge summary from the same date for more details of her hospital course.     Hospice status  Transitioned to Hospice 10/19/22. GIP status. Comfortable on exam on 10/20, receiving PRN morphine. Family at bedside.  - Hospice RN following  - Hospice meds ordered - adjust as needed              -Atropine ophthalmic every 4 hours              -Cogentin 1 mg 3 times daily as needed              -Robinul IV every 4 hours as needed              -Haldol 0.5-1 mg IV every hour as needed for agitation              -Ativan high concentration solution 0.5-1 mg every 3 hours as needed, or IV 0.5-1 mg IV every hour as needed for anxiety              -Morphine IV 1 to 2 mg every hour as needed              -Roxanol dose increased to 20 mg every 4 hours and added prn               -Zyprexa 2.5-5 mg ODT every 6 hours as needed for agitation  - Family support     Hospital Problem List:  Acute ischemic right cerebellar stroke suspect cardioembolic.   Acute metabolic encephalopathy, multifactorial  Moderate malnutrition in the setting of acute illness  Physical deconditioning from medical illness, senile frailty.  Pressure injury of left buttock, stage 1, chronic, present on admission.  Concern for cognitive impairment at baseline  New acute systolic congestive heart failure exacerbation  Acute hypoxic respiratory failure dt CHF: Resolved  Hypertension  Dyslipidemia  Chronic severe bilateral LE edema  Atrial fibrillation with RVR  Intermittent lactic acidosis  Enterobacter UTI  Hyperchloremic hypernatremia likely from poor intake, iatrogenic .  Stage III CKD  DM II           Code Status: No CPR- Do NOT Intubate     Disposition: Expected discharge : unsure now  Clinically Significant Risk Factors                      # DMII: A1C  "= 7.2 % (Ref range: 0.0 - 5.6 %) within past 3 months, PRESENT ON ADMISSION  # Obesity: Estimated body mass index is 32.45 kg/m  as calculated from the following:    Height as of 10/11/22: 1.676 m (5' 6\").    Weight as of 10/17/22: 91.2 kg (201 lb 1 oz)., PRESENT ON ADMISSION         Marisol Morse MD  Text Page   (7am to 6pm)    Interval History   Sleepy and calm, concern was there about her having pain at times.narcotics adjusted, discussed with  Hospice team and family.    -Data reviewed today: I reviewed all new labs and imaging results over the last 24 hours  .Physical Exam             Resp: 18        There were no vitals filed for this visit.  Vital Signs with Ranges  Resp:  [18-20] 18  I/O last 3 completed shifts:  In: -   Out: 60 [Urine:60]    Constitutional: sleepy  Respiratory:  no crackles or wheezing  Cardiovascular: Regular rate and rhythm  GI:  non-distended, non-tender  Skin/Integumen: No rashes, no cyanosis, no edema  Neuro : sleepy    Medications       morphine sulfate  20 mg Oral 6x Daily     sodium chloride (PF)  10 mL Intracatheter Q8H     sodium chloride (PF)  3 mL Intracatheter Q8H       Data   Recent Labs   Lab 10/19/22  0740 10/19/22  0527 10/19/22  0234 10/18/22  0807 10/18/22  0526 10/17/22  0912 10/17/22  0544   WBC  --  9.8  --   --  10.7  --  11.9*   HGB  --  14.9  --   --  15.2  --  15.7   MCV  --  107*  --   --  109*  --  106*   PLT  --  203  --   --  226  --  233   NA  --  150*  --   --  149*  --  146*   POTASSIUM  --  4.0  --   --  4.0  --  3.9   CHLORIDE  --  115*  --   --  113*  --  109   CO2  --  31  --   --  28  --  31   BUN  --  39*  --   --  41*  --  44*   CR  --  1.04  --   --  1.03  --  1.06*   ANIONGAP  --  4  --   --  8  --  6   EZEKIEL  --  9.5  --   --  9.7  --  9.9   * 189* 98   < > 182*   < > 203*   ALBUMIN  --  3.0*  --   --  3.0*  --   --    PROTTOTAL  --  7.5  --   --  7.6  --   --    BILITOTAL  --  0.7  --   --  0.7  --   --    ALKPHOS  --  88  --   --  87  --   " --    ALT  --  40  --   --  36  --   --    AST  --  44  --   --  42  --   --     < > = values in this interval not displayed.     Recent Labs   Lab 10/19/22  0740 10/19/22  0527 10/19/22  0234 10/18/22  2108 10/18/22  1651   * 189* 98 118* 153*       Imaging:   No results found for this or any previous visit (from the past 24 hour(s)).

## 2022-10-21 NOTE — PLAN OF CARE
Physical Therapy Discharge Summary    Reason for therapy discharge:    Change in medical status.    Progress towards therapy goal(s). See goals on Care Plan in Epic electronic health record for goal details.  Goals not met.  Barriers to achieving goals:   Patient transitioned to inpatient hospice.    Therapy recommendation(s):    No further therapy is recommended.    Goal Outcome Evaluation:

## 2022-10-21 NOTE — PROGRESS NOTES
"SPIRITUAL HEALTH SERVICES Progress Note     SH  73     Saw pt Hima morley [facility].     Illness Narrative - Pt is admitted to general inpatient hospice and the family notes that they believe the patient seems comfortable at this moment.     Distress -  Primary area of distress for the family is surrounding the pt's expected death. No other acute areas of distress were noted.     Coping - Family is present and supportive of the patient at the bedside. In reflection, family noted that another  had visited with them yesterday and that they had appreciated the supportive presence of . In addition to this, someone came this morning and spent time \"singing hymns\" which was meaningful for the pt and family.      Meaning-Making - I affirmed the difficulty of this admission for the family and meaning-making was not assessed during this visit.     Plan -  is available to support this patient as needed and I will continue to follow while on unit 73. Please consult if any immediate needs arise.     ------  Jacob Robin M.Div.  Resident   Pager: [phone]   "

## 2022-10-21 NOTE — PLAN OF CARE
Pt now IP hospice. Apneic episodes increasing. PRN morphine available. RUE midline. T/R q2. Family at bedside overnight.

## 2022-10-22 NOTE — PLAN OF CARE
Pt. Pronouced dead at 0242 by overnight provider. Paper work filled out and sent with security. Pt. Transferred off the unit by security at about 0530am.

## 2022-10-22 NOTE — PROGRESS NOTES
House SHIV Death Pronouncement    Called by nursing staff to pronounce Hima Griffiths dead.    Physical Exam: Unresponsive to noxious stimuli, Spontaneous respirations absent, Breath sounds absent, Carotid pulse absent, Heart sounds absent, Pupillary light reflex absent and Corneal blink reflex absent    Patient was pronounced dead at 2:42 AM, 2022.    No data filed       Active Problems:    * No active hospital problems. *       Infectious disease present?: NO    Communicable disease present? (examples: HIV, chicken pox, TB, Ebola, CJD) :  NO    Multi-drug resistant organism present? (example: MRSA): NO    Please consider an autopsy if any of the following exist:  NO Unexpected or unexplained death during or following any dental, medical, or surgical diagnostic treatment procedures.   NO Death of mother at or up to seven days after delivery.     NO All  and pediatric deaths.     NO Death where the cause is sufficiently obscure to delay completion of the death certificate.   NO Deaths in which autopsy would confirm a suspected illness/condition that would affect surviving family members or recipients of transplanted organs.     The following deaths must be reported to the 's Office:  NO A death that may be due entirely or in part to any factors other than natural disease (recent surgery, recent trauma, suspected abuse/neglect).   NO A death that may be an accident, suicide, or homicide.     NO Any sudden, unexpected death in which there is no prior history of significant heart disease or any other condition associated with sudden death.   NO A death under suspicious, unusual, or unexpected circumstances.    NO Any death which is apparently due to natural causes but in which the  does not have a personal physician familiar with the patient s medical history, social, or environmental situation or the circumstances of the terminal event.   NO Any death apparently due to Sudden  Infant Death Syndrome.     NO Deaths that occur during, in association with, or as consequences of a diagnostic, therapeutic, or anesthetic procedure.   NO Any death in which a fracture of a major bone has occurred within the past (6) six months.   NO A death of persons note seen by their physician within 120 days of demise.     NO Any death in which the  was an inmate of a public institution or was in the custody of Law Enforcement personnel.   NO  All unexpected deaths of children   NO Solid organ donors   NO Unidentified bodies   YES Deaths of persons whose bodies are to be cremated or otherwise disposed of so that the bodies will later be unavailable for examination;   NO Deaths unattended by a physician outside of a licensed healthcare facility or licensed residential hospice program   NO Deaths occurring within 24 hours of arrival to a health care facility if death is unexpected.    NO Deaths associated with the decedent s employment.   NO Deaths attributed to acts of terrorism.   NO Any death in which there is uncertainty as to whether it is a medical examiner s care should be discussed with the medical investigator.      Death Certificate to be directed to Dr. Marisol Morse, hospitalist    Body disposition: Autopsy was discussed with family member:  Son and Daughter in person.  Permission for autopsy was declined.  Body released to the morgue.    ESTHER Langley, [facility] SHIV

## 2022-10-24 NOTE — DISCHARGE SUMMARY
[facility]    Discharge Summary  Hospitalist    Date of Admission:  10/20/2022  Date of Discharge:  10/22/2022  5:40 AM  Discharging Provider: Marisol Morse MD    Discharge Diagnoses   Patient was on hospice care and passed away .    History of Present Illness   Please review admission history and physical.    Hospital Course   Hima Griffiths was admitted on 10/20/2022.  The following problems were addressed during her hospitalization:  Hima Griffiths is a 77 year old female who is being transitioned to inpatient hospice on 10/20/2022. Please see the discharge summary from the same date for more details of her hospital course.     Hospice status  Transitioned to Hospice 10/19/22. GIP status. Comfortable on exam on 10/20, receiving PRN morphine. Family at bedside.  - Hospice RN following  - Hospice meds ordered - adjust as needed              -Atropine ophthalmic every 4 hours              -Cogentin 1 mg 3 times daily as needed              -Robinul IV every 4 hours as needed              -Haldol 0.5-1 mg IV every hour as needed for agitation              -Ativan high concentration solution 0.5-1 mg every 3 hours as needed, or IV 0.5-1 mg IV every hour as needed for anxiety              -Morphine IV 1 to 2 mg every hour as needed              -Roxanol dose increased to 20 mg every 4 hours and added prn               -Zyprexa 2.5-5 mg ODT every 6 hours as needed for agitation  Patient passed away at 2:45 AM on 10/22     Hospital Problem List:  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   Acute metabolic encephalopathy, multifactorial  Moderate malnutrition in the setting of acute illness  Physical deconditioning from medical illness, senile frailty.  Pressure injury of left buttock, stage 1, chronic, present on admission.  Concern for cognitive impairment at baseline  New acute systolic congestive heart failure exacerbation  Acute hypoxic respiratory failure dt CHF:  Resolved  Hypertension  Dyslipidemia  Chronic severe bilateral LE edema  Atrial fibrillation with RVR  Intermittent lactic acidosis  Enterobacter UTI  Hyperchloremic hypernatremia likely from poor intake, iatrogenic .  Stage III CKD  DM II          Marisol Morse MD    Significant Results and Procedures       Pending Results     Unresulted Labs Ordered in the Past 30 Days of this Admission     No orders found from 2022 to 10/21/2022.          Code Status   Comfort Care       Primary Care Physician   Georgiana Joy    Physical Exam                    There were no vitals filed for this visit.  Vital Signs with Ranges     No intake/output data recorded.    The patient was examined on the day of discharge.    Discharge Disposition   Patient  during this admission  Condition at discharge:     Consultations This Hospital Stay   CARE MANAGEMENT / SOCIAL WORK IP CONSULT  PHARMACY IP CONSULT  [facility] INPATIENT HOSPICE ADULT CONSULT    Time Spent on this Encounter   I, Marisol Morse MD, discharged this patient today but I did not personally see the patient today and will not be billing for the patient's discharge.    Discharge Orders   No discharge procedures on file.  Discharge Medications   Discharge Medication List as of 10/22/2022  5:44 AM      CONTINUE these medications which have NOT CHANGED    Details   acetaminophen (TYLENOL) 325 MG tablet Take 2 tablets (650 mg) by mouth every 6 hours as needed for mild pain or other (and adjunct with moderate or severe pain or per patient request), Disp-30 tablet, R-0, Transitional      !! clotrimazole (LOTRIMIN) 1 % external cream Apply topically 2 times daily as needed (yeast rash)Disp-113 g, [id number]      !! clotrimazole (LOTRIMIN) 1 % external cream Apply topically 2 times daily Under breasts and in groin areasDisp-113 g, [id number]      diltiazem (CARDIZEM) 30 MG tablet Take 1 tablet (30 mg) by mouth 2 times daily, Disp-180 tablet, R-0,  E-Prescribe      dulaglutide (TRULICITY) 1.5 MG/0.5ML pen Inject 1.5 mg Subcutaneous every 7 days, Disp-8 mL, R-3, E-Prescribe      glipiZIDE (GLUCOTROL XL) 10 MG 24 hr tablet Take 1 tablet (10 mg) by mouth daily (with breakfast), Disp-30 tablet, R-1, E-Prescribe      lisinopril (ZESTRIL) 5 MG tablet Take 1 tablet (5 mg) by mouth daily, Disp-90 tablet, R-0, E-Prescribe      metoprolol tartrate (LOPRESSOR) 100 MG tablet Take 1 tablet (100 mg) by mouth 2 times daily, Disp-180 tablet, R-3, E-Prescribe      pravastatin (PRAVACHOL) 40 MG tablet Take 1 tablet (40 mg) by mouth daily, Disp-90 tablet, R-0, E-Prescribe      rivaroxaban ANTICOAGULANT (XARELTO ANTICOAGULANT) 20 MG TABS tablet Take 1 tablet (20 mg) by mouth daily (with dinner), Disp-90 tablet, R-1, E-Prescribe      ASPIRIN NOT PRESCRIBED (INTENTIONAL) Reason ASA was Not Prescribed: Current anticoagulant therapy (warfarin/enoxaparin)Historical      blood glucose (NO BRAND SPECIFIED) lancets standard Use to test blood sugar 2 times daily or as directed.Disp-100 each,[id number]      blood glucose (NO BRAND SPECIFIED) test strip Use to test blood sugar 2 times daily or as directed., Disp-200 each,R-11, E-Prescribe      blood glucose monitoring (NO BRAND SPECIFIED) meter device kit Use to test blood sugar 2 times daily or as directed.Disp-1 kit,[id number]      insulin pen needle (32G X 6 MM) 32G X 6 MM miscellaneous Use as directed.Disp-100 each, [id number]-[id number]      melatonin 5 MG tablet Take 1 tablet (5 mg) by mouth nightly as needed for sleep, Historical       !! - Potential duplicate medications found. Please discuss with provider.        Allergies   Allergies   Allergen Reactions     Alkylamines Other (See Comments)     Comment: Weakness tingling arms and legs, Description:      Sudafed [Pseudoephedrine]      Legs and arms get weak     Data   Most Recent 3 CBC's:Recent Labs   Lab Test 10/19/22  0527 10/18/22  0526 10/17/22  0544   WBC 9.8 10.7 11.9*    HGB 14.9 15.2 15.7   * 109* 106*    226 233      Most Recent 3 BMP's:  Recent Labs   Lab Test 10/19/22  0740 10/19/22  0527 10/19/22  0234 10/18/22  0807 10/18/22  0526 10/17/22  0912 10/17/22  0544   NA  --  150*  --   --  149*  --  146*   POTASSIUM  --  4.0  --   --  4.0  --  3.9   CHLORIDE  --  115*  --   --  113*  --  109   CO2  --  31  --   --  28  --  31   BUN  --  39*  --   --  41*  --  44*   CR  --  1.04  --   --  1.03  --  1.06*   ANIONGAP  --  4  --   --  8  --  6   EZEKIEL  --  9.5  --   --  9.7  --  9.9   * 189* 98   < > 182*   < > 203*    < > = values in this interval not displayed.     Most Recent 2 LFT's:  Recent Labs   Lab Test 10/19/22  0527 10/18/22  0526   AST 44 42   ALT 40 36   ALKPHOS 88 87   BILITOTAL 0.7 0.7     Most Recent INR's and Anticoagulation Dosing History:  Anticoagulation Dose History     Recent Dosing and Labs Latest Ref Rng & Units 10/9/2022    INR 0.85 - 1.15 0.97        Most Recent 3 Troponin's:No lab results found.  Most Recent Cholesterol Panel:  Recent Labs   Lab Test 10/10/22  0823   CHOL 132   LDL 55   HDL 53   TRIG 120     Most Recent 6 Bacteria Isolates From Any Culture (See EPIC Reports for Culture Details):No lab results found.  Most Recent TSH, T4 and A1c Labs:  Recent Labs   Lab Test 10/17/22  0544 09/21/22  1628   TSH 1.19  --    A1C  --  7.2*     Results for orders placed or performed during the hospital encounter of 10/09/22   XR Chest Port 1 View    Narrative    XR CHEST PORT 1 VIEW  10/10/2022 2:05 PM       INDICATION: hypoxia  COMPARISON: 6/16/2022       Impression    IMPRESSION: Cardiomegaly. Increased pulmonary vascular congestion. No  focal infiltrate or definite pleural effusion.    JOSIANE EVANS MD         SYSTEM ID:  [id number]   CT Head w/o Contrast    Narrative    CT SCAN OF THE HEAD WITHOUT CONTRAST   10/10/2022 1:49 PM     HISTORY: Neurological change, decreased mentation.    TECHNIQUE: Axial images of the head and coronal  reformations without  IV contrast material. Radiation dose for this scan was reduced using  automated exposure control, adjustment of the mA and/or kV according  to patient size, or iterative reconstruction technique.    COMPARISON: Head MRI 10/9/2022, head CT 6/16/2022      Impression    IMPRESSION: Acute right cerebellar infarcts. No evidence of hemorrhage  or hydrocephalus. Old left cerebellar infarcts. Multiple old left  cerebral hemisphere infarcts. Volume loss and white matter  hypoattenuation likely represent chronic small vessel ischemic change.  No acute osseous abnormality.    DANIKA DE JESUS MD         SYSTEM ID:  [id number]   CT Head w/o Contrast    Narrative    EXAM: CT HEAD W/O CONTRAST  LOCATION: [facility]  DATE/TIME: 10/11/2022 2:28 AM    INDICATION: eval for any hemorrhagic conversion prior to restarting DOAC  COMPARISON: CT head 10/10/2022  TECHNIQUE: Routine CT Head without IV contrast. Multiplanar reformats. Dose reduction techniques were used.    FINDINGS:  Evolving right cerebellar infarcts without evidence of space-occupying hemorrhage. Mild to moderate volume loss and presumed chronic small vessel ischemia are stable. Small chronic cortical infarct in the left frontal operculum noted. Remainder   unchanged.      Impression    IMPRESSION:  1.  Evolving right cerebellar infarcts without definite hemorrhage.    2.  Remainder unchanged.   CT Head w/o Contrast    Narrative    CT OF THE HEAD WITHOUT CONTRAST 10/11/2022 2:51 PM     COMPARISON: Head CT 10/11/2022 at 0221 hours.    HISTORY: Question left upper extremity drift, assess for hemorrhage,  new infarct or other acute intracranial change.    TECHNIQUE: 5 mm thick axial CT images of the head were acquired  without IV contrast material.    FINDINGS: Evolving subacute ischemic infarct in the right cerebellar  hemisphere again noted. No definite new infarcts.    There is moderate diffuse cerebral volume loss. There are  extensive  confluent areas of decreased density in the cerebral white matter  bilaterally that are consistent with sequela of chronic small vessel  ischemic disease. The ventricles and basal cisterns are within normal  limits in configuration given the degree of cerebral volume loss.   There is no midline shift. There are no extra-axial fluid collections.      No intracranial hemorrhage or mass.    The visualized paranasal sinuses are well-aerated. There is no  mastoiditis. There are no fractures of the visualized bones.       Impression    IMPRESSION:   1. Evolving subacute ischemic infarct in the right cerebellar  hemisphere again noted.  2. No definite new infarcts.  3. Diffuse cerebral volume loss and cerebral white matter changes  consistent with chronic small vessel ischemic disease.       Radiation dose for this scan was reduced using automated exposure  control, adjustment of the mA and/or kV according to patient size, or  iterative reconstruction technique.    TRISTAN COSTA MD         SYSTEM ID:  [id number]   CT Head w/o Contrast    Narrative    CT SCAN OF THE HEAD WITHOUT CONTRAST   10/18/2022 11:12 AM     HISTORY: Stroke with worsening mental status.    TECHNIQUE:  Axial images of the head and coronal reformations without  IV contrast material. Radiation dose for this scan was reduced using  automated exposure control, adjustment of the mA and/or kV according  to patient size, or iterative reconstruction technique.    COMPARISON: Head CT 10/11/2022, head MRI 10/9/2022.    FINDINGS:  Recent right cerebellar infarct, not appreciably changed.  Recent right posterior cerebral artery distribution corpus callosum  and right occipital lobe infarct, increased in volume compared to the  prior exam. Right thalamic lacunar infarcts are present which appear  slightly more conspicuous compared to the prior exam. No evidence of  hemorrhage or significant mass effect.    Moderate generalized parenchymal volume loss is  present with patchy  areas of white matter hypoattenuation most likely representing chronic  small vessel ischemic change. Old left superior frontal gyrus infarct.  Old left superior parietal lobule infarct. Old left cerebellar  infarct. Old left frontal periventricular infarcts.    No acute osseous abnormality. Mild paranasal sinus mucosal thickening.  Bilateral lens replacements. Presumed cerumen within the bilateral  external auditory canals.      Impression    IMPRESSION:   1. New/worsening infarcts involving the right posterior cerebral  artery distribution.  2. Recent right cerebellar infarct, not appreciably changed.  3. No evidence of hemorrhage or significant mass effect.  4. Chronic changes and multiple old infarcts.    DANIKA DE JESUS MD         SYSTEM ID:  [id number]   MR Brain w/o Contrast    Narrative    EXAM: MR BRAIN W/O CONTRAST, MRA BRAIN ([place]) W/O CONTRAST  LOCATION: [facility]  DATE/TIME: 10/18/2022 7:23 PM    INDICATION: Weakness. Dizziness.  COMPARISON: MRI brain/MRA head dated 10/9/2022  TECHNIQUE:  1) Routine multiplanar multisequence head MRI without intravenous contrast.  2) 3D time-of-flight head MRA without intravenous contrast.    FINDINGS:  HEAD MRI:  INTRACRANIAL CONTENTS: Restricted diffusion with associated T2/FLAIR signal hyperintensity involving the right posterior inferior cerebellar and right posterior cerebral artery territories suspicious for acute ischemia without hemorrhagic conversion. No   mass or extra-axial fluid collections. Confluent nonspecific T2/FLAIR hyperintensities within the cerebral white matter most consistent with sequelae of advanced chronic microangiopathy. Moderate cerebral volume loss without hydrocephalus. Patent basal   cisterns. Normal position of the cerebellar tonsils.     SELLA: No abnormality accounting for technique.    OSSEOUS STRUCTURES/SOFT TISSUES: No aggressive osseous lesion involving the calvarium or  visualized upper cervical spine. Maintained major intracranial vascular flow voids.    ORBITS: No significant orbital abnormality accounting for technique.     SINUSES/MASTOIDS: No paranasal sinus mucosal disease. No middle ear or mastoid effusion.     HEAD MRA:  ANTERIOR CIRCULATION: Right ICA terminus aneurysm measuring up to 3 mm. No stenosis/occlusion. Hypoplastic A1 segment of the right anterior cerebral artery. The anterior communicating artery is patent. The posterior communicating arteries are   hypoplastic/absent.     POSTERIOR CIRCULATION: Occlusion of the right posterior cerebral artery origin. No aneurysm or high flow vascular malformation. Balanced vertebrobasilar circulation. The basilar artery is unremarkable and gives rise to patent superior cerebellar and   posterior cerebral arteries.       Impression    IMPRESSION:    HEAD MRI:  1. Acute ischemia involving the right posterior inferior cerebellar and right posterior cerebral artery territories without hemorrhagic conversion.     HEAD MRA:   1. Redemonstrated right posterior cerebral artery origin occlusion.    2. Right 3 mm ICA terminus aneurysm.    - - - - - - - - - - - - - - - - - - - - - - - - - - - - - - - - - - - - - - - - - - - - - - - - - - - - - - - - - - - - - - - - - - - - - - - - - - - -   The results above were discussed with HARVINDER SCHMITT on 10/18/2022 8:33 PM by Dr. Keyon Garza.  - - - - - - - - - - - - - - - - - - - - - - - - - - - - - - - - - - - - - - - - - - - - - - - - - - - - - - - - - - - - - - - - - - - - - - - - - - - -     MRA Brain ([place]) wo Contrast    Narrative    EXAM: MR BRAIN W/O CONTRAST, MRA BRAIN ([place] W/O CONTRAST  LOCATION: [facility]  DATE/TIME: 10/18/2022 7:23 PM    INDICATION: Weakness. Dizziness.  COMPARISON: MRI brain/MRA head dated 10/9/2022  TECHNIQUE:  1) Routine multiplanar multisequence head MRI without intravenous contrast.  2) 3D time-of-flight  head MRA without intravenous contrast.    FINDINGS:  HEAD MRI:  INTRACRANIAL CONTENTS: Restricted diffusion with associated T2/FLAIR signal hyperintensity involving the right posterior inferior cerebellar and right posterior cerebral artery territories suspicious for acute ischemia without hemorrhagic conversion. No   mass or extra-axial fluid collections. Confluent nonspecific T2/FLAIR hyperintensities within the cerebral white matter most consistent with sequelae of advanced chronic microangiopathy. Moderate cerebral volume loss without hydrocephalus. Patent basal   cisterns. Normal position of the cerebellar tonsils.     SELLA: No abnormality accounting for technique.    OSSEOUS STRUCTURES/SOFT TISSUES: No aggressive osseous lesion involving the calvarium or visualized upper cervical spine. Maintained major intracranial vascular flow voids.    ORBITS: No significant orbital abnormality accounting for technique.     SINUSES/MASTOIDS: No paranasal sinus mucosal disease. No middle ear or mastoid effusion.     HEAD MRA:  ANTERIOR CIRCULATION: Right ICA terminus aneurysm measuring up to 3 mm. No stenosis/occlusion. Hypoplastic A1 segment of the right anterior cerebral artery. The anterior communicating artery is patent. The posterior communicating arteries are   hypoplastic/absent.     POSTERIOR CIRCULATION: Occlusion of the right posterior cerebral artery origin. No aneurysm or high flow vascular malformation. Balanced vertebrobasilar circulation. The basilar artery is unremarkable and gives rise to patent superior cerebellar and   posterior cerebral arteries.       Impression    IMPRESSION:    HEAD MRI:  1. Acute ischemia involving the right posterior inferior cerebellar and right posterior cerebral artery territories without hemorrhagic conversion.     HEAD MRA:   1. Redemonstrated right posterior cerebral artery origin occlusion.    2. Right 3 mm ICA terminus aneurysm.    - - - - - - - - - - - - - - - - - - - - -  - - - - - - - - - - - - - - - - - - - - - - - - - - - - - - - - - - - - - - - - - - - - - - - - - - - - - - -   The results above were discussed with HARVINDER SCHMITT on 10/18/2022 8:33 PM by Dr. Keyon aGrza.  - - - - - - - - - - - - - - - - - - - - - - - - - - - - - - - - - - - - - - - - - - - - - - - - - - - - - - - - - - - - - - - - - - - - - - - - - - - -     Echocardiogram Complete w Bubble Study - For age < 60 yrs     Value    LVEF  45%    Narrative    [id number]  [id number]  [id number]  [id number]^JOHNNIE^MARIA ELENA^MADDY     [facility]  Echocardiography Laboratory  [address] [place] [id number]     Name: CIRILOCLAREHARVINDER ESPITIA  MRN: [id number]  : 1945  Study Date: 10/11/2022 10:38 AM  Age: 77 yrs  Gender: Female  Patient Location: [facility]  Reason For Study: Cerebrovascular Incident  Ordering Physician: MARIA ELENA BLAIR  Referring Physician: ELKIN SWAN     BSA: 2.1 m2  Height: 64 in  Weight: 230 lb  HR: 116  BP: 119/88 mmHg  ______________________________________________________________________________  Procedure  Complete Portable Bubble Echo Adult. [place] (NDC #[id number]) given  intravenously.  ______________________________________________________________________________  Interpretation Summary     Technically challenging study due to patient body habitus, even with the use  of contrast imaging.     Left ventricular systolic function is mild to moderately reduced. The visual  ejection fraction is estimated at 45%.  With rapid atrial fibrillation, the visual approximation of left ventricular  systolic function may be falsely decreased.  Regional wall motion abnormalities cannot be excluded due to limited  visualization, however hypokinesis appears global.  Right ventricular global function is normal.  No clear evidence of interatrial shunt on limited assessment with color  Doppler. A contrast injection (Bubble Study) was performed that was negative  for flow across the  interatrial septum.  There is mild-moderate global hypokinesia of the left ventricle.  The ascending aorta is Mildly dilated. Max diameter of the visualized portion  3.9 cm.     There are no prior studies available for comparison.  ______________________________________________________________________________  Left Ventricle  The left ventricle is normal in size. There is normal left ventricular wall  thickness. Left ventricular systolic function is mild to moderately reduced.  The visual ejection fraction is estimated at 45%. With rapid atrial  fibrillation, the visual approximation of left ventricular systolic function  may be falsely decreased. Left ventricular diastolic function is not  assessable. There is mild-moderate global hypokinesia of the left ventricle.  Regional wall motion abnormalities cannot be excluded due to limited  visualization.     Right Ventricle  The right ventricle is normal size. The right ventricular systolic function is  normal.     Atria  Normal left atrial size. Right atrial size is normal. No clear evidence of  interatrial shunt on limited assessment with color Doppler. A contrast  injection (Bubble Study) was performed that was negative for flow across the  interatrial septum. A Valsalva maneuver was inadequate.     Mitral Valve  There is mild (1+) mitral regurgitation.     Tricuspid Valve  There is trace tricuspid regurgitation. Right ventricular systolic pressure  could not be approximated due to inadequate tricuspid regurgitation.     Aortic Valve  The aortic valve is trileaflet with aortic valve sclerosis.     Pulmonic Valve  The pulmonic valve is not well seen, but is grossly normal.     Vessels  The aortic root is normal size. The ascending aorta is Mildly dilated. Max  diameter of the visualized portion 3.9 cm. The inferior vena cava was normal  in size with preserved respiratory variability.      ______________________________________________________________________________  MMode/2D Measurements & Calculations  IVSd: 1.4 cm  LVIDd: 4.4 cm  LVIDs: 3.7 cm  LVPWd: 1.1 cm  FS: 15.8 %  LV mass(C)d: 203.7 grams  LV mass(C)dI: 98.1 grams/m2  Ao root diam: 3.0 cm  LA dimension: 4.3 cm  asc Aorta Diam: 3.9 cm     LA/Ao: 1.4  LA Volume (BP): 91.0 ml  LA Volume Index (BP): 43.8 ml/m2  RWT: 0.51     Doppler Measurements & Calculations  MV E max serge: 107.4 cm/sec  MV dec time: 0.14 sec  PA acc time: 0.11 sec  E/E' av.7     Lateral E/e': 11.7  Medial E/e': 17.8     ______________________________________________________________________________  Report approved by: Fabiola Cosme 10/11/2022 03:22 PM

## 2023-03-28 NOTE — TELEPHONE ENCOUNTER
XARELTO 20MG           Last Written Prescription Date: 12/12/2016  Last Fill Quantity: 90, # refills: 0    Last Office Visit with [facility], [facility] or [facility] prescribing provider:  2/15/2017   Future Office Visit:       No results found for: WBC  No results found for: RBC  No results found for: HGB  No results found for: HCT  No components found for: MCT  No results found for: MCV  No results found for: MCH  No results found for: MCHC  No results found for: RDW  No results found for: PLT  Lab Results   Component Value Date    AST 17 02/15/2017     Lab Results   Component Value Date    ALT 25 02/15/2017     Creatinine   Date Value Ref Range Status   02/15/2017 0.94 0.52 - 1.04 mg/dL Final   ]     Cryotherapy Text: The wound bed was treated with cryotherapy after the biopsy was performed.

## 2023-09-13 NOTE — PROGRESS NOTES
[facility] GERIATRICS    Chief Complaint   Patient presents with     RECHECK     HPI:  Hima Griffiths is a 77 year old  (1945), who is being seen today for an episodic care visit at: [facility] ([facility]) [[id number]].     Patient was hospitalized at [facility] from June 16 through June 24, 2022 for acute gastroenteritis and UTI.  Patient originally presented with generalized weakness, encephalopathy s/p fall.   Her daughter noticed that she was not answering her phone, so sent a wellness check and they found her on the ground.  Upon admission to ED, EKG showed atrial fibrillation with a heart rate of 98 bpm.  CT head was negative for acute intracranial abnormality.  CT cervical spine showed no acute fracture.  Chest x-ray demonstrated no focal airspace disease or pleural effusion.  CBC was significant for elevated white count of 15.4.  CMP was unremarkable except for hyperglycemia (blood glucose of 479).  TSH was 1.74, high-sensitivity troponin was normal at 12, total CK was 70, and hemoglobin A1c was 8.3%.  Screening for COVID-19, RSV, and influenza was negative.  UA was abnormal showing moderate leukocyte esterase, 54 WBCs, and 7 RBCs.  After admission, she continued to feel weak but nausea, vomiting and diarrhea resolved.  She was resuscitated with IV fluids and started on IV ceftriaxone. Urine culture grew more than 100,000 colonies per mL E. coli and less than 10,000 colonies per mL urogenital cy.  While inpatient, she received 5 days of IV ceftriaxone.  Blood culture NGTD. insulin glargine and prandial NovoLog were initiated in the hospital. Sent to .    Allergies, and PMH/PSH reviewed in [facility] today.  REVIEW OF SYSTEMS:  10 point ROS of systems including Constitutional, Eyes, Respiratory, Cardiovascular, Gastroenterology, Genitourinary, Integumentary, Musculoskeletal, Psychiatric were all negative except for pertinent positives noted in my HPI.    Objective:   /75   " Pulse 72   Temp 97.8  F (36.6  C)   Resp 18   Ht 1.626 m (5' 4\")   Wt 101.6 kg (224 lb)   SpO2 98%   BMI 38.45 kg/m    GENERAL APPEARANCE:  Alert, morbidly obese, cooperative, denies pain  ENT:  Mouth and posterior oropharynx normal, moist mucous membranes, normal hearing acuity  NECK:  No adenopathy,masses or thyromegaly  RESP:  respiratory effort and palpation of chest normal, lungs clear to auscultation , no respiratory distress  CV:  Palpation and auscultation of heart done , peripheral edema 2+ in BLE, irregular rhythm rate controlled A-fib  ABDOMEN:  normal bowel sounds, soft, nontender, no hepatosplenomegaly or other masses  M/S:   Able to move all extremities  SKIN:  Inspection of skin and subcutaneous tissue baseline  NEURO:   No focal deficits  PSYCH:  oriented X 3, memory impaired       Most Recent 3 CBC's:  Recent Labs   Lab Test 06/22/22  0704 06/21/22  0557 06/20/22  0650   WBC 11.5* 12.4* 12.6*   HGB 14.2 14.1 13.6   * 101* 102*    159 153     Most Recent 3 BMP's:  Recent Labs   Lab Test 06/24/22  0911 06/24/22  0834 06/24/22  0250 06/24/22  0242 06/20/22  0758 06/20/22  0650 06/19/22  0746 06/19/22  0535 06/18/22  0826 06/18/22  0520   NA  --   --   --   --   --  141  --  138  --  138   POTASSIUM  --  4.1  --   --   --  3.5  --  3.5  --  3.8   CHLORIDE  --   --   --   --   --  110*  --  110*  --  110*   CO2  --   --   --   --   --  24  --  22  --  23   BUN  --   --   --   --   --  12  --  15  --  19   CR  --   --   --   --   --  0.60  --  0.59  --  0.72   ANIONGAP  --   --   --   --   --  7  --  6  --  5   EZEKIEL  --   --   --   --   --  8.5  --  8.2*  --  8.3*   *  --  234* 463*   < > 192*   < > 178*   < > 162*    < > = values in this interval not displayed.     Most Recent Hemoglobin A1c:  Recent Labs   Lab Test 06/16/22  1415   A1C 8.3*       Assessment/Plan:    (N39.0) Urinary tract infection in elderly patient  Treated with rocephin 5d, no S/S's    (I48.19) Persistent " atrial fibrillation (H)  (Z79.01) Anticoagulated  (I10) Benign hypertension  Continue Cardizem 30 mg twice daily, lisinopril 5 mg daily and metoprolol tartrate 100 mg twice daily.  Continue rivaroxaban 20 mg at dinner.  Monitor blood pressure twice daily    (R52) Pain  Continue Tylenol 650 mg every 6 hours as needed denies pain    (E66.01) BMI > 40.0 (H)    Last BMI 38.5    (E11.8) Type 2 diabetes mellitus with complication, without long-term current use of insulin (H)  Last A1c 8.3 on 6/16, continue Trulicity weekly, glipizide 10 mg daily and NovoLog 5 units with meals.  - 300    (N18.31) Stage 3a chronic kidney disease (H)  Last creatinine 0.60 with GFR >90 on 6/20    Mild leukocytosis, probably due to UTI  Last WBC 11.5 on 6/22    Insomnia  Start melatonin 5 mg at bedtime    HLD  Continue statin    Orders:  Increase blood pressure monitoring, insulin training, melatonin    Electronically signed by: Tim Pineda NP        Aklief Pregnancy And Lactation Text: It is unknown if this medication is safe to use during pregnancy.  It is unknown if this medication is excreted in breast milk.  Breastfeeding women should use the topical cream on the smallest area of the skin for the shortest time needed while breastfeeding.  Do not apply to nipple and areola.